# Patient Record
Sex: FEMALE | Race: WHITE | NOT HISPANIC OR LATINO | URBAN - METROPOLITAN AREA
[De-identification: names, ages, dates, MRNs, and addresses within clinical notes are randomized per-mention and may not be internally consistent; named-entity substitution may affect disease eponyms.]

---

## 2017-07-18 ENCOUNTER — OUTPATIENT (OUTPATIENT)
Dept: OUTPATIENT SERVICES | Facility: HOSPITAL | Age: 82
LOS: 1 days | End: 2017-07-18

## 2017-10-05 ENCOUNTER — OUTPATIENT (OUTPATIENT)
Dept: OUTPATIENT SERVICES | Facility: HOSPITAL | Age: 82
LOS: 1 days | End: 2017-10-05

## 2019-09-06 ENCOUNTER — APPOINTMENT (OUTPATIENT)
Dept: DERMATOLOGY | Facility: CLINIC | Age: 84
End: 2019-09-06
Payer: MEDICARE

## 2019-09-06 VITALS — WEIGHT: 122 LBS | HEIGHT: 64 IN | BODY MASS INDEX: 20.83 KG/M2

## 2019-09-06 DIAGNOSIS — D48.9 NEOPLASM OF UNCERTAIN BEHAVIOR, UNSPECIFIED: ICD-10-CM

## 2019-09-06 DIAGNOSIS — L82.0 INFLAMED SEBORRHEIC KERATOSIS: ICD-10-CM

## 2019-09-06 PROCEDURE — 99203 OFFICE O/P NEW LOW 30 MIN: CPT | Mod: 25

## 2019-09-06 PROCEDURE — 11312 SHAVE SKIN LESION 1.1-2.0 CM: CPT

## 2019-09-06 NOTE — HISTORY OF PRESENT ILLNESS
[de-identified] : Pt. for check of face;  c/o lesion on L cheek, has been treated with cryo multiple times in past, irritated, now recurring

## 2019-09-06 NOTE — PHYSICAL EXAM
[FreeTextEntry3] : Skin examination performed of the face, neck, chest, hands, lower legs;\par The patient is well, alert and oriented, pleasant and cooperative.\par Eyelids, conjunctivae, oral mucosa, digits and nails all normal.  \par No cervical adenopathy.\par \par \par + lentigines and solar damage are present in sun exposed areas; \par \par + inflamed, waxy, keratotic papules; multiple small lesions in cluster, 1.8 cm in diameter;  L lateral cheek

## 2019-09-25 ENCOUNTER — APPOINTMENT (OUTPATIENT)
Dept: ORTHOPEDIC SURGERY | Facility: CLINIC | Age: 84
End: 2019-09-25
Payer: MEDICARE

## 2019-09-25 VITALS
DIASTOLIC BLOOD PRESSURE: 64 MMHG | SYSTOLIC BLOOD PRESSURE: 101 MMHG | BODY MASS INDEX: 20.83 KG/M2 | WEIGHT: 122 LBS | HEIGHT: 64 IN | HEART RATE: 67 BPM

## 2019-09-25 DIAGNOSIS — M70.50 OTHER BURSITIS OF KNEE, UNSPECIFIED KNEE: ICD-10-CM

## 2019-09-25 PROCEDURE — 20610 DRAIN/INJ JOINT/BURSA W/O US: CPT | Mod: RT

## 2019-09-25 PROCEDURE — 99214 OFFICE O/P EST MOD 30 MIN: CPT | Mod: 25

## 2019-09-25 PROCEDURE — 73560 X-RAY EXAM OF KNEE 1 OR 2: CPT | Mod: RT

## 2019-10-03 ENCOUNTER — APPOINTMENT (OUTPATIENT)
Dept: ORTHOPEDIC SURGERY | Facility: CLINIC | Age: 84
End: 2019-10-03
Payer: MEDICARE

## 2019-10-03 VITALS
SYSTOLIC BLOOD PRESSURE: 116 MMHG | WEIGHT: 124 LBS | TEMPERATURE: 97.7 F | DIASTOLIC BLOOD PRESSURE: 73 MMHG | HEART RATE: 61 BPM | HEIGHT: 64 IN | BODY MASS INDEX: 21.17 KG/M2

## 2019-10-03 DIAGNOSIS — Z87.39 PERSONAL HISTORY OF OTHER DISEASES OF THE MUSCULOSKELETAL SYSTEM AND CONNECTIVE TISSUE: ICD-10-CM

## 2019-10-03 DIAGNOSIS — Z78.9 OTHER SPECIFIED HEALTH STATUS: ICD-10-CM

## 2019-10-03 DIAGNOSIS — M70.50 OTHER BURSITIS OF KNEE, UNSPECIFIED KNEE: ICD-10-CM

## 2019-10-03 PROCEDURE — 99213 OFFICE O/P EST LOW 20 MIN: CPT

## 2019-10-04 NOTE — ADDENDUM
[FreeTextEntry1] : This note was written by Albania Lindquist on 10/04/2019 acting as scribe for Lavon Villasenor III, MD

## 2019-10-04 NOTE — HISTORY OF PRESENT ILLNESS
[de-identified] : The patient comes in today for her right knee.  She has not been seen for many years for her right knee.  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  She had some pain for the past week.  She denies any fevers or chills.

## 2019-10-04 NOTE — PROCEDURE
[de-identified] : Consent: \par At this time, I have recommended an injection to the right knee.  The risks and benefits of the procedure were discussed with the patient in detail.  Upon verbal consent of the patient, we proceeded with the injection as noted below.  \par \par Procedure:  \par After a sterile prep, the patient underwent an injection of 9 cc of 1% Lidocaine without epinephrine and 1 cc of Kenalog into the right knee.  The patient tolerated the procedure well.  There were no complications.

## 2019-10-04 NOTE — PHYSICAL EXAM
[de-identified] : Left Knee: \par Knee: Range of Motion in Degrees	\par 	                  Claimant:	Normal:	\par Flexion Active	  135 	                135-degrees	\par Flexion Passive	  135	                135-degrees	\par Extension Active	  0-5	                0-5-degrees	\par Extension Passive	  0-5	                0-5-degrees	\par \par No weakness to flexion/extension. No evidence of instability in the AP plane or varus or valgus stress.  Negative  Lachman.  Negative pivot shift.  Negative anterior drawer test.  Negative posterior drawer test.  Negative Mariann.  Negative Apley grind.  No medial or lateral joint line tenderness.  Positive tenderness over the medial and lateral facet of the patella.  Positive patellofemoral crepitations.  No lateral tilting patella.  No patella apprehension.  Positive crepitation in the medial and lateral femoral condyle.  No proximal or distal swelling, edema or tenderness.  No gross motor or sensory deficits. Mild intra-articular swelling.  2+ DP and PT pulses. No varus or valgus malalignment.  Skin is intact.  No rashes, scars or lesions.\par \par Right Knee: \par Knee: Range of Motion in Degrees	\par 	                  Claimant:	Normal:	\par Flexion Active	  135 	                135-degrees	\par Flexion Passive	  135	                135-degrees	\par Extension Active	  0-5	                0-5-degrees	\par Extension Passive	  0-5	                0-5-degrees	\par \par No weakness to flexion/extension.  Tenderness over the pes bursa.  No evidence of instability in the AP plane or varus or valgus stress.  Negative  Lachman.  Negative pivot shift.  Negative anterior drawer test.  Negative posterior drawer test.  Negative Mariann.  Negative Apley grind.  No medial or lateral joint line tenderness.  No tenderness over the medial and lateral facet of the patella.  No patellofemoral crepitations.  No lateral tilting patella.  No patella apprehension.  No crepitation in the medial and lateral femoral condyle.  No proximal or distal swelling, edema or tenderness.  No gross motor or sensory deficits.  Mild effusion.  Extra-articular swelling over the proximal medial aspect of the tibia.  2+ DP and PT pulses.  No varus or valgus malalignment.  Well healed scar.    \par   [de-identified] : Ambulating with a slightly antalgic to antalgic gait.  Station:  Normal.  [de-identified] : General Appearance:  Well-developed, well-nourished female in no acute distress. \par  [de-identified] : Radiographs, two views of the right knee, show excellent position of the implant.  No evidence of loosening.

## 2019-10-04 NOTE — DISCUSSION/SUMMARY
[de-identified] : At this time, I have recommended ice and elevation for the right knee pes bursitis status post right total knee.  She will be reassessed in one week.

## 2019-10-10 NOTE — ADDENDUM
[FreeTextEntry1] : This note was written by Nidia Carter on 10/08/2019 acting as a scribe for Lavon Villasenor III, MD\par \par

## 2019-10-10 NOTE — DISCUSSION/SUMMARY
[de-identified] : At this time, since the patient is doing well status post the pes bursal injection for the pes bursitis of the right knee, she is instructed on home therapeutic modalities.  She will follow up as needed.

## 2019-10-10 NOTE — PHYSICAL EXAM
[de-identified] : Right Knee: \par Range of Motion in Degrees	\par 	                  Claimant:	Normal:	\par Flexion Active	  135 	                135-degrees	\par Flexion Passive	  135	                135-degrees	\par Extension Active	  0-5	                0-5-degrees	\par Extension Passive	  0-5	                0-5-degrees	\par \par No weakness to flexion/extension.  No evidence of instability in the AP plane or varus or valgus stress.  Negative  Lachman.  Negative pivot shift.  Negative anterior drawer test.  Negative posterior drawer test.  Negative Mariann.  Negative Apley grind.  No medial or lateral joint line tenderness.  No tenderness over the medial and lateral facet of the patella.  No patellofemoral crepitations.  No lateral tilting patella.  No patellar apprehension.  No crepitation in the medial and lateral femoral condyle.  No proximal or distal swelling, edema or tenderness.  No gross motor or sensory deficits.  No intra-articular swelling.  2+ DP and PT pulses. No varus or valgus malalignment.  Well-healed scar.  \par   [de-identified] : Ambulating with a slightly antalgic to antalgic gait.  Station:  Normal.  [de-identified] : Appearance:  Well-developed, well-nourished female in no acute distress.\par

## 2019-10-10 NOTE — HISTORY OF PRESENT ILLNESS
[5] : the ailment interference is 5/10 [6] : the ailment interference is 6/10 [(Does not interfere) 0] : the ailment interference is 0/10 (does not interfere) [de-identified] : The patient comes in today for her right knee.  She states she is feeling great from her injection.  The patient states the pain is localized.  The patient describes the pain as achy.  The patient notes rest makes her symptoms better, while bending makes her symptoms worse. The patient indicates pain is at a level of 6 on a pain scale of 0-10. [] : No

## 2020-09-17 ENCOUNTER — APPOINTMENT (OUTPATIENT)
Dept: ORTHOPEDIC SURGERY | Facility: CLINIC | Age: 85
End: 2020-09-17
Payer: MEDICARE

## 2020-09-17 VITALS
HEIGHT: 64 IN | BODY MASS INDEX: 20.49 KG/M2 | SYSTOLIC BLOOD PRESSURE: 174 MMHG | DIASTOLIC BLOOD PRESSURE: 78 MMHG | WEIGHT: 120 LBS | HEART RATE: 61 BPM

## 2020-09-17 DIAGNOSIS — S83.207A UNSPECIFIED TEAR OF UNSPECIFIED MENISCUS, CURRENT INJURY, LEFT KNEE, INITIAL ENCOUNTER: ICD-10-CM

## 2020-09-17 PROCEDURE — 20610 DRAIN/INJ JOINT/BURSA W/O US: CPT | Mod: LT

## 2020-09-17 PROCEDURE — 99214 OFFICE O/P EST MOD 30 MIN: CPT | Mod: 25

## 2020-09-17 PROCEDURE — 73560 X-RAY EXAM OF KNEE 1 OR 2: CPT | Mod: LT

## 2020-09-23 NOTE — PROCEDURE
[de-identified] : Consent: \par At this time, I have recommended an injection to the left knee.  The risks and benefits of the procedure were discussed with the patient in detail.  Upon verbal consent of the patient, we proceeded with the injection as noted below.  \par \par Procedure:  \par After a sterile prep, the patient underwent an injection of 9 cc of 1% Lidocaine without epinephrine and 1 cc of Kenalog into the left knee.  The patient tolerated the procedure well.  There were no complications.

## 2020-09-23 NOTE — PHYSICAL EXAM
[de-identified] : Left Knee: \par Range of Motion in Degrees	\par 	                  Claimant:	Normal:	\par Flexion Active	  135 	               135-degrees	\par Flexion Passive	  135	               135-degrees	\par Extension Active	  0-5	               0-5-degrees	\par Extension Passive     0-5	               0-5-degrees	\par \par No weakness to flexion/extension.  No evidence of instability in the AP plane or varus or valgus stress.  Negative  Lachman.  Negative pivot shift.  Negative anterior drawer test.  Negative posterior drawer test.  Positive Mariann.  Positive Apley grind.  Positive medial joint line tenderness.  Negative lateral joint line tenderness.  Positive tenderness over the medial and lateral facet of the patella.  Positive patellofemoral crepitations.  No lateral tilting patella.  No patellar apprehension.  Positive crepitation in the medial and lateral femoral condyle.  No proximal or distal swelling, edema or tenderness.  No gross motor or sensory deficits.  Mild intra-articular swelling.  2+ DP and PT pulses.  No varus or valgus malalignment.  Skin is intact.  No rashes, scars or lesions. \par  [de-identified] : Ambulating with a slightly antalgic to antalgic gait.  Station:  Normal.  [de-identified] : General Appearance:  Well-developed, well-nourished female in no acute distress. \par  [de-identified] : Radiographs, two views of the left knee, show moderate degenerative change.

## 2020-09-23 NOTE — ADDENDUM
[FreeTextEntry1] : This note was written by Albania Lindquist on 09/23/2020 acting as scribe for Lavon Villasenor III, MD

## 2020-09-23 NOTE — DISCUSSION/SUMMARY
[de-identified] : At this time, I have recommended ice and elevation for the left knee osteoarthritis and meniscal tear.  She will be reassessed in two to three weeks.

## 2020-09-23 NOTE — HISTORY OF PRESENT ILLNESS
[de-identified] : The patient comes in today with persistent complaints of pain to her left knee.

## 2020-09-30 ENCOUNTER — APPOINTMENT (OUTPATIENT)
Dept: ORTHOPEDIC SURGERY | Facility: CLINIC | Age: 85
End: 2020-09-30
Payer: MEDICARE

## 2020-09-30 DIAGNOSIS — S83.207D UNSPECIFIED TEAR OF UNSPECIFIED MENISCUS, CURRENT INJURY, LEFT KNEE, SUBSEQUENT ENCOUNTER: ICD-10-CM

## 2020-09-30 PROCEDURE — 99441: CPT | Mod: 95

## 2021-05-27 ENCOUNTER — NON-APPOINTMENT (OUTPATIENT)
Age: 86
End: 2021-05-27

## 2021-05-27 ENCOUNTER — APPOINTMENT (OUTPATIENT)
Dept: ORTHOPEDIC SURGERY | Facility: CLINIC | Age: 86
End: 2021-05-27
Payer: MEDICARE

## 2021-05-27 VITALS
DIASTOLIC BLOOD PRESSURE: 62 MMHG | HEART RATE: 56 BPM | SYSTOLIC BLOOD PRESSURE: 116 MMHG | WEIGHT: 122 LBS | HEIGHT: 64 IN | TEMPERATURE: 97.7 F | BODY MASS INDEX: 20.83 KG/M2

## 2021-05-27 PROCEDURE — 99214 OFFICE O/P EST MOD 30 MIN: CPT | Mod: 25

## 2021-05-27 PROCEDURE — 20610 DRAIN/INJ JOINT/BURSA W/O US: CPT | Mod: LT

## 2021-05-27 PROCEDURE — 73560 X-RAY EXAM OF KNEE 1 OR 2: CPT | Mod: LT

## 2021-06-03 NOTE — DISCUSSION/SUMMARY
[de-identified] : At this time, due to osteoarthritis of the left knee, I recommend ice and elevation.  She will be reassessed in three to four weeks.

## 2021-06-03 NOTE — ADDENDUM
[FreeTextEntry1] : This note was written by Nidia Carter on 06/03/2021 acting as a scribe for OSCAR ALLEN III, MD

## 2021-06-03 NOTE — PROCEDURE
[de-identified] : Consent: \par At this time, I have recommended an injection to the left knee.  The risks and benefits of the procedure were discussed with the patient in detail.  Upon verbal consent of the patient, we proceeded with the injection as noted below.  \par \par Procedure:  \par After a sterile prep, the patient underwent an injection of 9 cc of 1% Lidocaine without epinephrine and 1 cc of Kenalog into the left knee.  The patient tolerated the procedure well.  There were no complications.  \par

## 2021-06-03 NOTE — HISTORY OF PRESENT ILLNESS
[de-identified] : The patient comes in today, she hasn't been seen in awhile.  She is having some increasing complaints of pain in her left knee.

## 2021-06-03 NOTE — PHYSICAL EXAM
[de-identified] : Right Knee: \par Range of Motion in Degrees	\par 	                  Claimant:	Normal:	\par Flexion Active	  135 	                135-degrees	\par Flexion Passive	  135	                135-degrees	\par Extension Active	  0-5	                0-5-degrees	\par Extension Passive	  0-5	                0-5-degrees	\par \par Well-healed scar.  No instability.\par  \par Left Knee: \par Range of Motion in Degrees	\par 	                  Claimant:	Normal:	\par Flexion Active	  135 	                135-degrees	\par Flexion Passive	  135	                135-degrees	\par Extension Active	  0-5	                0-5-degrees	\par Extension Passive	  0-5	                0-5-degrees	\par \par No weakness to flexion/extension.  No evidence of instability in the AP plane or varus or valgus stress.  Negative  Lachman.  Negative pivot shift.  Negative anterior drawer test.  Negative posterior drawer test.  Negative Mariann.  Negative Apley grind.  No medial or lateral joint line tenderness.  Positive tenderness over the medial and lateral facet of the patella.  Positive patellofemoral crepitations.  No lateral tilting patella.  No patella apprehension.  Positive crepitation in the medial and lateral femoral condyle.  No proximal or distal swelling, edema or tenderness.  No gross motor or sensory deficits.  Mild intra-articular swelling.  2+ DP and PT pulses.  No varus or valgus malalignment.  Skin is intact.  No rashes, scars or lesions.  \par   [de-identified] : Ambulating with a slightly antalgic to antalgic gait.  Station:  Normal.  [de-identified] : Appearance:  Well-developed, well-nourished female in no acute distress.\par   [de-identified] : Radiographs, two views of the left knee, show moderate degenerative changes.

## 2021-06-24 ENCOUNTER — APPOINTMENT (OUTPATIENT)
Dept: ORTHOPEDIC SURGERY | Facility: CLINIC | Age: 86
End: 2021-06-24
Payer: MEDICARE

## 2021-06-24 DIAGNOSIS — M17.12 UNILATERAL PRIMARY OSTEOARTHRITIS, LEFT KNEE: ICD-10-CM

## 2021-06-24 PROCEDURE — 99441: CPT | Mod: 95

## 2021-07-07 PROBLEM — M17.12 PRIMARY OSTEOARTHRITIS OF LEFT KNEE: Status: ACTIVE | Noted: 2020-09-17

## 2021-07-14 ENCOUNTER — APPOINTMENT (OUTPATIENT)
Dept: OPHTHALMOLOGY | Facility: CLINIC | Age: 86
End: 2021-07-14
Payer: MEDICARE

## 2021-07-14 ENCOUNTER — NON-APPOINTMENT (OUTPATIENT)
Age: 86
End: 2021-07-14

## 2021-07-14 PROCEDURE — 92014 COMPRE OPH EXAM EST PT 1/>: CPT

## 2021-08-05 ENCOUNTER — APPOINTMENT (OUTPATIENT)
Dept: ORTHOPEDIC SURGERY | Facility: CLINIC | Age: 86
End: 2021-08-05
Payer: MEDICARE

## 2021-08-05 VITALS
DIASTOLIC BLOOD PRESSURE: 73 MMHG | SYSTOLIC BLOOD PRESSURE: 153 MMHG | WEIGHT: 122 LBS | BODY MASS INDEX: 20.83 KG/M2 | HEIGHT: 64 IN | HEART RATE: 52 BPM

## 2021-08-05 DIAGNOSIS — S42.002A FRACTURE OF UNSPECIFIED PART OF LEFT CLAVICLE, INITIAL ENCOUNTER FOR CLOSED FRACTURE: ICD-10-CM

## 2021-08-05 DIAGNOSIS — S80.11XA CONTUSION OF RIGHT LOWER LEG, INITIAL ENCOUNTER: ICD-10-CM

## 2021-08-05 PROCEDURE — 73030 X-RAY EXAM OF SHOULDER: CPT | Mod: LT

## 2021-08-05 PROCEDURE — 73560 X-RAY EXAM OF KNEE 1 OR 2: CPT | Mod: RT

## 2021-08-05 PROCEDURE — 99214 OFFICE O/P EST MOD 30 MIN: CPT | Mod: 57

## 2021-08-05 PROCEDURE — 23500 CLTX CLAVICULAR FX W/O MNPJ: CPT | Mod: LT

## 2021-08-09 NOTE — DISCUSSION/SUMMARY
[de-identified] : At this time, due to contusion of the right knee, I recommend a patellar sleeve.  As far as the fracture of the distal clavicle of the left shoulder, I recommend a sling, ice and elevation.  She will be reassessed in three weeks.\par \par I declare responsibility and liability for caring for this fracture for the next 90 days.

## 2021-08-09 NOTE — PHYSICAL EXAM
[de-identified] : Left Knee: \par Range of Motion in Degrees	\par 	                  Claimant:	Normal:	\par Flexion Active	  135 	                135-degrees	\par Flexion Passive	  135	                135-degrees	\par Extension Active	  0-5	                0-5-degrees	\par Extension Passive	  0-5	                0-5-degrees	\par \par No weakness to flexion/extension.  No evidence of instability in the AP plane or varus or valgus stress.  Negative  Lachman.  Negative pivot shift.  Negative anterior drawer test.  Negative posterior drawer test.  Negative Mariann.  Negative Apley grind.  No medial or lateral joint line tenderness.  No tenderness over the medial and lateral facet of the patella.  No patellofemoral crepitations.  No lateral tilting patella.  No patellar apprehension.  No crepitation in the medial and lateral femoral condyle.  No proximal or distal swelling, edema or tenderness.  No gross motor or sensory deficits.  No intra-articular swelling.  2+ DP and PT pulses. No varus or valgus malalignment.  Skin is intact.  No rashes, scars or lesions.  \par \par Right Knee:\par Range of Motion in Degrees	\par 	                  Claimant:	Normal:	\par Flexion Active	  135 	                135-degrees	\par Flexion Passive	  135	                135-degrees	\par Extension Active	  0-5	                0-5-degrees	\par Extension Passive	  0-5	                0-5-degrees	\par \par Well-healed scar.  Diffuse tenderness in the prepatellar region.\par \par Left Shoulder:\par Significantly tender over the distal clavicle.  Limited range of motion secondary to pain.  \par \par Right Shoulder: \par Range of Motion in Degrees:   	\par    	                        Claimant:          Normal:  	\par Abduction (Active)  	180  	180 degrees  	\par Abduction (Passive)  	180  	180 degrees  	\par Forward elevation (Active):  	180  	180 degrees  	\par Forward elevation (Passive):  180  	180 degrees  	\par External rotation (Active):  	45  	45 degrees  	\par External rotation (Passive):  	45  	45 degrees  	\par Internal rotation (Active):  	L-1  	L-1  	\par Internal rotation (Passive):  	L-1  	L-1  	\par \par No motor weakness to internal rotation, external rotation or abduction in the scapular plane.  Negative crank test.  Negative O’Elias’s test.  Negative Speed’s test. Negative Yergason’s test.  Negative cross arm test.  No tenderness to palpation at the AC joint. Negative Hawkin’s sign.  Negative Neer’s sign.  Negative apprehension. Negative sulcus sign.  No gross neurological or vascular deficits distally.  Skin is intact.  No rashes, scars or lesions. 2+ radial and ulnar pulses. No extra-articular swelling or tenderness.   \par    [de-identified] : Ambulating with a slightly antalgic to antalgic gait.  Station:  Normal.  [de-identified] : Appearance:  Well-developed, well-nourished female in no acute distress.\par   [de-identified] : Radiographs, two views of the left shoulder, show fracture of the distal clavicle.\par \par Radiographs, two views of the right knee, show a total knee arthroplasty in good position.

## 2021-08-09 NOTE — ADDENDUM
[FreeTextEntry1] : This note was written by Nidia Carter on 08/09/2021 acting as a scribe for OSCAR ALLEN III, MD

## 2021-08-09 NOTE — HISTORY OF PRESENT ILLNESS
[de-identified] : The patient comes in today with complaints of pain to her left shoulder and her right knee.  She is status post a fall several hours ago, banging her shoulder and hitting the front aspect of her right knee (the one that she had replaced).

## 2021-08-26 ENCOUNTER — APPOINTMENT (OUTPATIENT)
Dept: ORTHOPEDIC SURGERY | Facility: CLINIC | Age: 86
End: 2021-08-26
Payer: MEDICARE

## 2021-08-26 VITALS
SYSTOLIC BLOOD PRESSURE: 100 MMHG | HEART RATE: 65 BPM | BODY MASS INDEX: 20.83 KG/M2 | WEIGHT: 122 LBS | HEIGHT: 64 IN | DIASTOLIC BLOOD PRESSURE: 59 MMHG

## 2021-08-26 PROCEDURE — 73000 X-RAY EXAM OF COLLAR BONE: CPT | Mod: LT

## 2021-08-26 PROCEDURE — 99024 POSTOP FOLLOW-UP VISIT: CPT

## 2021-09-01 NOTE — ADDENDUM
[FreeTextEntry1] : This note was written by Nidia Carter on 08/31/2021 acting as a scribe for OSCAR ALLEN III, MD

## 2021-09-01 NOTE — PHYSICAL EXAM
[de-identified] : Left Shoulder:\par Significantly tender over the distal clavicle. Limited range of motion secondary to pain.\par \par  [de-identified] : Ambulating with a slightly antalgic to antalgic gait.  Station:  Normal.  [de-identified] : Appearance:  Well-developed, well-nourished female in no acute distress.\par   [de-identified] : Radiographs, two views of the left clavicle, reveal some displacement and a fracture of the distal clavicle.

## 2021-09-01 NOTE — DISCUSSION/SUMMARY
[de-identified] : At this time, due to the fracture of the left clavicle, I recommend she continue her current modalities.  She will be reassessed in three weeks.

## 2021-09-20 ENCOUNTER — APPOINTMENT (OUTPATIENT)
Dept: ORTHOPEDIC SURGERY | Facility: CLINIC | Age: 86
End: 2021-09-20
Payer: MEDICARE

## 2021-09-20 VITALS
BODY MASS INDEX: 20.83 KG/M2 | HEART RATE: 72 BPM | HEIGHT: 64 IN | SYSTOLIC BLOOD PRESSURE: 123 MMHG | WEIGHT: 122 LBS | DIASTOLIC BLOOD PRESSURE: 64 MMHG

## 2021-09-20 PROCEDURE — 99024 POSTOP FOLLOW-UP VISIT: CPT

## 2021-09-20 PROCEDURE — 73000 X-RAY EXAM OF COLLAR BONE: CPT | Mod: LT

## 2021-09-22 NOTE — DISCUSSION/SUMMARY
[de-identified] : At this time, due to fracture of left distal clavicle with mild scapular winging, she was instructed on home therapeutic modalities and will be reassessed in 1 month.\par

## 2021-09-22 NOTE — ADDENDUM
[FreeTextEntry1] : This note was written by Caleb Monroe on 09/22/2021, acting as a scribe for Lavon Villasenor III, MD

## 2021-09-22 NOTE — HISTORY OF PRESENT ILLNESS
[de-identified] : The patient comes in today for her left shoulder.  She states she is feeling much better, but she notes her scapula sticking out.\par

## 2021-09-22 NOTE — PHYSICAL EXAM
[Normal] : Gait: normal [de-identified] : Left Shoulder:  Positive winging of the scapula.  Minimally tender over the distal clavicle. [de-identified] : \par   [de-identified] : Radiographs, two views of the left clavicle, show acceptable position and healing.\par

## 2021-09-29 ENCOUNTER — APPOINTMENT (OUTPATIENT)
Dept: OPHTHALMOLOGY | Facility: CLINIC | Age: 86
End: 2021-09-29

## 2021-10-18 ENCOUNTER — APPOINTMENT (OUTPATIENT)
Dept: ORTHOPEDIC SURGERY | Facility: CLINIC | Age: 86
End: 2021-10-18
Payer: MEDICARE

## 2021-10-18 VITALS
DIASTOLIC BLOOD PRESSURE: 69 MMHG | BODY MASS INDEX: 20.49 KG/M2 | WEIGHT: 120 LBS | SYSTOLIC BLOOD PRESSURE: 145 MMHG | HEIGHT: 64 IN | HEART RATE: 51 BPM

## 2021-10-18 DIAGNOSIS — G62.9 POLYNEUROPATHY, UNSPECIFIED: ICD-10-CM

## 2021-10-18 DIAGNOSIS — S42.002D FRACTURE OF UNSPECIFIED PART OF LEFT CLAVICLE, SUBSEQUENT ENCOUNTER FOR FRACTURE WITH ROUTINE HEALING: ICD-10-CM

## 2021-10-18 DIAGNOSIS — M17.0 BILATERAL PRIMARY OSTEOARTHRITIS OF KNEE: ICD-10-CM

## 2021-10-18 PROCEDURE — 73000 X-RAY EXAM OF COLLAR BONE: CPT | Mod: LT

## 2021-10-18 PROCEDURE — 99024 POSTOP FOLLOW-UP VISIT: CPT

## 2021-10-19 PROBLEM — S42.002D CLOSED NONDISPLACED FRACTURE OF LEFT CLAVICLE WITH ROUTINE HEALING, UNSPECIFIED PART OF CLAVICLE, SUBSEQUENT ENCOUNTER: Status: ACTIVE | Noted: 2021-08-26

## 2021-10-19 PROBLEM — G62.9 NEUROPATHY: Status: ACTIVE | Noted: 2021-10-18

## 2021-10-19 NOTE — HISTORY OF PRESENT ILLNESS
[de-identified] : The patient comes in today for her left shoulder.  She states the shoulder is feeling much better.\par

## 2021-10-19 NOTE — ADDENDUM
[FreeTextEntry1] : This note was written by Caleb Monroe on 10/19/2021, acting as a scribe for Lavon Villasenor III, MD

## 2021-10-19 NOTE — PHYSICAL EXAM
[de-identified] : Left Shoulder:  Mildly tender over the distal clavicle.   [de-identified] : Radiographs, two views of the left clavicle, show acceptable position and healing.\par

## 2021-10-19 NOTE — DISCUSSION/SUMMARY
[de-identified] : At this time, since the patient is status post fracture of left distal clavicle, the patient was instructed on home therapeutic modalities.  \par \par Of note, because of the weakness in the legs from neuropathy, I recommended a rolling walker and she was given a script for such.\par

## 2022-09-08 ENCOUNTER — APPOINTMENT (OUTPATIENT)
Dept: OPHTHALMOLOGY | Facility: CLINIC | Age: 87
End: 2022-09-08

## 2022-09-08 ENCOUNTER — NON-APPOINTMENT (OUTPATIENT)
Age: 87
End: 2022-09-08

## 2022-09-08 PROCEDURE — 92133 CPTRZD OPH DX IMG PST SGM ON: CPT

## 2022-09-08 PROCEDURE — 92014 COMPRE OPH EXAM EST PT 1/>: CPT

## 2022-12-20 ENCOUNTER — APPOINTMENT (OUTPATIENT)
Dept: OPHTHALMOLOGY | Facility: CLINIC | Age: 87
End: 2022-12-20

## 2022-12-20 ENCOUNTER — NON-APPOINTMENT (OUTPATIENT)
Age: 87
End: 2022-12-20

## 2022-12-20 PROCEDURE — 92012 INTRM OPH EXAM EST PATIENT: CPT

## 2022-12-20 PROCEDURE — 92083 EXTENDED VISUAL FIELD XM: CPT

## 2022-12-20 PROCEDURE — 92250 FUNDUS PHOTOGRAPHY W/I&R: CPT

## 2022-12-29 ENCOUNTER — EMERGENCY (EMERGENCY)
Facility: HOSPITAL | Age: 87
LOS: 0 days | Discharge: HOME | End: 2022-12-29
Attending: EMERGENCY MEDICINE
Payer: MEDICARE

## 2022-12-29 VITALS
DIASTOLIC BLOOD PRESSURE: 71 MMHG | OXYGEN SATURATION: 97 % | RESPIRATION RATE: 16 BRPM | TEMPERATURE: 98 F | SYSTOLIC BLOOD PRESSURE: 135 MMHG | HEART RATE: 70 BPM

## 2022-12-29 VITALS — HEIGHT: 64 IN | WEIGHT: 119.93 LBS

## 2022-12-29 DIAGNOSIS — G47.33 OBSTRUCTIVE SLEEP APNEA (ADULT) (PEDIATRIC): ICD-10-CM

## 2022-12-29 DIAGNOSIS — Z91.040 LATEX ALLERGY STATUS: ICD-10-CM

## 2022-12-29 DIAGNOSIS — Z88.7 ALLERGY STATUS TO SERUM AND VACCINE: ICD-10-CM

## 2022-12-29 DIAGNOSIS — Z91.018 ALLERGY TO OTHER FOODS: ICD-10-CM

## 2022-12-29 DIAGNOSIS — Z90.49 ACQUIRED ABSENCE OF OTHER SPECIFIED PARTS OF DIGESTIVE TRACT: ICD-10-CM

## 2022-12-29 DIAGNOSIS — M85.80 OTHER SPECIFIED DISORDERS OF BONE DENSITY AND STRUCTURE, UNSPECIFIED SITE: ICD-10-CM

## 2022-12-29 DIAGNOSIS — M79.662 PAIN IN LEFT LOWER LEG: ICD-10-CM

## 2022-12-29 DIAGNOSIS — Z91.012 ALLERGY TO EGGS: ICD-10-CM

## 2022-12-29 DIAGNOSIS — G62.9 POLYNEUROPATHY, UNSPECIFIED: ICD-10-CM

## 2022-12-29 DIAGNOSIS — Z90.710 ACQUIRED ABSENCE OF BOTH CERVIX AND UTERUS: ICD-10-CM

## 2022-12-29 DIAGNOSIS — Z90.89 ACQUIRED ABSENCE OF OTHER ORGANS: ICD-10-CM

## 2022-12-29 DIAGNOSIS — M19.90 UNSPECIFIED OSTEOARTHRITIS, UNSPECIFIED SITE: ICD-10-CM

## 2022-12-29 DIAGNOSIS — I82.452 ACUTE EMBOLISM AND THROMBOSIS OF LEFT PERONEAL VEIN: ICD-10-CM

## 2022-12-29 DIAGNOSIS — M41.84 OTHER FORMS OF SCOLIOSIS, THORACIC REGION: ICD-10-CM

## 2022-12-29 DIAGNOSIS — M81.0 AGE-RELATED OSTEOPOROSIS WITHOUT CURRENT PATHOLOGICAL FRACTURE: ICD-10-CM

## 2022-12-29 LAB
ALBUMIN SERPL ELPH-MCNC: 3.2 G/DL — LOW (ref 3.3–5)
ALP SERPL-CCNC: 100 U/L — SIGNIFICANT CHANGE UP (ref 40–120)
ALT FLD-CCNC: 50 U/L — SIGNIFICANT CHANGE UP (ref 12–78)
ANION GAP SERPL CALC-SCNC: 3 MMOL/L — LOW (ref 5–17)
APTT BLD: 49.2 SEC — HIGH (ref 27.5–35.5)
AST SERPL-CCNC: 39 U/L — HIGH (ref 15–37)
BASOPHILS # BLD AUTO: 0.02 K/UL — SIGNIFICANT CHANGE UP (ref 0–0.2)
BASOPHILS NFR BLD AUTO: 0.4 % — SIGNIFICANT CHANGE UP (ref 0–2)
BILIRUB SERPL-MCNC: 0.3 MG/DL — SIGNIFICANT CHANGE UP (ref 0.2–1.2)
BUN SERPL-MCNC: 31 MG/DL — HIGH (ref 7–23)
CALCIUM SERPL-MCNC: 9.6 MG/DL — SIGNIFICANT CHANGE UP (ref 8.5–10.1)
CHLORIDE SERPL-SCNC: 109 MMOL/L — HIGH (ref 96–108)
CO2 SERPL-SCNC: 29 MMOL/L — SIGNIFICANT CHANGE UP (ref 22–31)
CREAT SERPL-MCNC: 0.86 MG/DL — SIGNIFICANT CHANGE UP (ref 0.5–1.3)
EGFR: 65 ML/MIN/1.73M2 — SIGNIFICANT CHANGE UP
EOSINOPHIL # BLD AUTO: 0.07 K/UL — SIGNIFICANT CHANGE UP (ref 0–0.5)
EOSINOPHIL NFR BLD AUTO: 1.5 % — SIGNIFICANT CHANGE UP (ref 0–6)
GLUCOSE SERPL-MCNC: 100 MG/DL — HIGH (ref 70–99)
HCT VFR BLD CALC: 35.6 % — SIGNIFICANT CHANGE UP (ref 34.5–45)
HGB BLD-MCNC: 11.5 G/DL — SIGNIFICANT CHANGE UP (ref 11.5–15.5)
IMM GRANULOCYTES NFR BLD AUTO: 0 % — SIGNIFICANT CHANGE UP (ref 0–0.9)
INR BLD: 1.07 RATIO — SIGNIFICANT CHANGE UP (ref 0.88–1.16)
LYMPHOCYTES # BLD AUTO: 1.19 K/UL — SIGNIFICANT CHANGE UP (ref 1–3.3)
LYMPHOCYTES # BLD AUTO: 25.4 % — SIGNIFICANT CHANGE UP (ref 13–44)
MCHC RBC-ENTMCNC: 30.2 PG — SIGNIFICANT CHANGE UP (ref 27–34)
MCHC RBC-ENTMCNC: 32.3 GM/DL — SIGNIFICANT CHANGE UP (ref 32–36)
MCV RBC AUTO: 93.4 FL — SIGNIFICANT CHANGE UP (ref 80–100)
MONOCYTES # BLD AUTO: 0.34 K/UL — SIGNIFICANT CHANGE UP (ref 0–0.9)
MONOCYTES NFR BLD AUTO: 7.2 % — SIGNIFICANT CHANGE UP (ref 2–14)
NEUTROPHILS # BLD AUTO: 3.07 K/UL — SIGNIFICANT CHANGE UP (ref 1.8–7.4)
NEUTROPHILS NFR BLD AUTO: 65.5 % — SIGNIFICANT CHANGE UP (ref 43–77)
PLATELET # BLD AUTO: 147 K/UL — LOW (ref 150–400)
POTASSIUM SERPL-MCNC: 4.5 MMOL/L — SIGNIFICANT CHANGE UP (ref 3.5–5.3)
POTASSIUM SERPL-SCNC: 4.5 MMOL/L — SIGNIFICANT CHANGE UP (ref 3.5–5.3)
PROT SERPL-MCNC: 6.8 GM/DL — SIGNIFICANT CHANGE UP (ref 6–8.3)
PROTHROM AB SERPL-ACNC: 12.4 SEC — SIGNIFICANT CHANGE UP (ref 10.5–13.4)
RBC # BLD: 3.81 M/UL — SIGNIFICANT CHANGE UP (ref 3.8–5.2)
RBC # FLD: 15.1 % — HIGH (ref 10.3–14.5)
SODIUM SERPL-SCNC: 141 MMOL/L — SIGNIFICANT CHANGE UP (ref 135–145)
WBC # BLD: 4.69 K/UL — SIGNIFICANT CHANGE UP (ref 3.8–10.5)
WBC # FLD AUTO: 4.69 K/UL — SIGNIFICANT CHANGE UP (ref 3.8–10.5)

## 2022-12-29 PROCEDURE — 99283 EMERGENCY DEPT VISIT LOW MDM: CPT | Mod: 25

## 2022-12-29 PROCEDURE — 80053 COMPREHEN METABOLIC PANEL: CPT

## 2022-12-29 PROCEDURE — 85730 THROMBOPLASTIN TIME PARTIAL: CPT

## 2022-12-29 PROCEDURE — 71045 X-RAY EXAM CHEST 1 VIEW: CPT

## 2022-12-29 PROCEDURE — 85025 COMPLETE CBC W/AUTO DIFF WBC: CPT

## 2022-12-29 PROCEDURE — 85610 PROTHROMBIN TIME: CPT

## 2022-12-29 PROCEDURE — 71045 X-RAY EXAM CHEST 1 VIEW: CPT | Mod: 26

## 2022-12-29 PROCEDURE — 99284 EMERGENCY DEPT VISIT MOD MDM: CPT | Mod: FS

## 2022-12-29 PROCEDURE — 36415 COLL VENOUS BLD VENIPUNCTURE: CPT

## 2022-12-29 RX ORDER — SODIUM CHLORIDE 9 MG/ML
3 INJECTION INTRAMUSCULAR; INTRAVENOUS; SUBCUTANEOUS ONCE
Refills: 0 | Status: COMPLETED | OUTPATIENT
Start: 2022-12-29 | End: 2022-12-29

## 2022-12-29 RX ORDER — RIVAROXABAN 15 MG-20MG
1 KIT ORAL
Qty: 42 | Refills: 0
Start: 2022-12-29

## 2022-12-29 RX ORDER — RIVAROXABAN 15 MG-20MG
15 KIT ORAL ONCE
Refills: 0 | Status: COMPLETED | OUTPATIENT
Start: 2022-12-29 | End: 2022-12-29

## 2022-12-29 RX ADMIN — RIVAROXABAN 15 MILLIGRAM(S): KIT at 18:41

## 2022-12-29 RX ADMIN — SODIUM CHLORIDE 3 MILLILITER(S): 9 INJECTION INTRAMUSCULAR; INTRAVENOUS; SUBCUTANEOUS at 16:55

## 2022-12-29 NOTE — ED STATDOCS - PATIENT PORTAL LINK FT
You can access the FollowMyHealth Patient Portal offered by [facility] by registering at the following website: [email or web address]. By joining [facility]’s FollowMyHealth portal, you will also be able to view your health information using other applications (apps) compatible with our system.

## 2022-12-29 NOTE — ED ADULT TRIAGE NOTE - CHIEF COMPLAINT QUOTE
pt ambulatory to triage from DR. Pichardo (cardiologist) office. as per radiologist report patient has a DVT in L lower extremity. -sob -blood thinners

## 2022-12-29 NOTE — ED STATDOCS - NSICDXPASTSURGICALHX_GEN_ALL_CORE_FT
PAST SURGICAL HISTORY:  Lumbar Laminectomy/ Spinal Fusion 1999    S/P Appendectomy @ 12 years old    S/P Arthroscopy of Right Knee 1998    S/P Hysterectomy 2007    S/P Tonsillectomy @ 2 years old

## 2022-12-29 NOTE — ED STATDOCS - MUSCULOSKELETAL, MLM
maex4, R knee surgical scar well healed, no obvious tenderness or swelling maex4, R knee surgical scar well healed, no obvious tenderness or swelling.  LLE: + mild L calf tender, no swelling/erythema noted

## 2022-12-29 NOTE — ED STATDOCS - PROGRESS NOTE DETAILS
PA: Patient is an 86 y/o female with PMHx of neuropathy, osteoporosis, and MILTON who presents to [facility] c/o LEFT calf pain. Patient had an outpatient venous doppler done today that showed +DVT in left calf. ADAN JAVIER CP. ~Jigar Bingham PA-C Will get basic labs, CXR. Start patient on Xarelto and continue on DC. Vascular surgery referral. ~Jigar Bingham PA-C PA note: All labwork/radiology results discussed in detail with patient. Patient re-examined and re-evaluated. Patient feels much better at this time. ED evaluation, Diagnosis and management discussed with the patient in detail. Workup results discussed with ED attending, OK to dc home. Close VASCULAR PMD follow up encouraged, aftercare to assist with scheduling appointment ASAP. Strict ED return instructions discussed in detail and patient given the opportunity to ask any questions about their discharge diagnosis and instructions. Patient verbalized understanding. ~ Jigar Bingham PA-C

## 2022-12-29 NOTE — ED STATDOCS - CARE PROVIDER_API CALL
Marshall Woodard)  Vascular Surgery  [address] [place] [address]  Phone: [phone]  Fax: [phone]  Follow Up Time: Urgent

## 2022-12-29 NOTE — ED STATDOCS - NS ED ATTENDING STATEMENT MOD
25' RW contact guard until discharge. This was a shared visit with the GRISELDA. I reviewed and verified the documentation and independently performed the documented:

## 2022-12-29 NOTE — ED STATDOCS - OBJECTIVE STATEMENT
86 y/o F with a PMHx of neuropathy, osteoporosis, and MILTON presents to the ED c/o L calf pain. pt has difficulty ambulating at baseline and is assisted by a walker.  states they were at the Dr. Pichardo who prompted the pt to have a doppler of b/l LE. on outpatient venous doppler b/l, R nunes's cyst, no DVT, L LE with positive +near occlusive thrombosis in peroneal vein with lack of proper flow or compressibility. Denies fever, CP, or SOB. Not on ACs. PCP: Dr. Washington. KILEY. 86 y/o F with a PMHx of neuropathy, osteoporosis, and MILTON BIB pvt car to the ED c/o L calf pain. pt has difficulty ambulating at baseline and is assisted by a walker.  states they were at Dr. Pichardo's office who prompted the pt to have a doppler of b/l LE.  Outpatient venous doppler b/l: R nunes's cyst, no DVT, L LE with positive +near occlusive thrombosis in peroneal vein with lack of proper flow or compressibility. Denies fever, CP, or SOB. Not on ACs.   PCP: Dr. Washington. KILEY.

## 2022-12-29 NOTE — ED STATDOCS - PRIOR OUTPATIENT RADIOLOGY SUMMARY
Outpatient venous doppler b/l: R nunes's cyst, no DVT, L LE with positive +near occlusive thrombosis in peroneal vein with lack of proper flow or compressibility.

## 2022-12-29 NOTE — ED STATDOCS - CLINICAL SUMMARY MEDICAL DECISION MAKING FREE TEXT BOX
elderly woman with neuropathy with recent LLKE calf pain, outpatient venous doppler positive L dvt, pt advised by a cardiologist for further eval and management. no CP, SOB, VSS. plan; labs, CXR, start oral AC, expect d/c. elderly woman with neuropathy with recent LLKE calf pain, outpatient venous doppler positive L dvt, pt advised by a cardiologist for further eval and management. no CP, SOB, VSS. plan; labs, CXR, start oral AC, expect d/c.      PA note: All labwork/radiology results discussed in detail with patient. Patient re-examined and re-evaluated. Patient feels much better at this time. ED evaluation, Diagnosis and management discussed with the patient in detail. Workup results discussed with ED attending, OK to dc home. Close VASCULAR PMD follow up encouraged, aftercare to assist with scheduling appointment ASAP. Strict ED return instructions discussed in detail and patient given the opportunity to ask any questions about their discharge diagnosis and instructions. Patient verbalized understanding. ~ Jigar Bingham PA-C elderly woman with neuropathy with recent L LE/calf pain, outpatient venous doppler positive L dvt, pt advised by a cardiologist for further eval and management. no CP, SOB, VSS.   Plan: labs, CXR, start oral AC, expect d/c.      PA note: All labwork/radiology results discussed in detail with patient. Patient re-examined and re-evaluated. Patient feels much better at this time. ED evaluation, Diagnosis and management discussed with the patient in detail. Workup results discussed with ED attending, OK to dc home. Close VASCULAR PMD follow up encouraged, aftercare to assist with scheduling appointment ASAP. Strict ED return instructions discussed in detail and patient given the opportunity to ask any questions about their discharge diagnosis and instructions. Patient verbalized understanding. ~ Jigar Bingham PA-C

## 2022-12-29 NOTE — ED STATDOCS - ENMT, MLM
Nasal mucosa clear.  oropharynx clear, MMM Throat has no vesicles, no oropharyngeal exudates and uvula is midline.

## 2022-12-29 NOTE — ED STATDOCS - NSFOLLOWUPINSTRUCTIONS_ED_ALL_ED_FT
Deep Vein Thrombosis    A person's legs with close-ups showing a normal vein, a vein with a blood clot, and a blood clot that breaks loose.   Deep vein thrombosis (DVT) is a condition in which a blood clot forms in a vein of the deep venous system. This can occur in the lower leg, thigh, pelvis, arm, or neck. A clot is blood that has thickened into a gel or solid. This condition is serious and can be life-threatening if the clot travels to the arteries of the lungs and causes a blockage (pulmonary embolism). A DVT can also damage veins in the leg, which can lead to long-term venous disease, leg pain, swelling, discoloration, and ulcers or sores (post-thrombotic syndrome).      What are the causes?    This condition may be caused by:  •A slowdown of blood flow.      •Damage to a vein.      •A condition that causes blood to clot more easily, such as certain bleeding disorders.        What increases the risk?    The following factors may make you more likely to develop this condition:  •Obesity.      •Being older, especially older than age 60.    •Being inactive or not moving around (sedentary lifestyle). This may include:  •Sitting or lying down for longer than 4–6 hours other than to sleep at night.      •Being in the hospital, or having major or lengthy surgery.      •Having any recent bone injuries, such as breaks (fractures), that reduce movement, especially in the lower extremities.      •Having recent orthopedic surgery on the lower extremities.        •Being pregnant, giving birth, or having recently given birth.      •Taking medicines that contain estrogen, such as birth control or hormone replacement therapy.      •Using products that contain nicotine or tobacco, especially if you use hormonal birth control.      •Having a history of a blood vessel disease (peripheral vascular disease) or congestive heart disease.      •Having a history of cancer, especially if being treated with chemotherapy.        What are the signs or symptoms?    Symptoms of this condition include:  •Swelling, pain, pressure, or tenderness in an arm or a leg.      •An arm or a leg becoming warm, red, or discolored.      •A leg turning very pale or blue. You may have a large DVT. This is rare.      If the clot is in your leg, you may notice that symptoms get worse when you stand or walk.    In some cases, there are no symptoms.      How is this diagnosed?    This condition is diagnosed with:  •Your medical history and a physical exam.    •Tests, such as:  •Blood tests to check how well your blood clots.      •Doppler ultrasound. This is the best way to find a DVT.      •CT venogram. Contrast dye is injected into a vein, and X-rays are taken to check for clots. This is helpful for veins in the chest or pelvis.          How is this treated?    Treatment for this condition depends on:  •The cause of your DVT.      •The size and location of your DVT, or having more than one DVT.      •Your risk for bleeding or developing more clots.      •Other medical conditions you may have.      Treatment may include:  •Taking a blood thinner medicine (anticoagulant) to prevent more clots from forming or current clots from growing.      •Wearing compression stockings.      •Injecting medicines into the affected vein to break up the clot (catheter-directed thrombolysis).    •Surgical procedures, when DVT is severe or hard to treat. These may be done to:  •Isolate and remove your clot.      •Place an inferior vena cava (IVC) filter. This filter is placed into a large vein called the inferior vena cava to catch blood clots before they reach your lungs.        You may get some medical treatments for 6 months or longer.      Follow these instructions at home:    If you are taking blood thinners:     •Talk with your health care provider before you take any medicines that contain aspirin or NSAIDs, such as ibuprofen. These medicines increase your risk for dangerous bleeding.      •Take your medicine exactly as told, at the same time every day. Do not skip a dose. Do not take more than the prescribed dose. This is important.      •Ask your health care provider about foods and medicines that could change or interact with the way your blood thinner works. Avoid these foods and medicines if you are told to do so.    •Avoid anything that may cause bleeding or bruising. You may bleed more easily while taking blood thinners.  •Be very careful when using knives, scissors, or other sharp objects.      •Use an electric razor instead of a blade.      •Avoid activities that could cause injury or bruising, and follow instructions for preventing falls.      •Tell your health care provider if you have had any internal bleeding, bleeding ulcers, or neurologic diseases, such as strokes or cerebral aneurysms.        •Wear a medical alert bracelet or carry a card that lists what medicines you take.      General instructions     •Take over-the-counter and prescription medicines only as told by your health care provider.      •Return to your normal activities as told by your health care provider. Ask your health care provider what activities are safe for you.      •If recommended, wear compression stockings as told by your health care provider. These stockings help to prevent blood clots and reduce swelling in your legs. Never wear your compression stockings while sleeping at night.      •Keep all follow-up visits. This is important.        Where to find more information    •American Heart Association: www.heart.org      •Centers for Disease Control and Prevention: www.cdc.gov      •National Heart, Lung, and [facility] [place]: www.nhlbi.nih.gov        Contact a health care provider if:    •You miss a dose of your blood thinner.      •You have unusual bruising or other color changes.      •You have new or worse pain, swelling, or redness in an arm or a leg.      •You have worsening numbness or tingling in an arm or a leg.      •You have a significant color change (pale or blue) in the extremity that has the DVT.        Get help right away if:  •You have signs or symptoms that a blood clot has moved to the lungs. These may include:  •Shortness of breath.      •Chest pain.      •Fast or irregular heartbeats (palpitations).      •Light-headedness, dizziness, or fainting.      •Coughing up blood.      •You have signs or symptoms that your blood is too thin. These may include:  •Blood in your vomit, stool, or urine.      •A cut that will not stop bleeding.      •A menstrual period that is heavier than usual.      •A severe headache or confusion.        These symptoms may be an emergency. Get help right away. Call 911.   • Do not wait to see if the symptoms will go away.        •  Do not drive yourself to the hospital.         Summary    •Deep vein thrombosis (DVT) happens when a blood clot forms in a deep vein. This may occur in the lower leg, thigh, pelvis, arm, or neck.      •Symptoms affect the arm or leg and can include swelling, pain, tenderness, warmth, redness, or discoloration.      •This condition may be treated with medicines. In severe cases, a procedure or surgery may be done to remove or dissolve the clots.      •If you are taking blood thinners, take them exactly as told. Do not skip a dose. Do not take more than is prescribed.      •Get help right away if you have a severe headache, shortness of breath, chest pain, fast or irregular heartbeats, or blood in your vomit, urine, or stool.      This information is not intended to replace advice given to you by your health care provider. Make sure you discuss any questions you have with your health care provider.

## 2022-12-29 NOTE — ED STATDOCS - PHYSICAL EXAMINATION
mild tenderness L calf, no obvious swelling or erythema PA NOTE: GEN: AOX3, NAD. HEENT: Throat clear. Airway is patent. EYES: PERRLA. EOMI. Head: NC/AT. NECK: Supple, No JVD. FROM. C-spine non-tender. CV:S1S2, RRR, LUNGS: Non-labored breathing, no tachypnea. O2sat 100% RA. CTA b/l. No w/r/r. CHEST: Equal chest expansion and rise. No deformity. ABD: Soft, NT/ND, no rebound, no guarding. No CVAT. EXT: No e/c/c. 2+ distal pulses. LEFT LE: +Mild tenderness left calf area. NEG Brandy, NEG Dick. 2+ distal pulses. No erythema. SKIN: No rashes. NEURO: No focal deficits. CN II-XII intact. FROM. 5/5 motor and sensory. ~Jigar ANDI Bingham-GRZEGORZ         mild tenderness L calf, no obvious swelling or erythema

## 2023-02-22 ENCOUNTER — OUTPATIENT (OUTPATIENT)
Dept: OUTPATIENT SERVICES | Facility: HOSPITAL | Age: 88
LOS: 1 days | End: 2023-02-22
Payer: MEDICARE

## 2023-02-22 DIAGNOSIS — R13.10 DYSPHAGIA, UNSPECIFIED: ICD-10-CM

## 2023-02-22 PROCEDURE — 74230 X-RAY XM SWLNG FUNCJ C+: CPT

## 2023-02-22 PROCEDURE — 74220 X-RAY XM ESOPHAGUS 1CNTRST: CPT

## 2023-02-22 PROCEDURE — 74230 X-RAY XM SWLNG FUNCJ C+: CPT | Mod: 26

## 2023-02-22 PROCEDURE — 92611 MOTION FLUOROSCOPY/SWALLOW: CPT | Mod: GN

## 2023-02-22 NOTE — SWALLOW VFSS/MBS ASSESSMENT ADULT - ORAL PHASE COMMENTS
Bolus formation/transfer were achieved via functional AP lingual actions and functional rotary masticatory motions that were within age acceptable parameters. The patient cleared oral debris after age acceptable piecemeal deglutition.

## 2023-02-22 NOTE — SWALLOW VFSS/MBS ASSESSMENT ADULT - PHARYNGEAL PHASE COMMENTS
Swallow triggered in an acceptable time frame for age. Hyolaryngeal excursion and epiglottic retroflexion during the swallow were grossly within functional parameters for age with sufficient prandial airway protection. Pharyngeal motility was felt to be functional for age as well, and cricopharyngeal sphincter opening was functional as well. NO LARYNGEAL PENETRATION, ASPIRATION, PHARYNGEAL RETENTION OR LARYNGOPHARYNGEAL REFLUX WERE DEMONSTRATED UNDER FLUOROSCOPIC CONTROL. MOREOVER, NO COUGHING OR SNEEZING EPISODES OCCURRED DURING THE MBS PROCEDURE.

## 2023-02-22 NOTE — SWALLOW VFSS/MBS ASSESSMENT ADULT - CONSISTENCIES ADMINISTERED
All PO was either coated or injected with barium for contrast purposes./thin liquid/mildly thick/pureed/regular solid

## 2023-02-22 NOTE — SWALLOW VFSS/MBS ASSESSMENT ADULT - ADDITIONAL INFORMATION
The patient was postured upright in a MIKEY chair for testing and was studied in the lateral plane. Preliminary fluoroscopy was notable for a cervical lordosis and presence of multiple cervical osteophytes that are not obstructive.
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
## 2023-02-22 NOTE — SWALLOW VFSS/MBS ASSESSMENT ADULT - COMMENTS
The patient was referred for an outpatient Modified Barium Swallowing Study(MBS) by Dr Seth. Medical history is remarkable for osteopenia, OA  post nasal drip, prior lumbar laminectomy, previous right knee arthroscopy, past LISSY, prior hysterectomy, and past tonsillectomy. The patient is reportedly on a regular consistency diet. The pt and  report that the she sometimes sneezes when eating and she sometimes coughs when eating as well. With that being stated, the patient denied coughing being food consistency specific and she indicated that she variably coughs non nutritively as well. The patient denied aspiration signs with meals, denied odynophagia and denied unintentional weight loss.

## 2023-02-22 NOTE — SWALLOW VFSS/MBS ASSESSMENT ADULT - DIAGNOSTIC IMPRESSIONS
THE PATIENT DEMONSTRATES OROPHARYNGEAL SWALLOWING ABILITIES WHICH ARE FELT TO BE WITHIN FUNCTIONAL PARAMETERS FOR AGE. NO LARYNGEAL PENETRATION, ASPIRATION, PHARYNGEAL RETENTION OR LARYNGOPHARYNGEAL REFLUX WERE DEMONSTRATED UNDER FLUOROSCOPIC CONTROL. MOREOVER, NO COUGHING OR SNEEZING EPISODES OCCURRED DURING THE MBS PROCEDURE.

## 2023-02-22 NOTE — SWALLOW VFSS/MBS ASSESSMENT ADULT - ADDITIONAL RECOMMENDATIONS
1) SUGGEST A REGULAR TEXTURE DIET WITH THIN LIQUID CONSISTENCIES AS THE PATIENT APPEARED CLINICALLY TOLERANT OF THESE FOOD CONSISTENCIES FROM AN OROPHARYNGEAL SWALLOWING PERSPECTIVE ON CLINICAL EXAM.     2) NO NEED FOR SWALLOWING THERAPY BY A SPEECH PATHOLOGIST.     3) MEDICAL F/U AT DISCRETION OF DR RIVERA. NOTE THAT AN ESOPHAGRAM WAS CONDUCTED BY RADIOLOGIST(DR CASTRO) AFTER MBS PROCEDURE. SEE HIS REPORT FOR DETAILS.     4) RECONSULT MAGED WEISS MA, [facility]  , SPEECH PATHOLOGY   [phone]

## 2023-02-23 ENCOUNTER — APPOINTMENT (OUTPATIENT)
Dept: ORTHOPEDIC SURGERY | Facility: CLINIC | Age: 88
End: 2023-02-23
Payer: MEDICARE

## 2023-02-23 VITALS
SYSTOLIC BLOOD PRESSURE: 107 MMHG | WEIGHT: 120 LBS | HEART RATE: 77 BPM | HEIGHT: 64 IN | BODY MASS INDEX: 20.49 KG/M2 | DIASTOLIC BLOOD PRESSURE: 54 MMHG

## 2023-02-23 DIAGNOSIS — R07.81 PLEURODYNIA: ICD-10-CM

## 2023-02-23 DIAGNOSIS — M25.512 PAIN IN LEFT SHOULDER: ICD-10-CM

## 2023-02-23 DIAGNOSIS — R13.10 DYSPHAGIA, UNSPECIFIED: ICD-10-CM

## 2023-02-23 PROCEDURE — 73030 X-RAY EXAM OF SHOULDER: CPT | Mod: LT

## 2023-02-23 PROCEDURE — 99213 OFFICE O/P EST LOW 20 MIN: CPT

## 2023-02-27 PROBLEM — M25.512 ACUTE PAIN OF LEFT SHOULDER: Status: ACTIVE | Noted: 2023-02-27

## 2023-02-27 PROBLEM — R07.81 PAIN IN RIB: Status: ACTIVE | Noted: 2023-02-27

## 2023-02-27 NOTE — DISCUSSION/SUMMARY
[de-identified] : At this time, due to left shoulder pain and left-sided rib pain, she is being referred for a spine evaluation because I think her primary issue is the pain at that level.\par \par

## 2023-02-27 NOTE — PHYSICAL EXAM
[Normal] : Gait: normal [de-identified] : Left Shoulder: \par Shoulder: Range of Motion in Degrees:   	\par    	                        Claimant:  	Normal:  	\par Abduction (Active)  	180  	180 degrees  	\par Abduction (Passive)  	180  	180 degrees  	\par Forward elevation (Active):  	180  	180 degrees  	\par Forward elevation (Passive):  180  	180 degrees  	\par External rotation (Active):  	45  	45 degrees  	\par External rotation (Passive):  	45  	45 degrees  	\par Internal rotation (Active):  	L-1  	L-1  	\par Internal rotation (Passive):  	L-1  	L-1  	\par \par Mild tenderness at the extremes of motion to the left shoulder and some pain to the extremes of motion, more significantly tender along the left side of her rib cage with mild scoliotic deformity.  No motor weakness to internal rotation, external rotation or abduction in the scapular plane.  Negative crank test.  Negative O’Elias’s test.  Negative Speed’s test. Negative Yergason’s test.  Negative cross arm test.  No tenderness to palpation at the AC joint. Negative Hawkin’s sign.  Negative Neer’s sign.  Negative apprehension. Negative sulcus sign.  No gross neurological or vascular deficits distally.  Skin is intact.  No rashes, scars or lesions.  2+ radial and ulnar pulses. No extra-articular swelling or tenderness.\par   [de-identified] : Appearance:  Well developed, well-nourished female in no acute distress.\par   [de-identified] : Radiographs, two views of the left shoulder taken in the office today, show mild degenerative changes.\par

## 2023-02-27 NOTE — HISTORY OF PRESENT ILLNESS
[de-identified] : The patient comes in today with complaints of pain to her left shoulder.  She notes she had a fall, but also complains of a lot of pain down the left side of her ribs.\par

## 2023-02-27 NOTE — ADDENDUM
[FreeTextEntry1] : This note was written by Caleb Monroe on 02/27/2023, acting as a scribe for Lavon Villasenor III, MD

## 2023-03-02 ENCOUNTER — INPATIENT (INPATIENT)
Facility: HOSPITAL | Age: 88
LOS: 8 days | Discharge: HOME | DRG: 177 | End: 2023-03-11
Attending: HOSPITALIST | Admitting: HOSPITALIST
Payer: MEDICARE

## 2023-03-02 VITALS — WEIGHT: 119.93 LBS | OXYGEN SATURATION: 91 %

## 2023-03-02 DIAGNOSIS — J18.9 PNEUMONIA, UNSPECIFIED ORGANISM: ICD-10-CM

## 2023-03-02 LAB
ADD ON TEST-SPECIMEN IN LAB: SIGNIFICANT CHANGE UP
ALBUMIN SERPL ELPH-MCNC: 1.7 G/DL — LOW (ref 3.3–5)
ALP SERPL-CCNC: 451 U/L — HIGH (ref 40–120)
ALT FLD-CCNC: 66 U/L — SIGNIFICANT CHANGE UP (ref 12–78)
ANION GAP SERPL CALC-SCNC: 4 MMOL/L — LOW (ref 5–17)
APPEARANCE UR: CLEAR — SIGNIFICANT CHANGE UP
APTT BLD: 38.9 SEC — HIGH (ref 27.5–35.5)
AST SERPL-CCNC: 39 U/L — HIGH (ref 15–37)
BASOPHILS # BLD AUTO: 0.09 K/UL — SIGNIFICANT CHANGE UP (ref 0–0.2)
BASOPHILS NFR BLD AUTO: 0.5 % — SIGNIFICANT CHANGE UP (ref 0–2)
BILIRUB SERPL-MCNC: 0.3 MG/DL — SIGNIFICANT CHANGE UP (ref 0.2–1.2)
BILIRUB UR-MCNC: NEGATIVE — SIGNIFICANT CHANGE UP
BUN SERPL-MCNC: 26 MG/DL — HIGH (ref 7–23)
CALCIUM SERPL-MCNC: 9.1 MG/DL — SIGNIFICANT CHANGE UP (ref 8.5–10.1)
CHLORIDE SERPL-SCNC: 102 MMOL/L — SIGNIFICANT CHANGE UP (ref 96–108)
CO2 SERPL-SCNC: 30 MMOL/L — SIGNIFICANT CHANGE UP (ref 22–31)
COLOR SPEC: YELLOW — SIGNIFICANT CHANGE UP
CREAT SERPL-MCNC: 0.78 MG/DL — SIGNIFICANT CHANGE UP (ref 0.5–1.3)
DIFF PNL FLD: NEGATIVE — SIGNIFICANT CHANGE UP
EGFR: 73 ML/MIN/1.73M2 — SIGNIFICANT CHANGE UP
EOSINOPHIL # BLD AUTO: 0.02 K/UL — SIGNIFICANT CHANGE UP (ref 0–0.5)
EOSINOPHIL NFR BLD AUTO: 0.1 % — SIGNIFICANT CHANGE UP (ref 0–6)
GLUCOSE SERPL-MCNC: 107 MG/DL — HIGH (ref 70–99)
GLUCOSE UR QL: NEGATIVE — SIGNIFICANT CHANGE UP
HCT VFR BLD CALC: 29.7 % — LOW (ref 34.5–45)
HGB BLD-MCNC: 9.9 G/DL — LOW (ref 11.5–15.5)
IMM GRANULOCYTES NFR BLD AUTO: 2 % — HIGH (ref 0–0.9)
INR BLD: 1.97 RATIO — HIGH (ref 0.88–1.16)
KETONES UR-MCNC: NEGATIVE — SIGNIFICANT CHANGE UP
LACTATE SERPL-SCNC: 1.3 MMOL/L — SIGNIFICANT CHANGE UP (ref 0.7–2)
LEUKOCYTE ESTERASE UR-ACNC: NEGATIVE — SIGNIFICANT CHANGE UP
LYMPHOCYTES # BLD AUTO: 0.68 K/UL — LOW (ref 1–3.3)
LYMPHOCYTES # BLD AUTO: 3.7 % — LOW (ref 13–44)
MCHC RBC-ENTMCNC: 30.3 PG — SIGNIFICANT CHANGE UP (ref 27–34)
MCHC RBC-ENTMCNC: 33.3 GM/DL — SIGNIFICANT CHANGE UP (ref 32–36)
MCV RBC AUTO: 90.8 FL — SIGNIFICANT CHANGE UP (ref 80–100)
MONOCYTES # BLD AUTO: 1.73 K/UL — HIGH (ref 0–0.9)
MONOCYTES NFR BLD AUTO: 9.4 % — SIGNIFICANT CHANGE UP (ref 2–14)
NEUTROPHILS # BLD AUTO: 15.58 K/UL — HIGH (ref 1.8–7.4)
NEUTROPHILS NFR BLD AUTO: 84.3 % — HIGH (ref 43–77)
NITRITE UR-MCNC: NEGATIVE — SIGNIFICANT CHANGE UP
NT-PROBNP SERPL-SCNC: 1428 PG/ML — HIGH (ref 0–450)
PH UR: 7 — SIGNIFICANT CHANGE UP (ref 5–8)
PLATELET # BLD AUTO: 400 K/UL — SIGNIFICANT CHANGE UP (ref 150–400)
POTASSIUM SERPL-MCNC: 3.9 MMOL/L — SIGNIFICANT CHANGE UP (ref 3.5–5.3)
POTASSIUM SERPL-SCNC: 3.9 MMOL/L — SIGNIFICANT CHANGE UP (ref 3.5–5.3)
PROT SERPL-MCNC: 5.6 GM/DL — LOW (ref 6–8.3)
PROT UR-MCNC: NEGATIVE — SIGNIFICANT CHANGE UP
PROTHROM AB SERPL-ACNC: 23 SEC — HIGH (ref 10.5–13.4)
RAPID RVP RESULT: SIGNIFICANT CHANGE UP
RBC # BLD: 3.27 M/UL — LOW (ref 3.8–5.2)
RBC # FLD: 14.4 % — SIGNIFICANT CHANGE UP (ref 10.3–14.5)
SARS-COV-2 RNA SPEC QL NAA+PROBE: SIGNIFICANT CHANGE UP
SODIUM SERPL-SCNC: 136 MMOL/L — SIGNIFICANT CHANGE UP (ref 135–145)
SP GR SPEC: 1 — LOW (ref 1.01–1.02)
TROPONIN I, HIGH SENSITIVITY RESULT: 8.23 NG/L — SIGNIFICANT CHANGE UP
UROBILINOGEN FLD QL: NEGATIVE — SIGNIFICANT CHANGE UP
WBC # BLD: 18.46 K/UL — HIGH (ref 3.8–10.5)
WBC # FLD AUTO: 18.46 K/UL — HIGH (ref 3.8–10.5)

## 2023-03-02 PROCEDURE — 71275 CT ANGIOGRAPHY CHEST: CPT | Mod: ME

## 2023-03-02 PROCEDURE — 85610 PROTHROMBIN TIME: CPT

## 2023-03-02 PROCEDURE — 83735 ASSAY OF MAGNESIUM: CPT

## 2023-03-02 PROCEDURE — 71046 X-RAY EXAM CHEST 2 VIEWS: CPT

## 2023-03-02 PROCEDURE — G1004: CPT

## 2023-03-02 PROCEDURE — 36415 COLL VENOUS BLD VENIPUNCTURE: CPT

## 2023-03-02 PROCEDURE — 82945 GLUCOSE OTHER FLUID: CPT

## 2023-03-02 PROCEDURE — C1729: CPT

## 2023-03-02 PROCEDURE — 32555 ASPIRATE PLEURA W/ IMAGING: CPT | Mod: LT

## 2023-03-02 PROCEDURE — 71045 X-RAY EXAM CHEST 1 VIEW: CPT

## 2023-03-02 PROCEDURE — 92610 EVALUATE SWALLOWING FUNCTION: CPT | Mod: GN

## 2023-03-02 PROCEDURE — 92523 SPEECH SOUND LANG COMPREHEN: CPT | Mod: GN

## 2023-03-02 PROCEDURE — 80053 COMPREHEN METABOLIC PANEL: CPT

## 2023-03-02 PROCEDURE — 70486 CT MAXILLOFACIAL W/O DYE: CPT

## 2023-03-02 PROCEDURE — 99285 EMERGENCY DEPT VISIT HI MDM: CPT | Mod: CS

## 2023-03-02 PROCEDURE — 93010 ELECTROCARDIOGRAM REPORT: CPT

## 2023-03-02 PROCEDURE — 84157 ASSAY OF PROTEIN OTHER: CPT

## 2023-03-02 PROCEDURE — 85027 COMPLETE CBC AUTOMATED: CPT

## 2023-03-02 PROCEDURE — 85730 THROMBOPLASTIN TIME PARTIAL: CPT

## 2023-03-02 PROCEDURE — 84100 ASSAY OF PHOSPHORUS: CPT

## 2023-03-02 PROCEDURE — 85025 COMPLETE CBC W/AUTO DIFF WBC: CPT

## 2023-03-02 PROCEDURE — 94760 N-INVAS EAR/PLS OXIMETRY 1: CPT

## 2023-03-02 PROCEDURE — 99497 ADVNCD CARE PLAN 30 MIN: CPT | Mod: 25

## 2023-03-02 PROCEDURE — 88313 SPECIAL STAINS GROUP 2: CPT

## 2023-03-02 PROCEDURE — 87070 CULTURE OTHR SPECIMN AEROBIC: CPT

## 2023-03-02 PROCEDURE — 80048 BASIC METABOLIC PNL TOTAL CA: CPT

## 2023-03-02 PROCEDURE — 32557 INSERT CATH PLEURA W/ IMAGE: CPT | Mod: LT

## 2023-03-02 PROCEDURE — 99223 1ST HOSP IP/OBS HIGH 75: CPT

## 2023-03-02 PROCEDURE — 71275 CT ANGIOGRAPHY CHEST: CPT | Mod: 26

## 2023-03-02 PROCEDURE — 88305 TISSUE EXAM BY PATHOLOGIST: CPT

## 2023-03-02 PROCEDURE — 82042 OTHER SOURCE ALBUMIN QUAN EA: CPT

## 2023-03-02 PROCEDURE — 71045 X-RAY EXAM CHEST 1 VIEW: CPT | Mod: 26

## 2023-03-02 PROCEDURE — 89051 BODY FLUID CELL COUNT: CPT

## 2023-03-02 PROCEDURE — C1769: CPT

## 2023-03-02 PROCEDURE — 83615 LACTATE (LD) (LDH) ENZYME: CPT

## 2023-03-02 PROCEDURE — 88312 SPECIAL STAINS GROUP 1: CPT

## 2023-03-02 PROCEDURE — 87102 FUNGUS ISOLATION CULTURE: CPT

## 2023-03-02 PROCEDURE — 0241U: CPT

## 2023-03-02 PROCEDURE — 87075 CULTR BACTERIA EXCEPT BLOOD: CPT

## 2023-03-02 RX ORDER — ASCORBIC ACID 60 MG
500 TABLET,CHEWABLE ORAL DAILY
Refills: 0 | Status: DISCONTINUED | OUTPATIENT
Start: 2023-03-02 | End: 2023-03-11

## 2023-03-02 RX ORDER — ALBUTEROL 90 UG/1
2 AEROSOL, METERED ORAL EVERY 6 HOURS
Refills: 0 | Status: DISCONTINUED | OUTPATIENT
Start: 2023-03-02 | End: 2023-03-11

## 2023-03-02 RX ORDER — PIPERACILLIN AND TAZOBACTAM 4; .5 G/20ML; G/20ML
3.38 INJECTION, POWDER, LYOPHILIZED, FOR SOLUTION INTRAVENOUS ONCE
Refills: 0 | Status: COMPLETED | OUTPATIENT
Start: 2023-03-02 | End: 2023-03-02

## 2023-03-02 RX ORDER — LATANOPROST 0.05 MG/ML
1 SOLUTION/ DROPS OPHTHALMIC; TOPICAL AT BEDTIME
Refills: 0 | Status: DISCONTINUED | OUTPATIENT
Start: 2023-03-02 | End: 2023-03-11

## 2023-03-02 RX ORDER — CHOLECALCIFEROL (VITAMIN D3) 125 MCG
400 CAPSULE ORAL DAILY
Refills: 0 | Status: DISCONTINUED | OUTPATIENT
Start: 2023-03-02 | End: 2023-03-11

## 2023-03-02 RX ORDER — ACETAMINOPHEN 500 MG
650 TABLET ORAL EVERY 6 HOURS
Refills: 0 | Status: DISCONTINUED | OUTPATIENT
Start: 2023-03-02 | End: 2023-03-11

## 2023-03-02 RX ORDER — AZITHROMYCIN 500 MG/1
500 TABLET, FILM COATED ORAL ONCE
Refills: 0 | Status: COMPLETED | OUTPATIENT
Start: 2023-03-02 | End: 2023-03-02

## 2023-03-02 RX ORDER — DONEPEZIL HYDROCHLORIDE 10 MG/1
5 TABLET, FILM COATED ORAL AT BEDTIME
Refills: 0 | Status: DISCONTINUED | OUTPATIENT
Start: 2023-03-02 | End: 2023-03-11

## 2023-03-02 RX ORDER — PIPERACILLIN AND TAZOBACTAM 4; .5 G/20ML; G/20ML
3.38 INJECTION, POWDER, LYOPHILIZED, FOR SOLUTION INTRAVENOUS ONCE
Refills: 0 | Status: COMPLETED | OUTPATIENT
Start: 2023-03-03 | End: 2023-03-03

## 2023-03-02 RX ORDER — AZITHROMYCIN 500 MG/1
500 TABLET, FILM COATED ORAL DAILY
Refills: 0 | Status: DISCONTINUED | OUTPATIENT
Start: 2023-03-03 | End: 2023-03-06

## 2023-03-02 RX ORDER — MAGNESIUM OXIDE 400 MG ORAL TABLET 241.3 MG
400 TABLET ORAL DAILY
Refills: 0 | Status: DISCONTINUED | OUTPATIENT
Start: 2023-03-02 | End: 2023-03-04

## 2023-03-02 RX ORDER — CEFTRIAXONE 500 MG/1
1000 INJECTION, POWDER, FOR SOLUTION INTRAMUSCULAR; INTRAVENOUS ONCE
Refills: 0 | Status: COMPLETED | OUTPATIENT
Start: 2023-03-02 | End: 2023-03-02

## 2023-03-02 RX ORDER — LANOLIN ALCOHOL/MO/W.PET/CERES
3 CREAM (GRAM) TOPICAL AT BEDTIME
Refills: 0 | Status: DISCONTINUED | OUTPATIENT
Start: 2023-03-02 | End: 2023-03-11

## 2023-03-02 RX ORDER — GUAIFENESIN/DEXTROMETHORPHAN 600MG-30MG
10 TABLET, EXTENDED RELEASE 12 HR ORAL EVERY 4 HOURS
Refills: 0 | Status: DISCONTINUED | OUTPATIENT
Start: 2023-03-02 | End: 2023-03-11

## 2023-03-02 RX ORDER — ONDANSETRON 8 MG/1
4 TABLET, FILM COATED ORAL EVERY 8 HOURS
Refills: 0 | Status: DISCONTINUED | OUTPATIENT
Start: 2023-03-02 | End: 2023-03-11

## 2023-03-02 RX ORDER — DORZOLAMIDE HYDROCHLORIDE TIMOLOL MALEATE 20; 5 MG/ML; MG/ML
1 SOLUTION/ DROPS OPHTHALMIC
Refills: 0 | Status: DISCONTINUED | OUTPATIENT
Start: 2023-03-02 | End: 2023-03-11

## 2023-03-02 RX ORDER — ENOXAPARIN SODIUM 100 MG/ML
50 INJECTION SUBCUTANEOUS
Refills: 0 | Status: COMPLETED | OUTPATIENT
Start: 2023-03-02 | End: 2023-03-02

## 2023-03-02 RX ORDER — PIPERACILLIN AND TAZOBACTAM 4; .5 G/20ML; G/20ML
3.38 INJECTION, POWDER, LYOPHILIZED, FOR SOLUTION INTRAVENOUS EVERY 8 HOURS
Refills: 0 | Status: COMPLETED | OUTPATIENT
Start: 2023-03-03 | End: 2023-03-10

## 2023-03-02 RX ORDER — CEFTRIAXONE 500 MG/1
1000 INJECTION, POWDER, FOR SOLUTION INTRAMUSCULAR; INTRAVENOUS ONCE
Refills: 0 | Status: DISCONTINUED | OUTPATIENT
Start: 2023-03-02 | End: 2023-03-02

## 2023-03-02 RX ADMIN — DONEPEZIL HYDROCHLORIDE 5 MILLIGRAM(S): 10 TABLET, FILM COATED ORAL at 22:00

## 2023-03-02 RX ADMIN — PIPERACILLIN AND TAZOBACTAM 200 GRAM(S): 4; .5 INJECTION, POWDER, LYOPHILIZED, FOR SOLUTION INTRAVENOUS at 18:23

## 2023-03-02 RX ADMIN — AZITHROMYCIN 255 MILLIGRAM(S): 500 TABLET, FILM COATED ORAL at 10:54

## 2023-03-02 RX ADMIN — CEFTRIAXONE 1000 MILLIGRAM(S): 500 INJECTION, POWDER, FOR SOLUTION INTRAMUSCULAR; INTRAVENOUS at 09:44

## 2023-03-02 RX ADMIN — DORZOLAMIDE HYDROCHLORIDE TIMOLOL MALEATE 1 DROP(S): 20; 5 SOLUTION/ DROPS OPHTHALMIC at 22:02

## 2023-03-02 RX ADMIN — Medication 3 MILLIGRAM(S): at 22:01

## 2023-03-02 RX ADMIN — ENOXAPARIN SODIUM 50 MILLIGRAM(S): 100 INJECTION SUBCUTANEOUS at 22:18

## 2023-03-02 RX ADMIN — LATANOPROST 1 DROP(S): 0.05 SOLUTION/ DROPS OPHTHALMIC; TOPICAL at 22:01

## 2023-03-02 NOTE — H&P ADULT - ASSESSMENT
MEDICATIONS  (STANDING):  ascorbic acid 500 milliGRAM(s) Oral daily  cholecalciferol 400 Unit(s) Oral daily  donepezil 5 milliGRAM(s) Oral at bedtime  dorzolamide 2%/timolol 0.5% Ophthalmic Solution 1 Drop(s) Right EYE two times a day  enoxaparin Injectable 50 milliGRAM(s) SubCutaneous <User Schedule>  latanoprost 0.005% Ophthalmic Solution 1 Drop(s) Right EYE at bedtime  magnesium oxide 400 milliGRAM(s) Oral daily  multivitamin 1 Tablet(s) Oral daily  piperacillin/tazobactam IVPB. 3.375 Gram(s) IV Intermittent once  piperacillin/tazobactam IVPB.- 3.375 Gram(s) IV Intermittent once    MEDICATIONS  (PRN):  acetaminophen     Tablet .. 650 milliGRAM(s) Oral every 6 hours PRN Temp greater or equal to 38C (100.4F), Mild Pain (1 - 3)  albuterol    90 MICROgram(s) HFA Inhaler 2 Puff(s) Inhalation every 6 hours PRN Shortness of Breath and/or Wheezing  aluminum hydroxide/magnesium hydroxide/simethicone Suspension 30 milliLiter(s) Oral every 4 hours PRN Dyspepsia  benzonatate 100 milliGRAM(s) Oral every 8 hours PRN Cough  guaifenesin/dextromethorphan Oral Liquid 10 milliLiter(s) Oral every 4 hours PRN Cough  melatonin 3 milliGRAM(s) Oral at bedtime PRN Insomnia  ondansetron Injectable 4 milliGRAM(s) IV Push every 8 hours PRN Nausea and/or Vomiting      A/P    Pneumonia. Possibly necrotizing but unable to clinically determine  Loculated Left Pleural Effusion  -No Sepsis POA  -Troponin negative  -CTA findings noted  -Empiric ABX  -ID consulted  -CTS consulted. Tentative chest tube tomorrow (last dose of eliquis morning of 3/2)  -O2 supplementation titration  -Pulmonology consulted  -Albuterol PRN  -Symptomatic Rx      Recent Dx of LLE DVT   -Eliquis on hold in anticipation for chest tube tomorrow  -Therapeutic lovenox tonight. Resume AC post chest tube when cleared by CT surgery    DVT Prophylaxis: Lovenox subq    Goals of Care (GOC)/Advance Care Planning (ACP)  Date of Discussion/evaluation: 3/2/2023  Purpose of Discussion: In the setting of advanced age and multiple comorbidities, discussion of patient's wishes should there be any deterioration in health status.   Parties Present/Discussed with: Patient,  (josse) and myself at bedside  Patient's Decision making capacity at the time of discussion: AAOx4 and has full medical decision making capacity  Presentation: As above  GOC: Code Status, HCP, Alternative Feeding Methods, Blood product Transfusions    PLAN:  Code Status: Full Code  HCP:   (Josse  Alternative Feeding methods: Would like to discuss or assess should the situation arise that it requires alternative feeding methods  Blood Product Transfusions: YES agreeable  Pressors: YES agreeable    ACP/GOC Time Spent:  16 minutes

## 2023-03-02 NOTE — CONSULT NOTE ADULT - SUBJECTIVE AND OBJECTIVE BOX
Patient is a 87y old  Female who presents with a chief complaint of SOB    HPI:  86 y/o female with h/o neuropathy, osteoporosis, MILTON, and DVT on Eliquis was admitted on 3/2 for worsening SOB x 2 weeks. Pt describes feeling like it is hard to take a breath in. Pt notes associated dry cough and left sided chest pain radiating to armpit. Pt was sent in by PCP Dr. Bg Villasenor secondary to CT chest non-contrast results revealing left sided pleural effusion/consolidation. Denies fevers, chills, sore throat at home. In ER she received zosyn.     PAST MEDICAL HISTORY:  Osteoarthritis   Osteopenia   Deep vein thrombosis (DVT).     PAST SURGICAL HISTORY:  Lumbar Laminectomy/ Spinal Fusion   S/P Appendectomy @ 12 years old  S/P Arthroscopy of Right Knee   S/P Hysterectomy   S/P Tonsillectomy @ 2 years old.    Meds: per reconciliation sheet, noted below  MEDICATIONS  (STANDING):  ascorbic acid 500 milliGRAM(s) Oral daily  cholecalciferol 400 Unit(s) Oral daily  donepezil 5 milliGRAM(s) Oral at bedtime  dorzolamide 2%/timolol 0.5% Ophthalmic Solution 1 Drop(s) Right EYE two times a day  enoxaparin Injectable 50 milliGRAM(s) SubCutaneous <User Schedule>  latanoprost 0.005% Ophthalmic Solution 1 Drop(s) Right EYE at bedtime  magnesium oxide 400 milliGRAM(s) Oral daily  multivitamin 1 Tablet(s) Oral daily  piperacillin/tazobactam IVPB. 3.375 Gram(s) IV Intermittent once  piperacillin/tazobactam IVPB.- 3.375 Gram(s) IV Intermittent once    MEDICATIONS  (PRN):  acetaminophen     Tablet .. 650 milliGRAM(s) Oral every 6 hours PRN Temp greater or equal to 38C (100.4F), Mild Pain (1 - 3)  albuterol    90 MICROgram(s) HFA Inhaler 2 Puff(s) Inhalation every 6 hours PRN Shortness of Breath and/or Wheezing  aluminum hydroxide/magnesium hydroxide/simethicone Suspension 30 milliLiter(s) Oral every 4 hours PRN Dyspepsia  benzonatate 100 milliGRAM(s) Oral every 8 hours PRN Cough  guaifenesin/dextromethorphan Oral Liquid 10 milliLiter(s) Oral every 4 hours PRN Cough  melatonin 3 milliGRAM(s) Oral at bedtime PRN Insomnia  ondansetron Injectable 4 milliGRAM(s) IV Push every 8 hours PRN Nausea and/or Vomiting    Allergies    [This allergen will not trigger allergy alert] hazelnuts (Swelling)  [This allergen will not trigger allergy alert] horse serum (Unknown)  [This allergen will not trigger allergy alert] Originally Entered as [SEE COMMENT] reaction to [HORSE SERUM] (Unknown)  eggs (Rhinitis)  latex (Rash)  No Known Drug Allergies    Intolerances    Social: no smoking, no alcohol, no illegal drugs; no recent travel, no exposure to TB  FAMILY HISTORY:    no history of premature cardiovascular disease in first degree relatives    ROS: the patient denies fever, no chills, no HA, no seizures, no dizziness, no sore throat, no nasal congestion, no blurry vision, no CP, no palpitations, has SOB, no cough, no abdominal pain, no diarrhea, no N/V, no dysuria, no leg pain, no claudication, no rash, no joint aches, no rectal pain or bleeding, no night sweats, has increased weakness  All other systems reviewed and are negative    Vital Signs Last 24 Hrs  T(C): 36.1 (02 Mar 2023 08:44), Max: 36.1 (02 Mar 2023 08:44)  T(F): 96.9 (02 Mar 2023 08:44), Max: 96.9 (02 Mar 2023 08:44)  HR: 76 (02 Mar 2023 08:44) (76 - 76)  BP: 130/59 (02 Mar 2023 08:44) (130/59 - 130/59)  BP(mean): 79 (02 Mar 2023 08:44) (79 - 79)  RR: 18 (02 Mar 2023 08:44) (18 - 18)  SpO2: 92% (02 Mar 2023 08:44) (91% - 92%)    Parameters below as of 02 Mar 2023 08:44  Patient On (Oxygen Delivery Method): room air    PE:    Constitutional:  No acute distress  HEENT: NC/AT, EOMI, PERRLA, conjunctivae clear; ears and nose atraumatic; pharynx benign  Neck: supple; thyroid not palpable  Back: no tenderness  Respiratory: respiratory effort normal; crackles at bases  Cardiovascular: S1S2 regular, no murmurs  Abdomen: soft, not tender, not distended, positive BS; no liver or spleen organomegaly  Genitourinary: no suprapubic tenderness  Lymphatic: no LN palpable  Musculoskeletal: no muscle tenderness, no joint swelling or tenderness  Extremities: no pedal edema  Neurological/ Psychiatric: AxOx3, judgement and insight normal; moving all extremities  Skin: no rashes; no palpable lesions    Labs: all available labs reviewed                        9.9    18.46 )-----------( 400      ( 02 Mar 2023 08:58 )             29.7         136  |  102  |  26<H>  ----------------------------<  107<H>  3.9   |  30  |  0.78    Ca    9.1      02 Mar 2023 08:58    TPro  5.6<L>  /  Alb  1.7<L>  /  TBili  0.3  /  DBili  x   /  AST  39<H>  /  ALT  66  /  AlkPhos  451<H>       LIVER FUNCTIONS - ( 02 Mar 2023 08:58 )  Alb: 1.7 g/dL / Pro: 5.6 gm/dL / ALK PHOS: 451 U/L / ALT: 66 U/L / AST: 39 U/L / GGT: x           Urinalysis Basic - ( 02 Mar 2023 10:21 )    Color: Yellow / Appearance: Clear / S.005 / pH: x  Gluc: x / Ketone: Negative  / Bili: Negative / Urobili: Negative   Blood: x / Protein: Negative / Nitrite: Negative   Leuk Esterase: Negative / RBC: x / WBC x   Sq Epi: x / Non Sq Epi: x / Bacteria: x    ( @ 08:58)  [facility]      Radiology: all available radiological tests reviewed    < from: CT Angio Chest PE Protocol w/ IV Cont (23 @ 13:49) >  No pulmonary embolism.    Complete left lower lobe consolidation, which appears to be new compared   to 2022. This likely represents pneumonia (possibly necrotizing   pneumonia). Recommend CT chest follow-up in one month to ensure clearing   and to exclude alternative etiologies.    Moderate size loculated left pleural effusion and small right pleural effusion.  < end of copied text >      Advanced directives addressed: full resuscitation

## 2023-03-02 NOTE — PATIENT PROFILE ADULT - FALL HARM RISK - ATTEMPT OOB
adenopathy. Does not bruise/bleed easily. Psychiatric/Behavioral: Negative for agitation, behavioral problems, confusion, decreased concentration, dysphoric mood, hallucinations, self-injury, sleep disturbance and suicidal ideas. The patient is not nervous/anxious and is not hyperactive.       /62 (Site: Right Arm, Position: Sitting, Cuff Size: Medium Adult)   Pulse 118   Temp 98.2 °F (36.8 °C) (Tympanic)   Resp 18   Ht 5' 5\" (1.651 m)   Wt 194 lb 8 oz (88.2 kg)   SpO2 95%   BMI 32.37 kg/m²     Objective:   Physical Exam    Assessment:            Plan:              Author MACRINA [place] No

## 2023-03-02 NOTE — H&P ADULT - NSHPPHYSICALEXAM_GEN_ALL_CORE
PHYSICAL EXAM:    T(C): 36.1 (03-02-23 @ 08:44), Max: 36.1 (03-02-23 @ 08:44)  HR: 63 (03-02-23 @ 15:03) (63 - 76)  BP: 145/64 (03-02-23 @ 15:03) (130/59 - 145/64)  RR: 19 (03-02-23 @ 15:03) (18 - 19)  SpO2: 93% (03-02-23 @ 15:03) (91% - 93%)    General: AAOx3; NAD; Lethargic; Fraile  Head: AT/NC  ENT: Moist Mucous Membranes; No Injury  Eyes: EOMI; PERRL  Neck: Non-tender; No JVD  CVS: RRR, S1&S2, No murmur, Trace LE edema  Respiratory: Decreased breath sounds bilaterally (L>>R) and mild bibasilar ronchi; Normal Respiratory Effort; respiratory support with NC  Abdomen/GI: Soft, non-tender, non-distended, no guarding, no rebound, normal bowel sounds  : No bladder distention, No Lenz  Extremities: No cyanosis, No clubbing, Trace LE edema  MSK: No CVA tenderness, Normal ROM, No injury  Neuro: AAOx3, CNII-XII grossly intact, non-focal  Psych: Appropriate, Cooperative,  Skin: Clean, Dry and Intact

## 2023-03-02 NOTE — H&P ADULT - NSHPLABSRESULTS_GEN_ALL_CORE
9.9    18.46 )-----------( 400      ( 02 Mar 2023 08:58 )             29.7     03-02    136  |  102  |  26<H>  ----------------------------<  107<H>  3.9   |  30  |  0.78    Ca    9.1      02 Mar 2023 08:58    TPro  5.6<L>  /  Alb  1.7<L>  /  TBili  0.3  /  DBili  x   /  AST  39<H>  /  ALT  66  /  AlkPhos  451<H>  03-02    [facility] (02 Mar 2023 08:58)    CAPILLARY BLOOD GLUCOSE        Urinalysis (03.02.23 @ 10:21)   Glucose Qualitative, Urine: Negative   Blood, Urine: Negative   pH Urine: 7.0   Color: Yellow   Urine Appearance: Clear   Bilirubin: Negative   Ketone - Urine: Negative   Specific Gravity: 1.005   Protein, Urine: Negative   Urobilinogen: Negative   Nitrite: Negative   Leukocyte Esterase Concentration: Negative Serum Pro-Brain Natriuretic Peptide (03.02.23 @ 08:58)   Serum Pro-Brain Natriuretic Peptide: 1428 pg/mL Troponin I, High Sensitivity Result: 8.23: Lactate, Blood: 1.3 mmol/L (03.02.23 @ 08:58) Respiratory Viral Panel with COVID-19 by RICARDO (03.02.23 @ 08:58)   Rapid RVP Result: [facility]    RADIOLOGY:  `< from: CT Angio Chest PE Protocol w/ IV Cont (03.02.23 @ 13:49) >    IMPRESSION:.    No pulmonary embolism.    Complete left lower lobe consolidation, which appears to be new compared   to 12/29/2022. This likely represents pneumonia (possibly necrotizing   pneumonia). Recommend CT chest follow-up in one month to ensure clearing   and to exclude alternative etiologies.    Moderate size loculated left pleural effusion and small right pleural   effusion.    < end of copied text >    < from: Xray Chest 1 View-PORTABLE IMMEDIATE (03.02.23 @ 09:00) >      IMPRESSION: Large left effusion and minimal right base effusion presently   seen.    < end of copied text >    I personally reviewed labs, imaging, orders and vitals. `

## 2023-03-02 NOTE — PHARMACOTHERAPY INTERVENTION NOTE - COMMENTS
Medication history complete, reviewed medication with patients  at bedside and confirmed with DrFirst.

## 2023-03-02 NOTE — ED PROVIDER NOTE - PROGRESS NOTE DETAILS
Alana Mensah for Dr. Sales: Thoracic will consult, can not drain effusion now because pt took her Eliquis this morning.

## 2023-03-02 NOTE — ED ADULT NURSE NOTE - OBJECTIVE STATEMENT
Pt. is a 87 YOF complaining of shortness of breath. Pt. states "Its been hard to breathe for two weeks". Pt endorses chest pain upon inspiration. Pt is GCS 15, A & O x4, PERRL, PMS x4, HEENT clear. 20 G iv inserted R FA labs drawn. Pt has a hx of neuropathy osteopetrosis, MILTON, and DVT. Pt takes Eliquis. Pt safety is maintained bed is in lowest position bed rails up semi fowlers call bell within reach.

## 2023-03-02 NOTE — ED ADULT NURSE NOTE - CHIEF COMPLAINT QUOTE
p/w SOB w/ hypoxia and L sided chest pain x 2 weeks. pt. seen by MD [place] at [facility]. outp. CT scan chest that showed L lobe PNA vs. atelectasis, pleural effusion and nodule. (official report and CD in chart).  PMH: DVT on Eliquis.

## 2023-03-02 NOTE — ED PROVIDER NOTE - PHYSICAL EXAMINATION
Constitutional: NAD AAOx3  Eyes: PERRLA EOMI  Head: Normocephalic atraumatic  Mouth: MMM  Cardiac: regular rate   Resp: Lungs CTAB  GI: Abd s/nt/nd, no rebound or guarding.  Neuro: awake, alert, moving all extremities, cranial nerves 2-12 intact, sensation intact, no dysmetria.  Skin: No rashes Constitutional: Elderly female, mild distress, AAOx3  Eyes: PERRLA EOMI  Head: Normocephalic atraumatic  Mouth: MMM  Cardiac: regular rate   Resp: left lung with diminished breath sounds, tachypneic  GI: Abd s/nt/nd, no rebound or guarding.  Neuro: awake, alert, moving all extremities, cranial nerves 2-12 intact, sensation intact, no dysmetria.  Skin: No rashes

## 2023-03-02 NOTE — ED PROVIDER NOTE - OBJECTIVE STATEMENT
87 year old female with PMHx of neuropathy, osteoporosis, MILTON, and DVT on Eliquis presents to the ED complaining of SOB x 2 weeks. Pt describes feeling like it is hard to take a breath in. Pt notes associated dry cough and chest pain. Pt was sent in by PCP Dr. Bg Villasenor secondary to CT chest non-contrast results revealing left sided pleural effusion/consolidation. Denies any movements that exacerbate/relieve symptoms. Denies fevers, chills, sore throat, nausea, vomiting, constipation, diarrhea, or any other complaints. No recent travel. 87 year old female with PMHx of neuropathy, osteoporosis, MILTON, and DVT on Eliquis presents to the ED complaining of SOB x 2 weeks. Pt describes feeling like it is hard to take a breath in. Pt notes associated dry cough and left sided chest pain radiating to armpit. Pt was sent in by PCP Dr. Bg Villasenor secondary to CT chest non-contrast results revealing left sided pleural effusion/consolidation. Denies any movements that exacerbate/relieve symptoms. Denies fevers, chills, sore throat, nausea, vomiting, constipation, diarrhea, or any other complaints. No recent travel.

## 2023-03-02 NOTE — ED ADULT NURSE NOTE - NSIMPLEMENTINTERV_GEN_ALL_ED
Implemented All Fall with Harm Risk Interventions:  [place] to call system. Call bell, personal items and telephone within reach. Instruct patient to call for assistance. Room bathroom lighting operational. Non-slip footwear when patient is off stretcher. Physically safe environment: no spills, clutter or unnecessary equipment. Stretcher in lowest position, wheels locked, appropriate side rails in place. Provide visual cue, wrist band, yellow gown, etc. Monitor gait and stability. Monitor for mental status changes and reorient to person, place, and time. Review medications for side effects contributing to fall risk. Reinforce activity limits and safety measures with patient and family. Provide visual clues: red socks.

## 2023-03-02 NOTE — ED PROVIDER NOTE - NS ED ROS FT
Constitutional: No fever or chills  Eyes: No visual changes  HEENT: No throat pain  CV: + chest pain  Resp: + SOB + cough  GI: No abd pain, nausea or vomiting  : No dysuria  MSK: No musculoskeletal pain  Skin: No rash  Neuro: No headache

## 2023-03-02 NOTE — PATIENT PROFILE ADULT - FALL HARM RISK - HARM RISK INTERVENTIONS

## 2023-03-02 NOTE — ED PROVIDER NOTE - CLINICAL SUMMARY MEDICAL DECISION MAKING FREE TEXT BOX
Elderly female with SOB and left lung pain found to have plural effusion and pneumonia. Will give oxygen, antibiotics, check labs, EKG, chest x-ray, and admit.

## 2023-03-02 NOTE — ED ADULT TRIAGE NOTE - CHIEF COMPLAINT QUOTE
p/w SOB w/ hypoxia and L sided chest pain x 2 weeks. pt. seen by MD [place] at [facility]. outp. CT scan chest that showed L lobe PNA vs. atelectasis, pleural effusion and nodule. (official report and CD in chart).  PMH: DVT on Eliquis.

## 2023-03-02 NOTE — H&P ADULT - HISTORY OF PRESENT ILLNESS
87F with PMH of DVT (Dx 12/2022 on eliquis), Neuropathy, Osteoenia, MILTON presents with progressive SOB, left pleurisy and non-productive cough x 2 weeks. Associated weakness and fatigue. Denies dizziness, fever, syncope, nausea, vomiting. Outpatient evaluation with CT revealed loculated left pleural effusion and advised to report to ER for further evaluation by PMD.     In the ER vitals stable with SpO2 low of 90% on RA. WBC 18.46, Hgb 9.9 from 11.5 (12/2022). Troponin negative. CXRAY with large left pleural efffusion. Also noted to have have been recently diagnosed with DVT 2 months prior to admission. CTA chest ordered to rule out PE as a concurrent problem. CTA chest reported no PE, complete left lower lobe consolidation and likely pneumonia in nature. moderate left loculated pleural effusion and small right pleural effusion.    Reported recent cardiac work up outpatient with stress and echocardiogram reported as unremarkable by patient's . In addition recent swallow evaluation with barium swallow outpatient a little more than a week prior to admission also reported as unremarkable.

## 2023-03-02 NOTE — ED PROVIDER NOTE - CARE PLAN
Principal Discharge DX:	CAP (community acquired pneumonia)  Secondary Diagnosis:	Pleural effusion   1

## 2023-03-03 LAB
ALBUMIN SERPL ELPH-MCNC: 1.6 G/DL — LOW (ref 3.3–5)
ALP SERPL-CCNC: 397 U/L — HIGH (ref 40–120)
ALT FLD-CCNC: 61 U/L — SIGNIFICANT CHANGE UP (ref 12–78)
ANION GAP SERPL CALC-SCNC: 3 MMOL/L — LOW (ref 5–17)
AST SERPL-CCNC: 36 U/L — SIGNIFICANT CHANGE UP (ref 15–37)
BASOPHILS # BLD AUTO: 0.09 K/UL — SIGNIFICANT CHANGE UP (ref 0–0.2)
BASOPHILS NFR BLD AUTO: 0.6 % — SIGNIFICANT CHANGE UP (ref 0–2)
BILIRUB SERPL-MCNC: 0.3 MG/DL — SIGNIFICANT CHANGE UP (ref 0.2–1.2)
BUN SERPL-MCNC: 22 MG/DL — SIGNIFICANT CHANGE UP (ref 7–23)
CALCIUM SERPL-MCNC: 8.9 MG/DL — SIGNIFICANT CHANGE UP (ref 8.5–10.1)
CHLORIDE SERPL-SCNC: 104 MMOL/L — SIGNIFICANT CHANGE UP (ref 96–108)
CO2 SERPL-SCNC: 30 MMOL/L — SIGNIFICANT CHANGE UP (ref 22–31)
CREAT SERPL-MCNC: 0.64 MG/DL — SIGNIFICANT CHANGE UP (ref 0.5–1.3)
CULTURE RESULTS: SIGNIFICANT CHANGE UP
EGFR: 85 ML/MIN/1.73M2 — SIGNIFICANT CHANGE UP
EOSINOPHIL # BLD AUTO: 0.04 K/UL — SIGNIFICANT CHANGE UP (ref 0–0.5)
EOSINOPHIL NFR BLD AUTO: 0.3 % — SIGNIFICANT CHANGE UP (ref 0–6)
GLUCOSE SERPL-MCNC: 113 MG/DL — HIGH (ref 70–99)
HCT VFR BLD CALC: 27.7 % — LOW (ref 34.5–45)
HGB BLD-MCNC: 9.2 G/DL — LOW (ref 11.5–15.5)
IMM GRANULOCYTES NFR BLD AUTO: 2 % — HIGH (ref 0–0.9)
LDH SERPL L TO P-CCNC: 151 U/L — SIGNIFICANT CHANGE UP (ref 84–241)
LYMPHOCYTES # BLD AUTO: 0.79 K/UL — LOW (ref 1–3.3)
LYMPHOCYTES # BLD AUTO: 5.4 % — LOW (ref 13–44)
MAGNESIUM SERPL-MCNC: 2.1 MG/DL — SIGNIFICANT CHANGE UP (ref 1.6–2.6)
MCHC RBC-ENTMCNC: 30.2 PG — SIGNIFICANT CHANGE UP (ref 27–34)
MCHC RBC-ENTMCNC: 33.2 GM/DL — SIGNIFICANT CHANGE UP (ref 32–36)
MCV RBC AUTO: 90.8 FL — SIGNIFICANT CHANGE UP (ref 80–100)
MONOCYTES # BLD AUTO: 1.19 K/UL — HIGH (ref 0–0.9)
MONOCYTES NFR BLD AUTO: 8.1 % — SIGNIFICANT CHANGE UP (ref 2–14)
NEUTROPHILS # BLD AUTO: 12.22 K/UL — HIGH (ref 1.8–7.4)
NEUTROPHILS NFR BLD AUTO: 83.6 % — HIGH (ref 43–77)
PHOSPHATE SERPL-MCNC: 3.8 MG/DL — SIGNIFICANT CHANGE UP (ref 2.5–4.5)
PLATELET # BLD AUTO: 377 K/UL — SIGNIFICANT CHANGE UP (ref 150–400)
POTASSIUM SERPL-MCNC: 3.8 MMOL/L — SIGNIFICANT CHANGE UP (ref 3.5–5.3)
POTASSIUM SERPL-SCNC: 3.8 MMOL/L — SIGNIFICANT CHANGE UP (ref 3.5–5.3)
PROT SERPL-MCNC: 5.2 GM/DL — LOW (ref 6–8.3)
RBC # BLD: 3.05 M/UL — LOW (ref 3.8–5.2)
RBC # FLD: 14.6 % — HIGH (ref 10.3–14.5)
SODIUM SERPL-SCNC: 137 MMOL/L — SIGNIFICANT CHANGE UP (ref 135–145)
SPECIMEN SOURCE: SIGNIFICANT CHANGE UP
WBC # BLD: 14.62 K/UL — HIGH (ref 3.8–10.5)
WBC # FLD AUTO: 14.62 K/UL — HIGH (ref 3.8–10.5)

## 2023-03-03 PROCEDURE — 99232 SBSQ HOSP IP/OBS MODERATE 35: CPT

## 2023-03-03 RX ORDER — ENOXAPARIN SODIUM 100 MG/ML
50 INJECTION SUBCUTANEOUS EVERY 12 HOURS
Refills: 0 | Status: DISCONTINUED | OUTPATIENT
Start: 2023-03-03 | End: 2023-03-06

## 2023-03-03 RX ADMIN — MAGNESIUM OXIDE 400 MG ORAL TABLET 400 MILLIGRAM(S): 241.3 TABLET ORAL at 09:55

## 2023-03-03 RX ADMIN — Medication 400 UNIT(S): at 09:59

## 2023-03-03 RX ADMIN — Medication 1 TABLET(S): at 09:58

## 2023-03-03 RX ADMIN — LATANOPROST 1 DROP(S): 0.05 SOLUTION/ DROPS OPHTHALMIC; TOPICAL at 22:07

## 2023-03-03 RX ADMIN — Medication 100 MILLIGRAM(S): at 09:58

## 2023-03-03 RX ADMIN — PIPERACILLIN AND TAZOBACTAM 25 GRAM(S): 4; .5 INJECTION, POWDER, LYOPHILIZED, FOR SOLUTION INTRAVENOUS at 17:04

## 2023-03-03 RX ADMIN — AZITHROMYCIN 500 MILLIGRAM(S): 500 TABLET, FILM COATED ORAL at 09:59

## 2023-03-03 RX ADMIN — PIPERACILLIN AND TAZOBACTAM 25 GRAM(S): 4; .5 INJECTION, POWDER, LYOPHILIZED, FOR SOLUTION INTRAVENOUS at 02:09

## 2023-03-03 RX ADMIN — DONEPEZIL HYDROCHLORIDE 5 MILLIGRAM(S): 10 TABLET, FILM COATED ORAL at 22:06

## 2023-03-03 RX ADMIN — ENOXAPARIN SODIUM 50 MILLIGRAM(S): 100 INJECTION SUBCUTANEOUS at 22:06

## 2023-03-03 RX ADMIN — DORZOLAMIDE HYDROCHLORIDE TIMOLOL MALEATE 1 DROP(S): 20; 5 SOLUTION/ DROPS OPHTHALMIC at 09:59

## 2023-03-03 RX ADMIN — PIPERACILLIN AND TAZOBACTAM 25 GRAM(S): 4; .5 INJECTION, POWDER, LYOPHILIZED, FOR SOLUTION INTRAVENOUS at 08:35

## 2023-03-03 RX ADMIN — DORZOLAMIDE HYDROCHLORIDE TIMOLOL MALEATE 1 DROP(S): 20; 5 SOLUTION/ DROPS OPHTHALMIC at 22:07

## 2023-03-03 RX ADMIN — Medication 500 MILLIGRAM(S): at 09:58

## 2023-03-03 RX ADMIN — Medication 3 MILLIGRAM(S): at 22:06

## 2023-03-03 NOTE — CONSULT NOTE ADULT - SUBJECTIVE AND OBJECTIVE BOX
Surgeon: Jovani     Consult requesting by: Grace     HISTORY OF PRESENT ILLNESS:  87F with PMH of DVT (Dx 2022 on eliquis), Neuropathy, Osteoenia, MILTON presents with progressive SOB, left pleurisy and non-productive cough x 2 weeks. Associated weakness and fatigue. Denies dizziness, fever, syncope, nausea, vomiting. Outpatient evaluation with CT revealed loculated left pleural effusion and advised to report to ER for further evaluation by PMD.     Pt noted to have b/l pleural effusions, loculated in nature .  Will plan for POCUS and offer thoracentesis of there is a good pocket     PAST MEDICAL & SURGICAL HISTORY:  Osteoarthritis      Osteopenia      Deep vein thrombosis (DVT)      S/P Hysterectomy        Lumbar Laminectomy/ Spinal Fusion        S/P Arthroscopy of Right Knee        S/P Appendectomy  @ 12 years old      S/P Tonsillectomy  @ 2 years old          MEDICATIONS  (STANDING):  ascorbic acid 500 milliGRAM(s) Oral daily  azithromycin   Tablet 500 milliGRAM(s) Oral daily  cholecalciferol 400 Unit(s) Oral daily  donepezil 5 milliGRAM(s) Oral at bedtime  dorzolamide 2%/timolol 0.5% Ophthalmic Solution 1 Drop(s) Right EYE two times a day  latanoprost 0.005% Ophthalmic Solution 1 Drop(s) Right EYE at bedtime  magnesium oxide 400 milliGRAM(s) Oral daily  multivitamin 1 Tablet(s) Oral daily  piperacillin/tazobactam IVPB.. 3.375 Gram(s) IV Intermittent every 8 hours    MEDICATIONS  (PRN):  acetaminophen     Tablet .. 650 milliGRAM(s) Oral every 6 hours PRN Temp greater or equal to 38C (100.4F), Mild Pain (1 - 3)  albuterol    90 MICROgram(s) HFA Inhaler 2 Puff(s) Inhalation every 6 hours PRN Shortness of Breath and/or Wheezing  aluminum hydroxide/magnesium hydroxide/simethicone Suspension 30 milliLiter(s) Oral every 4 hours PRN Dyspepsia  benzonatate 100 milliGRAM(s) Oral every 8 hours PRN Cough  guaifenesin/dextromethorphan Oral Liquid 10 milliLiter(s) Oral every 4 hours PRN Cough  melatonin 3 milliGRAM(s) Oral at bedtime PRN Insomnia  ondansetron Injectable 4 milliGRAM(s) IV Push every 8 hours PRN Nausea and/or Vomiting    Antiplatelet therapy:   Eliquis                         Last dose/amt: hold     Allergies    [This allergen will not trigger allergy alert] hazelnuts (Swelling)  [This allergen will not trigger allergy alert] horse serum (Unknown)  [This allergen will not trigger allergy alert] Originally Entered as [SEE COMMENT] reaction to [HORSE SERUM] (Unknown)  eggs (Rhinitis)  latex (Rash)  No Known Drug Allergies    Intolerances        SOCIAL HISTORY:  Smoker: [ ] Yes  [ x] No        PACK YEARS:                         WHEN QUIT?  ETOH use: [ ] Yes  [x ] No              FREQUENCY / QUANTITY:  Ilicit Drug use:  [ ] Yes  [x ] No  Occupation:  Live with:    Assisted device use: cane     FAMILY HISTORY:  FH: heart disease        Review of Systems  CONSTITUTIONAL:  Fevers[ ] chills[ ] sweats[ ] fatigue[x ] weight loss[ ] weight gain [ ]                                     NEGATIVE [ ]   NEURO:  parathesias[ ] seizures [ ]  syncope [ ]  confusion [ ]                                                                                NEGATIVE[x ]   EYES: glasses[ ]  blurry vision[ ]  discharge[ ] pain[ ] glaucoma [ ]                                                                          NEGATIVE[x ]   ENMT:  difficulty hearing [ ]  vertigo[ ]  dysphagia[ ] epistaxis[ ] recent dental work [ ]                                    NEGATIVE[x ]   CV:  chest pain[ ] palpitations[ ] ARELLANO [x ] diaphoresis [ ]                                                                                           NEGATIVE[ ]   RESPIRATORY:  wheezing[ ] SOB[ x] cough [x ] sputum[ ] hemoptysis[ ]                                                                  NEGATIVE[ ]   GI:  nausea[ ]  vomiting [ ]  diarrhea[ ] constipation [ ] melena [ ]                                                                      NEGATIVE[ ]   : hematuria[ ]  dysuria[ ] urgency[ ] incontinence[ ]                                                                                            NEGATIVE[ ]   MUSCULOSKELETAL  arthritis[ ]  joint swelling [ ] muscle weakness [x ]                                                                NEGATIVE[x ]   SKIN/BREAST:  rash[ ] itching [ ]  hair loss[ ] masses[ ]                                                                                              NEGATIVE[x ]   PSYCH:  dementia [ ] depression [ ] anxiety[ ]                                                                                                               NEGATIVE[x ]   HEME/LYMPH:  bruises easily[ ] enlarged lymph nodes[ ] tender lymph nodes[ ]                                               NEGATIVE[x ]   ENDOCRINE:  cold intolerance[ ] heat intolerance[ ] polydipsia[ ]                                                                          NEGATIVE[x ]     PHYSICAL EXAM  Vital Signs Last 24 Hrs  T(C): 36.3 (03 Mar 2023 09:16), Max: 36.8 (02 Mar 2023 17:20)  T(F): 97.3 (03 Mar 2023 09:16), Max: 98.3 (02 Mar 2023 17:20)  HR: 75 (03 Mar 2023 09:16) (63 - 78)  BP: 115/53 (03 Mar 2023 09:16) (115/53 - 149/69)  BP(mean): 81 (02 Mar 2023 15:03) (81 - 81)  RR: 18 (03 Mar 2023 09:16) (18 - 19)  SpO2: 93% (03 Mar 2023 09:16) (93% - 93%)    Parameters below as of 03 Mar 2023 09:16  Patient On (Oxygen Delivery Method): nasal cannula  O2 Flow (L/min): 2      CONSTITUTIONAL:                                                                          WNL[ ]  frail , cachetic   Neuro: WNL[ ] Normal exam oriented to person/place & time with no focal motor or sensory  deficits. Other                     Eyes: WNL[x ]   Normal exam of conjunctiva & lids, pupils equally reactive. Other     ENT: WNL[ x]    Normal exam of nasal/oral mucosa with absence of cyanosis. Other  Neck: WNL[x ]  Normal exam of jugular veins, trachea & thyroid. Other  Chest: WNL[ ] Normal lung exam with good air movement absence of wheezes, rales, or rhonchi: Other    decreased at bases b/l                                                                             CV:  Auscultation: normal [ ] S3[ ] S4[ ] Irregular [ ] Rub[ ] Clicks[ ]    Murmurs none:[ ]systolic [ ]  diastolic [ ] holosystolic [ ]  Carotids: No Bruits[ ] Other                 Abdominal Aorta: normal [ ] nonpalpable[ ]Other                                                                                      GI:           WNL[ ] Normal exam of abdomen, liver & spleen with no noted masses or tenderness. Other                                                                                                        Extremities: WNL[ ] Normal no evidence of cyanosis or deformity Edema: none[ ]trace[ ]1+[ ]2+[ ]3+[ ]4+[ ]  Lower Extremity Pulses: Right[ ] Left[ ]Varicosities[ ]  SKIN :WNL[ ] Normal exam to inspection & palation. Other:                                                          LABS:                        9.2    14.62 )-----------( 377      ( 03 Mar 2023 06:05 )             27.7     03-03    137  |  104  |  22  ----------------------------<  113<H>  3.8   |  30  |  0.64    Ca    8.9      03 Mar 2023 06:05  Phos  3.8     -  Mg     2.1     -    TPro  5.2<L>  /  Alb  1.6<L>  /  TBili  0.3  /  DBili  x   /  AST  36  /  ALT  61  /  AlkPhos  397<H>  -    PT/INR - ( 02 Mar 2023 08:58 )   PT: 23.0 sec;   INR: 1.97 ratio         PTT - ( 02 Mar 2023 08:58 )  PTT:38.9 sec  Urinalysis Basic - ( 02 Mar 2023 10:21 )    Color: Yellow / Appearance: Clear / S.005 / pH: x  Gluc: x / Ketone: Negative  / Bili: Negative / Urobili: Negative   Blood: x / Protein: Negative / Nitrite: Negative   Leuk Esterase: Negative / RBC: x / WBC x   Sq Epi: x / Non Sq Epi: x / Bacteria: x              Cardiac Cath:    TTE / ALICE:

## 2023-03-03 NOTE — CONSULT NOTE ADULT - SUBJECTIVE AND OBJECTIVE BOX
Surgeon: Jovani    Consult requesting by: Grace    HISTORY OF PRESENT ILLNESS:  87F with PMH of DVT (Dx 2022 on eliquis), Neuropathy, Osteoenia, MILTON presents with progressive SOB, left pleurisy and non-productive cough x 2 weeks. Associated weakness and fatigue. Denies dizziness, fever, syncope, nausea, vomiting. Outpatient evaluation with CT revealed loculated left pleural effusion and advised to report to ER for further evaluation by PMD.     pt seen at bedside. Comfortable POCUS preformed b/l  Small loculated pleural effusions appreciated     PAST MEDICAL & SURGICAL HISTORY:  Osteoarthritis      Osteopenia      Deep vein thrombosis (DVT)      S/P Hysterectomy  2007      Lumbar Laminectomy/ Spinal Fusion        S/P Arthroscopy of Right Knee        S/P Appendectomy  @ 12 years old      S/P Tonsillectomy  @ 2 years old          MEDICATIONS  (STANDING):  ascorbic acid 500 milliGRAM(s) Oral daily  azithromycin   Tablet 500 milliGRAM(s) Oral daily  cholecalciferol 400 Unit(s) Oral daily  donepezil 5 milliGRAM(s) Oral at bedtime  dorzolamide 2%/timolol 0.5% Ophthalmic Solution 1 Drop(s) Right EYE two times a day  latanoprost 0.005% Ophthalmic Solution 1 Drop(s) Right EYE at bedtime  magnesium oxide 400 milliGRAM(s) Oral daily  multivitamin 1 Tablet(s) Oral daily  piperacillin/tazobactam IVPB.. 3.375 Gram(s) IV Intermittent every 8 hours    MEDICATIONS  (PRN):  acetaminophen     Tablet .. 650 milliGRAM(s) Oral every 6 hours PRN Temp greater or equal to 38C (100.4F), Mild Pain (1 - 3)  albuterol    90 MICROgram(s) HFA Inhaler 2 Puff(s) Inhalation every 6 hours PRN Shortness of Breath and/or Wheezing  aluminum hydroxide/magnesium hydroxide/simethicone Suspension 30 milliLiter(s) Oral every 4 hours PRN Dyspepsia  benzonatate 100 milliGRAM(s) Oral every 8 hours PRN Cough  guaifenesin/dextromethorphan Oral Liquid 10 milliLiter(s) Oral every 4 hours PRN Cough  melatonin 3 milliGRAM(s) Oral at bedtime PRN Insomnia  ondansetron Injectable 4 milliGRAM(s) IV Push every 8 hours PRN Nausea and/or Vomiting    Antiplatelet therapy:      Eliquis                     Last dose/amt: Hold     Allergies    [This allergen will not trigger allergy alert] hazelnuts (Swelling)  [This allergen will not trigger allergy alert] horse serum (Unknown)  [This allergen will not trigger allergy alert] Originally Entered as [SEE COMMENT] reaction to [HORSE SERUM] (Unknown)  eggs (Rhinitis)  latex (Rash)  No Known Drug Allergies    Intolerances        SOCIAL HISTORY:  Smoker: [ ] Yes  [x ] No        PACK YEARS:                         WHEN QUIT?  ETOH use: [ ] Yes  [ x] No              FREQUENCY / QUANTITY:  Ilicit Drug use:  [ ] Yes  [ x] No  Occupation:  Live with:    Assisted device use:    FAMILY HISTORY:  FH: heart disease        Review of Systems  CONSTITUTIONAL:  Fevers[ ] chills[X ] sweats[ ] fatigue[ ] weight loss[ X] weight gain [ ]                                     NEGATIVE [ ]   NEURO:  parathesias[ ] seizures [ ]  syncope [ ]  confusion [ ]                                                                                NEGATIVE[x ]   EYES: glasses[ ]  blurry vision[ ]  discharge[ ] pain[ ] glaucoma [ ]                                                                          NEGATIVE[x ]   ENMT:  difficulty hearing [ ]  vertigo[ ]  dysphagia[ ] epistaxis[ ] recent dental work [ ]                                    NEGATIVE[x ]   CV:  chest pain[ ] palpitations[ ] ARELLANO [x ] diaphoresis [ ]                                                                                           NEGATIVE[ ]   RESPIRATORY:  wheezing[ ] SOB[ ] cough [x ] sputum[ ] hemoptysis[ ]                                                                  NEGATIVE[ ]   GI:  nausea[ ]  vomiting [ ]  diarrhea[ ] constipation [ ] melena [ ]                                                                      NEGATIVE[ x]   : hematuria[ ]  dysuria[ ] urgency[ ] incontinence[ ]                                                                                            NEGATIVE[x ]   MUSCULOSKELETAL  arthritis[ ]  joint swelling [ ] muscle weakness [x ]                                                                NEGATIVE[ ]   SKIN/BREAST:  rash[ ] itching [ ]  hair loss[ ] masses[ ]                                                                                              NEGATIVE[x ]   PSYCH:  dementia [ ] depression [ ] anxiety[ ]                                                                                                               NEGATIVE[x ]   HEME/LYMPH:  bruises easily[ ] enlarged lymph nodes[ ] tender lymph nodes[ ]                                               NEGATIVE[ x]   ENDOCRINE:  cold intolerance[ ] heat intolerance[ ] polydipsia[ ]                                                                          NEGATIVE[ x]     PHYSICAL EXAM  Vital Signs Last 24 Hrs  T(C): 36.3 (03 Mar 2023 09:16), Max: 36.3 (03 Mar 2023 09:16)  T(F): 97.3 (03 Mar 2023 09:16), Max: 97.3 (03 Mar 2023 09:16)  HR: 75 (03 Mar 2023 09:16) (69 - 75)  BP: 115/53 (03 Mar 2023 09:16) (115/53 - 149/69)  BP(mean): --  RR: 18 (03 Mar 2023 09:16) (18 - 18)  SpO2: 93% (03 Mar 2023 09:16) (93% - 93%)    Parameters below as of 03 Mar 2023 09:16  Patient On (Oxygen Delivery Method): nasal cannula  O2 Flow (L/min): 2      CONSTITUTIONAL:                                                                          WNL[x ]   Neuro: WNLx[ ] Normal exam oriented to person/place & time with no focal motor or sensory  deficits. Other                     Eyes: WNL[ x]   Normal exam of conjunctiva & lids, pupils equally reactive. Other     ENT: WNL[x ]    Normal exam of nasal/oral mucosa with absence of cyanosis. Other  Neck: WNL[x ]  Normal exam of jugular veins, trachea & thyroid. Other  Chest: WNL[ ] Normal lung exam with good air movement absence of wheezes, rales, or rhonchi: Other    decreased b/l                                                                             CV:  Auscultation: normal [ x] S3[ ] S4[ ] Irregular [ ] Rub[ ] Clicks[ ]    Murmurs none:[ ]systolic [x ]  diastolic [ ] holosystolic [ ]  Carotids: No Bruits[ x] Other                 Abdominal Aorta: normal [ ] nonpalpable[ ]Other                                                                                      GI:           WNL[x ] Normal exam of abdomen, liver & spleen with no noted masses or tenderness. Other                                                                                                        Extremities: WNL[ ] Normal no evidence of cyanosis or deformity Edema: none[ ]trace[x ]1+[ ]2+[ ]3+[ ]4+[ ]                                                            LABS:                        9.2    14.62 )-----------( 377      ( 03 Mar 2023 06:05 )             27.7     03-03    137  |  104  |  22  ----------------------------<  113<H>  3.8   |  30  |  0.64    Ca    8.9      03 Mar 2023 06:05  Phos  3.8     03-03  Mg     2.1     03-03    TPro  5.2<L>  /  Alb  1.6<L>  /  TBili  0.3  /  DBili  x   /  AST  36  /  ALT  61  /  AlkPhos  397<H>  03-03    PT/INR - ( 02 Mar 2023 08:58 )   PT: 23.0 sec;   INR: 1.97 ratio         PTT - ( 02 Mar 2023 08:58 )  PTT:38.9 sec  Urinalysis Basic - ( 02 Mar 2023 10:21 )    Color: Yellow / Appearance: Clear / S.005 / pH: x  Gluc: x / Ketone: Negative  / Bili: Negative / Urobili: Negative   Blood: x / Protein: Negative / Nitrite: Negative   Leuk Esterase: Negative / RBC: x / WBC x   Sq Epi: x / Non Sq Epi: x / Bacteria: x          < from: CT Angio Chest PE Protocol w/ IV Cont (23 @ 13:49) >    FINDINGS:    PULMONARY ARTERIES: No pulmonary embolism.    MEDIASTINUM: Heart size normal. No pericardial effusion. Normal caliber   thoracic aorta. Fluid present within the lumen of the mid esophagus.    AIRWAYS, LUNGS, PLEURA: Trachea and mainstem bronchi patent. Complete   left lower lobe consolidation with areas of hypoenhancement. When   comparing the current  radiograph with prior chest radiograph dated   2022, findings appear to be new. Moderate size loculated left   pleural effusion and small right pleural effusion.    IMAGED ABDOMEN: Unremarkable.    SOFT TISSUES: Unremarkable.    BONES: Unremarkable.      IMPRESSION:.    No pulmonary embolism.    Complete left lower lobe consolidation, which appears to be new compared   to 2022. This likely represents pneumonia (possibly necrotizing   pneumonia). Recommend CT chest follow-up in one month to ensure clearing   and to exclude alternative etiologies.    Moderate size loculated left pleural effusion and small right pleural   effusion.    < end of copied text >

## 2023-03-03 NOTE — CONSULT NOTE ADULT - CONSULT REASON
b/l pleural effusions
pleural effusions.
Pneumonia; pleural effusion.
dysphagia
pleural effusions
SOB

## 2023-03-03 NOTE — SWALLOW BEDSIDE ASSESSMENT ADULT - COMMENTS
The patient was admitted to  with SOB and cough. Imaging notable for left sided pneumonia/loculated pleural effusion. NOTE THAT PATIENT UNDERWENT AN OUTPATIENT MODIFIED BARIUM SWALLOWING STUDY(MBS) AT THIS FACILITY ON 2/22/23 AT WHICH TIME SHE EXHIBITED FUNCTIONAL OROPHARYNGEAL SWALLOWING ABILITIES FOR AGE. STUDY NEGATIVE FOR PRANDIAL ASPIRATION. HOWEVER, AN ESOPHAGRAM WAS CONDUCTED BY RADIOLOGIST(DR CASTRO) AFTER MBS PROCEDURE AT WHICH TIME PATIENT WAS DIAGNOSED WITH ACHALASIA. PATIENTS GI PHYSICIAN IS DR CLARK. This profile is superimposed upon a history of osteopenia, OA, post nasal drip, lumbar laminectomy, previous right knee arthroscopy, past LISSY, previous appy and prior tonsillectomy.

## 2023-03-03 NOTE — PROGRESS NOTE ADULT - ASSESSMENT
Pneumonia. Possibly necrotizing but unable to clinically determine  Loculated Left Pleural Effusion worrisome picture with hx of achalasia for Gram negative aspiration PNA  -No Sepsis POA  -Troponin negative  -CTA findings noted  -on Empiric ABX  -ID  and Pulm consulted  -CTS consulted. for chest tube tomorrow (last dose of eliquis morning of 3/2)  plan  -O2 supplementation titration  Chest tube today  -IV abx  -speech consulted. (patient had recent modifed barium    Hx of achalasia  patient was followed by  GI;   will consult in house      Recent Dx of LLE DVT   -Eliquis on hold in anticipation for chest tube today  -Resume AC post chest tube when cleared by CT surgery    DVT Prophylaxis: Lovenox subq

## 2023-03-03 NOTE — CONSULT NOTE ADULT - ASSESSMENT
87F with PMH of DVT (Dx 12/2022 on eliquis), Neuropathy, Osteoenia, MILTON presents with progressive SOB, left pleurisy and non-productive cough x 2 weeks found to b/l PNA and small pleural effusions     Plan   POCUS at bedside with small loculated pleural effusions   no thoracic intervention   Spoke with Dr Henao  Recommend IR thora if a specimen is warranted  would restart AC until IR consult   will sign off   DW Dr Hahn 
1. Dysphagia with prior findings of dilated esophagram with narrowing at GEJ concerning for achalasia. Subsequent CT chest with large nearly 7 x 7 cm consolidation in left lower lobe. Unsure if this is the cause of the extrinsic compression leading to intermittent dysphagia or is partial cause of lung findings.     Recommendation  1. Full liquid diet  2. Aspiration precautions  3. Appreciate ID, pulmonary and cardiothoracic surgery recommendations  4. Will follow. Would be at increased risk for procedures with lung findings at this time. 
88 y/o female with h/o neuropathy, osteoporosis, MILTON, and DVT on Eliquis was admitted on 3/2 for worsening SOB x 2 weeks. Pt describes feeling like it is hard to take a breath in. Pt notes associated dry cough and left sided chest pain radiating to armpit. Pt was sent in by PCP Dr. Bg Villasenor secondary to CT chest non-contrast results revealing left sided pleural effusion/consolidation. Denies fevers, chills, sore throat at home. In ER she received zosyn.     1. LLL pneumonia. ?necrotizing pneumonia. Loculated left side pleural effusion. ?empyema.   -leukocytosis  -obtain BC x 2, sputum c/s  -start zosyn 3.375 gm IV q8h  -reason for abx use and side effects reviewed with patient; monitor BMP   -consider cardiothoracic evaluation ?thoracocentesis  -respiratory care  -old chart reviewed to assess prior cultures  -monitor temps  -f/u CBC  -supportive care  2. Other issues:   -care per medicine    
 chest pain, atypical in nature  Likely secondary to the left pleural effusion.    Cardiac enzymes were negative suffered the EKG was uninterpretable secondary to the left bundle-branch block.    Recommend continue monitoring.      Left pleural effusion  Her cardiac BNP is mildly elevated.    I doubt it is transudative effusion secondary to congestive heart failure.    She otherwise looks euvolemic on physical exam.    Other medical issues- Management per primary team.e is about to have thoracocentesis today.    Atrial fibrillation  She is on anticoagulation.    It should be started we ASAP if it was held for the PleurX catheter.    Hypertension   Continue current medical regimen.      I am seeing this patient for Dr. Elmore.   Will sign off for now.  Please call us with any questions.  Other medical issues- Management per primary team.    Thank you for allowing me to participate in the care of this patient. Please feel free to contact me with any questions.

## 2023-03-03 NOTE — SWALLOW BEDSIDE ASSESSMENT ADULT - SWALLOW EVAL: RECOMMENDED DIET
NO OVERT OROPHARYNGEAL SWALLOWING CONTRAINDICATIONS EVIDENT FOR PATIENT TO BE ON A REGULAR CONSISTENCY DIET WITH THIN LIQUIDS AS SHE TOLERATES THE SAME FROM AN OROPHARYNGEAL SWALLOWING PERSPECTIVE ON EXAM. NEED TO MODIFY DIET DUE TO ACHALASIA A

## 2023-03-03 NOTE — CONSULT NOTE ADULT - CONSULT REQUESTED DATE/TIME
03-Mar-2023 07:30
03-Mar-2023 07:54
03-Mar-2023 13:48
03-Mar-2023 17:44
02-Mar-2023 14:59
03-Mar-2023 17:42

## 2023-03-03 NOTE — SWALLOW BEDSIDE ASSESSMENT ADULT - ASR SWALLOW LINGUAL MOBILITY
Telephone Encounter by Patricia Braden at 07/19/17 08:23 AM     Author:  Patricia Braden Service:  (none) Author Type:  Patient      Filed:  07/19/17 08:27 AM Encounter Date:  7/19/2017 Status:  Signed     :  Patricia Braden (Patient )              JORGITO HURLEY    Patient Age: 55 year old    ACCT STATUS:   MESSAGE:[MM1.1T]   Kirti from Dr. Lopez's office calling to request doctor's notes and referral for patient. Per Kirti, patient needs prior authorization for physical therapy. Kirti can be reached at [phone] to discuss and doctor's notes and referral can be fax to [phone]. Message routed to team for assistance.[MM1.1M]     Next and Last Visit with Provider and Department  Next visit with MARGARET THURMAN is on No match found  Next visit with [facility] is on No match found  Last visit with MARGARET THURMAN was on 07/12/2017 at  1:45 PM in [facility]  Last visit with [facility] was on 07/12/2017 at  1:45 PM in [facility]     WEIGHT AND HEIGHT: As of 07/12/2017 weight is 272 lbs.(123.378 kg). Height is 5' 6\"(1.676 m).   BMI is 43.92 kg/(m^2) calculated from:     Height 5' 6\" (1.676 m) as of 7/12/17     Weight 272 lb (123.378 kg) as of 7/12/17[MM1.1T]      No Known Allergies[MM1.2T]  Current Outpatient Prescriptions     Medication  Sig   • Diclofenac Sodium (VOLTAREN) 1 % GEL Place 4 g onto the skin 2 (two) times daily.   • divalproex (DEPAKOTE ENTERIC COATED) 500 MG tablet Take 2 Tabs by mouth 2 (two) times daily.   • RisperiDONE (RISPERDAL) 1 MG tablet Take 1 Tab by mouth nightly.   • sertraline (ZOLOFT) 100 MG tablet Take 2 Tabs by mouth daily.   • benztropine (COGENTIN) 0.5 MG tablet Take 1 Tab by mouth 2 (two) times daily as needed.   • metoprolol (LOPRESSOR) 25 MG tablet Take 25 mg by mouth 2 (two) times daily.   • lisinopril (PRINIVIL,ZESTRIL) 20 MG tablet Take 20 mg by mouth daily.   • omeprazole (PRILOSEC) 40 MG  Cap    • LANTUS SOLOSTAR 100 UNIT/ML SOPN    • NOVOLOG 100 UNIT/ML injection    • atorvastatin (LIPITOR) 20 MG tablet    • gabapentin (NEURONTIN) 400 MG Cap Take 400 mg by mouth 3 (three) times daily.      PHARMACY to use:[MM1.1T] n/a[MM1.1M]          Pharmacy preference(s) on file: Data Unavailable    CALL BACK INFO:[MM1.1T] Ok to leave response (including medical information) with family member or on answering machine[MM1.1M]  ROUTING:[MM1.1T] Patient's physician/staff[MM1.1M]        PCP: Carlos Manuel Whiting         INS: Payor: MEDICARE HMO NON DREYER / Plan: N/A / Product Type: *No Product type* / Note: This is the primary coverage, but no account was found for this location or the patient's primary location.   ADDRESS:  [address] [place] [address] [id number]  [facility] [id number][MM1.1T]       Revision History        User Key Date/Time User Provider Type Action    > MM1.2 07/19/17 08:27 AM Patricia Braden Patient  Sign     MM1.1 07/19/17 08:23 AM Patricia Braden Patient      M - Manual, T - Template             No tongue focality evident./within functional limits

## 2023-03-03 NOTE — SWALLOW BEDSIDE ASSESSMENT ADULT - ADDITIONAL RECOMMENDATIONS
Hospitalist f/u. Pt exhibited functional Oropharyngeal Swallowing abilities for age. Bolus formation/transfer were mechanically functional for age & laryngeal lift on palpation during swallowing trials was felt to be functional for age as well. No behavioral aspiration signs exhibited. No change in O2 sats noted. Odynophagia denied. No emesis demonstrated. Note that pt underwent an outpatient Modified Barium Swallow Study(MBS) at this facility on 2/22/23 which was negative for Oropharyngeal Dysphagia as well as negative for Aspiration. With that being stated, pt has a h/o radiographically confirmed Achalasia which places her at a relatively increased risk for episodic post prandial aspiration of Reflux, particularly nocturnally. Need to consult GI/Dr Seth at discretion of hospitalist.

## 2023-03-03 NOTE — PROGRESS NOTE ADULT - ASSESSMENT
88 y/o female with h/o neuropathy, osteoporosis, MILTON, and DVT on Eliquis was admitted on 3/2 for worsening SOB x 2 weeks. Pt describes feeling like it is hard to take a breath in. Pt notes associated dry cough and left sided chest pain radiating to armpit. Pt was sent in by PCP Dr. Bg Villasenor secondary to CT chest non-contrast results revealing left sided pleural effusion/consolidation. Denies fevers, chills, sore throat at home. In ER she received zosyn.     1. LLL pneumonia. ?necrotizing pneumonia. Loculated left side pleural effusion. ?empyema.   -respiratory frail  -leukocytosis  -BC x 2, sputum c/s noted  -on zosyn 3.375 gm IV q8h # 2  -tolerating abx well so far; no side effects noted  -cardiothoracic evaluation appreciated  -respiratory care  -continue abx coverage   -monitor temps  -f/u CBC  -supportive care  2. Other issues:   -care per medicine    d/w  at bedside

## 2023-03-03 NOTE — CONSULT NOTE ADULT - SUBJECTIVE AND OBJECTIVE BOX
HPI:  87 F with PMH of DVT (Dx 12/2022 on eliquis), Neuropathy, Osteoenia, MILTON who presented with 2 weeks of progressive dysphagia, non-productive cough, weakness an fatigue.    Patient was found on outpatient CT chest without contrast with a 7.6 cm x 6.5 cm large dense consolidation occupying most of the left lower lobe. Patient hadbeen seen by GI as outpatient for dysphagia in 2/10/23 for dysphagia. She was sent for MBS and timed barium esophagram performed at  which showed no evidence of oropharyngeal dysphagia or aspiration, however, esophagram revealed a dilated esophagus with narrowing at the GEJ concerning for achalasia. Given age, CT Chest was ordered with findings as stated above.  Denied nausea, vomiting, odynophagia, fever, chills, abdominal pain.    In the ED, hemodynamically stable with SpO2 90% on RA. Labs notable for WBC 18.46, Hgb 9.9. CTA chest reported no PE, complete left lower lobe consolidation and likely pneumonia in nature. moderate left loculated pleural effusion and small right pleural effusion.    PAST MEDICAL & SURGICAL HISTORY:  Osteoarthritis      Osteopenia      Deep vein thrombosis (DVT)      S/P Hysterectomy  2007      Lumbar Laminectomy/ Spinal Fusion  1999      S/P Arthroscopy of Right Knee  1998      S/P Appendectomy  @ 12 years old      S/P Tonsillectomy  @ 2 years old          Home Medications:  apixaban 2.5 mg oral tablet: 1 tab(s) orally 2 times a day (02 Mar 2023 13:08)  biotin 300 mcg oral tablet: 1 tab(s) orally once a day (02 Mar 2023 13:08)  Citracal 250 mg + D 200 intl units oral tablet: 1 tab(s) orally 2 times a day (02 Mar 2023 13:08)  donepezil 5 mg oral tablet: 1 tab(s) orally once a day (at bedtime) (02 Mar 2023 13:08)  dorzolamide-timolol 22.3-6.8mg/mL: 1 drop(s) in the right eye 2 times a day (02 Mar 2023 13:08)  Negrita-C 500 mg oral tablet: 1 tab(s) orally once a day (02 Mar 2023 13:08)  ipratropium 42 mcg/inh (0.06%) nasal spray: 2 spray(s) in each nostril 3 times a day, As Needed (02 Mar 2023 13:08)  latanoprost 0.005% ophthalmic solution: 1 drop(s) in the right eye once a day (in the evening) (02 Mar 2023 13:08)  magnesium glycinate 200 mg oral tablet: 2 tab(s) orally once a day (after a meal) (02 Mar 2023 13:08)  Multiple Vitamins oral tablet: 1 tab(s) orally once a day (02 Mar 2023 13:08)  Vitamin D3 400 intl units (10 mcg) oral tablet: 1 tab(s) orally once a day (02 Mar 2023 13:08)      MEDICATIONS  (STANDING):  ascorbic acid 500 milliGRAM(s) Oral daily  azithromycin   Tablet 500 milliGRAM(s) Oral daily  cholecalciferol 400 Unit(s) Oral daily  donepezil 5 milliGRAM(s) Oral at bedtime  dorzolamide 2%/timolol 0.5% Ophthalmic Solution 1 Drop(s) Right EYE two times a day  latanoprost 0.005% Ophthalmic Solution 1 Drop(s) Right EYE at bedtime  magnesium oxide 400 milliGRAM(s) Oral daily  multivitamin 1 Tablet(s) Oral daily  piperacillin/tazobactam IVPB.. 3.375 Gram(s) IV Intermittent every 8 hours    MEDICATIONS  (PRN):  acetaminophen     Tablet .. 650 milliGRAM(s) Oral every 6 hours PRN Temp greater or equal to 38C (100.4F), Mild Pain (1 - 3)  albuterol    90 MICROgram(s) HFA Inhaler 2 Puff(s) Inhalation every 6 hours PRN Shortness of Breath and/or Wheezing  aluminum hydroxide/magnesium hydroxide/simethicone Suspension 30 milliLiter(s) Oral every 4 hours PRN Dyspepsia  benzonatate 100 milliGRAM(s) Oral every 8 hours PRN Cough  guaifenesin/dextromethorphan Oral Liquid 10 milliLiter(s) Oral every 4 hours PRN Cough  melatonin 3 milliGRAM(s) Oral at bedtime PRN Insomnia  ondansetron Injectable 4 milliGRAM(s) IV Push every 8 hours PRN Nausea and/or Vomiting      Allergies    [This allergen will not trigger allergy alert] hazelnuts (Swelling)  [This allergen will not trigger allergy alert] horse serum (Unknown)  [This allergen will not trigger allergy alert] Originally Entered as [SEE COMMENT] reaction to [HORSE SERUM] (Unknown)  eggs (Rhinitis)  latex (Rash)  No Known Drug Allergies    Intolerances        SOCIAL HISTORY:    FAMILY HISTORY:  FH: heart disease        ROS  As above  Otherwise unremarkable    Vital Signs Last 24 Hrs  T(C): 36.3 (03 Mar 2023 09:16), Max: 36.3 (03 Mar 2023 09:16)  T(F): 97.3 (03 Mar 2023 09:16), Max: 97.3 (03 Mar 2023 09:16)  HR: 75 (03 Mar 2023 09:16) (69 - 75)  BP: 115/53 (03 Mar 2023 09:16) (115/53 - 149/69)  BP(mean): --  RR: 18 (03 Mar 2023 09:16) (18 - 18)  SpO2: 93% (03 Mar 2023 09:16) (93% - 93%)    Parameters below as of 03 Mar 2023 09:16  Patient On (Oxygen Delivery Method): nasal cannula  O2 Flow (L/min): 2      Constitutional: NAD  Respiratory: CTAB  Cardiovascular: S1 and S2, RRR  Gastrointestinal: BS+, soft, NT/ND  Extremities: No peripheral edema  Psychiatric: Normal mood, normal affect  Skin: No rashes    LABS:                        9.2    14.62 )-----------( 377      ( 03 Mar 2023 06:05 )             27.7     03-03    137  |  104  |  22  ----------------------------<  113<H>  3.8   |  30  |  0.64    Ca    8.9      03 Mar 2023 06:05  Phos  3.8     03-03  Mg     2.1     03-03    TPro  5.2<L>  /  Alb  1.6<L>  /  TBili  0.3  /  DBili  x   /  AST  36  /  ALT  61  /  AlkPhos  397<H>  03-03    PT/INR - ( 02 Mar 2023 08:58 )   PT: 23.0 sec;   INR: 1.97 ratio         PTT - ( 02 Mar 2023 08:58 )  PTT:38.9 sec  LIVER FUNCTIONS - ( 03 Mar 2023 06:05 )  Alb: 1.6 g/dL / Pro: 5.2 gm/dL / ALK PHOS: 397 U/L / ALT: 61 U/L / AST: 36 U/L / GGT: x             RADIOLOGY & ADDITIONAL STUDIES:    ACC: [id number] EXAM:  CT ANGIO CHEST PULM ART WAWIC   ORDERED BY: GERALDO PETTIT     PROCEDURE DATE:  03/02/2023          INTERPRETATION:  HISTORY: Shortness of breath.    EXAMINATION: CTA CHEST was performed for evaluation of the pulmonary   arteries. Multiplanar reformatted images and MIPS were acquired.  CONTRAST/COMPLICATIONS:  IV Contrast: Omnipaque 350  68 cc administered   32 cc discarded  Oral Contrast: NONE  Complications: None reported at time of study completion    COMPARISON: No priorCT chest comparison. Correlation with chest   radiograph dated 2/22/2023 and 12/29/2022.    FINDINGS:    PULMONARY ARTERIES: No pulmonary embolism.    MEDIASTINUM: Heart size normal. No pericardial effusion. Normal caliber   thoracic aorta. Fluid present within the lumen of the mid esophagus.    AIRWAYS, LUNGS, PLEURA: Trachea and mainstem bronchi patent. Complete   left lower lobe consolidation with areas of hypoenhancement. When   comparing the current  radiograph with prior chest radiograph dated   12/29/2022, findings appear to be new. Moderate size loculated left   pleural effusion and small right pleural effusion.    IMAGED ABDOMEN: Unremarkable.    SOFT TISSUES: Unremarkable.    BONES: Unremarkable.      IMPRESSION:.    No pulmonary embolism.    Complete left lower lobe consolidation, which appears to be new compared   to 12/29/2022. This likely represents pneumonia (possibly necrotizing   pneumonia). Recommend CT chest follow-up in one month to ensure clearing   and to exclude alternative etiologies.    Moderate size loculated left pleural effusion and small right pleural   effusion.    --- End of Report ---        LATISHA GONZALES MD; Attending Radiologist  This document has been electronically signed. Mar  2 2023  1:54PM

## 2023-03-03 NOTE — SWALLOW BEDSIDE ASSESSMENT ADULT - SWALLOW EVAL: DIAGNOSIS
1) Pt exhibits functional Oropharyngeal Swallowing abilities for age. Bolus formation/transfer were mechanically functional for age & laryngeal lift on palpation during swallowing trials was felt to be functional for age as well. No behavioral aspiration signs exhibited. No change in O2 sats noted. Odynophagia denied. No emesis demonstrated. Note that pt underwent an outpatient Modified Barium Swallow Study(MBS) at this facility on 2/22/23 which was negative for Oropharyngeal Dysphagia as well as negative for Aspiration. With that being stated, pt has a h/o radiographically confirmed Achalasia which places her at a relatively increased risk for episodic post prandial aspiration of Reflux, particularly nocturnally.

## 2023-03-03 NOTE — CONSULT NOTE ADULT - SUBJECTIVE AND OBJECTIVE BOX
Chief Complaint: Shortness of breath; cough    HPI: 88 yo F presented to ER c/o 2 week hx of cough, left sided chest discomfort, and shortness of breath. Chest CT shows left lower lobe consolidation and a loculated left pleural effusion. She denies history of chronic lung disease. She admits to having a cough when eating. She c/o weakness and fatigue. Denies dizziness, fever, syncope, nausea, vomiting.     An esophagram done 2/22/2023 demonstrates achalasia with markedly decreased clearance of barium.       In the ER vitals stable with SpO2 low of 90% on RA. WBC 18.46.    3/3/2023: No new complaints this morning. C/O cough; left chest discomfort.       PAST MEDICAL & SURGICAL HISTORY:  Osteoarthritis      Osteopenia      Deep vein thrombosis (DVT)      S/P Hysterectomy  2007      Lumbar Laminectomy/ Spinal Fusion  1999      S/P Arthroscopy of Right Knee  1998      S/P Appendectomy  @ 12 years old      S/P Tonsillectomy  @ 2 years old          REVIEW OF SYSTEMS:    As in HPI  All other review of systems is negative unless indicated above    Vital Signs Last 24 Hrs  T(C): 36.2 (02 Mar 2023 20:51), Max: 36.8 (02 Mar 2023 17:20)  T(F): 97.2 (02 Mar 2023 20:51), Max: 98.3 (02 Mar 2023 17:20)  HR: 69 (02 Mar 2023 20:51) (63 - 78)  BP: 149/69 (02 Mar 2023 20:51) (115/56 - 149/69)  BP(mean): 81 (02 Mar 2023 15:03) (79 - 81)  RR: 18 (02 Mar 2023 20:51) (18 - 19)  SpO2: 93% (02 Mar 2023 20:51) (91% - 93%)      I&O's Summary    02 Mar 2023 07:01  -  03 Mar 2023 07:00  --------------------------------------------------------  IN: 200 mL / OUT: 0 mL / NET: 200 mL        PHYSICAL EXAM:    Constitutional: NAD, awake and alert, frail. No distress  Neck: Soft and supple, No LAD, No JVD  Respiratory: Breath sounds are decreased in the left lower lung field.   Cardiovascular: S1 and S2, regular rate and rhythm, no Murmurs, gallops or rubs  Gastrointestinal: Bowel Sounds present, soft, nontender, nondistended, no guarding, no rebound  Extremities: No peripheral edema  Neurological: A/O x 3, no focal deficits  Skin: No rashes    Medications:  MEDICATIONS  (STANDING):  ascorbic acid 500 milliGRAM(s) Oral daily  azithromycin   Tablet 500 milliGRAM(s) Oral daily  cholecalciferol 400 Unit(s) Oral daily  donepezil 5 milliGRAM(s) Oral at bedtime  dorzolamide 2%/timolol 0.5% Ophthalmic Solution 1 Drop(s) Right EYE two times a day  latanoprost 0.005% Ophthalmic Solution 1 Drop(s) Right EYE at bedtime  magnesium oxide 400 milliGRAM(s) Oral daily  multivitamin 1 Tablet(s) Oral daily  piperacillin/tazobactam IVPB.- 3.375 Gram(s) IV Intermittent once  piperacillin/tazobactam IVPB.. 3.375 Gram(s) IV Intermittent every 8 hours      Labs: All Labs Reviewed:                        9.2    14.62 )-----------( 377      ( 03 Mar 2023 06:05 )             27.7     03-03    137  |  104  |  22  ----------------------------<  113<H>  3.8   |  30  |  0.64    Ca    8.9      03 Mar 2023 06:05  Phos  3.8     03-03  Mg     2.1     03-03    TPro  5.2<L>  /  Alb  1.6<L>  /  TBili  0.3  /  DBili  x   /  AST  36  /  ALT  61  /  AlkPhos  397<H>  03-03    PT/INR - ( 02 Mar 2023 08:58 )   PT: 23.0 sec;   INR: 1.97 ratio         PTT - ( 02 Mar 2023 08:58 )  PTT:38.9 sec      Blood Culture:     RADIOLOGY:     CT ANGIO CHEST PULM ART WAWIC   ORDERED BY: GERALDO PETTIT     PROCEDURE DATE:  03/02/2023          INTERPRETATION:  HISTORY: Shortness of breath.    EXAMINATION: CTA CHEST was performed for evaluation of the pulmonary   arteries. Multiplanar reformatted images and MIPS were acquired.  CONTRAST/COMPLICATIONS:  IV Contrast: Omnipaque 350  68 cc administered   32 cc discarded  Oral Contrast: NONE  Complications: None reported at time of study completion    COMPARISON: No priorCT chest comparison. Correlation with chest   radiograph dated 2/22/2023 and 12/29/2022.    FINDINGS:    PULMONARY ARTERIES: No pulmonary embolism.    MEDIASTINUM: Heart size normal. No pericardial effusion. Normal caliber   thoracic aorta. Fluid present within the lumen of the mid esophagus.    AIRWAYS, LUNGS, PLEURA: Trachea and mainstem bronchi patent. Complete   left lower lobe consolidation with areas of hypoenhancement. When   comparing the current  radiograph with prior chest radiograph dated   12/29/2022, findings appear to be new. Moderate size loculated left   pleural effusion and small right pleural effusion.    IMAGED ABDOMEN: Unremarkable.    SOFT TISSUES: Unremarkable.    BONES: Unremarkable.      IMPRESSION:.    No pulmonary embolism.    Complete left lower lobe consolidation, which appears to be new compared   to 12/29/2022. This likely represents pneumonia (possibly necrotizing   pneumonia). Recommend CT chest follow-up in one month to ensure clearing   and to exclude alternative etiologies.    Moderate size loculated left pleural effusion and small right pleural   effusion.           EXAM:  XR ESOPH SNGL CON STUDY   ORDERED BY: JULIANA CLARK   PROCEDURE DATE:  02/22/2023      INTERPRETATION:  CLINICAL INFORMATION: Dysphagia    TECHNIQUE: An esophagram was performed under fluoroscopic guidance   utilizing single contrast barium. The study is limited due to the   patient's inability to stand.  Fluoroscopy time:  1.1 minutes    COMPARISON: None    FINDINGS:  radiograph of the chest demonstrates the visualized lungs   to be clear and fusion hardware in the lumbar spine..    Barium mixes with retained intraluminal esophageal fluid at the beginning   of the examination. The esophagus is moderately dilated. There is   significant delay in contrast passing through the gastroesophageal   junction and into the stomach. There is marked narrowing of the   gastroesophageal junction measuring 5 mm in diameter when maximally   distended. Contrast does eventually pass into the stomach and duodenum.    Postprocedure radiograph obtained at least 7 minutes after the patient   completed drinking demonstrates residual contrast column to the level of   the proximal esophagus. Note that a timed esophagram could not be   performed due to the patient's inability to stand.    IMPRESSION:    Dilated esophagus withmarkedly decreased clearance of barium and marked   narrowing of the gastroesophageal junction, suspicious for achalasia.      MERCEDES CASTRO MD; Attending Radiologist            Assessment/Plan: 1. Left lower lobe pneumonia - consider aspiration pneumonia in patient with achalasia    2. Left loculated pleural effusion -  history concerning for possible empyema.      3. Achalasia - recently noted on esophagram.     4. Hx of DVT - on Eliquis; no evidence of PE on CTA.     Suggest: Continue antibiotics per ID (currently on Zosyn and zithro). Follow up cultures; follow up WBC. Thoracentesis / drainage of left pleural space per thoracic surgery (Eliquis held). GI follow up with Dr. Stevens who has been following her as an outpatient. Swallow therapy consult (patient has had prior evaluation with Shahid Rodriguez. Supplement O2 prn. Lovenox for DVT prophylaxis while off Eliquis; hold for procedures. Resume anticoagulation after procedures.         Time Span: 60 minutes.

## 2023-03-03 NOTE — SWALLOW BEDSIDE ASSESSMENT ADULT - SLP GENERAL OBSERVATIONS
On encounter, a cervical kyphosis was evident and an age acceptable loss of bulk was apparent in pt's strap muscle regions. The pt was alert and interactive. She was able to verbalize during communicative probes without evidence of a primary motor speech or primary linguistic pathology. Pt is able to verbalize needs and feels that she is at communicative baseline.

## 2023-03-03 NOTE — SWALLOW BEDSIDE ASSESSMENT ADULT - SWALLOW EVAL: STRUCTURAL ABNORMALITIES
A cervical kyphosis was evident and an age acceptable loss of bulk was apparent in pt's strap muscle regions.

## 2023-03-03 NOTE — SWALLOW BEDSIDE ASSESSMENT ADULT - NS SPL SWALLOW CLINIC TRIAL FT
Pt exhibited functional Oropharyngeal Swallowing abilities for age. Bolus formation/transfer were mechanically functional for age & laryngeal lift on palpation during swallowing trials was felt to be functional for age as well. No behavioral aspiration signs exhibited. No change in O2 sats noted. Odynophagia denied. No emesis demonstrated. Note that pt underwent an outpatient Modified Barium Swallow Study(MBS) at this facility on 2/22/23 which was negative for Oropharyngeal Dysphagia as well as negative for Aspiration. With that being stated, pt has a h/o radiographically confirmed Achalasia which places her at a relatively increased risk for episodic post prandial aspiration of Reflux, particularly nocturnally.

## 2023-03-03 NOTE — SWALLOW BEDSIDE ASSESSMENT ADULT - SWALLOW EVAL: CRITERIA FOR SKILLED INTERVENTION MET
DO NOT FEEL THAT ACUTE SPEECH PATHOLOGY FOLLOW UP WOULD CHANGE CLINICAL MANAGEMENT/OUTCOME IN HOSPITAL SETTING. PT'S SPEECH-LANGUAGE ABILITIES AND OROPHARYNGEAL SWALLOWING ABILITIES ARE FUNCTIONAL/AT USUAL STATE/MAXIMIZED. GIVEN ABOVE, THIS SERVICE WILL NOT ACTIVELY FOLLOW. RECONSULT PRN SHOULD STATUS CHANGE AND CONDITION WARRANT.

## 2023-03-03 NOTE — SWALLOW BEDSIDE ASSESSMENT ADULT - SWALLOW EVAL: PROGNOSIS
2) On encounter, a cervical kyphosis was evident and an age acceptable loss of bulk was apparent in pt's strap muscle regions. The pt was alert and interactive. She was able to verbalize during communicative probes without evidence of a primary motor speech or primary linguistic pathology. Pt is able to verbalize needs and feels that she is at communicative baseline.

## 2023-03-03 NOTE — CONSULT NOTE ADULT - SUBJECTIVE AND OBJECTIVE BOX
Patient is a 87y old  Female who presents with a chief complaint of SOB/Cough/Pleurisy/Large loculate left pleural effusion (02 Mar 2023 16:35)      HPI:  87F with PMH of DVT (Dx 2022 on eliquis), Neuropathy, Osteoenia, MILTON presents with progressive SOB, left pleurisy and non-productive cough x 2 weeks. Associated weakness and fatigue. Denies dizziness, fever, syncope, nausea, vomiting. Outpatient evaluation with CT revealed loculated left pleural effusion and advised to report to ER for further evaluation by PMD.     In the ER vitals stable with SpO2 low of 90% on RA. WBC 18.46, Hgb 9.9 from 11.5 (2022). Troponin negative. CXRAY with large left pleural efffusion. Also noted to have have been recently diagnosed with DVT 2 months prior to admission. CTA chest ordered to rule out PE as a concurrent problem. CTA chest reported no PE, complete left lower lobe consolidation and likely pneumonia in nature. moderate left loculated pleural effusion and small right pleural effusion.    Reported recent cardiac work up outpatient with stress and echocardiogram reported as unremarkable by patient's . In addition recent swallow evaluation with barium swallow outpatient a little more than a week prior to admission also reported as unremarkable.   (02 Mar 2023 16:35)      PAST MEDICAL & SURGICAL HISTORY:  Osteoarthritis      Osteopenia      Deep vein thrombosis (DVT)      S/P Hysterectomy        Lumbar Laminectomy/ Spinal Fusion        S/P Arthroscopy of Right Knee        S/P Appendectomy  @ 12 years old      S/P Tonsillectomy  @ 2 years old          MEDICATIONS  (STANDING):  ascorbic acid 500 milliGRAM(s) Oral daily  azithromycin   Tablet 500 milliGRAM(s) Oral daily  cholecalciferol 400 Unit(s) Oral daily  donepezil 5 milliGRAM(s) Oral at bedtime  dorzolamide 2%/timolol 0.5% Ophthalmic Solution 1 Drop(s) Right EYE two times a day  latanoprost 0.005% Ophthalmic Solution 1 Drop(s) Right EYE at bedtime  magnesium oxide 400 milliGRAM(s) Oral daily  multivitamin 1 Tablet(s) Oral daily  piperacillin/tazobactam IVPB.- 3.375 Gram(s) IV Intermittent once  piperacillin/tazobactam IVPB.. 3.375 Gram(s) IV Intermittent every 8 hours    MEDICATIONS  (PRN):  acetaminophen     Tablet .. 650 milliGRAM(s) Oral every 6 hours PRN Temp greater or equal to 38C (100.4F), Mild Pain (1 - 3)  albuterol    90 MICROgram(s) HFA Inhaler 2 Puff(s) Inhalation every 6 hours PRN Shortness of Breath and/or Wheezing  aluminum hydroxide/magnesium hydroxide/simethicone Suspension 30 milliLiter(s) Oral every 4 hours PRN Dyspepsia  benzonatate 100 milliGRAM(s) Oral every 8 hours PRN Cough  guaifenesin/dextromethorphan Oral Liquid 10 milliLiter(s) Oral every 4 hours PRN Cough  melatonin 3 milliGRAM(s) Oral at bedtime PRN Insomnia  ondansetron Injectable 4 milliGRAM(s) IV Push every 8 hours PRN Nausea and/or Vomiting      FAMILY HISTORY:  FH: heart disease        SOCIAL HISTORY:    REVIEW OF SYSTEMS:  CONSTITUTIONAL:    No fatigue, malaise, lethargy.  No fever or chills.  RESPIRATORY:  No cough.  No wheeze.  No hemoptysis.    CARDIOVASCULAR:  No chest pains.  No palpitations. No shortness of breath, No orthopnea or PND.  GASTROINTESTINAL:  No abdominal pain.  No nausea or vomiting.    GENITOURINARY:    No hematuria.    MUSCULOSKELETAL:  No musculoskeletal pain.  No joint swelling.  No arthritis.  NEUROLOGICAL:  No tingling or numbness or weakness.  PSYCHIATRIC:  No confusion  SKIN:  No rashes.            Vital Signs Last 24 Hrs  T(C): 36.2 (02 Mar 2023 20:51), Max: 36.8 (02 Mar 2023 17:20)  T(F): 97.2 (02 Mar 2023 20:51), Max: 98.3 (02 Mar 2023 17:20)  HR: 69 (02 Mar 2023 20:51) (63 - 78)  BP: 149/69 (02 Mar 2023 20:51) (115/56 - 149/69)  BP(mean): 81 (02 Mar 2023 15:03) (79 - 81)  RR: 18 (02 Mar 2023 20:51) (18 - 19)  SpO2: 93% (02 Mar 2023 20:51) (91% - 93%)    Parameters below as of 02 Mar 2023 20:51  Patient On (Oxygen Delivery Method): nasal cannula  O2 Flow (L/min): 2      PHYSICAL EXAM-    Constitutional:  no acute distress     Head: Head is normocephalic and atraumatic.      Neck:  No JVD.     Cardiovascular: Regular rate and rhythm without S3, S4. No murmurs or rubs are appreciated.      Respiratory: Breathsounds are normal. No rales. No wheezing.    Abdomen: Soft, nontender, nondistended with positive bowel sounds.      Extremity: No tenderness. No  pitting edema     Neurologic: The patient is alert and oriented.      Skin: No rash, no obvious lesions noted.      Psychiatric: The patient appears to be emotionally stable.      INTERPRETATION OF TELEMETRY:    ECG: Sinus rythm ,  no ST T changes.     I&O's Detail    02 Mar 2023 07:01  -  03 Mar 2023 07:00  --------------------------------------------------------  IN:    IV PiggyBack: 200 mL  Total IN: 200 mL    OUT:  Total OUT: 0 mL    Total NET: 200 mL          LABS:                        9.2    14.62 )-----------( 377      ( 03 Mar 2023 06:05 )             27.7     03-03    137  |  104  |  22  ----------------------------<  113<H>  3.8   |  30  |  0.64    Ca    8.9      03 Mar 2023 06:05  Phos  3.8     03-03  Mg     2.1     -03    TPro  5.2<L>  /  Alb  1.6<L>  /  TBili  0.3  /  DBili  x   /  AST  36  /  ALT  61  /  AlkPhos  397<H>  03-03        PT/INR - ( 02 Mar 2023 08:58 )   PT: 23.0 sec;   INR: 1.97 ratio         PTT - ( 02 Mar 2023 08:58 )  PTT:38.9 sec  Urinalysis Basic - ( 02 Mar 2023 10:21 )    Color: Yellow / Appearance: Clear / S.005 / pH: x  Gluc: x / Ketone: Negative  / Bili: Negative / Urobili: Negative   Blood: x / Protein: Negative / Nitrite: Negative   Leuk Esterase: Negative / RBC: x / WBC x   Sq Epi: x / Non Sq Epi: x / Bacteria: x      I&O's Summary    02 Mar 2023 07:01  -  03 Mar 2023 07:00  --------------------------------------------------------  IN: 200 mL / OUT: 0 mL / NET: 200 mL      BNPSerum Pro-Brain Natriuretic Peptide: 1428 pg/mL ( @ 08:58)    RADIOLOGY & ADDITIONAL STUDIES: Patient is a 87y old  Female who presents with a chief complaint of SOB/Cough/Pleurisy/Large loculate left pleural effusion.    HPI:  87F with PMH of DVT (Dx 2022 on eliquis), Neuropathy, Osteoenia, MILTON presents with progressive SOB, left pleurisy and non-productive cough x 2 weeks. Associated weakness and fatigue.  Cardiology team was consulted for further evaluation.    Patient was seen and examined by me this morning.    She is in the bed comfortable.    She denies any chest pain in the a.m..    She was planned to have a PleurX catheter.        PAST MEDICAL & SURGICAL HISTORY:  Osteoarthritis      Osteopenia      Deep vein thrombosis (DVT)      S/P Hysterectomy        Lumbar Laminectomy/ Spinal Fusion        S/P Arthroscopy of Right Knee        S/P Appendectomy  @ 12 years old      S/P Tonsillectomy  @ 2 years old          MEDICATIONS  (STANDING):  ascorbic acid 500 milliGRAM(s) Oral daily  azithromycin   Tablet 500 milliGRAM(s) Oral daily  cholecalciferol 400 Unit(s) Oral daily  donepezil 5 milliGRAM(s) Oral at bedtime  dorzolamide 2%/timolol 0.5% Ophthalmic Solution 1 Drop(s) Right EYE two times a day  latanoprost 0.005% Ophthalmic Solution 1 Drop(s) Right EYE at bedtime  magnesium oxide 400 milliGRAM(s) Oral daily  multivitamin 1 Tablet(s) Oral daily  piperacillin/tazobactam IVPB.- 3.375 Gram(s) IV Intermittent once  piperacillin/tazobactam IVPB.. 3.375 Gram(s) IV Intermittent every 8 hours    MEDICATIONS  (PRN):  acetaminophen     Tablet .. 650 milliGRAM(s) Oral every 6 hours PRN Temp greater or equal to 38C (100.4F), Mild Pain (1 - 3)  albuterol    90 MICROgram(s) HFA Inhaler 2 Puff(s) Inhalation every 6 hours PRN Shortness of Breath and/or Wheezing  aluminum hydroxide/magnesium hydroxide/simethicone Suspension 30 milliLiter(s) Oral every 4 hours PRN Dyspepsia  benzonatate 100 milliGRAM(s) Oral every 8 hours PRN Cough  guaifenesin/dextromethorphan Oral Liquid 10 milliLiter(s) Oral every 4 hours PRN Cough  melatonin 3 milliGRAM(s) Oral at bedtime PRN Insomnia  ondansetron Injectable 4 milliGRAM(s) IV Push every 8 hours PRN Nausea and/or Vomiting      FAMILY HISTORY:  FH: heart disease        SOCIAL HISTORY:No recent smoking    REVIEW OF SYSTEMS:  CONSTITUTIONAL:    No fatigue, malaise, lethargy.  No fever or chills.  RESPIRATORY:  No cough.  No wheeze.  No hemoptysis.    CARDIOVASCULAR:  No chest pains.  No palpitations.  admits shortness of breath, No orthopnea or PND.  GASTROINTESTINAL:  No abdominal pain.  No nausea or vomiting.    GENITOURINARY:    No hematuria.    MUSCULOSKELETAL:   admits musculoskeletal pain.  No joint swelling.  No arthritis.  NEUROLOGICAL:  No tingling or numbness or weakness.  PSYCHIATRIC:  No confusion  SKIN:  No rashes.            Vital Signs Last 24 Hrs  T(C): 36.2 (02 Mar 2023 20:51), Max: 36.8 (02 Mar 2023 17:20)  T(F): 97.2 (02 Mar 2023 20:51), Max: 98.3 (02 Mar 2023 17:20)  HR: 69 (02 Mar 2023 20:51) (63 - 78)  BP: 149/69 (02 Mar 2023 20:51) (115/56 - 149/69)  BP(mean): 81 (02 Mar 2023 15:03) (79 - 81)  RR: 18 (02 Mar 2023 20:51) (18 - 19)  SpO2: 93% (02 Mar 2023 20:51) (91% - 93%)    Parameters below as of 02 Mar 2023 20:51  Patient On (Oxygen Delivery Method): nasal cannula  O2 Flow (L/min): 2      PHYSICAL EXAM-    Constitutional:  no acute distress , elderly looking female    Head: Head is normocephalic and atraumatic.      Neck:  No JVD.     Cardiovascular: Regular rate and rhythm without S3, S4. No murmurs or rubs are appreciated.      Respiratory: Breathsounds  decreased at left base    Abdomen: Soft, nontender, nondistended with positive bowel sounds.      Extremity: No tenderness. No  pitting edema     Neurologic: The patient is alert and oriented.      Skin: No rash, no obvious lesions noted.      Psychiatric: The patient appears to be emotionally stable.      INTERPRETATION OF TELEMETRY: not on    ECG: Sinus rythm ,  left bundle-branch block    I&O's Detail    02 Mar 2023 07:01  -  03 Mar 2023 07:00  --------------------------------------------------------  IN:    IV PiggyBack: 200 mL  Total IN: 200 mL    OUT:  Total OUT: 0 mL    Total NET: 200 mL          LABS:                        9.2    14.62 )-----------( 377      ( 03 Mar 2023 06:05 )             27.7     03    137  |  104  |  22  ----------------------------<  113<H>  3.8   |  30  |  0.64    Ca    8.9      03 Mar 2023 06:05  Phos  3.8     0303  Mg     2.1     03    TPro  5.2<L>  /  Alb  1.6<L>  /  TBili  0.3  /  DBili  x   /  AST  36  /  ALT  61  /  AlkPhos  397<H>  03-03        PT/INR - ( 02 Mar 2023 08:58 )   PT: 23.0 sec;   INR: 1.97 ratio         PTT - ( 02 Mar 2023 08:58 )  PTT:38.9 sec  Urinalysis Basic - ( 02 Mar 2023 10:21 )    Color: Yellow / Appearance: Clear / S.005 / pH: x  Gluc: x / Ketone: Negative  / Bili: Negative / Urobili: Negative   Blood: x / Protein: Negative / Nitrite: Negative   Leuk Esterase: Negative / RBC: x / WBC x   Sq Epi: x / Non Sq Epi: x / Bacteria: x      I&O's Summary    02 Mar 2023 07:01  -  03 Mar 2023 07:00  --------------------------------------------------------  IN: 200 mL / OUT: 0 mL / NET: 200 mL      BNPSerum Pro-Brain Natriuretic Peptide: 1428 pg/mL ( @ 08:58)    RADIOLOGY & ADDITIONAL STUDIES:  < from: CT Angio Chest PE Protocol w/ IV Cont (23 @ 13:49) >  IMPRESSION:.    No pulmonary embolism.    Complete left lower lobe consolidation, which appears to be new compared   to 2022. This likely represents pneumonia (possibly necrotizing   pneumonia). Recommend CT chest follow-up in one month to ensure clearing   and to exclude alternative etiologies.    Moderate size loculated left pleural effusion and small right pleural   effusion.    --- End of Report ---            LATISHA GONZALES MD; Attending Radiologist  This document has been electronically signed. Mar  2 2023  1:54PM    < end of copied text >  < from: Xray Chest 1 View-PORTABLE IMMEDIATE (23 @ 09:00) >    ACC: [id number] EXAM:  XR CHEST PORTABLE IMMED 1V   ORDERED BY: GUSTAVO MCGHEE     PROCEDURE DATE:  2023          INTERPRETATION:  AP chest on 2023 at 8:51 AM. Patient has sepsis.    Heart size cannot be assessed.    Presently there is a large left effusion which is new since 2022. There may also be a slight right base effusion.    IMPRESSION: Large left effusion and minimal right base effusion presently   seen.    --- End of Report ---            LEEROY CRISTINA MD;Attending Radiologist  This document has been electronically signed. Mar  2 2023  9:44AM    < end of copied text >

## 2023-03-03 NOTE — CONSULT NOTE ADULT - REASON FOR ADMISSION
SOB/Cough/Pleurisy/Large loculate left pleural effusion
SOB

## 2023-03-04 LAB
ANION GAP SERPL CALC-SCNC: 5 MMOL/L — SIGNIFICANT CHANGE UP (ref 5–17)
BASOPHILS # BLD AUTO: 0.12 K/UL — SIGNIFICANT CHANGE UP (ref 0–0.2)
BASOPHILS NFR BLD AUTO: 1 % — SIGNIFICANT CHANGE UP (ref 0–2)
BUN SERPL-MCNC: 19 MG/DL — SIGNIFICANT CHANGE UP (ref 7–23)
CALCIUM SERPL-MCNC: 8.8 MG/DL — SIGNIFICANT CHANGE UP (ref 8.5–10.1)
CHLORIDE SERPL-SCNC: 105 MMOL/L — SIGNIFICANT CHANGE UP (ref 96–108)
CO2 SERPL-SCNC: 29 MMOL/L — SIGNIFICANT CHANGE UP (ref 22–31)
CREAT SERPL-MCNC: 0.85 MG/DL — SIGNIFICANT CHANGE UP (ref 0.5–1.3)
EGFR: 66 ML/MIN/1.73M2 — SIGNIFICANT CHANGE UP
EOSINOPHIL # BLD AUTO: 0.12 K/UL — SIGNIFICANT CHANGE UP (ref 0–0.5)
EOSINOPHIL NFR BLD AUTO: 1 % — SIGNIFICANT CHANGE UP (ref 0–6)
GLUCOSE SERPL-MCNC: 122 MG/DL — HIGH (ref 70–99)
HCT VFR BLD CALC: 29.5 % — LOW (ref 34.5–45)
HGB BLD-MCNC: 9.4 G/DL — LOW (ref 11.5–15.5)
LYMPHOCYTES # BLD AUTO: 1.18 K/UL — SIGNIFICANT CHANGE UP (ref 1–3.3)
LYMPHOCYTES # BLD AUTO: 10 % — LOW (ref 13–44)
MCHC RBC-ENTMCNC: 29.6 PG — SIGNIFICANT CHANGE UP (ref 27–34)
MCHC RBC-ENTMCNC: 31.9 GM/DL — LOW (ref 32–36)
MCV RBC AUTO: 92.8 FL — SIGNIFICANT CHANGE UP (ref 80–100)
MONOCYTES # BLD AUTO: 0.71 K/UL — SIGNIFICANT CHANGE UP (ref 0–0.9)
MONOCYTES NFR BLD AUTO: 6 % — SIGNIFICANT CHANGE UP (ref 2–14)
NEUTROPHILS # BLD AUTO: 9.42 K/UL — HIGH (ref 1.8–7.4)
NEUTROPHILS NFR BLD AUTO: 73 % — SIGNIFICANT CHANGE UP (ref 43–77)
NRBC # BLD: SIGNIFICANT CHANGE UP /100 WBCS (ref 0–0)
PLATELET # BLD AUTO: 409 K/UL — HIGH (ref 150–400)
POTASSIUM SERPL-MCNC: 3.5 MMOL/L — SIGNIFICANT CHANGE UP (ref 3.5–5.3)
POTASSIUM SERPL-SCNC: 3.5 MMOL/L — SIGNIFICANT CHANGE UP (ref 3.5–5.3)
RBC # BLD: 3.18 M/UL — LOW (ref 3.8–5.2)
RBC # FLD: 14.7 % — HIGH (ref 10.3–14.5)
SODIUM SERPL-SCNC: 139 MMOL/L — SIGNIFICANT CHANGE UP (ref 135–145)
WBC # BLD: 11.77 K/UL — HIGH (ref 3.8–10.5)
WBC # FLD AUTO: 11.77 K/UL — HIGH (ref 3.8–10.5)

## 2023-03-04 PROCEDURE — 99233 SBSQ HOSP IP/OBS HIGH 50: CPT

## 2023-03-04 RX ADMIN — PIPERACILLIN AND TAZOBACTAM 25 GRAM(S): 4; .5 INJECTION, POWDER, LYOPHILIZED, FOR SOLUTION INTRAVENOUS at 10:59

## 2023-03-04 RX ADMIN — PIPERACILLIN AND TAZOBACTAM 25 GRAM(S): 4; .5 INJECTION, POWDER, LYOPHILIZED, FOR SOLUTION INTRAVENOUS at 02:32

## 2023-03-04 RX ADMIN — DORZOLAMIDE HYDROCHLORIDE TIMOLOL MALEATE 1 DROP(S): 20; 5 SOLUTION/ DROPS OPHTHALMIC at 22:58

## 2023-03-04 RX ADMIN — AZITHROMYCIN 500 MILLIGRAM(S): 500 TABLET, FILM COATED ORAL at 10:53

## 2023-03-04 RX ADMIN — ENOXAPARIN SODIUM 50 MILLIGRAM(S): 100 INJECTION SUBCUTANEOUS at 10:54

## 2023-03-04 RX ADMIN — DORZOLAMIDE HYDROCHLORIDE TIMOLOL MALEATE 1 DROP(S): 20; 5 SOLUTION/ DROPS OPHTHALMIC at 10:55

## 2023-03-04 RX ADMIN — PIPERACILLIN AND TAZOBACTAM 25 GRAM(S): 4; .5 INJECTION, POWDER, LYOPHILIZED, FOR SOLUTION INTRAVENOUS at 18:52

## 2023-03-04 RX ADMIN — Medication 500 MILLIGRAM(S): at 10:53

## 2023-03-04 RX ADMIN — ENOXAPARIN SODIUM 50 MILLIGRAM(S): 100 INJECTION SUBCUTANEOUS at 22:57

## 2023-03-04 RX ADMIN — Medication 100 MILLIGRAM(S): at 10:53

## 2023-03-04 RX ADMIN — LATANOPROST 1 DROP(S): 0.05 SOLUTION/ DROPS OPHTHALMIC; TOPICAL at 22:56

## 2023-03-04 RX ADMIN — Medication 400 UNIT(S): at 10:53

## 2023-03-04 RX ADMIN — DONEPEZIL HYDROCHLORIDE 5 MILLIGRAM(S): 10 TABLET, FILM COATED ORAL at 22:56

## 2023-03-04 RX ADMIN — Medication 3 MILLIGRAM(S): at 22:56

## 2023-03-04 RX ADMIN — Medication 1 TABLET(S): at 10:52

## 2023-03-04 NOTE — PROGRESS NOTE ADULT - ASSESSMENT
88 y/o female with h/o neuropathy, osteoporosis, MILTON, and DVT on Eliquis was admitted on 3/2 for worsening SOB x 2 weeks. Pt describes feeling like it is hard to take a breath in. Pt notes associated dry cough and left sided chest pain radiating to armpit. Pt was sent in by PCP Dr. Bg Villsaenor secondary to CT chest non-contrast results revealing left sided pleural effusion/consolidation. Denies fevers, chills, sore throat at home. In ER she received zosyn.     1. LLL pneumonia. ?necrotizing pneumonia. Loculated left side pleural effusion. ?empyema.   -respiratory frail  -leukocytosis improving  -BC x 2, sputum c/s noted  -on zosyn 3.375 gm IV q8h # 3  -tolerating abx well so far; no side effects noted  -cardiothoracic evaluation appreciated  -respiratory care  -continue abx coverage   -monitor temps  -f/u CBC  -supportive care  2. Other issues:   -care per medicine    d/w  in [place]

## 2023-03-04 NOTE — PROGRESS NOTE ADULT - ASSESSMENT
87F with PMH of DVT (Dx 12/2022 on eliquis), Neuropathy, Osteopenia, MILTON presented with progressive SOB, left pleurisy and non-productive cough x 2 weeks. Associated weakness and fatigue. Outpatient evaluation with CT revealed loculated left pleural effusion and advised to report to ER for further evaluation by PMD.     Acute Hypoxic respiratory failure secondary to Pneumonia/ Left pleural effusion   Pneumonia. Possibly necrotizing but unable to clinically determine  Loculated Left Pleural Effusion worrisome picture with hx of achalasia for Gram negative aspiration PNA  -Supplemental O2 via NC to maintain Spo2> 92%  -Pain management as per orders  -CTA findings noted  -Continue Zosyn 3.375g q8h --> day #3  -Cultures noted   -ID  and Pulm consult appreciated   -CTS consult appreciated: no intervention at this time, recommended IR eval   -F/u IR consult     Hx of achalasia  -GI consult appreciated   -Aspiration precautions      Recent Dx of LLE DVT   -Eliquis on hold in anticipation for chest tube placement   -Resume AC pending IR consult     DVT Prophylaxis: Lovenox subq     87F with PMH of DVT (Dx 12/2022 on eliquis), Neuropathy, Osteopenia, MILTON presented with progressive SOB, left pleurisy and non-productive cough x 2 weeks. Associated weakness and fatigue. Outpatient evaluation with CT revealed loculated left pleural effusion and advised to report to ER for further evaluation by PMD.     Acute Hypoxic respiratory failure secondary to Pneumonia/ Left pleural effusion   Pneumonia. Possibly necrotizing but unable to clinically determine  Loculated Left Pleural Effusion worrisome picture with hx of achalasia for Gram negative aspiration PNA  -Supplemental O2 via NC to maintain Spo2> 92%  -Pain management as per orders  -CTA findings noted  -Continue Zosyn 3.375g q8h --> day #3  -Cultures noted   -ID  and Pulm consult appreciated   -CTS consult appreciated: no intervention at this time, recommended IR eval   -F/u IR consult     Diarrhea  -2 episodes of watery BM this morning  -Continue to monitor  -WBC is actually better today, no abdominal pain  -She did receive one dose of Magnesium oxide yesterday AM    Hx of achalasia  -GI consult appreciated   -Aspiration precautions      Recent Dx of LLE DVT   -Eliquis on hold in anticipation for chest tube placement   -Resume AC pending IR consult     DVT Prophylaxis: Lovenox subq

## 2023-03-05 LAB
BASOPHILS # BLD AUTO: 0.04 K/UL — SIGNIFICANT CHANGE UP (ref 0–0.2)
BASOPHILS NFR BLD AUTO: 0.4 % — SIGNIFICANT CHANGE UP (ref 0–2)
EOSINOPHIL # BLD AUTO: 0.06 K/UL — SIGNIFICANT CHANGE UP (ref 0–0.5)
EOSINOPHIL NFR BLD AUTO: 0.6 % — SIGNIFICANT CHANGE UP (ref 0–6)
HCT VFR BLD CALC: 27.6 % — LOW (ref 34.5–45)
HGB BLD-MCNC: 8.9 G/DL — LOW (ref 11.5–15.5)
IMM GRANULOCYTES NFR BLD AUTO: 2.4 % — HIGH (ref 0–0.9)
LYMPHOCYTES # BLD AUTO: 0.95 K/UL — LOW (ref 1–3.3)
LYMPHOCYTES # BLD AUTO: 9.1 % — LOW (ref 13–44)
MCHC RBC-ENTMCNC: 29.7 PG — SIGNIFICANT CHANGE UP (ref 27–34)
MCHC RBC-ENTMCNC: 32.2 GM/DL — SIGNIFICANT CHANGE UP (ref 32–36)
MCV RBC AUTO: 92 FL — SIGNIFICANT CHANGE UP (ref 80–100)
MONOCYTES # BLD AUTO: 0.93 K/UL — HIGH (ref 0–0.9)
MONOCYTES NFR BLD AUTO: 8.9 % — SIGNIFICANT CHANGE UP (ref 2–14)
NEUTROPHILS # BLD AUTO: 8.18 K/UL — HIGH (ref 1.8–7.4)
NEUTROPHILS NFR BLD AUTO: 78.6 % — HIGH (ref 43–77)
PLATELET # BLD AUTO: 400 K/UL — SIGNIFICANT CHANGE UP (ref 150–400)
RBC # BLD: 3 M/UL — LOW (ref 3.8–5.2)
RBC # FLD: 14.6 % — HIGH (ref 10.3–14.5)
WBC # BLD: 10.41 K/UL — SIGNIFICANT CHANGE UP (ref 3.8–10.5)
WBC # FLD AUTO: 10.41 K/UL — SIGNIFICANT CHANGE UP (ref 3.8–10.5)

## 2023-03-05 PROCEDURE — 71046 X-RAY EXAM CHEST 2 VIEWS: CPT | Mod: 26

## 2023-03-05 PROCEDURE — 99233 SBSQ HOSP IP/OBS HIGH 50: CPT

## 2023-03-05 RX ADMIN — DONEPEZIL HYDROCHLORIDE 5 MILLIGRAM(S): 10 TABLET, FILM COATED ORAL at 23:07

## 2023-03-05 RX ADMIN — Medication 1 TABLET(S): at 09:17

## 2023-03-05 RX ADMIN — Medication 400 UNIT(S): at 09:17

## 2023-03-05 RX ADMIN — LATANOPROST 1 DROP(S): 0.05 SOLUTION/ DROPS OPHTHALMIC; TOPICAL at 23:08

## 2023-03-05 RX ADMIN — DORZOLAMIDE HYDROCHLORIDE TIMOLOL MALEATE 1 DROP(S): 20; 5 SOLUTION/ DROPS OPHTHALMIC at 09:17

## 2023-03-05 RX ADMIN — PIPERACILLIN AND TAZOBACTAM 25 GRAM(S): 4; .5 INJECTION, POWDER, LYOPHILIZED, FOR SOLUTION INTRAVENOUS at 17:22

## 2023-03-05 RX ADMIN — PIPERACILLIN AND TAZOBACTAM 25 GRAM(S): 4; .5 INJECTION, POWDER, LYOPHILIZED, FOR SOLUTION INTRAVENOUS at 02:23

## 2023-03-05 RX ADMIN — Medication 10 MILLILITER(S): at 23:09

## 2023-03-05 RX ADMIN — ENOXAPARIN SODIUM 50 MILLIGRAM(S): 100 INJECTION SUBCUTANEOUS at 23:09

## 2023-03-05 RX ADMIN — PIPERACILLIN AND TAZOBACTAM 25 GRAM(S): 4; .5 INJECTION, POWDER, LYOPHILIZED, FOR SOLUTION INTRAVENOUS at 09:18

## 2023-03-05 RX ADMIN — Medication 500 MILLIGRAM(S): at 09:17

## 2023-03-05 RX ADMIN — ENOXAPARIN SODIUM 50 MILLIGRAM(S): 100 INJECTION SUBCUTANEOUS at 09:17

## 2023-03-05 RX ADMIN — AZITHROMYCIN 500 MILLIGRAM(S): 500 TABLET, FILM COATED ORAL at 09:17

## 2023-03-05 RX ADMIN — DORZOLAMIDE HYDROCHLORIDE TIMOLOL MALEATE 1 DROP(S): 20; 5 SOLUTION/ DROPS OPHTHALMIC at 23:08

## 2023-03-05 NOTE — PROGRESS NOTE ADULT - ASSESSMENT
87F with PMH of DVT (Dx 12/2022 on eliquis), Neuropathy, Osteopenia, MILTON presented with progressive SOB, left pleurisy and non-productive cough x 2 weeks. Associated weakness and fatigue. Outpatient evaluation with CT revealed loculated left pleural effusion and advised to report to ER for further evaluation by PMD.     Acute Hypoxic respiratory failure secondary to Pneumonia/ Left pleural effusion   Pneumonia. Possibly necrotizing but unable to clinically determine  Loculated Left Pleural Effusion worrisome picture with hx of achalasia for Gram negative aspiration PNA  -Supplemental O2 via NC to maintain Spo2> 92%  -Pain management as per orders  -CTA findings noted  -Continue Zosyn 3.375g q8h --> day #4  -Cultures noted   -ID and Pulm consult appreciated   -Pt would benefit from GI work up to evaluate further given findings of esophogram suggestive of achalasia, as pt may continue to aspirate and at risk of recurrent asp PNA/ hospitalization - will discuss with Dr. Seth   -CTS consult appreciated: no intervention at this time, recommended IR eval   -F/u IR consult     Diarrhea - Improved   -2 episodes of watery BM  3/4  -One BM this AM, soft   -Continue to monitor  -WBC is actually better today, no abdominal pain  -She did receive one dose of Magnesium oxide yesterday AM    Hx of achalasia  -GI consult appreciated   -Aspiration precautions      Recent Dx of LLE DVT   -Eliquis on hold in anticipation for chest tube placement   -Resume AC pending IR consult     DVT Prophylaxis: Lovenox subq

## 2023-03-05 NOTE — PROGRESS NOTE ADULT - ASSESSMENT
86 y/o female with h/o neuropathy, osteoporosis, MILTON, and DVT on Eliquis was admitted on 3/2 for worsening SOB x 2 weeks. Pt describes feeling like it is hard to take a breath in. Pt notes associated dry cough and left sided chest pain radiating to armpit. Pt was sent in by PCP Dr. Bg Villasenor secondary to CT chest non-contrast results revealing left sided pleural effusion/consolidation. Denies fevers, chills, sore throat at home. In ER she received zosyn.     1. LLL pneumonia. ?necrotizing pneumonia. Loculated left side pleural effusion. ?empyema.   -respiratory frail  -leukocytosis improving  -BC x 2, sputum c/s noted  -on zosyn 3.375 gm IV q8h # 3  -tolerating abx well so far; no side effects noted  -cardiothoracic evaluation appreciated  -plan for thoracocentesis in AM  -respiratory care  -continue abx coverage   -monitor temps  -f/u CBC  -supportive care  2. Other issues:   -care per medicine    d/w  at bedside

## 2023-03-06 LAB
ALBUMIN FLD-MCNC: 1.8 G/DL — SIGNIFICANT CHANGE UP
ANION GAP SERPL CALC-SCNC: 1 MMOL/L — LOW (ref 5–17)
APTT BLD: 46.6 SEC — HIGH (ref 27.5–35.5)
B PERT IGG+IGM PNL SER: ABNORMAL
BASOPHILS # BLD AUTO: 0.04 K/UL — SIGNIFICANT CHANGE UP (ref 0–0.2)
BASOPHILS NFR BLD AUTO: 0.3 % — SIGNIFICANT CHANGE UP (ref 0–2)
BUN SERPL-MCNC: 13 MG/DL — SIGNIFICANT CHANGE UP (ref 7–23)
CALCIUM SERPL-MCNC: 9.1 MG/DL — SIGNIFICANT CHANGE UP (ref 8.5–10.1)
CHLORIDE SERPL-SCNC: 106 MMOL/L — SIGNIFICANT CHANGE UP (ref 96–108)
CO2 SERPL-SCNC: 32 MMOL/L — HIGH (ref 22–31)
COLOR FLD: SIGNIFICANT CHANGE UP
CREAT SERPL-MCNC: 0.74 MG/DL — SIGNIFICANT CHANGE UP (ref 0.5–1.3)
EGFR: 78 ML/MIN/1.73M2 — SIGNIFICANT CHANGE UP
EOSINOPHIL # BLD AUTO: 0.06 K/UL — SIGNIFICANT CHANGE UP (ref 0–0.5)
EOSINOPHIL NFR BLD AUTO: 0.5 % — SIGNIFICANT CHANGE UP (ref 0–6)
FLUAV AG NPH QL: SIGNIFICANT CHANGE UP
FLUBV AG NPH QL: SIGNIFICANT CHANGE UP
FLUID INTAKE SUBSTANCE CLASS: SIGNIFICANT CHANGE UP
GLUCOSE FLD-MCNC: 19 MG/DL — SIGNIFICANT CHANGE UP
GLUCOSE SERPL-MCNC: 97 MG/DL — SIGNIFICANT CHANGE UP (ref 70–99)
GRAM STN FLD: SIGNIFICANT CHANGE UP
HCT VFR BLD CALC: 30.6 % — LOW (ref 34.5–45)
HGB BLD-MCNC: 9.5 G/DL — LOW (ref 11.5–15.5)
IMM GRANULOCYTES NFR BLD AUTO: 3 % — HIGH (ref 0–0.9)
INR BLD: 1.39 RATIO — HIGH (ref 0.88–1.16)
LDH SERPL L TO P-CCNC: 2937 U/L — SIGNIFICANT CHANGE UP
LYMPHOCYTES # BLD AUTO: 1.05 K/UL — SIGNIFICANT CHANGE UP (ref 1–3.3)
LYMPHOCYTES # BLD AUTO: 9 % — LOW (ref 13–44)
LYMPHOCYTES # FLD: 1 % — SIGNIFICANT CHANGE UP
MCHC RBC-ENTMCNC: 29.2 PG — SIGNIFICANT CHANGE UP (ref 27–34)
MCHC RBC-ENTMCNC: 31 GM/DL — LOW (ref 32–36)
MCV RBC AUTO: 94.2 FL — SIGNIFICANT CHANGE UP (ref 80–100)
MONOCYTES # BLD AUTO: 0.97 K/UL — HIGH (ref 0–0.9)
MONOCYTES NFR BLD AUTO: 8.3 % — SIGNIFICANT CHANGE UP (ref 2–14)
MONOS+MACROS # FLD: 5 % — SIGNIFICANT CHANGE UP
NEUTROPHILS # BLD AUTO: 9.23 K/UL — HIGH (ref 1.8–7.4)
NEUTROPHILS NFR BLD AUTO: 78.9 % — HIGH (ref 43–77)
NEUTROPHILS-BODY FLUID: 94 % — SIGNIFICANT CHANGE UP
PLATELET # BLD AUTO: 392 K/UL — SIGNIFICANT CHANGE UP (ref 150–400)
POTASSIUM SERPL-MCNC: 3.8 MMOL/L — SIGNIFICANT CHANGE UP (ref 3.5–5.3)
POTASSIUM SERPL-SCNC: 3.8 MMOL/L — SIGNIFICANT CHANGE UP (ref 3.5–5.3)
PROT FLD-MCNC: 3.4 G/DL — SIGNIFICANT CHANGE UP
PROTHROM AB SERPL-ACNC: 16.2 SEC — HIGH (ref 10.5–13.4)
RBC # BLD: 3.25 M/UL — LOW (ref 3.8–5.2)
RBC # FLD: 14.7 % — HIGH (ref 10.3–14.5)
RCV VOL RI: HIGH /UL (ref 0–0)
RSV RNA NPH QL NAA+NON-PROBE: SIGNIFICANT CHANGE UP
SARS-COV-2 RNA SPEC QL NAA+PROBE: SIGNIFICANT CHANGE UP
SODIUM SERPL-SCNC: 139 MMOL/L — SIGNIFICANT CHANGE UP (ref 135–145)
SPECIMEN SOURCE: SIGNIFICANT CHANGE UP
TOTAL NUCLEATED CELL COUNT, BODY FLUID: 1043 /UL — SIGNIFICANT CHANGE UP
TUBE TYPE: SIGNIFICANT CHANGE UP
WBC # BLD: 11.7 K/UL — HIGH (ref 3.8–10.5)
WBC # FLD AUTO: 11.7 K/UL — HIGH (ref 3.8–10.5)

## 2023-03-06 PROCEDURE — 99233 SBSQ HOSP IP/OBS HIGH 50: CPT

## 2023-03-06 PROCEDURE — 32555 ASPIRATE PLEURA W/ IMAGING: CPT | Mod: LT

## 2023-03-06 RX ORDER — ENOXAPARIN SODIUM 100 MG/ML
50 INJECTION SUBCUTANEOUS EVERY 12 HOURS
Refills: 0 | Status: DISCONTINUED | OUTPATIENT
Start: 2023-03-06 | End: 2023-03-07

## 2023-03-06 RX ADMIN — Medication 500 MILLIGRAM(S): at 10:20

## 2023-03-06 RX ADMIN — Medication 400 UNIT(S): at 10:21

## 2023-03-06 RX ADMIN — DORZOLAMIDE HYDROCHLORIDE TIMOLOL MALEATE 1 DROP(S): 20; 5 SOLUTION/ DROPS OPHTHALMIC at 21:45

## 2023-03-06 RX ADMIN — DORZOLAMIDE HYDROCHLORIDE TIMOLOL MALEATE 1 DROP(S): 20; 5 SOLUTION/ DROPS OPHTHALMIC at 10:21

## 2023-03-06 RX ADMIN — ENOXAPARIN SODIUM 50 MILLIGRAM(S): 100 INJECTION SUBCUTANEOUS at 21:45

## 2023-03-06 RX ADMIN — LATANOPROST 1 DROP(S): 0.05 SOLUTION/ DROPS OPHTHALMIC; TOPICAL at 21:45

## 2023-03-06 RX ADMIN — PIPERACILLIN AND TAZOBACTAM 25 GRAM(S): 4; .5 INJECTION, POWDER, LYOPHILIZED, FOR SOLUTION INTRAVENOUS at 10:19

## 2023-03-06 RX ADMIN — PIPERACILLIN AND TAZOBACTAM 25 GRAM(S): 4; .5 INJECTION, POWDER, LYOPHILIZED, FOR SOLUTION INTRAVENOUS at 02:00

## 2023-03-06 RX ADMIN — Medication 1 TABLET(S): at 10:21

## 2023-03-06 RX ADMIN — Medication 10 MILLILITER(S): at 22:17

## 2023-03-06 RX ADMIN — Medication 100 MILLIGRAM(S): at 10:20

## 2023-03-06 RX ADMIN — DONEPEZIL HYDROCHLORIDE 5 MILLIGRAM(S): 10 TABLET, FILM COATED ORAL at 21:45

## 2023-03-06 RX ADMIN — PIPERACILLIN AND TAZOBACTAM 25 GRAM(S): 4; .5 INJECTION, POWDER, LYOPHILIZED, FOR SOLUTION INTRAVENOUS at 18:51

## 2023-03-06 NOTE — PROGRESS NOTE ADULT - ASSESSMENT
88 y/o female with h/o neuropathy, osteoporosis, MILTON, and DVT on Eliquis was admitted on 3/2 for worsening SOB x 2 weeks. Pt describes feeling like it is hard to take a breath in. Pt notes associated dry cough and left sided chest pain radiating to armpit. Pt was sent in by PCP Dr. Bg Villasenor secondary to CT chest non-contrast results revealing left sided pleural effusion/consolidation. Denies fevers, chills, sore throat at home. In ER she received zosyn.     1. LLL pneumonia. ?necrotizing pneumonia. Loculated left side pleural effusion s/p thoracentesis. ?empyema.   -respiratory frail  -leukocytosis improving  -BC x 2, sputum c/s noted  -on zosyn 3.375 gm IV q8h # 4  -tolerating abx well so far; no side effects noted  -cardiothoracic evaluation appreciated  -s/p thoracocentesis - f/u pleural fluid analysis  -respiratory care  -continue abx coverage   -monitor temps  -f/u CBC  -supportive care  2. Other issues:   -care per medicine    d/w  at bedside

## 2023-03-06 NOTE — PROGRESS NOTE ADULT - ASSESSMENT
1. left lower lobe consolidation    2. Dysphagia with possible achalasia, normal MBS    Recommendation  1. Continue full liquid diet with aspiration precautions  2. Follow up path from thoracentesis  3. Based on clinical course, may consider EGD this week

## 2023-03-06 NOTE — PROGRESS NOTE ADULT - ASSESSMENT
87F with PMH of DVT (Dx 12/2022 on eliquis), Neuropathy, Osteopenia, MILTON presented with progressive SOB, left pleurisy and non-productive cough x 2 weeks. Associated weakness and fatigue. Outpatient evaluation with CT revealed loculated left pleural effusion and advised to report to ER for further evaluation by PMD.     Acute Hypoxic respiratory failure secondary to Pneumonia/ Left pleural effusion   Pneumonia. Possibly necrotizing but unable to clinically determine  Loculated Left Pleural Effusion worrisome picture with hx of achalasia for Gram negative aspiration PNA  -Supplemental O2 via NC to maintain Spo2> 92%  -Pain management as per orders  -CTA findings noted  -Continue Zosyn 3.375g q8h --> day #5  -Cultures noted   -ID and Pulm consult appreciated   -Pt would benefit from GI work up to evaluate further given findings of esophogram suggestive of achalasia, as pt may continue to aspirate and at risk of recurrent asp PNA/ hospitalization   -CTS consult appreciated: no intervention at this time, recommended IR eval   -S/p IR procedure 3/6/23, ~ 30cc fluid removed for studies     Diarrhea - Improved   -2 episodes of watery BM  3/4  -Continue to monitor  -WBC is actually better today, no abdominal pain    Hx of achalasia  -GI consult appreciated   -Aspiration precautions      Recent Dx of LLE DVT   -Eliquis on hold in anticipation for chest tube placement   -Resume AC pending IR consult     DVT Prophylaxis: Lovenox subq

## 2023-03-07 LAB
ANION GAP SERPL CALC-SCNC: 3 MMOL/L — LOW (ref 5–17)
BUN SERPL-MCNC: 16 MG/DL — SIGNIFICANT CHANGE UP (ref 7–23)
CALCIUM SERPL-MCNC: 8.9 MG/DL — SIGNIFICANT CHANGE UP (ref 8.5–10.1)
CHLORIDE SERPL-SCNC: 106 MMOL/L — SIGNIFICANT CHANGE UP (ref 96–108)
CO2 SERPL-SCNC: 31 MMOL/L — SIGNIFICANT CHANGE UP (ref 22–31)
CREAT SERPL-MCNC: 0.71 MG/DL — SIGNIFICANT CHANGE UP (ref 0.5–1.3)
CULTURE RESULTS: SIGNIFICANT CHANGE UP
CULTURE RESULTS: SIGNIFICANT CHANGE UP
EGFR: 82 ML/MIN/1.73M2 — SIGNIFICANT CHANGE UP
GLUCOSE SERPL-MCNC: 88 MG/DL — SIGNIFICANT CHANGE UP (ref 70–99)
HCT VFR BLD CALC: 29.7 % — LOW (ref 34.5–45)
HGB BLD-MCNC: 9.3 G/DL — LOW (ref 11.5–15.5)
MCHC RBC-ENTMCNC: 29.5 PG — SIGNIFICANT CHANGE UP (ref 27–34)
MCHC RBC-ENTMCNC: 31.3 GM/DL — LOW (ref 32–36)
MCV RBC AUTO: 94.3 FL — SIGNIFICANT CHANGE UP (ref 80–100)
PLATELET # BLD AUTO: 391 K/UL — SIGNIFICANT CHANGE UP (ref 150–400)
POTASSIUM SERPL-MCNC: 3.5 MMOL/L — SIGNIFICANT CHANGE UP (ref 3.5–5.3)
POTASSIUM SERPL-SCNC: 3.5 MMOL/L — SIGNIFICANT CHANGE UP (ref 3.5–5.3)
RBC # BLD: 3.15 M/UL — LOW (ref 3.8–5.2)
RBC # FLD: 14.6 % — HIGH (ref 10.3–14.5)
SODIUM SERPL-SCNC: 140 MMOL/L — SIGNIFICANT CHANGE UP (ref 135–145)
SPECIMEN SOURCE: SIGNIFICANT CHANGE UP
SPECIMEN SOURCE: SIGNIFICANT CHANGE UP
WBC # BLD: 9.47 K/UL — SIGNIFICANT CHANGE UP (ref 3.8–10.5)
WBC # FLD AUTO: 9.47 K/UL — SIGNIFICANT CHANGE UP (ref 3.8–10.5)

## 2023-03-07 PROCEDURE — 99233 SBSQ HOSP IP/OBS HIGH 50: CPT

## 2023-03-07 PROCEDURE — 71045 X-RAY EXAM CHEST 1 VIEW: CPT | Mod: 26

## 2023-03-07 PROCEDURE — 32557 INSERT CATH PLEURA W/ IMAGE: CPT | Mod: LT

## 2023-03-07 RX ORDER — MORPHINE SULFATE 50 MG/1
0.5 CAPSULE, EXTENDED RELEASE ORAL EVERY 4 HOURS
Refills: 0 | Status: DISCONTINUED | OUTPATIENT
Start: 2023-03-07 | End: 2023-03-11

## 2023-03-07 RX ORDER — LIDOCAINE 4 G/100G
1 CREAM TOPICAL DAILY
Refills: 0 | Status: DISCONTINUED | OUTPATIENT
Start: 2023-03-07 | End: 2023-03-11

## 2023-03-07 RX ADMIN — DORZOLAMIDE HYDROCHLORIDE TIMOLOL MALEATE 1 DROP(S): 20; 5 SOLUTION/ DROPS OPHTHALMIC at 10:34

## 2023-03-07 RX ADMIN — DONEPEZIL HYDROCHLORIDE 5 MILLIGRAM(S): 10 TABLET, FILM COATED ORAL at 21:11

## 2023-03-07 RX ADMIN — PIPERACILLIN AND TAZOBACTAM 25 GRAM(S): 4; .5 INJECTION, POWDER, LYOPHILIZED, FOR SOLUTION INTRAVENOUS at 10:32

## 2023-03-07 RX ADMIN — PIPERACILLIN AND TAZOBACTAM 25 GRAM(S): 4; .5 INJECTION, POWDER, LYOPHILIZED, FOR SOLUTION INTRAVENOUS at 01:08

## 2023-03-07 RX ADMIN — DORZOLAMIDE HYDROCHLORIDE TIMOLOL MALEATE 1 DROP(S): 20; 5 SOLUTION/ DROPS OPHTHALMIC at 21:10

## 2023-03-07 RX ADMIN — ENOXAPARIN SODIUM 50 MILLIGRAM(S): 100 INJECTION SUBCUTANEOUS at 10:34

## 2023-03-07 RX ADMIN — Medication 650 MILLIGRAM(S): at 19:49

## 2023-03-07 RX ADMIN — Medication 1 TABLET(S): at 10:33

## 2023-03-07 RX ADMIN — Medication 650 MILLIGRAM(S): at 15:03

## 2023-03-07 RX ADMIN — LATANOPROST 1 DROP(S): 0.05 SOLUTION/ DROPS OPHTHALMIC; TOPICAL at 21:11

## 2023-03-07 RX ADMIN — MORPHINE SULFATE 0.5 MILLIGRAM(S): 50 CAPSULE, EXTENDED RELEASE ORAL at 21:10

## 2023-03-07 RX ADMIN — PIPERACILLIN AND TAZOBACTAM 25 GRAM(S): 4; .5 INJECTION, POWDER, LYOPHILIZED, FOR SOLUTION INTRAVENOUS at 19:43

## 2023-03-07 RX ADMIN — MORPHINE SULFATE 0.5 MILLIGRAM(S): 50 CAPSULE, EXTENDED RELEASE ORAL at 21:40

## 2023-03-07 RX ADMIN — Medication 500 MILLIGRAM(S): at 10:33

## 2023-03-07 RX ADMIN — MORPHINE SULFATE 0.5 MILLIGRAM(S): 50 CAPSULE, EXTENDED RELEASE ORAL at 16:32

## 2023-03-07 RX ADMIN — Medication 400 UNIT(S): at 10:35

## 2023-03-07 RX ADMIN — Medication 3 MILLIGRAM(S): at 21:11

## 2023-03-07 RX ADMIN — Medication 100 MILLIGRAM(S): at 10:32

## 2023-03-07 NOTE — PROGRESS NOTE ADULT - ASSESSMENT
1. left lower lobe consolidation possible empyema undergoing chest tube placement.     2. Dysphagia with possible achalasia, normal MBS    Recommendation  1. Continue full liquid diet with aspiration precautions and NPO after midnight for possible endoscopy.   2. Follow up path from thoracentesis  3. Diagnostic EGD on 3/8 or 3/9 based on endo availability

## 2023-03-07 NOTE — PROCEDURE NOTE - SPECIMEN OBTAINED
Fluid sent for gram stain and culture
Fluid sent for chemistry/Fluid sent for cytology/Fluid sent for gram stain and culture

## 2023-03-07 NOTE — PROGRESS NOTE ADULT - ASSESSMENT
87F with PMH of DVT (Dx 12/2022 on eliquis), Neuropathy, Osteopenia, MILTON presented with progressive SOB, left pleurisy and non-productive cough x 2 weeks. Associated weakness and fatigue. Outpatient evaluation with CT revealed loculated left pleural effusion and advised to report to ER for further evaluation by PMD.     Acute Hypoxic respiratory failure secondary to Pneumonia/ Left pleural effusion   Pneumonia. Possibly necrotizing but unable to clinically determine  Loculated Left Pleural Effusion worrisome picture with hx of achalasia for Gram negative aspiration PNA  -Supplemental O2 via NC to maintain Spo2> 92%  -Pain management as per orders  -CTA findings noted  -Continue Zosyn 3.375g q8h --> day #6  -Cultures noted   -ID and Pulm consult appreciated   -CTS consult appreciated  -S/p IR procedure 3/6/23, ~ 30cc fluid removed for studies  -S/p L chest tube placement 3/7/23    -Pain management as per orders     Diarrhea - Resolved   -2 episodes of watery BM  3/4  -Continue to monitor  -WBC is actually better today, no abdominal pain    Hx of achalasia  -GI consult appreciated   -Aspiration precautions    -EGD planned for tomorrow 3/8/23    Recent Dx of LLE DVT   -Eliquis on hold in anticipation for chest tube placement   -Resume AC pending IR consult     DVT Prophylaxis: Lovenox subq      DISPO: EGD planned for tomorrow 3/8/23

## 2023-03-07 NOTE — PROCEDURE NOTE - PROCEDURE FINDINGS AND DETAILS
Successful placement of 14Fr left chest tube.   Chest tube to suction for now, will be managed by thoracic surgery.
Successful aspiration of 30cc
3000cc of ya ascitic fluid removed from right abdomen under sterile conditions and ultrasound guidance.

## 2023-03-08 LAB
ANION GAP SERPL CALC-SCNC: 4 MMOL/L — LOW (ref 5–17)
BASOPHILS # BLD AUTO: 0.03 K/UL — SIGNIFICANT CHANGE UP (ref 0–0.2)
BASOPHILS NFR BLD AUTO: 0.4 % — SIGNIFICANT CHANGE UP (ref 0–2)
BUN SERPL-MCNC: 10 MG/DL — SIGNIFICANT CHANGE UP (ref 7–23)
CALCIUM SERPL-MCNC: 8.8 MG/DL — SIGNIFICANT CHANGE UP (ref 8.5–10.1)
CHLORIDE SERPL-SCNC: 106 MMOL/L — SIGNIFICANT CHANGE UP (ref 96–108)
CO2 SERPL-SCNC: 31 MMOL/L — SIGNIFICANT CHANGE UP (ref 22–31)
CREAT SERPL-MCNC: 0.65 MG/DL — SIGNIFICANT CHANGE UP (ref 0.5–1.3)
EGFR: 85 ML/MIN/1.73M2 — SIGNIFICANT CHANGE UP
EOSINOPHIL # BLD AUTO: 0.05 K/UL — SIGNIFICANT CHANGE UP (ref 0–0.5)
EOSINOPHIL NFR BLD AUTO: 0.6 % — SIGNIFICANT CHANGE UP (ref 0–6)
GLUCOSE SERPL-MCNC: 93 MG/DL — SIGNIFICANT CHANGE UP (ref 70–99)
HCT VFR BLD CALC: 29.1 % — LOW (ref 34.5–45)
HGB BLD-MCNC: 9.2 G/DL — LOW (ref 11.5–15.5)
IMM GRANULOCYTES NFR BLD AUTO: 1.8 % — HIGH (ref 0–0.9)
LYMPHOCYTES # BLD AUTO: 1.11 K/UL — SIGNIFICANT CHANGE UP (ref 1–3.3)
LYMPHOCYTES # BLD AUTO: 13.4 % — SIGNIFICANT CHANGE UP (ref 13–44)
MCHC RBC-ENTMCNC: 29.5 PG — SIGNIFICANT CHANGE UP (ref 27–34)
MCHC RBC-ENTMCNC: 31.6 GM/DL — LOW (ref 32–36)
MCV RBC AUTO: 93.3 FL — SIGNIFICANT CHANGE UP (ref 80–100)
MONOCYTES # BLD AUTO: 0.62 K/UL — SIGNIFICANT CHANGE UP (ref 0–0.9)
MONOCYTES NFR BLD AUTO: 7.5 % — SIGNIFICANT CHANGE UP (ref 2–14)
NEUTROPHILS # BLD AUTO: 6.32 K/UL — SIGNIFICANT CHANGE UP (ref 1.8–7.4)
NEUTROPHILS NFR BLD AUTO: 76.3 % — SIGNIFICANT CHANGE UP (ref 43–77)
PLATELET # BLD AUTO: 402 K/UL — HIGH (ref 150–400)
POTASSIUM SERPL-MCNC: 3.5 MMOL/L — SIGNIFICANT CHANGE UP (ref 3.5–5.3)
POTASSIUM SERPL-SCNC: 3.5 MMOL/L — SIGNIFICANT CHANGE UP (ref 3.5–5.3)
RBC # BLD: 3.12 M/UL — LOW (ref 3.8–5.2)
RBC # FLD: 14.6 % — HIGH (ref 10.3–14.5)
SODIUM SERPL-SCNC: 141 MMOL/L — SIGNIFICANT CHANGE UP (ref 135–145)
WBC # BLD: 8.28 K/UL — SIGNIFICANT CHANGE UP (ref 3.8–10.5)
WBC # FLD AUTO: 8.28 K/UL — SIGNIFICANT CHANGE UP (ref 3.8–10.5)

## 2023-03-08 PROCEDURE — 99232 SBSQ HOSP IP/OBS MODERATE 35: CPT

## 2023-03-08 PROCEDURE — 88313 SPECIAL STAINS GROUP 2: CPT | Mod: 26

## 2023-03-08 PROCEDURE — 88312 SPECIAL STAINS GROUP 1: CPT | Mod: 26

## 2023-03-08 PROCEDURE — 88305 TISSUE EXAM BY PATHOLOGIST: CPT | Mod: 26

## 2023-03-08 PROCEDURE — 70486 CT MAXILLOFACIAL W/O DYE: CPT | Mod: 26

## 2023-03-08 PROCEDURE — 99231 SBSQ HOSP IP/OBS SF/LOW 25: CPT

## 2023-03-08 PROCEDURE — 71045 X-RAY EXAM CHEST 1 VIEW: CPT | Mod: 26

## 2023-03-08 RX ORDER — PANTOPRAZOLE SODIUM 20 MG/1
40 TABLET, DELAYED RELEASE ORAL
Refills: 0 | Status: DISCONTINUED | OUTPATIENT
Start: 2023-03-08 | End: 2023-03-11

## 2023-03-08 RX ADMIN — MORPHINE SULFATE 0.5 MILLIGRAM(S): 50 CAPSULE, EXTENDED RELEASE ORAL at 02:30

## 2023-03-08 RX ADMIN — DORZOLAMIDE HYDROCHLORIDE TIMOLOL MALEATE 1 DROP(S): 20; 5 SOLUTION/ DROPS OPHTHALMIC at 09:35

## 2023-03-08 RX ADMIN — MORPHINE SULFATE 0.5 MILLIGRAM(S): 50 CAPSULE, EXTENDED RELEASE ORAL at 01:32

## 2023-03-08 RX ADMIN — DORZOLAMIDE HYDROCHLORIDE TIMOLOL MALEATE 1 DROP(S): 20; 5 SOLUTION/ DROPS OPHTHALMIC at 21:47

## 2023-03-08 RX ADMIN — MORPHINE SULFATE 0.5 MILLIGRAM(S): 50 CAPSULE, EXTENDED RELEASE ORAL at 21:15

## 2023-03-08 RX ADMIN — PIPERACILLIN AND TAZOBACTAM 25 GRAM(S): 4; .5 INJECTION, POWDER, LYOPHILIZED, FOR SOLUTION INTRAVENOUS at 09:34

## 2023-03-08 RX ADMIN — Medication 1 TABLET(S): at 09:35

## 2023-03-08 RX ADMIN — PIPERACILLIN AND TAZOBACTAM 25 GRAM(S): 4; .5 INJECTION, POWDER, LYOPHILIZED, FOR SOLUTION INTRAVENOUS at 01:32

## 2023-03-08 RX ADMIN — Medication 500 MILLIGRAM(S): at 09:34

## 2023-03-08 RX ADMIN — DONEPEZIL HYDROCHLORIDE 5 MILLIGRAM(S): 10 TABLET, FILM COATED ORAL at 21:49

## 2023-03-08 RX ADMIN — LATANOPROST 1 DROP(S): 0.05 SOLUTION/ DROPS OPHTHALMIC; TOPICAL at 21:47

## 2023-03-08 RX ADMIN — MORPHINE SULFATE 0.5 MILLIGRAM(S): 50 CAPSULE, EXTENDED RELEASE ORAL at 20:15

## 2023-03-08 RX ADMIN — Medication 400 UNIT(S): at 09:48

## 2023-03-08 RX ADMIN — PIPERACILLIN AND TAZOBACTAM 25 GRAM(S): 4; .5 INJECTION, POWDER, LYOPHILIZED, FOR SOLUTION INTRAVENOUS at 17:10

## 2023-03-08 NOTE — PROGRESS NOTE ADULT - ASSESSMENT
87F with PMH of DVT (Dx 12/2022 on eliquis), Neuropathy, Osteopenia, MILTON presented with progressive SOB, left pleurisy and non-productive cough x 2 weeks. Associated weakness and fatigue. Outpatient evaluation with CT revealed loculated left pleural effusion and advised to report to ER for further evaluation by PMD.     Acute Hypoxic respiratory failure secondary to Pneumonia/ Left pleural effusion   Pneumonia. Possibly necrotizing but unable to clinically determine  Loculated Left Pleural Effusion worrisome picture with hx of achalasia for Gram negative aspiration PNA  -Supplemental O2 via NC to maintain Spo2> 92%  -Pain management as per orders  -CTA findings noted  -Continue Zosyn 3.375g q8h --> day #7  -Cultures noted   -ID and Pulm consult appreciated   -CTS consult appreciated  -S/p IR procedure 3/6/23, ~ 30cc fluid removed for studies  -S/p L chest tube placement 3/7/23    -Pain management as per orders   -Family requests imaging of sinuses, given long hx persistent post nasal drip/cough ---> CT sinuses ordered --> no significant findings noted --> will need to discuss with patient tomorrow     Hx of achalasia  -GI consult appreciated   -Aspiration precautions    -S/p EGD 3/8: no significant findings as per Dr. Seth   -Diet advanced     Recent Dx of LLE DVT   -Eliquis on hold in anticipation for chest tube placement   -Resume AC pending IR consult   -As per CTsx--> hold eliquis today --> f/u to verify when to restart AC     Diarrhea - Resolved   -2 episodes of watery BM  3/4  -Continue to monitor  -WBC is actually better today, no abdominal pain    DVT Prophylaxis: Lovenox subq      DISPO: Continue medical management

## 2023-03-08 NOTE — PROGRESS NOTE ADULT - ASSESSMENT
88 y/o female with h/o neuropathy, osteoporosis, MILTON, and DVT on Eliquis was admitted on 3/2 for worsening SOB x 2 weeks. Pt describes feeling like it is hard to take a breath in. Pt notes associated dry cough and left sided chest pain radiating to armpit. Pt was sent in by PCP Dr. Bg Villasenor secondary to CT chest non-contrast results revealing left sided pleural effusion/consolidation. Denies fevers, chills, sore throat at home. In ER she received zosyn.     1. LLL pneumonia. ?necrotizing pneumonia. Loculated left side pleural effusion s/p thoracentesis. ?empyema.   -respiratory frail  -leukocytosis improving  -BC x 2, sputum c/s noted  -on zosyn 3.375 gm IV q8h # 6  -tolerating abx well so far; no side effects noted  -cardiothoracic evaluation appreciated  - f/u pleural fluid culture  -respiratory care  -continue abx coverage   -monitor temps  -f/u CBC  -supportive care  2. Other issues:   -care per medicine    d/w  at bedside

## 2023-03-09 LAB
ANION GAP SERPL CALC-SCNC: 1 MMOL/L — LOW (ref 5–17)
BASOPHILS # BLD AUTO: 0.03 K/UL — SIGNIFICANT CHANGE UP (ref 0–0.2)
BASOPHILS NFR BLD AUTO: 0.4 % — SIGNIFICANT CHANGE UP (ref 0–2)
BUN SERPL-MCNC: 12 MG/DL — SIGNIFICANT CHANGE UP (ref 7–23)
CALCIUM SERPL-MCNC: 9.2 MG/DL — SIGNIFICANT CHANGE UP (ref 8.5–10.1)
CHLORIDE SERPL-SCNC: 108 MMOL/L — SIGNIFICANT CHANGE UP (ref 96–108)
CO2 SERPL-SCNC: 31 MMOL/L — SIGNIFICANT CHANGE UP (ref 22–31)
CREAT SERPL-MCNC: 0.65 MG/DL — SIGNIFICANT CHANGE UP (ref 0.5–1.3)
EGFR: 85 ML/MIN/1.73M2 — SIGNIFICANT CHANGE UP
EOSINOPHIL # BLD AUTO: 0.07 K/UL — SIGNIFICANT CHANGE UP (ref 0–0.5)
EOSINOPHIL NFR BLD AUTO: 0.8 % — SIGNIFICANT CHANGE UP (ref 0–6)
GLUCOSE SERPL-MCNC: 94 MG/DL — SIGNIFICANT CHANGE UP (ref 70–99)
HCT VFR BLD CALC: 29.9 % — LOW (ref 34.5–45)
HGB BLD-MCNC: 9.3 G/DL — LOW (ref 11.5–15.5)
IMM GRANULOCYTES NFR BLD AUTO: 1.5 % — HIGH (ref 0–0.9)
LYMPHOCYTES # BLD AUTO: 1.08 K/UL — SIGNIFICANT CHANGE UP (ref 1–3.3)
LYMPHOCYTES # BLD AUTO: 12.6 % — LOW (ref 13–44)
MCHC RBC-ENTMCNC: 29.5 PG — SIGNIFICANT CHANGE UP (ref 27–34)
MCHC RBC-ENTMCNC: 31.1 GM/DL — LOW (ref 32–36)
MCV RBC AUTO: 94.9 FL — SIGNIFICANT CHANGE UP (ref 80–100)
MONOCYTES # BLD AUTO: 0.52 K/UL — SIGNIFICANT CHANGE UP (ref 0–0.9)
MONOCYTES NFR BLD AUTO: 6.1 % — SIGNIFICANT CHANGE UP (ref 2–14)
NEUTROPHILS # BLD AUTO: 6.71 K/UL — SIGNIFICANT CHANGE UP (ref 1.8–7.4)
NEUTROPHILS NFR BLD AUTO: 78.6 % — HIGH (ref 43–77)
PLATELET # BLD AUTO: 394 K/UL — SIGNIFICANT CHANGE UP (ref 150–400)
POTASSIUM SERPL-MCNC: 3.3 MMOL/L — LOW (ref 3.5–5.3)
POTASSIUM SERPL-SCNC: 3.3 MMOL/L — LOW (ref 3.5–5.3)
RBC # BLD: 3.15 M/UL — LOW (ref 3.8–5.2)
RBC # FLD: 14.6 % — HIGH (ref 10.3–14.5)
SODIUM SERPL-SCNC: 140 MMOL/L — SIGNIFICANT CHANGE UP (ref 135–145)
SURGICAL PATHOLOGY STUDY: SIGNIFICANT CHANGE UP
WBC # BLD: 8.54 K/UL — SIGNIFICANT CHANGE UP (ref 3.8–10.5)
WBC # FLD AUTO: 8.54 K/UL — SIGNIFICANT CHANGE UP (ref 3.8–10.5)

## 2023-03-09 PROCEDURE — 71045 X-RAY EXAM CHEST 1 VIEW: CPT | Mod: 26

## 2023-03-09 PROCEDURE — 99232 SBSQ HOSP IP/OBS MODERATE 35: CPT

## 2023-03-09 PROCEDURE — 99231 SBSQ HOSP IP/OBS SF/LOW 25: CPT

## 2023-03-09 RX ORDER — POTASSIUM CHLORIDE 20 MEQ
40 PACKET (EA) ORAL EVERY 4 HOURS
Refills: 0 | Status: COMPLETED | OUTPATIENT
Start: 2023-03-09 | End: 2023-03-09

## 2023-03-09 RX ORDER — APIXABAN 2.5 MG/1
2.5 TABLET, FILM COATED ORAL EVERY 12 HOURS
Refills: 0 | Status: DISCONTINUED | OUTPATIENT
Start: 2023-03-09 | End: 2023-03-11

## 2023-03-09 RX ADMIN — Medication 1 TABLET(S): at 10:16

## 2023-03-09 RX ADMIN — LIDOCAINE 1 PATCH: 4 CREAM TOPICAL at 22:50

## 2023-03-09 RX ADMIN — Medication 400 UNIT(S): at 10:17

## 2023-03-09 RX ADMIN — Medication 500 MILLIGRAM(S): at 10:16

## 2023-03-09 RX ADMIN — PANTOPRAZOLE SODIUM 40 MILLIGRAM(S): 20 TABLET, DELAYED RELEASE ORAL at 05:51

## 2023-03-09 RX ADMIN — DORZOLAMIDE HYDROCHLORIDE TIMOLOL MALEATE 1 DROP(S): 20; 5 SOLUTION/ DROPS OPHTHALMIC at 10:18

## 2023-03-09 RX ADMIN — Medication 100 MILLIGRAM(S): at 10:17

## 2023-03-09 RX ADMIN — PIPERACILLIN AND TAZOBACTAM 25 GRAM(S): 4; .5 INJECTION, POWDER, LYOPHILIZED, FOR SOLUTION INTRAVENOUS at 01:40

## 2023-03-09 RX ADMIN — Medication 40 MILLIEQUIVALENT(S): at 10:19

## 2023-03-09 RX ADMIN — LATANOPROST 1 DROP(S): 0.05 SOLUTION/ DROPS OPHTHALMIC; TOPICAL at 22:41

## 2023-03-09 RX ADMIN — DORZOLAMIDE HYDROCHLORIDE TIMOLOL MALEATE 1 DROP(S): 20; 5 SOLUTION/ DROPS OPHTHALMIC at 22:42

## 2023-03-09 RX ADMIN — Medication 40 MILLIEQUIVALENT(S): at 18:55

## 2023-03-09 RX ADMIN — LIDOCAINE 1 PATCH: 4 CREAM TOPICAL at 20:00

## 2023-03-09 RX ADMIN — PIPERACILLIN AND TAZOBACTAM 25 GRAM(S): 4; .5 INJECTION, POWDER, LYOPHILIZED, FOR SOLUTION INTRAVENOUS at 10:15

## 2023-03-09 RX ADMIN — APIXABAN 2.5 MILLIGRAM(S): 2.5 TABLET, FILM COATED ORAL at 22:44

## 2023-03-09 RX ADMIN — LIDOCAINE 1 PATCH: 4 CREAM TOPICAL at 10:17

## 2023-03-09 RX ADMIN — PIPERACILLIN AND TAZOBACTAM 25 GRAM(S): 4; .5 INJECTION, POWDER, LYOPHILIZED, FOR SOLUTION INTRAVENOUS at 16:59

## 2023-03-09 RX ADMIN — DONEPEZIL HYDROCHLORIDE 5 MILLIGRAM(S): 10 TABLET, FILM COATED ORAL at 22:41

## 2023-03-09 NOTE — DIETITIAN INITIAL EVALUATION ADULT - ADD RECOMMEND
1) C/w Soft & Bite-Sized diet per GI recs  2) Add Magic Cup TID to optimize PO intake (provides 290 kcal, 9g protein/ shake)  3) Obtain vitamin D 25OH level to assess nutriture  4) Please obtain weekly weights  5) Consider adding thiamine 100 mg daily 2/2 poor PO intake/ malnutrition  6) MVI w/ minerals daily to ensure 100% RDA met  7) Encourage protein-rich foods, maximize food preferences  8) Monitor bowel movements, if no BM for >3 days, consider implementing bowel regimen.  9) Consider adding appetite stimulant such as Remeron or Marinol 2/2 chronically poor appetite/ PO intake  10) Confirm goals of care regarding nutrition support  RD will continue to monitor PO intake, labs, hydration, and wt prn.

## 2023-03-09 NOTE — DIETITIAN INITIAL EVALUATION ADULT - NSFNSGIIOFT_GEN_A_CORE
03-08-23 @ 07:01  -  03-09-23 @ 07:00  --------------------------------------------------------  OUT:    Chest Tube (mL): 700 mL  Total OUT: 700 mL    Total NET: -700 mL

## 2023-03-09 NOTE — DIETITIAN NUTRITION RISK NOTIFICATION - TREATMENT: THE FOLLOWING DIET HAS BEEN RECOMMENDED
Diet, Soft and Bite Sized (03-08-23 @ 11:54) [Active]  Diet, NPO after Midnight:      NPO Start Date: 07-Mar-2023,   NPO Start Time: 23:59 (03-07-23 @ 14:12) [Active]  Diet, NPO after Midnight:      NPO Start Date: 07-Mar-2023,   NPO Start Time: 23:59 (03-07-23 @ 13:41) [Active]

## 2023-03-09 NOTE — PROGRESS NOTE ADULT - ASSESSMENT
1. Dysphagia with concern for achalasia. S/p EGD with no stenosis at GEJ, normal appearing esophagus.     Recommendations  1. Await pathology  2. Soft diet and discussed to eat slowly  3. Aspiration precautions  4. PPI 40 mg daily before breakfast  5. No further interventions planned, follow up as outpatient.

## 2023-03-09 NOTE — PROGRESS NOTE ADULT - ASSESSMENT
87F with PMH of DVT (Dx 12/2022 on eliquis), Neuropathy, Osteopenia, MILTON presented with progressive SOB, left pleurisy and non-productive cough x 2 weeks. Associated weakness and fatigue. Outpatient evaluation with CT revealed loculated left pleural effusion and advised to report to ER for further evaluation by PMD.     Acute Hypoxic respiratory failure secondary to Pneumonia/ Left pleural effusion   Pneumonia. Possibly necrotizing but unable to clinically determine  Loculated Left Pleural Effusion worrisome picture with hx of achalasia for Gram negative aspiration PNA  -Supplemental O2 via NC to maintain Spo2> 92%  -Pain management as per orders  -CTA findings noted  -Continue Zosyn 3.375g q8h --> day #8  -Cultures noted   -ID and Pulm consult appreciated   -CTS consult appreciated  -S/p IR procedure 3/6/23, ~ 30cc fluid removed for studies  -S/p L chest tube placement 3/7/23  and liekly remove in AM as per CT surgery   -Pain management as per orders   -Family requests imaging of sinuses, given long hx persistent post nasal drip/cough ---> CT sinuses ordered --> no significant findings noted --> will need to discuss with patient tomorrow     Hx of achalasia  -GI consult appreciated   -Aspiration precautions    -S/p EGD 3/8: no significant findings as per Dr. Seth   -Diet advanced     Recent Dx of LLE DVT   - okay to resume eliquis as per CT surg    Diarrhea - Resolved   -2 episodes of watery BM  3/4  -Continue to monitor  -WBC is actually better today, no abdominal pain    DVT Prophylaxis: eliquis      DISPO: Continue medical management

## 2023-03-09 NOTE — DIETITIAN INITIAL EVALUATION ADULT - OTHER INFO
88 y/o F with a PMHx of DVT (Dx 12/2022 on eliquis), Neuropathy, Osteopenia, MILTON presented to ED with progressive SOB, left pleurisy and non-productive cough x 2 weeks. Associated weakness and fatigue. outpatient evaluation with CT revealed loculated left pleural effusion and advised to report to ER for further evaluation by PMD. Cxr: large left pleural effusion. Also noted to have been recently diagnosed with DVT 2 months prior to admission. CTA chest reported no PE, complete left lower lobe consolidation and likely pneumonia in nature. moderate left loculated pleural effusion and small right pleural effusion. Recent swallow evaluation with barium swallow outpatient a little more than a week prior to admission also reported as unremarkable. Admitted for PNA, possibly necrotizing but unable to clinically determine and loculated left pleural effusion. SLP Eval (3/3): Regular, Thin Liquids; NPO -> FLD (3/3). GI following, plan for EGD. S/p L thoracentesis (3/6): aspiration of 30cc. S/p L chest tube placement (3/7). S/p EGD (3/8): Normal esophagus (bx at proximal esophagus), irregular Z-line at 40 cm (bx), gastric polyps but otherwise normal esophagus.    NPO/ FLD x 6 days, diet just advanced yesterday afternoon. Pt eating breakfast at time of visit, very little on tray (2 yogurts, cottage cheese, diced pears). States that she is hungry and would like more food, however very limited on soft & bite-sized diet and has a egg allergy. Multiple Ensure Plus High Proteins noted at bedside, pt states that she does not love the taste of Ensure so she has not been drinking them. Discussed the need for increased kcal/ prot intake 2/2 poor po intake since admission - pt receptive to trying Magic Cup (van) TID instead in effort to optimize poor po intake. Pt unsure of her body weight since being in the hospital but states her UBW is 120#. RD unable to obtain bedscale wt at time of visit 2/2 pt being OOB in chair. Pt's wt has not been taken since admission per chart review; no weight hx to review. Pt thin, frail, junie appearing. NFPE reveals moderate to severe muscle/ fat wasting, pt meets criteria for PCM at this time. Encouraged high kcal/ high protein intake with meals in addition to ONS. Consider adding appetite stimulant such as Remeron or Marinol 2/2 chronically poor appetite/ PO intake. Please see additional recommendations below.

## 2023-03-09 NOTE — PROGRESS NOTE ADULT - ASSESSMENT
86 y/o female with h/o neuropathy, osteoporosis, MILTON, and DVT on Eliquis was admitted on 3/2 for worsening SOB x 2 weeks. Pt describes feeling like it is hard to take a breath in. Pt notes associated dry cough and left sided chest pain radiating to armpit. Pt was sent in by PCP Dr. Bg Villasenor secondary to CT chest non-contrast results revealing left sided pleural effusion/consolidation. Denies fevers, chills, sore throat at home. In ER she received zosyn.     1. LLL pneumonia. ?necrotizing pneumonia. Loculated left side pleural effusion s/p drainage. ?empyema.   -respiratory frail  -leukocytosis improving  -BC x 2, sputum c/s noted  -on zosyn 3.375 gm IV q8h # 7  -tolerating abx well so far; no side effects noted  -cardiothoracic evaluation appreciated  - f/u pleural fluid culture  -respiratory care  -continue abx coverage   -monitor temps  -f/u CBC  -supportive care  2. Other issues:   -care per medicine    d/w  at bedside

## 2023-03-09 NOTE — DIETITIAN INITIAL EVALUATION ADULT - PERTINENT LABORATORY DATA
03-09    140  |  108  |  12  ----------------------------<  94  3.3<L>   |  31  |  0.65    Ca    9.2      09 Mar 2023 06:48

## 2023-03-09 NOTE — DIETITIAN INITIAL EVALUATION ADULT - PERTINENT MEDS FT
MEDICATIONS  (STANDING):  ascorbic acid 500 milliGRAM(s) Oral daily  cholecalciferol 400 Unit(s) Oral daily  donepezil 5 milliGRAM(s) Oral at bedtime  dorzolamide 2%/timolol 0.5% Ophthalmic Solution 1 Drop(s) Right EYE two times a day  latanoprost 0.005% Ophthalmic Solution 1 Drop(s) Right EYE at bedtime  lidocaine   4% Patch 1 Patch Transdermal daily  multivitamin 1 Tablet(s) Oral daily  pantoprazole    Tablet 40 milliGRAM(s) Oral before breakfast  piperacillin/tazobactam IVPB.. 3.375 Gram(s) IV Intermittent every 8 hours    MEDICATIONS  (PRN):  acetaminophen     Tablet .. 650 milliGRAM(s) Oral every 6 hours PRN Temp greater or equal to 38C (100.4F), Mild Pain (1 - 3)  albuterol    90 MICROgram(s) HFA Inhaler 2 Puff(s) Inhalation every 6 hours PRN Shortness of Breath and/or Wheezing  aluminum hydroxide/magnesium hydroxide/simethicone Suspension 30 milliLiter(s) Oral every 4 hours PRN Dyspepsia  benzonatate 100 milliGRAM(s) Oral every 8 hours PRN Cough  guaifenesin/dextromethorphan Oral Liquid 10 milliLiter(s) Oral every 4 hours PRN Cough  melatonin 3 milliGRAM(s) Oral at bedtime PRN Insomnia  morphine  - Injectable 0.5 milliGRAM(s) IV Push every 4 hours PRN Severe Pain (7 - 10)  ondansetron Injectable 4 milliGRAM(s) IV Push every 8 hours PRN Nausea and/or Vomiting    Home Medications:  apixaban 2.5 mg oral tablet: 1 tab(s) orally 2 times a day (02 Mar 2023 13:08)  biotin 300 mcg oral tablet: 1 tab(s) orally once a day (02 Mar 2023 13:08)  Citracal 250 mg + D 200 intl units oral tablet: 1 tab(s) orally 2 times a day (02 Mar 2023 13:08)  donepezil 5 mg oral tablet: 1 tab(s) orally once a day (at bedtime) (02 Mar 2023 13:08)  dorzolamide-timolol 22.3-6.8mg/mL: 1 drop(s) in the right eye 2 times a day (02 Mar 2023 13:08)  Negrita-C 500 mg oral tablet: 1 tab(s) orally once a day (02 Mar 2023 13:08)  ipratropium 42 mcg/inh (0.06%) nasal spray: 2 spray(s) in each nostril 3 times a day, As Needed (02 Mar 2023 13:08)  latanoprost 0.005% ophthalmic solution: 1 drop(s) in the right eye once a day (in the evening) (02 Mar 2023 13:08)  magnesium glycinate 200 mg oral tablet: 2 tab(s) orally once a day (after a meal) (02 Mar 2023 13:08)  Multiple Vitamins oral tablet: 1 tab(s) orally once a day (02 Mar 2023 13:08)  Vitamin D3 400 intl units (10 mcg) oral tablet: 1 tab(s) orally once a day (02 Mar 2023 13:08)

## 2023-03-09 NOTE — DIETITIAN INITIAL EVALUATION ADULT - CALCULATED TO (CAL/KG)
Visit Discharge/Physician Orders:  - be careful not to use too much tape  - Continue working on weight loss, work on lower sodium meals, try to eat more fresh fruit and veggies  - Eat less fast food, canned, and processed meals.  - Keep working on losing weight, about a pound a week  - goal of 5# weight loss before next appointment         Wound Location: Abdomen     Home Health: [facility] orders:      OK to shower take shower before dressing change. Clean wound after shower then re-dress. Abdomen wounds- Cleanse wound with normal saline or wound cleanser and gauze. Pat dry with clean gauze. - okay to use zinc oxide around wounds if needed     *Be careful not to use too much tape as it irritates her skin*    ** Please use skin prep to bridger wound to protect fragile skin and help dressing stick**     Upper Abdominal wound and Lower abdominal wounds- Triad to the wounds. Apply optilock or ABD pads to wounds (okay to cut lengthwise). Change daily     Follow up visit: 3 Months on Wednesday May 10th at 11 am     Keep next scheduled appointment. Please give 24 hour notice if unable to keep appointment. [phone]     If you experience any of the following, please call the Wound Care Service during business hours: Monday through Friday 8:00 am - 4:30 pm  ([phone]). *Increase in pain               *Temperature over 101              *Increase in drainage from your wound or a foul odor              *Uncontrolled swelling              *Need for compression bandage changes due to slippage, breakthrough drainage     If you need medical attention outside of business hours, please contact your Primary Care Doctor or go to the nearest emergency room.
1904

## 2023-03-09 NOTE — DIETITIAN INITIAL EVALUATION ADULT - RD TO REMAIN AVAILABLE
Rec'd fax from [facility] pharmacy stating patient's insurance requires a 90 day supply of levothyroxine. Ok to give a 90 day supply. Script sent to pharmacy.    yes

## 2023-03-09 NOTE — DIETITIAN INITIAL EVALUATION ADULT - ORAL INTAKE PTA/DIET HISTORY
Reports excellent appetite/ intake pta. Normally consumes 3 meal/day but states she eats only when she is hungry. Does not follow any diet restrictions and has Ensure at home but does not drink it because she does not like the medicinal taste. Pt does the cooking at home but pt's  and son do the shopping

## 2023-03-09 NOTE — PROGRESS NOTE ADULT - ASSESSMENT
87F with PMH of DVT (Dx 12/2022 on eliquis), Neuropathy, Osteoenia, MILTON presents with progressive SOB, left pleurisy and non-productive cough x 2 weeks found to b/l PNA and small pleural effusions. S/p Left thoracentesis 3/6/23. Exudative w LDH 2937 and glucose of 19. Cultures NTD. S/p 14F chest tube to left chest 3/7/23    Plan   drain fluid as able on suction for today, CXR improved.   no indication for TPA   can resume eliquis   cont ABX for PNA  pulmonary following  IS  OOB  s/p EGD nml esophagus,  biopsy done    Plan to d/c tube tomorrow       Discussed with Cardiothoracic Team at AM rounds.

## 2023-03-10 ENCOUNTER — TRANSCRIPTION ENCOUNTER (OUTPATIENT)
Age: 88
End: 2023-03-10

## 2023-03-10 LAB
ANION GAP SERPL CALC-SCNC: 5 MMOL/L — SIGNIFICANT CHANGE UP (ref 5–17)
BUN SERPL-MCNC: 13 MG/DL — SIGNIFICANT CHANGE UP (ref 7–23)
CALCIUM SERPL-MCNC: 8.8 MG/DL — SIGNIFICANT CHANGE UP (ref 8.5–10.1)
CHLORIDE SERPL-SCNC: 111 MMOL/L — HIGH (ref 96–108)
CO2 SERPL-SCNC: 29 MMOL/L — SIGNIFICANT CHANGE UP (ref 22–31)
CREAT SERPL-MCNC: 0.73 MG/DL — SIGNIFICANT CHANGE UP (ref 0.5–1.3)
EGFR: 80 ML/MIN/1.73M2 — SIGNIFICANT CHANGE UP
GLUCOSE SERPL-MCNC: 85 MG/DL — SIGNIFICANT CHANGE UP (ref 70–99)
HCT VFR BLD CALC: 29.7 % — LOW (ref 34.5–45)
HGB BLD-MCNC: 9.1 G/DL — LOW (ref 11.5–15.5)
MCHC RBC-ENTMCNC: 29 PG — SIGNIFICANT CHANGE UP (ref 27–34)
MCHC RBC-ENTMCNC: 30.6 GM/DL — LOW (ref 32–36)
MCV RBC AUTO: 94.6 FL — SIGNIFICANT CHANGE UP (ref 80–100)
PLATELET # BLD AUTO: 410 K/UL — HIGH (ref 150–400)
POTASSIUM SERPL-MCNC: 3.9 MMOL/L — SIGNIFICANT CHANGE UP (ref 3.5–5.3)
POTASSIUM SERPL-SCNC: 3.9 MMOL/L — SIGNIFICANT CHANGE UP (ref 3.5–5.3)
RBC # BLD: 3.14 M/UL — LOW (ref 3.8–5.2)
RBC # FLD: 14.6 % — HIGH (ref 10.3–14.5)
SODIUM SERPL-SCNC: 145 MMOL/L — SIGNIFICANT CHANGE UP (ref 135–145)
WBC # BLD: 8.03 K/UL — SIGNIFICANT CHANGE UP (ref 3.8–10.5)
WBC # FLD AUTO: 8.03 K/UL — SIGNIFICANT CHANGE UP (ref 3.8–10.5)

## 2023-03-10 PROCEDURE — 71045 X-RAY EXAM CHEST 1 VIEW: CPT | Mod: 26,77

## 2023-03-10 PROCEDURE — 71045 X-RAY EXAM CHEST 1 VIEW: CPT | Mod: 26

## 2023-03-10 PROCEDURE — 99232 SBSQ HOSP IP/OBS MODERATE 35: CPT

## 2023-03-10 RX ADMIN — DORZOLAMIDE HYDROCHLORIDE TIMOLOL MALEATE 1 DROP(S): 20; 5 SOLUTION/ DROPS OPHTHALMIC at 22:27

## 2023-03-10 RX ADMIN — Medication 1 TABLET(S): at 11:09

## 2023-03-10 RX ADMIN — DONEPEZIL HYDROCHLORIDE 5 MILLIGRAM(S): 10 TABLET, FILM COATED ORAL at 22:25

## 2023-03-10 RX ADMIN — APIXABAN 2.5 MILLIGRAM(S): 2.5 TABLET, FILM COATED ORAL at 11:08

## 2023-03-10 RX ADMIN — Medication 650 MILLIGRAM(S): at 14:55

## 2023-03-10 RX ADMIN — LATANOPROST 1 DROP(S): 0.05 SOLUTION/ DROPS OPHTHALMIC; TOPICAL at 22:26

## 2023-03-10 RX ADMIN — DORZOLAMIDE HYDROCHLORIDE TIMOLOL MALEATE 1 DROP(S): 20; 5 SOLUTION/ DROPS OPHTHALMIC at 11:08

## 2023-03-10 RX ADMIN — LIDOCAINE 1 PATCH: 4 CREAM TOPICAL at 23:30

## 2023-03-10 RX ADMIN — PIPERACILLIN AND TAZOBACTAM 25 GRAM(S): 4; .5 INJECTION, POWDER, LYOPHILIZED, FOR SOLUTION INTRAVENOUS at 09:30

## 2023-03-10 RX ADMIN — APIXABAN 2.5 MILLIGRAM(S): 2.5 TABLET, FILM COATED ORAL at 22:25

## 2023-03-10 RX ADMIN — Medication 400 UNIT(S): at 11:09

## 2023-03-10 RX ADMIN — Medication 650 MILLIGRAM(S): at 22:26

## 2023-03-10 RX ADMIN — LIDOCAINE 1 PATCH: 4 CREAM TOPICAL at 19:30

## 2023-03-10 RX ADMIN — Medication 650 MILLIGRAM(S): at 14:25

## 2023-03-10 RX ADMIN — Medication 500 MILLIGRAM(S): at 11:09

## 2023-03-10 RX ADMIN — PIPERACILLIN AND TAZOBACTAM 25 GRAM(S): 4; .5 INJECTION, POWDER, LYOPHILIZED, FOR SOLUTION INTRAVENOUS at 00:30

## 2023-03-10 RX ADMIN — PANTOPRAZOLE SODIUM 40 MILLIGRAM(S): 20 TABLET, DELAYED RELEASE ORAL at 06:02

## 2023-03-10 RX ADMIN — LIDOCAINE 1 PATCH: 4 CREAM TOPICAL at 11:09

## 2023-03-10 NOTE — PROGRESS NOTE ADULT - NUTRITIONAL ASSESSMENT
This patient has been assessed with a concern for Malnutrition and has been determined to have a diagnosis/diagnoses of Severe protein-calorie malnutrition.    This patient is being managed with:   Diet Soft and Bite Sized-  Entered: Mar  8 2023 11:54AM    Diet NPO after Midnight-     NPO Start Date: 07-Mar-2023   NPO Start Time: 23:59  Entered: Mar  7 2023  2:12PM    Diet NPO after Midnight-     NPO Start Date: 07-Mar-2023   NPO Start Time: 23:59  Entered: Mar  7 2023  1:41PM    

## 2023-03-10 NOTE — PROGRESS NOTE ADULT - ASSESSMENT
87F with PMH of DVT (Dx 12/2022 on eliquis), Neuropathy, Osteopenia, MILTON presented with progressive SOB, left pleurisy and non-productive cough x 2 weeks. Associated weakness and fatigue. Outpatient evaluation with CT revealed loculated left pleural effusion and advised to report to ER for further evaluation by PMD.     Acute Hypoxic respiratory failure secondary to Pneumonia/ Left pleural effusion   Pneumonia. Possibly necrotizing but unable to clinically determine  Loculated Left Pleural Effusion worrisome picture with hx of achalasia for Gram negative aspiration PNA  - patient qualifies for home oxygen. Patient has diagnosis of loculated pleural effusion and resolving pneumonia and requires home oxygen based on home oxygen eval with o2 sat 88% on RA with ambulation  -CTA findings noted  -Continue Zosyn 3.375g q8h --> day #9  -Cultures noted   -ID and Pulm consult appreciated   -CTS consult appreciated  -S/p IR procedure 3/6/23, ~ 30cc fluid removed for studies  -S/p L chest tube placement 3/7/23  and likely remove in AM as per CT surgery   -Pain management as per orders   -Family requests imaging of sinuses, given long hx persistent post nasal drip/cough ---> CT sinuses ordered --> no significant findings noted --> will need to discuss with patient tomorrow     Hx of achalasia  -GI consult appreciated   -Aspiration precautions    -S/p EGD 3/8: no significant findings as per Dr. Seth   -Diet advanced     Recent Dx of LLE DVT   - okay to resume eliquis as per CT surg    Diarrhea - Resolved   -2 episodes of watery BM  3/4  -Continue to monitor    DVT Prophylaxis: eliquis      DISPO: Continue medical management

## 2023-03-10 NOTE — PROGRESS NOTE ADULT - ASSESSMENT
86 y/o female with h/o neuropathy, osteoporosis, MILTON, and DVT on Eliquis was admitted on 3/2 for worsening SOB x 2 weeks. Pt describes feeling like it is hard to take a breath in. Pt notes associated dry cough and left sided chest pain radiating to armpit. Pt was sent in by PCP Dr. Bg Villasenor secondary to CT chest non-contrast results revealing left sided pleural effusion/consolidation. Denies fevers, chills, sore throat at home. In ER she received zosyn.     1. LLL pneumonia. ?necrotizing pneumonia. Loculated left side pleural effusion s/p drainage. ?empyema.   -respiratory frail  -leukocytosis improving  -BC x 2, sputum c/s noted  -on zosyn 3.375 gm IV q8h # 8  -tolerating abx well so far; no side effects noted  -cardiothoracic evaluation appreciated  - f/u pleural fluid culture  -respiratory care  -may change abx to augmentin 875 mg PO q12h for 7 more days  -monitor temps  -f/u CBC  -supportive care  2. Other issues:   -care per medicine    d/w  at bedside

## 2023-03-10 NOTE — CHART NOTE - NSCHARTNOTESELECT_GEN_ALL_CORE
Procedure note
Empyema/Event Note
Event Note
Event Note
chest tube removal/Event Note
sign off/Event Note

## 2023-03-10 NOTE — DISCHARGE NOTE NURSING/CASE MANAGEMENT/SOCIAL WORK - NSDCPNPNATDISSUGG_GEN_ALL_CORE
Patient: Yodit Lai  * No procedures listed *  Anesthesia type: general    Patient location: PACU  Last vitals:   Vitals:    08/28/17 0930   BP: 131/72   Pulse: 88   Resp: 14   Temp:    SpO2: 93%     Post vital signs: stable  Level of consciousness: awake, alert and oriented  Post-anesthesia pain: pain controlled  Post-anesthesia nausea and vomiting: no  Pulmonary: unassisted, nasal cannula  Cardiovascular: stable and blood pressure at baseline  Hydration: adequate  Anesthetic events: no    QCDR Measures:  ASA# 11 - Jessie-op Cardiac Arrest: ASA11B - Patient did NOT experience unanticipated cardiac arrest  ASA# 12 - Jessie-op Mortality Rate: ASA12B - Patient did NOT die  ASA# 13 - PACU Re-Intubation Rate: NA - No ETT / LMA used for case  ASA# 10 - Composite Anes Safety: ASA10A - No serious adverse event  ASA# 38 - New Corneal Injury: ASA38A - No new exposure keratitis or corneal abrasion in PACU    Additional Notes:   No

## 2023-03-10 NOTE — CHART NOTE - NSCHARTNOTEFT_GEN_A_CORE
87F with PMH of DVT (Dx 12/2022 on eliquis), Neuropathy, Osteoenia, MILTON presents with progressive SOB, left pleurisy and non-productive cough x 2 weeks found to b/l PNA and small pleural effusions     Plan   s/p IR thoracentesis - 30cc drained   pleural studies P   no thoracic procedures at this time   will follow up pleural cultures   DW Dr Hahn
87F with PMH of DVT (Dx 12/2022 on eliquis), Neuropathy, Osteoenia, MILTON presents with progressive SOB, left pleurisy and non-productive cough x 2 weeks. Associated weakness and fatigue. Denies dizziness, fever, syncope, nausea, vomiting. Outpatient evaluation with CT revealed loculated left pleural effusion and advised to report to ER for further evaluation by PMD.     pt seen at bedside.  Stable on 2-3 L NC.  Pt took Eliquis this am   Will evaluate for possible pigtail tomorrow   full consult to follow     Hold Jennifer   spoke with Dr Edwards
D/W Thoracic. multiple pockets noted  recommend IR  will order subq A/C for now  hold eliquis  IR consult for drain
Patient seen and examined bedside.  Chart reviewed.    Patient no longer warrants left sided Chest tube.  Currently without air leak, chest xray reviewed without pneumo, flow sheets with minimal drainage.    Chest tube d/c without incident.  Pt tolerated procedure well.  CXR ordered pending official read.
cxr without apparent PTX   pending official read   notably appears to have residual left effusion - would optimize diuretics as tolerated by blood pressure and creatine   nothing more to offer from a thoracic surgery standpoint   consult appreciated   will sign off   please reconsult if needed
EGD    Normal esophagus ( biopsied at proximal esophagus). There was no resistance at the GEJ or visualized narrowing of the esophagus.  irregular Z-line at 40 cm (biopsied)  Gastric polyps but otherwise normal esophagus  Normal duodenum    Recommendation  - Follow up pathology  - Soft diet and aspiration precautions  - PPI 40 mg daily before breakfast  - Rest of care per primary team

## 2023-03-10 NOTE — DISCHARGE NOTE NURSING/CASE MANAGEMENT/SOCIAL WORK - PATIENT PORTAL LINK FT
You can access the FollowMyHealth Patient Portal offered by [facility] by registering at the following website: [email or web address]. By joining [facility]’s FollowMyHealth portal, you will also be able to view your health information using other applications (apps) compatible with our system.

## 2023-03-10 NOTE — PROGRESS NOTE ADULT - ASSESSMENT
87F with PMH of DVT (Dx 12/2022 on eliquis), Neuropathy, Osteoenia, MILTON presents with progressive SOB, left pleurisy and non-productive cough x 2 weeks found to b/l PNA and small pleural effusions. S/p Left thoracentesis 3/6/23. Exudative w LDH 2937 and glucose of 19. Cultures NTD. S/p 14F chest tube to left chest 3/7/23    Plan   will d/c chest tube today   to follow up CXR   cont ABX for PNA  pulmonary following  IS  OOB        Discussed with Cardiothoracic Team at AM rounds.

## 2023-03-11 ENCOUNTER — TRANSCRIPTION ENCOUNTER (OUTPATIENT)
Age: 88
End: 2023-03-11

## 2023-03-11 VITALS
SYSTOLIC BLOOD PRESSURE: 138 MMHG | TEMPERATURE: 96 F | HEART RATE: 73 BPM | DIASTOLIC BLOOD PRESSURE: 50 MMHG | RESPIRATION RATE: 18 BRPM | OXYGEN SATURATION: 92 %

## 2023-03-11 LAB
CULTURE RESULTS: SIGNIFICANT CHANGE UP
SPECIMEN SOURCE: SIGNIFICANT CHANGE UP

## 2023-03-11 PROCEDURE — 99239 HOSP IP/OBS DSCHRG MGMT >30: CPT

## 2023-03-11 PROCEDURE — 99231 SBSQ HOSP IP/OBS SF/LOW 25: CPT

## 2023-03-11 RX ORDER — PANTOPRAZOLE SODIUM 20 MG/1
1 TABLET, DELAYED RELEASE ORAL
Qty: 30 | Refills: 0
Start: 2023-03-11 | End: 2023-04-09

## 2023-03-11 RX ORDER — ALBUTEROL 90 UG/1
2 AEROSOL, METERED ORAL
Qty: 1 | Refills: 0
Start: 2023-03-11

## 2023-03-11 RX ORDER — FUROSEMIDE 40 MG
1 TABLET ORAL
Qty: 30 | Refills: 0
Start: 2023-03-11

## 2023-03-11 RX ORDER — ACETAMINOPHEN 500 MG
2 TABLET ORAL
Qty: 0 | Refills: 0 | DISCHARGE
Start: 2023-03-11

## 2023-03-11 RX ADMIN — APIXABAN 2.5 MILLIGRAM(S): 2.5 TABLET, FILM COATED ORAL at 10:06

## 2023-03-11 RX ADMIN — Medication 400 UNIT(S): at 10:08

## 2023-03-11 RX ADMIN — PANTOPRAZOLE SODIUM 40 MILLIGRAM(S): 20 TABLET, DELAYED RELEASE ORAL at 05:51

## 2023-03-11 RX ADMIN — Medication 500 MILLIGRAM(S): at 10:06

## 2023-03-11 RX ADMIN — DORZOLAMIDE HYDROCHLORIDE TIMOLOL MALEATE 1 DROP(S): 20; 5 SOLUTION/ DROPS OPHTHALMIC at 10:08

## 2023-03-11 RX ADMIN — LIDOCAINE 1 PATCH: 4 CREAM TOPICAL at 10:07

## 2023-03-11 RX ADMIN — Medication 1 TABLET(S): at 10:06

## 2023-03-11 NOTE — DISCHARGE NOTE PROVIDER - NSDCCPCAREPLAN_GEN_ALL_CORE_FT
PRINCIPAL DISCHARGE DIAGNOSIS  Diagnosis: CAP (community acquired pneumonia)  Assessment and Plan of Treatment: complete course of antibiotics  follow up with dr steele as outptatient for follow up  returnt o ER if fever or chills, worsenign shortness of breath         SECONDARY DISCHARGE DIAGNOSES  Diagnosis: Pleural effusion  Assessment and Plan of Treatment: - start low dose water pill   see ludmila next week.

## 2023-03-11 NOTE — DISCHARGE NOTE PROVIDER - HOSPITAL COURSE
pt is 87F with PMH of DVT (Dx 12/2022 on eliquis), Neuropathy, Osteopenia, MILTON presented with progressive SOB, left pleurisy and non-productive cough x 2 weeks. Associated weakness and fatigue. Outpatient evaluation with CT revealed loculated left pleural effusion and advised to report to ER for further evaluation by PMD.   pt admitted and noted to have LLL PNA and left effusion. pt seen by CT surg s/p chest tube placed on 3/7 and removed 3/10.  pt seen by pulm and ID and continued on 8 days of IV zosyn given possible necrotizing pna.  plan to dc on 7 more days of augmentin as per ID recs.  pt still with residual loculated pleural effusion.  she is aware to f/u with dr christy as outpt.  pt qualified for home oxygen and o2 delivered to bedside today prior to dc.    course complicated by achalasia and s/p EGD without significant findings. will f/u with dr guerrero as outpt.   Vital Signs Last 24 Hrs  T(C): 35.7 (11 Mar 2023 08:10), Max: 36.3 (10 Mar 2023 22:45)  T(F): 96.2 (11 Mar 2023 08:10), Max: 97.3 (10 Mar 2023 22:45)  HR: 73 (11 Mar 2023 08:10) (69 - 75)  BP: 138/50 (11 Mar 2023 08:10) (138/50 - 157/78)  BP(mean): --  RR: 18 (11 Mar 2023 08:10) (18 - 18)  SpO2: 92% (11 Mar 2023 08:10) (92% - 93%)    Parameters below as of 11 Mar 2023 08:10  Patient On (Oxygen Delivery Method): nasal cannula  O2 Flow (L/min): 2  PHYSICAL EXAM:  GENERAL: NAD, lying in bed comfortably  HEAD:  Atraumatic, Normocephalic  CHEST/LUNG: Bilateral air entry, decreased breath sounds bilaterally, L>R; No rales, rhonchi, wheezing. Unlabored respirations, Chest tube in place   HEART: Regular rate and rhythm; No murmurs  ABDOMEN: Bowel sounds present; Soft, Nontender, Nondistended.   EXTREMITIES:  2+ Peripheral Pulses, brisk capillary refill. No clubbing, cyanosis, or edema  NERVOUS SYSTEM:  Alert & Oriented X3, speech clear. No deficits   MSK: FROM all 4 extremities, full and equal strength    < from: CT Angio Chest PE Protocol w/ IV Cont (03.02.23 @ 13:49) >  IMPRESSION:.    No pulmonary embolism.    Complete left lower lobe consolidation, which appears to be new compared   to 12/29/2022. This likely represents pneumonia (possibly necrotizing   pneumonia). Recommend CT chest follow-up in one month to ensure clearing   and to exclude alternative etiologies.    Moderate size loculated left pleural effusion and small right pleural   effusion.      Acute Hypoxic respiratory failure secondary to Pneumonia/ Left pleural effusion possible empyema   Pneumonia. Possibly necrotizing but unable to clinically determine fluid c/w parapneumonic effusion with pleural fluid cx negative   Loculated Left Pleural Effusion worrisome picture with hx of achalasia for Gram negative aspiration PNA  - patient qualifies for home oxygen. Patient has diagnosis of loculated pleural effusion and resolving pneumonia and requires home oxygen based on home oxygen eval with o2 sat 88% on RA with ambulation  -leukocytosis improving  -BC x 2, sputum c/s noted  -on zosyn 3.375 gm IV q8h # 8 ->  change abx to augmentin 875 mg PO q12h for 7 more days as per ID recs   -Family requests imaging of sinuses, given long hx persistent post nasal drip/cough ---> CT sinuses ordered --> no significant findings noted --> will need to discuss with patient tomorrow     Hx of achalasia  -GI consult appreciated   -Aspiration precautions    -S/p EGD 3/8: no significant findings as per Dr. Seth   -Diet advanced     Recent Dx of LLE DVT   - continue eliquis     Diarrhea - Resolved     DVT Prophylaxis: eliqumilvia martinez home time spent 40 mins

## 2023-03-11 NOTE — PROGRESS NOTE ADULT - REASON FOR ADMISSION
SOB/Cough/Pleurisy/Large loculate left pleural effusion

## 2023-03-11 NOTE — DISCHARGE NOTE PROVIDER - CARE PROVIDERS DIRECT ADDRESSES
,DirectAddress_Unknown,DirectAddress_Unknown,[email or web address]

## 2023-03-11 NOTE — DISCHARGE NOTE PROVIDER - NSDCFUSCHEDAPPT_GEN_ALL_CORE_FT
Karlos Wilson  [facility]  OPHTHALM Shelia E Brendon S  Scheduled Appointment: 04/20/2023    [facility]  PULED [address]  Scheduled Appointment: 05/15/2023    Zhang Corley  [facility]  [facility] [address]  Scheduled Appointment: 05/15/2023

## 2023-03-11 NOTE — PROGRESS NOTE ADULT - SUBJECTIVE AND OBJECTIVE BOX
Subjective:  Pt seen, breathing well. S/p left pigtail catheter placement. Drained ~600cc serous output. On suction.    Vital Signs:  Vital Signs Last 24 Hrs  T(C): 36.4 (03-08-23 @ 07:20), Max: 36.8 (03-07-23 @ 23:38)  T(F): 97.5 (03-08-23 @ 07:20), Max: 98.2 (03-07-23 @ 23:38)  HR: 84 (03-08-23 @ 07:20) (71 - 84)  BP: 148/60 (03-08-23 @ 07:20) (136/58 - 164/78)  RR: 18 (03-08-23 @ 07:20) (18 - 19)  SpO2: 96% (03-08-23 @ 07:20) (96% - 100%) on (O2)    Telemetry/Alarms:    Relevant labs, radiology and Medications reviewed                        9.2    8.28  )-----------( 402      ( 08 Mar 2023 07:40 )             29.1     03-08    141  |  106  |  10  ----------------------------<  93  3.5   |  31  |  0.65    Ca    8.8      08 Mar 2023 07:40        MEDICATIONS  (STANDING):  ascorbic acid 500 milliGRAM(s) Oral daily  cholecalciferol 400 Unit(s) Oral daily  donepezil 5 milliGRAM(s) Oral at bedtime  dorzolamide 2%/timolol 0.5% Ophthalmic Solution 1 Drop(s) Right EYE two times a day  latanoprost 0.005% Ophthalmic Solution 1 Drop(s) Right EYE at bedtime  lidocaine   4% Patch 1 Patch Transdermal daily  multivitamin 1 Tablet(s) Oral daily  piperacillin/tazobactam IVPB.. 3.375 Gram(s) IV Intermittent every 8 hours    MEDICATIONS  (PRN):  acetaminophen     Tablet .. 650 milliGRAM(s) Oral every 6 hours PRN Temp greater or equal to 38C (100.4F), Mild Pain (1 - 3)  albuterol    90 MICROgram(s) HFA Inhaler 2 Puff(s) Inhalation every 6 hours PRN Shortness of Breath and/or Wheezing  aluminum hydroxide/magnesium hydroxide/simethicone Suspension 30 milliLiter(s) Oral every 4 hours PRN Dyspepsia  benzonatate 100 milliGRAM(s) Oral every 8 hours PRN Cough  guaifenesin/dextromethorphan Oral Liquid 10 milliLiter(s) Oral every 4 hours PRN Cough  melatonin 3 milliGRAM(s) Oral at bedtime PRN Insomnia  morphine  - Injectable 0.5 milliGRAM(s) IV Push every 4 hours PRN Severe Pain (7 - 10)  ondansetron Injectable 4 milliGRAM(s) IV Push every 8 hours PRN Nausea and/or Vomiting      Physical exam  Gen NAD  Neuro AAOx3  neck supple  HEENT NC, AT, no cyanosis  Card RRR  Pulm decreased bases, left pigtial to suction, no AL, drainged 600cc  Abd soft  Ext warm, mild edema  skin no rashes  psych normal affect    I&O's Summary    07 Mar 2023 07:01  -  08 Mar 2023 07:00  --------------------------------------------------------  IN: 450 mL / OUT: 570 mL / NET: -120 mL        Assessment  87y Female  w/ PAST MEDICAL & SURGICAL HISTORY:  Osteoarthritis      Osteopenia      Deep vein thrombosis (DVT)      S/P Hysterectomy  2007      Lumbar Laminectomy/ Spinal Fusion  1999      S/P Arthroscopy of Right Knee  1998      S/P Appendectomy  @ 12 years old      S/P Tonsillectomy  @ 2 years old      admitted with complaints of Patient is a 87y old  Female who presents with a chief complaint of SOB/Cough/Pleurisy/Large loculate left pleural effusion (08 Mar 2023 08:14)  .  87F with PMH of DVT (Dx 12/2022 on eliquis), Neuropathy, Osteoenia, MILTON presents with progressive SOB, left pleurisy and non-productive cough x 2 weeks found to b/l PNA and small pleural effusions. S/p Left thoracentesis 3/6/23. Exudative w LDH 2937 and glucose of 19. Cultures NTD. S/p 14F chest tube to left chest 3/7/23    drain fluid as able on suction for today, CXR improved. Will consider TPA and dornase as needed. Will hold off today  hold eliquis today  cont ABX for PNA  pulmonary following  IS  OOB  possible EGD today, pt NPO for dysphagia evaluation      Discussed with Cardiothoracic Team at AM rounds.  
   Subjective:  Pt seen, s/p thoracentesis yesterday, exudative effusion, concerning for empyema, cultures NTD.    Vital Signs:  Vital Signs Last 24 Hrs  T(C): 36.1 (03-07-23 @ 08:11), Max: 36.4 (03-06-23 @ 21:45)  T(F): 97 (03-07-23 @ 08:11), Max: 97.5 (03-06-23 @ 21:45)  HR: 67 (03-07-23 @ 08:11) (67 - 72)  BP: 139/62 (03-07-23 @ 08:11) (130/65 - 139/62)  RR: 18 (03-07-23 @ 08:11) (18 - 18)  SpO2: 93% (03-07-23 @ 08:11) (93% - 95%) on (O2)    Telemetry/Alarms:    Relevant labs, radiology and Medications reviewed                        9.3    9.47  )-----------( 391      ( 07 Mar 2023 06:29 )             29.7     03-07    140  |  106  |  16  ----------------------------<  88  3.5   |  31  |  0.71    Ca    8.9      07 Mar 2023 06:29      PT/INR - ( 06 Mar 2023 08:43 )   PT: 16.2 sec;   INR: 1.39 ratio         PTT - ( 06 Mar 2023 08:43 )  PTT:46.6 sec  MEDICATIONS  (STANDING):  ascorbic acid 500 milliGRAM(s) Oral daily  cholecalciferol 400 Unit(s) Oral daily  donepezil 5 milliGRAM(s) Oral at bedtime  dorzolamide 2%/timolol 0.5% Ophthalmic Solution 1 Drop(s) Right EYE two times a day  enoxaparin Injectable 50 milliGRAM(s) SubCutaneous every 12 hours  latanoprost 0.005% Ophthalmic Solution 1 Drop(s) Right EYE at bedtime  multivitamin 1 Tablet(s) Oral daily  piperacillin/tazobactam IVPB.. 3.375 Gram(s) IV Intermittent every 8 hours    MEDICATIONS  (PRN):  acetaminophen     Tablet .. 650 milliGRAM(s) Oral every 6 hours PRN Temp greater or equal to 38C (100.4F), Mild Pain (1 - 3)  albuterol    90 MICROgram(s) HFA Inhaler 2 Puff(s) Inhalation every 6 hours PRN Shortness of Breath and/or Wheezing  aluminum hydroxide/magnesium hydroxide/simethicone Suspension 30 milliLiter(s) Oral every 4 hours PRN Dyspepsia  benzonatate 100 milliGRAM(s) Oral every 8 hours PRN Cough  guaifenesin/dextromethorphan Oral Liquid 10 milliLiter(s) Oral every 4 hours PRN Cough  melatonin 3 milliGRAM(s) Oral at bedtime PRN Insomnia  ondansetron Injectable 4 milliGRAM(s) IV Push every 8 hours PRN Nausea and/or Vomiting      Physical exam  Gen NAD  Neuro AAOx3  neck supple  HEENT NC, AT, no cyanosis  Card RRR  Pulm decreased left  Abd soft  Ext warm, mild edema  skin no rashes  psych normal affect        I&O's Summary      Assessment  87y Female  w/ PAST MEDICAL & SURGICAL HISTORY:  Osteoarthritis      Osteopenia      Deep vein thrombosis (DVT)      S/P Hysterectomy  2007      Lumbar Laminectomy/ Spinal Fusion  1999      S/P Arthroscopy of Right Knee  1998      S/P Appendectomy  @ 12 years old      S/P Tonsillectomy  @ 2 years old      admitted with complaints of Patient is a 87y old  Female who presents with a chief complaint of SOB/Cough/Pleurisy/Large loculate left pleural effusion (07 Mar 2023 08:10)  .  87F with PMH of DVT (Dx 12/2022 on eliquis), Neuropathy, Osteoenia, MILTON presents with progressive SOB, left pleurisy and non-productive cough x 2 weeks found to b/l PNA and small pleural effusions. S/p Left thoracentesis 3/6/23. Exudative w LDH 2937 and glucose of 19. Cultures NTD.    Requested IR to place 14F chest tube to left chest  will drain fluid as able and consider TPA and dornase as needed.  cont ABX for PNA  pulmonary following  IS  OOB  Discussed surgical option of VATS, decortication but pt is not a good surgical candidate based on age and comorbidities will try more conservative approach. Pt in agreement   d/w IR PA    Discussed with Cardiothoracic Team at AM rounds.  
HPI: 87F with PMH of DVT (Dx 12/2022 on eliquis), Neuropathy, Osteopenia, MILTON presented with progressive SOB, left pleurisy and non-productive cough x 2 weeks. Associated weakness and fatigue. Outpatient evaluation with CT revealed loculated left pleural effusion and advised to report to ER for further evaluation by PMD.     Interval Hx: Patient seen this AM, reported feeling well, had discussion with pulmonologist, CT surgery about possible surgical intervention vs chest tube placement, patient decided on chest tube placement, which was done today with IR. Planned for EGD tomorrow with GI. Seen again after chest tube placement, with c/o pain and found to have elevated bp.     REVIEW OF SYSTEMS:    CONSTITUTIONAL: No weakness, fevers or chills  EYES/ENT: No visual changes;  No vertigo or throat pain   NECK: No pain or stiffness  RESPIRATORY: No cough, wheezing, hemoptysis; No shortness of breath  CARDIOVASCULAR: No chest pain or palpitations  GASTROINTESTINAL: No abdominal or epigastric pain. No nausea, vomiting, or hematemesis; + diarrhea, no constipation. No melena or hematochezia.  GENITOURINARY: No dysuria, frequency or hematuria  NEUROLOGICAL: No numbness or weakness  SKIN: No itching, rashes    Vital Signs Last 24 Hrs  T(C): 36.2 (07 Mar 2023 16:28), Max: 36.4 (06 Mar 2023 21:45)  T(F): 97.1 (07 Mar 2023 16:28), Max: 97.5 (06 Mar 2023 21:45)  HR: 73 (07 Mar 2023 16:28) (67 - 73)  BP: 164/78 (07 Mar 2023 16:28) (137/59 - 164/78)  RR: 18 (07 Mar 2023 16:28) (18 - 18)  SpO2: 99% (07 Mar 2023 16:28) (93% - 100%)    Parameters below as of 07 Mar 2023 16:28  Patient On (Oxygen Delivery Method): nasal cannula  O2 Flow (L/min): 2    PHYSICAL EXAM:  GENERAL: NAD, lying in bed comfortably  HEAD:  Atraumatic, Normocephalic  EYES: conjunctiva and sclera clear  ENT: Moist mucous membranes  NECK: Supple, No JVD  CHEST/LUNG: Bilateral air entry, decreased breath sounds bilaterally, L>R; No rales, rhonchi, wheezing. Unlabored respirations, Chest tube in place   HEART: Regular rate and rhythm; No murmurs  ABDOMEN: Bowel sounds present; Soft, Nontender, Nondistended.   EXTREMITIES:  2+ Peripheral Pulses, brisk capillary refill. No clubbing, cyanosis, or edema  NERVOUS SYSTEM:  Alert & Oriented X3, speech clear. No deficits   MSK: FROM all 4 extremities, full and equal strength    MEDICATIONS  (STANDING):  ascorbic acid 500 milliGRAM(s) Oral daily  cholecalciferol 400 Unit(s) Oral daily  donepezil 5 milliGRAM(s) Oral at bedtime  dorzolamide 2%/timolol 0.5% Ophthalmic Solution 1 Drop(s) Right EYE two times a day  latanoprost 0.005% Ophthalmic Solution 1 Drop(s) Right EYE at bedtime  lidocaine   4% Patch 1 Patch Transdermal daily  multivitamin 1 Tablet(s) Oral daily  piperacillin/tazobactam IVPB.. 3.375 Gram(s) IV Intermittent every 8 hours    MEDICATIONS  (PRN):  acetaminophen     Tablet .. 650 milliGRAM(s) Oral every 6 hours PRN Temp greater or equal to 38C (100.4F), Mild Pain (1 - 3)  albuterol    90 MICROgram(s) HFA Inhaler 2 Puff(s) Inhalation every 6 hours PRN Shortness of Breath and/or Wheezing  aluminum hydroxide/magnesium hydroxide/simethicone Suspension 30 milliLiter(s) Oral every 4 hours PRN Dyspepsia  benzonatate 100 milliGRAM(s) Oral every 8 hours PRN Cough  guaifenesin/dextromethorphan Oral Liquid 10 milliLiter(s) Oral every 4 hours PRN Cough  melatonin 3 milliGRAM(s) Oral at bedtime PRN Insomnia  morphine  - Injectable 0.5 milliGRAM(s) IV Push every 4 hours PRN Severe Pain (7 - 10)  ondansetron Injectable 4 milliGRAM(s) IV Push every 8 hours PRN Nausea and/or Vomiting      LABS:                                     9.3    9.47  )-----------( 391      ( 07 Mar 2023 06:29 )             29.7   03-07    140  |  106  |  16  ----------------------------<  88  3.5   |  31  |  0.71    Ca    8.9      07 Mar 2023 06:29                      RADIOLOGY:    < from: CT Angio Chest PE Protocol w/ IV Cont (03.02.23 @ 13:49) >  IMPRESSION:.    No pulmonary embolism.    Complete left lower lobe consolidation, which appears to be new compared   to 12/29/2022. This likely represents pneumonia (possibly necrotizing   pneumonia). Recommend CT chest follow-up in one month to ensure clearing   and to exclude alternative etiologies.    Moderate size loculated left pleural effusion and small right pleural   effusion.          
HPI: 87F with PMH of DVT (Dx 12/2022 on eliquis), Neuropathy, Osteopenia, MILTON presented with progressive SOB, left pleurisy and non-productive cough x 2 weeks. Associated weakness and fatigue. Outpatient evaluation with CT revealed loculated left pleural effusion and advised to report to ER for further evaluation by PMD.     Subjective: Patient seen this AM, feels well, ambulating with walker, SpO2 down to 88% after walking to restroom off O2. Improved with O2 at 2L/min.     REVIEW OF SYSTEMS:    CONSTITUTIONAL: No weakness, fevers or chills  EYES/ENT: No visual changes;  No vertigo or throat pain   NECK: No pain or stiffness  RESPIRATORY: No cough, wheezing, hemoptysis; No shortness of breath  CARDIOVASCULAR: No chest pain or palpitations  GASTROINTESTINAL: No abdominal or epigastric pain. No nausea, vomiting, or hematemesis; + diarrhea, no constipation. No melena or hematochezia.  GENITOURINARY: No dysuria, frequency or hematuria  NEUROLOGICAL: No numbness or weakness  SKIN: No itching, rashes    Vital Signs Last 24 Hrs  T(C): 36.3 (06 Mar 2023 16:13), Max: 36.3 (05 Mar 2023 21:48)  T(F): 97.3 (06 Mar 2023 16:13), Max: 97.3 (05 Mar 2023 21:48)  HR: 72 (06 Mar 2023 16:13) (64 - 72)  BP: 130/65 (06 Mar 2023 16:13) (130/65 - 143/57)  RR: 18 (06 Mar 2023 16:13) (18 - 19)  SpO2: 95% (06 Mar 2023 16:13) (92% - 95%)    Parameters below as of 06 Mar 2023 16:13  Patient On (Oxygen Delivery Method): nasal cannula  O2 Flow (L/min): 2    PHYSICAL EXAM:  GENERAL: NAD, lying in bed comfortably  HEAD:  Atraumatic, Normocephalic  EYES: conjunctiva and sclera clear  ENT: Moist mucous membranes  NECK: Supple, No JVD  CHEST/LUNG: Bilateral air entry, decreased breath sounds bilaterally, L>R; No rales, rhonchi, wheezing, or rubs. Unlabored respirations  HEART: Regular rate and rhythm; No murmurs, rubs, or gallops  ABDOMEN: Bowel sounds present; Soft, Nontender, Nondistended.   EXTREMITIES:  2+ Peripheral Pulses, brisk capillary refill. No clubbing, cyanosis, or edema  NERVOUS SYSTEM:  Alert & Oriented X3, speech clear. No deficits   MSK: FROM all 4 extremities, full and equal strength    MEDICATIONS  (STANDING):  ascorbic acid 500 milliGRAM(s) Oral daily  azithromycin   Tablet 500 milliGRAM(s) Oral daily  cholecalciferol 400 Unit(s) Oral daily  donepezil 5 milliGRAM(s) Oral at bedtime  dorzolamide 2%/timolol 0.5% Ophthalmic Solution 1 Drop(s) Right EYE two times a day  enoxaparin Injectable 50 milliGRAM(s) SubCutaneous every 12 hours  latanoprost 0.005% Ophthalmic Solution 1 Drop(s) Right EYE at bedtime  multivitamin 1 Tablet(s) Oral daily  piperacillin/tazobactam IVPB.. 3.375 Gram(s) IV Intermittent every 8 hours    MEDICATIONS  (PRN):  acetaminophen     Tablet .. 650 milliGRAM(s) Oral every 6 hours PRN Temp greater or equal to 38C (100.4F), Mild Pain (1 - 3)  albuterol    90 MICROgram(s) HFA Inhaler 2 Puff(s) Inhalation every 6 hours PRN Shortness of Breath and/or Wheezing  aluminum hydroxide/magnesium hydroxide/simethicone Suspension 30 milliLiter(s) Oral every 4 hours PRN Dyspepsia  benzonatate 100 milliGRAM(s) Oral every 8 hours PRN Cough  guaifenesin/dextromethorphan Oral Liquid 10 milliLiter(s) Oral every 4 hours PRN Cough  melatonin 3 milliGRAM(s) Oral at bedtime PRN Insomnia  ondansetron Injectable 4 milliGRAM(s) IV Push every 8 hours PRN Nausea and/or Vomiting    LABS:                                     9.5    11.70 )-----------( 392      ( 06 Mar 2023 06:51 )             30.6   03-06    139  |  106  |  13  ----------------------------<  97  3.8   |  32<H>  |  0.74    Ca    9.1      06 Mar 2023 06:51                    RADIOLOGY:    < from: CT Angio Chest PE Protocol w/ IV Cont (03.02.23 @ 13:49) >  IMPRESSION:.    No pulmonary embolism.    Complete left lower lobe consolidation, which appears to be new compared   to 12/29/2022. This likely represents pneumonia (possibly necrotizing   pneumonia). Recommend CT chest follow-up in one month to ensure clearing   and to exclude alternative etiologies.    Moderate size loculated left pleural effusion and small right pleural   effusion.          
OTTO PIÑA  MRN: [id number]    S: HPI: 88 yo F presented to ER c/o 2 week hx of cough, left sided chest discomfort, and shortness of breath. Chest CT shows left lower lobe consolidation and a loculated left pleural effusion. She denies history of chronic lung disease. She admits to having a cough when eating. She c/o weakness and fatigue. Denies dizziness, fever, syncope, nausea, vomiting.     An esophagram done 2/22/2023 demonstrates achalasia with markedly decreased clearance of barium.       In the ER vitals stable with SpO2 low of 90% on RA. WBC 18.46.    3/3/2023: No new complaints this morning. C/O cough; left chest discomfort.     3/4/2023: Comfortable sitting upright in bed. C/O mild, dull/persistent left sided chest pain. Less cough today. Swallow therapy and GI consult notes appreciated. Now on full liquid diet; aspiration precautions. C/O loose stools.     3/5/2023: No new complaints. Less cough. Still c/o left sided left lower cheat discomfort.  Still has ARELLANO when walking to bathroom. on full liquid diet.     3/6/2023: Comfortable at rest. Still ARELLANO. Requiring O2 supplement. Left sided chest discomfort persists. Remains on broad spectrum antibiotics. Full liquid diet per GI. Afebrile.     3/7/2023: Still dyspneic with any activity. Left chest discomfort persists. Occasional cough. Had thoracentesis yesterday; as expected, minimal fluid (30 cc's) was drained. Afebrile.     3/8/2023: Breathing easier after drainage of left pleural effusion; Chest tube placed at IR yesterday. Thoracic surgery follow up appreciated. Patient reports mild discomfort at site of chest tube insertion. CXR post chest tube shows significant improvement. Patient now NPO for upper endoscopy scheduled for 10 AM.        3/9/2023: Denies shortness of breath at rest. Chest discomfort has resolved post drainage of left pleural collection. Minimal additional drainage overnight. Afebrile. Upper endoscopy results noted. Diet advanced per GI.     3/10/2023: Denies shortness of breath when walking to the bathroom. O2 sat on RA 90% with exertion this AM; 93% at rest. Denies chest pain or cough. Left chest tube remains in place - minimal drainage over night (17 ml).     3/11/1013: Anxious to go home. Feels stronger. Denied shortness of breath. Minimal cough. Chest tube removed yesterday. Thoracic surgery note appreciated. Home oxygen eval noted - patient desats to 88% while walking; qualifies for home O2. Afebrile.     PAST MEDICAL & SURGICAL HISTORY:  Osteoarthritis      Osteopenia      Deep vein thrombosis (DVT)      S/P Hysterectomy  2007      Lumbar Laminectomy/ Spinal Fusion  1999      S/P Arthroscopy of Right Knee  1998      S/P Appendectomy  @ 12 years old      S/P Tonsillectomy  @ 2 years old          O: T(C): 35.7 (03-11-23 @ 08:10), Max: 36.3 (03-10-23 @ 22:45)  HR: 73 (03-11-23 @ 08:10) (69 - 75)  BP: 138/50 (03-11-23 @ 08:10) (138/50 - 157/78)  RR: 18 (03-11-23 @ 08:10) (18 - 18)  SpO2: 92% (03-11-23 @ 08:10) (92% - 93%)  Wt(kg): --    PHYSICAL EXAM:      GENERAL: comfortable. No dyspnea at rest    NEURO: awake / aleert    NECK: no JVD    CHEST: diminished breath sounds left lower lung field. Chest tube removed; dressing dry and intact    CARDIAC: RR    EXT: no edema      LABS:      Home Oxygen Evaluation:  Pulse ox (SpO2) on room air at rest:	94 %  Pulse ox (SpO2) on room air with exertion:	88 %  Pulse ox (SpO2) on	2 L/min  O2 with exertion:	96 %        Electronic Signatures:  Kvng Malik (Respiratory Therapist)  (Signed 10-Mar-2023 12:06)  	Authored: Home Oxygen Evaluation      Last Updated: 10-Mar-2023 12:06 by Kvng Malik (Respiratory Therapist)                          9.1    8.03  )-----------( 410      ( 10 Mar 2023 06:34 )             29.7       03-10    145  |  111<H>  |  13  ----------------------------<  85  3.9   |  29  |  0.73    Ca    8.8      10 Mar 2023 06:34     EXAM:  XR CHEST PORTABLE URGENT 1V   ORDERED BY: MIGUELITO LUONG     PROCEDURE DATE:  03/10/2023          INTERPRETATION:  Clinical history: 87-year-old female, chest tube removed.    Portable view of the chest is compared to 4 hours prior.    FINDINGS: Left chest tube removed, no pneumothorax. Improvement in   atelectasis at the left base, moderate pleural effusion, grossly   unchanged.    Small right pleural effusion, unchanged.    Cardiac silhouette and pulmonary vasculaturewithin normal limits with no   right pneumothorax or acute osseous finding.    IMPRESSION:  Left chest tube removed with improvement in atelectasis and no   pneumothorax.    Moderate left and small right pleural effusions, grossly unchanged    --- End of Report ---            MAXWELL NIEVES DO; Attending Radiologist          MEDICATIONS  (STANDING):  apixaban 2.5 milliGRAM(s) Oral every 12 hours  ascorbic acid 500 milliGRAM(s) Oral daily  cholecalciferol 400 Unit(s) Oral daily  donepezil 5 milliGRAM(s) Oral at bedtime  dorzolamide 2%/timolol 0.5% Ophthalmic Solution 1 Drop(s) Right EYE two times a day  latanoprost 0.005% Ophthalmic Solution 1 Drop(s) Right EYE at bedtime  lidocaine   4% Patch 1 Patch Transdermal daily  multivitamin 1 Tablet(s) Oral daily  pantoprazole    Tablet 40 milliGRAM(s) Oral before breakfast    MEDICATIONS  (PRN):  acetaminophen     Tablet .. 650 milliGRAM(s) Oral every 6 hours PRN Temp greater or equal to 38C (100.4F), Mild Pain (1 - 3)  albuterol    90 MICROgram(s) HFA Inhaler 2 Puff(s) Inhalation every 6 hours PRN Shortness of Breath and/or Wheezing  aluminum hydroxide/magnesium hydroxide/simethicone Suspension 30 milliLiter(s) Oral every 4 hours PRN Dyspepsia  benzonatate 100 milliGRAM(s) Oral every 8 hours PRN Cough  guaifenesin/dextromethorphan Oral Liquid 10 milliLiter(s) Oral every 4 hours PRN Cough  melatonin 3 milliGRAM(s) Oral at bedtime PRN Insomnia  morphine  - Injectable 0.5 milliGRAM(s) IV Push every 4 hours PRN Severe Pain (7 - 10)  ondansetron Injectable 4 milliGRAM(s) IV Push every 8 hours PRN Nausea and/or Vomiting        A/P:  1. Left lower lobe pneumonia/hypoxemia. Continue antibiotics per ID recommendations; to transition to Augmentin on discharge. Follow up chest imaging to resolution as outpatient. Qualifies for home O2.        2. Left loculated pleural effusion - c/w complicated parapneumonic effusion; pleural fluid cultures negative). Chest tube removed. Residual left effusion. Will follow up CXR as an outpatient. Continue antibiotics per ID. Thoracic surgery "signed off" - no plan for additional intervention.      3. Small right effusion. Follow up CXR as outpatient. Consider low dose diuretic.          3. Dysphagia. EGD result noted. Management per GI. Diet advanced. On Protonix. Continue aspiration precautions and dietary recommendations per GI. Outpatient follow up with Dr. Stevens.      4. Hx of DVT - back on Eliquis.    5. Discharge planning. Per hospitalist team.  Increase activity. May benefit from VNS. Anxious to go home. Patient should follow up in our office within 2 weeks of discharge. Will arrange for follow up imaging in the office.  
OTTO PIÑA  MRN: [id number]    S:   HPI: 88 yo F presented to ER c/o 2 week hx of cough, left sided chest discomfort, and shortness of breath. Chest CT shows left lower lobe consolidation and a loculated left pleural effusion. She denies history of chronic lung disease. She admits to having a cough when eating. She c/o weakness and fatigue. Denies dizziness, fever, syncope, nausea, vomiting.     An esophagram done 2/22/2023 demonstrates achalasia with markedly decreased clearance of barium.       In the ER vitals stable with SpO2 low of 90% on RA. WBC 18.46.    3/3/2023: No new complaints this morning. C/O cough; left chest discomfort.     3/4/2023: Comfortable sitting upright in bed. C/O mild, dull/persistent left sided chest pain. Less cough today. Swallow therapy and GI consult notes appreciated. Now on full liquid diet; aspiration precautions. C/O loose stools.     PAST MEDICAL & SURGICAL HISTORY:  Osteoarthritis      Osteopenia      Deep vein thrombosis (DVT)      S/P Hysterectomy  2007      Lumbar Laminectomy/ Spinal Fusion  1999      S/P Arthroscopy of Right Knee  1998      S/P Appendectomy  @ 12 years old      S/P Tonsillectomy  @ 2 years old          O: T(C): 36.3 (03-04-23 @ 07:40), Max: 36.9 (03-03-23 @ 21:50)  HR: 67 (03-04-23 @ 07:40) (67 - 85)  BP: 135/58 (03-04-23 @ 07:40) (130/61 - 135/58)  RR: 19 (03-04-23 @ 07:40) (18 - 19)  SpO2: 92% (03-04-23 @ 07:40) (92% - 93%)  Wt(kg): --    PHYSICAL EXAM:      GENERAL: frail; no distress    NEURO: awake / alert    NECK: no JVD    CHEST: diminished breath sounds left lower lung field    CARDIAC: RR    EXT: no edema    LABS:                          9.4    11.77 )-----------( 409      ( 04 Mar 2023 09:09 )             29.5       03-04    139  |  105  |  19  ----------------------------<  122<H>  3.5   |  29  |  0.85    Ca    8.8      04 Mar 2023 09:09  Phos  3.8     03-03  Mg     2.1     03-03    TPro  5.2<L>  /  Alb  1.6<L>  /  TBili  0.3  /  DBili  x   /  AST  36  /  ALT  61  /  AlkPhos  397<H>  03-03      RADIOLOGY:     [facility]   ORDERED BY: GERALDO PETTIT     PROCEDURE DATE:  03/02/2023          INTERPRETATION:  HISTORY: Shortness of breath.    EXAMINATION: CTA CHEST was performed for evaluation of the pulmonary   arteries. Multiplanar reformatted images and MIPS were acquired.  CONTRAST/COMPLICATIONS:  IV Contrast: Omnipaque 350  68 cc administered   32 cc discarded  Oral Contrast: NONE  Complications: None reported at time of study completion    COMPARISON: No priorCT chest comparison. Correlation with chest   radiograph dated 2/22/2023 and 12/29/2022.    FINDINGS:    PULMONARY ARTERIES: No pulmonary embolism.    MEDIASTINUM: Heart size normal. No pericardial effusion. Normal caliber   thoracic aorta. Fluid present within the lumen of the mid esophagus.    AIRWAYS, LUNGS, PLEURA: Trachea and mainstem bronchi patent. Complete   left lower lobe consolidation with areas of hypoenhancement. When   comparing the current  radiograph with prior chest radiograph dated   12/29/2022, findings appear to be new. Moderate size loculated left   pleural effusion and small right pleural effusion.    IMAGED ABDOMEN: Unremarkable.    SOFT TISSUES: Unremarkable.    BONES: Unremarkable.      IMPRESSION:.    No pulmonary embolism.    Complete left lower lobe consolidation, which appears to be new compared   to 12/29/2022. This likely represents pneumonia (possibly necrotizing   pneumonia). Recommend CT chest follow-up in one month to ensure clearing   and to exclude alternative etiologies.    Moderate size loculated left pleural effusion and small right pleural   effusion.           EXAM:  XR ESOPH SNGL CON STUDY   ORDERED BY: JULIANA CLARK   PROCEDURE DATE:  02/22/2023      INTERPRETATION:  CLINICAL INFORMATION: Dysphagia    TECHNIQUE: An esophagram was performed under fluoroscopic guidance   utilizing single contrast barium. The study is limited due to the   patient's inability to stand.  Fluoroscopy time:  1.1 minutes    COMPARISON: None    FINDINGS:  radiograph of the chest demonstrates the visualized lungs   to be clear and fusion hardware in the lumbar spine..    Barium mixes with retained intraluminal esophageal fluid at the beginning   of the examination. The esophagus is moderately dilated. There is   significant delay in contrast passing through the gastroesophageal   junction and into the stomach. There is marked narrowing of the   gastroesophageal junction measuring 5 mm in diameter when maximally   distended. Contrast does eventually pass into the stomach and duodenum.    Postprocedure radiograph obtained at least 7 minutes after the patient   completed drinking demonstrates residual contrast column to the level of   the proximal esophagus. Note that a timed esophagram could not be   performed due to the patient's inability to stand.    IMPRESSION:    Dilated esophagus withmarkedly decreased clearance of barium and marked   narrowing of the gastroesophageal junction, suspicious for achalasia.      MERCEDES CASTRO MD; Attending Radiologist      MEDICATIONS  (STANDING):  ascorbic acid 500 milliGRAM(s) Oral daily  azithromycin   Tablet 500 milliGRAM(s) Oral daily  cholecalciferol 400 Unit(s) Oral daily  donepezil 5 milliGRAM(s) Oral at bedtime  dorzolamide 2%/timolol 0.5% Ophthalmic Solution 1 Drop(s) Right EYE two times a day  enoxaparin Injectable 50 milliGRAM(s) SubCutaneous every 12 hours  latanoprost 0.005% Ophthalmic Solution 1 Drop(s) Right EYE at bedtime  multivitamin 1 Tablet(s) Oral daily  piperacillin/tazobactam IVPB.. 3.375 Gram(s) IV Intermittent every 8 hours    MEDICATIONS  (PRN):  acetaminophen     Tablet .. 650 milliGRAM(s) Oral every 6 hours PRN Temp greater or equal to 38C (100.4F), Mild Pain (1 - 3)  albuterol    90 MICROgram(s) HFA Inhaler 2 Puff(s) Inhalation every 6 hours PRN Shortness of Breath and/or Wheezing  aluminum hydroxide/magnesium hydroxide/simethicone Suspension 30 milliLiter(s) Oral every 4 hours PRN Dyspepsia  benzonatate 100 milliGRAM(s) Oral every 8 hours PRN Cough  guaifenesin/dextromethorphan Oral Liquid 10 milliLiter(s) Oral every 4 hours PRN Cough  melatonin 3 milliGRAM(s) Oral at bedtime PRN Insomnia  ondansetron Injectable 4 milliGRAM(s) IV Push every 8 hours PRN Nausea and/or Vomiting        A/P: 1. Left lower lobe pneumonia - consider aspiration pneumonia in patient with achalasia.    2. Left loculated pleural effusion -  likely represents complicated parapneumonic effusion; ? empyema. Thoracic surgical input appreciated. Continue antibiotics. Patient has shown initial improvement with antibiotics with decreased WBC; feels better. IR consulted for diagnostic thoracentesis.       3. Dysphagia - longstanding with chronic cough after meals and at night. Esophagram with findings suggesting achalasia; marked narrowing of GE junction. Swallow therapy / GI consults appreciated. Continue aspiration precautions and dietary recommendations  per GI/swallow therapy.  GI follow up for additional evaluation; ? upper endoscopy.     4. Hx of DVT - on Eliquis; no evidence of PE on CTA. Lovenox for DVT prophylaxis while off Eliquis; hold for procedures. Resume anticoagulation after procedures.       
OTTO PIÑA  MRN: [id number]    S: HPI: 88 yo F presented to ER c/o 2 week hx of cough, left sided chest discomfort, and shortness of breath. Chest CT shows left lower lobe consolidation and a loculated left pleural effusion. She denies history of chronic lung disease. She admits to having a cough when eating. She c/o weakness and fatigue. Denies dizziness, fever, syncope, nausea, vomiting.     An esophagram done 2023 demonstrates achalasia with markedly decreased clearance of barium.       In the ER vitals stable with SpO2 low of 90% on RA. WBC 18.46.    3/3/2023: No new complaints this morning. C/O cough; left chest discomfort.     3/4/2023: Comfortable sitting upright in bed. C/O mild, dull/persistent left sided chest pain. Less cough today. Swallow therapy and GI consult notes appreciated. Now on full liquid diet; aspiration precautions. C/O loose stools.     3/5/2023: No new complaints. Less cough. Still c/o left sided left lower cheat discomfort.  Still has ARELLANO when walking to bathroom. on full liquid diet.     3/6/2023: Comfortable at rest. Still ARELLANO. Requiring O2 supplement. Left sided chest discomfort persists. Remains on broad spectrum antibiotics. Full liquid diet per GI. Afebrile.     3/7/2023: Still dyspneic with any activity. Left chest discomfort persists. Occasional cough. Had thoracentesis yesterday; as expected, minimal fluid (30 cc's) was drained. Afebrile.     3/8/2023: Breathing easier after drainage of left pleural effusion; Chest tube placed at IR yesterday. Thoracic surgery follow up appreciated. Patient reports mild discomfort at site of chest tube insertion. CXR post chest tube shows significant improvement. Patient now NPO for upper endoscopy scheduled for 10 AM.        3/9/2023: Denies shortness of breath at rest. Chest discomfort has resolved post drainage of left pleural collection. Minimal additional drainage overnight. Afebrile. Upper endoscopy results noted. Diet advanced per GI.     PAST MEDICAL & SURGICAL HISTORY:  Osteoarthritis      Osteopenia      Deep vein thrombosis (DVT)      S/P Hysterectomy  2007      Lumbar Laminectomy/ Spinal Fusion        S/P Arthroscopy of Right Knee        S/P Appendectomy  @ 12 years old      S/P Tonsillectomy  @ 2 years old          O: T(C): 36.5 (23 @ 20:06), Max: 36.5 (23 @ 20:06)  HR: 72 (23 @ 20:06) (70 - 72)  BP: 158/68 (23 @ 20:06) (155/85 - 158/68)  RR: 18 (23 @ 20:06) (18 - 18)  SpO2: 94% (23 @ 20:06) (94% - 98%)  Wt(kg): --    PHYSICAL EXAM:      GENERAL: comfortable. No dyspnea at rest    NEURO: awake / aleert    NECK: no JVD    CHEST: left chest tube in place; improved breath sounds in L hemithorax.     CARDIAC: RR    EXT: no edema      LABS:                        9.3    8.54  )-----------( 394      ( 09 Mar 2023 06:48 )             29.9       03-    140  |  108  |  12  ----------------------------<  94  3.3<L>   |  31  |  0.65    Ca    9.2      09 Mar 2023 06:48      · Note Type	Procedure note      EGD    Normal esophagus ( biopsied at proximal esophagus). There was no resistance at the GEJ or visualized narrowing of the esophagus.  irregular Z-line at 40 cm (biopsied)  Gastric polyps but otherwise normal esophagus  Normal duodenum    Recommendation  - Follow up pathology  - Soft diet and aspiration precautions  - PPI 40 mg daily before breakfast  - Rest of care per primary team.      Electronic Signatures:  Karlos Seth)  (Signed 08-Mar-2023 11:54)      RADIOLOGY:    PROCEDURE:   · Procedure Name	Interventional Radiology  · Procedure Name	Left chest tube placement  · TIME OUT	Patient's first and last name, , procedure, and correct site confirmed prior to the start of procedure.  · Procedure Date/Time	07-Mar-2023 15:31  · Informed Consent	Benefits, risks, and possible complications of procedure explained to patient/caregiver who verbalized understanding and gave written consent.  · Procedure Performed By	Myself  · Procedure Assisted By	PA  · Access Site (if applicable)	Left  · Estimated Blood Loss	Mild  · Complications	No complications  · Contrast	None  · Local Anesthesia	1% Lidocaine  · Procedure Findings and Details	Successful placement of 14Fr left chest tube.   Chest tube to suction for now, will be managed by thoracic surgery.  · Patient Condition/Disposition	Back to floor  · Anticoagulation Management (if applicable)	may resume anticoagulation if applicable tomorrow.      Electronic Signatures:  Halaibeh, Mohammad (MD)  (Signed 07-Mar-2023 15:32)  	Authored: PROCEDURE      Last Updated: 07-Mar-2023 15:32 by Halaibeh, Mohammad (MD)        EXAM:  XR CHEST PORTABLE ROUTINE 1V   ORDERED BY: DUNG MCBRIDE II     PROCEDURE DATE:  2023          INTERPRETATION:  AP chest on 2023 at 2:32 PM. Patient had left   chest tube insertion.    Moderate right lower thoracic curve and lumbar hardware again noted.    Heart magnified by technique.    Catheter left chest tube is been inserted and large left effusion on    markedly reduced with mild residual. There may be some left lower   lobe atelectasis.    There is persistent mild right base fluid. No pneumothorax.    IMPRESSION: Markedly diminished left effusion after catheter chest tube.        LEEROY CRISTINA MD; Attending Radiologist  This document has been electronically signed. Mar  7 2023  3:26PM    MEDICATIONS  (STANDING):  ascorbic acid 500 milliGRAM(s) Oral daily  cholecalciferol 400 Unit(s) Oral daily  donepezil 5 milliGRAM(s) Oral at bedtime  dorzolamide 2%/timolol 0.5% Ophthalmic Solution 1 Drop(s) Right EYE two times a day  latanoprost 0.005% Ophthalmic Solution 1 Drop(s) Right EYE at bedtime  lidocaine   4% Patch 1 Patch Transdermal daily  multivitamin 1 Tablet(s) Oral daily  pantoprazole    Tablet 40 milliGRAM(s) Oral before breakfast  piperacillin/tazobactam IVPB.. 3.375 Gram(s) IV Intermittent every 8 hours    MEDICATIONS  (PRN):  acetaminophen     Tablet .. 650 milliGRAM(s) Oral every 6 hours PRN Temp greater or equal to 38C (100.4F), Mild Pain (1 - 3)  albuterol    90 MICROgram(s) HFA Inhaler 2 Puff(s) Inhalation every 6 hours PRN Shortness of Breath and/or Wheezing  aluminum hydroxide/magnesium hydroxide/simethicone Suspension 30 milliLiter(s) Oral every 4 hours PRN Dyspepsia  benzonatate 100 milliGRAM(s) Oral every 8 hours PRN Cough  guaifenesin/dextromethorphan Oral Liquid 10 milliLiter(s) Oral every 4 hours PRN Cough  melatonin 3 milliGRAM(s) Oral at bedtime PRN Insomnia  morphine  - Injectable 0.5 milliGRAM(s) IV Push every 4 hours PRN Severe Pain (7 - 10)  ondansetron Injectable 4 milliGRAM(s) IV Push every 8 hours PRN Nausea and/or Vomiting        A/P: 1. Left lower lobe pneumonia/hypoxemia. Continue Zosyn per ID recommendations. Follow up chest imaging to resolution as outpatient. Check RA O2 sat; unlikely that patient will need O2.       2. Left loculated pleural effusion - c/w complicated parapneumonic effusion. CXR with marked improvement after chest tube placement. Continue chest tube management per thoracic surgery. Antibiotics per ID. Minimal additional drainage. If follow up imaging does not demonstrate significant residual fluid, may not need any additional intervention.         3. Dysphagia. EGD result noted. Esophagram with findings suggesting achalasia; marked narrowing of GE junction. Management per GI. Diet advanced. Continue aspiration precautions and dietary recommendations per GI. Outpatient follow up with Dr. Stevens. .      4. Hx of DVT - on Eliquis; no evidence of PE on CTA. Lovenox for DVT prophylaxis while off Eliquis; hold for procedures. Resume anticoagulation after procedures.     5. Discharge planning. Increase activity. May benefit from PT. Walks with walker. Anxious to go home.     
OTTO PIÑA  MRN: [id number]    OTTO PIÑA  MRN: [id number]    HPI: 86 yo F presented to ER c/o 2 week hx of cough, left sided chest discomfort, and shortness of breath. Chest CT shows left lower lobe consolidation and a loculated left pleural effusion. She denies history of chronic lung disease. She admits to having a cough when eating. She c/o weakness and fatigue. Denies dizziness, fever, syncope, nausea, vomiting.     An esophagram done 2023 demonstrates achalasia with markedly decreased clearance of barium.       In the ER vitals stable with SpO2 low of 90% on RA. WBC 18.46.    3/3/2023: No new complaints this morning. C/O cough; left chest discomfort.     3/4/2023: Comfortable sitting upright in bed. C/O mild, dull/persistent left sided chest pain. Less cough today. Swallow therapy and GI consult notes appreciated. Now on full liquid diet; aspiration precautions. C/O loose stools.     3/5/2023: No new complaints. Less cough. Still c/o left sided left lower cheat discomfort.  Still has ARELLANO when walking to bathroom. on full liquid diet.     3/6/2023: Comfortable at rest. Still ARELLANO. Requiring O2 supplement. Left sided chest discomfort persists. Remains on broad spectrum antibiotics. Full liquid diet per GI. Afebrile.     3/7/2023: Still dyspneic with any activity. Left chest discomfort persists. Occasional cough. Had thoracentesis yesterday; as expected, minimal fluid (30 cc's) was drained. Afebrile.     3/8/2023: Breathing easier after drainage gf left pleural effusion; Chest tube placed at IR yesterday. Thoracic surgery follow up appreciated. Patient reports mild discomfort at site of chest tube insertion. CXR post chest tube shows significant improvement. Patient now NPO for upper endoscopy scheduled for 10 AM.          PAST MEDICAL & SURGICAL HISTORY:  Osteoarthritis      Osteopenia      Deep vein thrombosis (DVT)      S/P Hysterectomy  2007      Lumbar Laminectomy/ Spinal Fusion        S/P Arthroscopy of Right Knee        S/P Appendectomy  @ 12 years old      S/P Tonsillectomy  @ 2 years old          O: T(C): 36.8 (23 @ 23:38), Max: 36.8 (23 @ 23:38)  HR: 72 (23 @ 23:38) (71 - 73)  BP: 136/58 (23 @ 23:38) (136/58 - 164/78)  RR: 19 (23 @ 23:38) (18 - 19)  SpO2: 99% (23 @ 23:38) (99% - 100%)  Wt(kg): --    PHYSICAL EXAM:      GENERAL: comfortable. No dyspnea at rest    NEURO: awake / aleert    NECK: no JVD    CHEST: left chest tube in place; improved breath sounds in L hemithorax.     CARDIAC: RR    EXT: no edema      LABS:                        9.3    9.47  )-----------( 391      ( 07 Mar 2023 06:29 )             29.7           140  |  106  |  16  ----------------------------<  88  3.5   |  31  |  0.71    Ca    8.9      07 Mar 2023 06:29      RADIOLOGY:      PROCEDURE:   · Procedure Name	Interventional Radiology  · Procedure Name	Left chest tube placement  · TIME OUT	Patient's first and last name, , procedure, and correct site confirmed prior to the start of procedure.  · Procedure Date/Time	07-Mar-2023 15:31  · Informed Consent	Benefits, risks, and possible complications of procedure explained to patient/caregiver who verbalized understanding and gave written consent.  · Procedure Performed By	Myself  · Procedure Assisted By	PA  · Access Site (if applicable)	Left  · Estimated Blood Loss	Mild  · Complications	No complications  · Contrast	None  · Local Anesthesia	1% Lidocaine  · Procedure Findings and Details	Successful placement of 14Fr left chest tube.   Chest tube to suction for now, will be managed by thoracic surgery.  · Patient Condition/Disposition	Back to floor  · Anticoagulation Management (if applicable)	may resume anticoagulation if applicable tomorrow.      Electronic Signatures:  Halaibeh, Mohammad (MD)  (Signed 07-Mar-2023 15:32)  	Authored: PROCEDURE      Last Updated: 07-Mar-2023 15:32 by Halaibeh, Mohammad (MD)        EXAM:  XR CHEST PORTABLE ROUTINE 1V   ORDERED BY: DUNG MCBRIDE II     PROCEDURE DATE:  2023          INTERPRETATION:  AP chest on 2023 at 2:32 PM. Patient had left   chest tube insertion.    Moderate right lower thoracic curve and lumbar hardware again noted.    Heart magnified by technique.    Catheter left chest tube is been inserted and large left effusion on    markedly reduced with mild residual. There may be some left lower   lobe atelectasis.    There is persistent mild right base fluid. No pneumothorax.    IMPRESSION: Markedly diminished left effusion after catheter chest tube.        LEEROY CRISTINA MD; Attending Radiologist  This document has been electronically signed. Mar  7 2023  3:26PM        MEDICATIONS  (STANDING):  ascorbic acid 500 milliGRAM(s) Oral daily  cholecalciferol 400 Unit(s) Oral daily  donepezil 5 milliGRAM(s) Oral at bedtime  dorzolamide 2%/timolol 0.5% Ophthalmic Solution 1 Drop(s) Right EYE two times a day  latanoprost 0.005% Ophthalmic Solution 1 Drop(s) Right EYE at bedtime  lidocaine   4% Patch 1 Patch Transdermal daily  multivitamin 1 Tablet(s) Oral daily  piperacillin/tazobactam IVPB.. 3.375 Gram(s) IV Intermittent every 8 hours    MEDICATIONS  (PRN):  acetaminophen     Tablet .. 650 milliGRAM(s) Oral every 6 hours PRN Temp greater or equal to 38C (100.4F), Mild Pain (1 - 3)  albuterol    90 MICROgram(s) HFA Inhaler 2 Puff(s) Inhalation every 6 hours PRN Shortness of Breath and/or Wheezing  aluminum hydroxide/magnesium hydroxide/simethicone Suspension 30 milliLiter(s) Oral every 4 hours PRN Dyspepsia  benzonatate 100 milliGRAM(s) Oral every 8 hours PRN Cough  guaifenesin/dextromethorphan Oral Liquid 10 milliLiter(s) Oral every 4 hours PRN Cough  melatonin 3 milliGRAM(s) Oral at bedtime PRN Insomnia  morphine  - Injectable 0.5 milliGRAM(s) IV Push every 4 hours PRN Severe Pain (7 - 10)  ondansetron Injectable 4 milliGRAM(s) IV Push every 8 hours PRN Nausea and/or Vomiting        A/P: 1. Left lower lobe pneumonia/hypoxemia. Consider aspiration pneumonia in patient with achalasia. Continue antibiotics. Continue O2 supplement prn (RA O2 sat 88% at rest). Patient does not have chronic lung disease.     2. Left loculated pleural effusion - c/w complicated parapneumonic effusion. CXR with marked improvement after chest tube placement. Continue chest tube management per thoracic surgery. Antibiotics per ID. Follow up chest imaging  as clinically indicated.        3. Dysphagia - longstanding with chronic cough after meals and at night. Esophagram with findings suggesting achalasia; marked narrowing of GE junction. Swallow therapy / GI consults appreciated. Continue aspiration precautions and dietary recommendations per GI/swallow therapy.  Upper endoscopy scheduled for today.      4. Hx of DVT - on Eliquis; no evidence of PE on CTA. Lovenox for DVT prophylaxis while off Eliquis; hold for procedures. Resume anticoagulation after procedures.         
Patient is a 87y old  Female who presents with a chief complaint of SOB/Cough/Pleurisy/Large loculate left pleural effusion (09 Mar 2023 15:34)      Subective: Patient seen and examined at bedside. No overnight events. Tolerating soft diet. Denies dysphagia, odynophagia, nausea, or vomiting.       PAST MEDICAL & SURGICAL HISTORY:  Osteoarthritis      Osteopenia      Deep vein thrombosis (DVT)      S/P Hysterectomy  2007      Lumbar Laminectomy/ Spinal Fusion  1999      S/P Arthroscopy of Right Knee  1998      S/P Appendectomy  @ 12 years old      S/P Tonsillectomy  @ 2 years old          MEDICATIONS  (STANDING):  ascorbic acid 500 milliGRAM(s) Oral daily  cholecalciferol 400 Unit(s) Oral daily  donepezil 5 milliGRAM(s) Oral at bedtime  dorzolamide 2%/timolol 0.5% Ophthalmic Solution 1 Drop(s) Right EYE two times a day  latanoprost 0.005% Ophthalmic Solution 1 Drop(s) Right EYE at bedtime  lidocaine   4% Patch 1 Patch Transdermal daily  multivitamin 1 Tablet(s) Oral daily  pantoprazole    Tablet 40 milliGRAM(s) Oral before breakfast  piperacillin/tazobactam IVPB.. 3.375 Gram(s) IV Intermittent every 8 hours  potassium chloride   Powder 40 milliEquivalent(s) Oral every 4 hours    MEDICATIONS  (PRN):  acetaminophen     Tablet .. 650 milliGRAM(s) Oral every 6 hours PRN Temp greater or equal to 38C (100.4F), Mild Pain (1 - 3)  albuterol    90 MICROgram(s) HFA Inhaler 2 Puff(s) Inhalation every 6 hours PRN Shortness of Breath and/or Wheezing  aluminum hydroxide/magnesium hydroxide/simethicone Suspension 30 milliLiter(s) Oral every 4 hours PRN Dyspepsia  benzonatate 100 milliGRAM(s) Oral every 8 hours PRN Cough  guaifenesin/dextromethorphan Oral Liquid 10 milliLiter(s) Oral every 4 hours PRN Cough  melatonin 3 milliGRAM(s) Oral at bedtime PRN Insomnia  morphine  - Injectable 0.5 milliGRAM(s) IV Push every 4 hours PRN Severe Pain (7 - 10)  ondansetron Injectable 4 milliGRAM(s) IV Push every 8 hours PRN Nausea and/or Vomiting      REVIEW OF SYSTEMS:    RESPIRATORY: No shortness of breath  CARDIOVASCULAR: No chest pain  All other review of systems is negative unless indicated above.    Vital Signs Last 24 Hrs  T(C): 36.4 (09 Mar 2023 07:56), Max: 36.5 (08 Mar 2023 20:06)  T(F): 97.6 (09 Mar 2023 07:56), Max: 97.7 (08 Mar 2023 20:06)  HR: 70 (09 Mar 2023 15:35) (70 - 75)  BP: 168/75 (09 Mar 2023 15:35) (152/73 - 168/75)  BP(mean): 92 (08 Mar 2023 20:06) (92 - 92)  RR: 18 (09 Mar 2023 15:35) (18 - 18)  SpO2: 96% (09 Mar 2023 15:35) (93% - 96%)    Parameters below as of 09 Mar 2023 15:35  Patient On (Oxygen Delivery Method): nasal cannula        PHYSICAL EXAM:    Constitutional: NAD  Respiratory: CTAB, chest tube in place  Cardiovascular: S1 and S2, RRR  Gastrointestinal: BS+, soft, NT/ND  Extremities: No peripheral edema  Psychiatric: Normal mood, normal affect    LABS:                        9.3    8.54  )-----------( 394      ( 09 Mar 2023 06:48 )             29.9     03-09    140  |  108  |  12  ----------------------------<  94  3.3<L>   |  31  |  0.65    Ca    9.2      09 Mar 2023 06:48            RADIOLOGY & ADDITIONAL STUDIES:
Date of service: 23 @ 14:42    Lying in bed in NAD  Has dry cough  SOB is improved s/p thoracocentesis  No fever    ROS: no fever or chills; denies dizziness, no HA, no abdominal pain, no diarrhea or constipation; no dysuria, no legs pain, no rashes    MEDICATIONS  (STANDING):  ascorbic acid 500 milliGRAM(s) Oral daily  cholecalciferol 400 Unit(s) Oral daily  donepezil 5 milliGRAM(s) Oral at bedtime  dorzolamide 2%/timolol 0.5% Ophthalmic Solution 1 Drop(s) Right EYE two times a day  latanoprost 0.005% Ophthalmic Solution 1 Drop(s) Right EYE at bedtime  lidocaine   4% Patch 1 Patch Transdermal daily  multivitamin 1 Tablet(s) Oral daily  pantoprazole    Tablet 40 milliGRAM(s) Oral before breakfast  piperacillin/tazobactam IVPB.. 3.375 Gram(s) IV Intermittent every 8 hours    Vital Signs Last 24 Hrs  T(C): 36.4 (08 Mar 2023 07:20), Max: 36.8 (07 Mar 2023 23:38)  T(F): 97.5 (08 Mar 2023 07:20), Max: 98.2 (07 Mar 2023 23:38)  HR: 84 (08 Mar 2023 07:20) (71 - 84)  BP: 148/60 (08 Mar 2023 07:20) (136/58 - 164/78)  BP(mean): --  RR: 18 (08 Mar 2023 07:20) (18 - 19)  SpO2: 96% (08 Mar 2023 07:20) (96% - 100%)    Parameters below as of 08 Mar 2023 07:20  Patient On (Oxygen Delivery Method): nasal cannula  O2 Flow (L/min): 2     Physical exam:    Constitutional:  No acute distress  HEENT: NC/AT, EOMI, PERRLA, conjunctivae clear; ears and nose atraumatic  Neck: supple; thyroid not palpable  Back: no tenderness  Respiratory: respiratory effort normal; crackles at bases  Cardiovascular: S1S2 regular, no murmurs  Abdomen: soft, not tender, not distended, positive BS  Genitourinary: no suprapubic tenderness  Lymphatic: no LN palpable  Musculoskeletal: no muscle tenderness, no joint swelling or tenderness  Extremities: no pedal edema  Neurological/ Psychiatric: AxOx3, moving all extremities  Skin: no rashes; no palpable lesions    Labs: reviewed                        9.2    8.28  )-----------( 402      ( 08 Mar 2023 07:40 )             29.1     03-08    141  |  106  |  10  ----------------------------<  93  3.5   |  31  |  0.65    Ca    8.8      08 Mar 2023 07:40                        9.5    11.70 )-----------( 392      ( 06 Mar 2023 06:51 )             30.6     03-06    139  |  106  |  13  ----------------------------<  97  3.8   |  32<H>  |  0.74    Ca    9.1      06 Mar 2023 06:51                        8.9    10.41 )-----------( 400      ( 05 Mar 2023 06:40 )             27.6     03-04    139  |  105  |  19  ----------------------------<  122<H>  3.5   |  29  |  0.85    Ca    8.8      04 Mar 2023 09:09                                   9.9    18.46 )-----------( 400      ( 02 Mar 2023 08:58 )             29.7     03-02    136  |  102  |  26<H>  ----------------------------<  107<H>  3.9   |  30  |  0.78    Ca    9.1      02 Mar 2023 08:58    TPro  5.6<L>  /  Alb  1.7<L>  /  TBili  0.3  /  DBili  x   /  AST  39<H>  /  ALT  66  /  AlkPhos  451<H>  0302     LIVER FUNCTIONS - ( 02 Mar 2023 08:58 )  Alb: 1.7 g/dL / Pro: 5.6 gm/dL / ALK PHOS: 451 U/L / ALT: 66 U/L / AST: 39 U/L / GGT: x           Urinalysis Basic - ( 02 Mar 2023 10:21 )    Color: Yellow / Appearance: Clear / S.005 / pH: x  Gluc: x / Ketone: Negative  / Bili: Negative / Urobili: Negative   Blood: x / Protein: Negative / Nitrite: Negative   Leuk Esterase: Negative / RBC: x / WBC x   Sq Epi: x / Non Sq Epi: x / Bacteria: x    ( @ 08:58)  NotDetec      Culture - Fungal, Body Fluid (collected 06 Mar 2023 11:15)  Source: Pleural Fl Pleural Fluid  Preliminary Report (07 Mar 2023 07:07):    Testing in progress    Culture - Body Fluid with Gram Stain (collected 06 Mar 2023 11:15)  Source: Pleural Fl Pleural Fluid  Gram Stain (06 Mar 2023 19:24):    polymorphonuclear leukocytes seen    No organisms seen    by cytocentrifuge  Preliminary Report (07 Mar 2023 13:30):    No growth    Culture - Urine (collected 02 Mar 2023 10:21)  Source: Clean Catch None  Final Report (03 Mar 2023 21:13):    <10,000 CFU/mL Normal Urogenital Yasmine    Culture - Blood (collected 02 Mar 2023 08:58)  Source: .Blood Blood-Peripheral  Final Report (07 Mar 2023 15:01):    No Growth Final    Culture - Blood (collected 02 Mar 2023 08:58)  Source: .Blood Blood-Peripheral  Final Report (07 Mar 2023 15:01):    No Growth Final    Radiology: all available radiological tests reviewed    < from: CT Angio Chest PE Protocol w/ IV Cont (23 @ 13:49) >  No pulmonary embolism.    Complete left lower lobe consolidation, which appears to be new compared   to 2022. This likely represents pneumonia (possibly necrotizing   pneumonia). Recommend CT chest follow-up in one month to ensure clearing   and to exclude alternative etiologies.    Moderate size loculated left pleural effusion and small right pleural effusion.  < end of copied text >      Advanced directives addressed: full resuscitation
HPI: 87F with PMH of DVT (Dx 12/2022 on eliquis), Neuropathy, Osteopenia, MILTON presented with progressive SOB, left pleurisy and non-productive cough x 2 weeks. Associated weakness and fatigue. Outpatient evaluation with CT revealed loculated left pleural effusion and advised to report to ER for further evaluation by PMD.     Interval Hx: Patient seen today,  at bedside, feels well, s/p EGD today, no findings suggestive of achalasia/mass, biopsies were taken. Diet advanced. Chest tube in place, CXR is improved.     ROS:   All 10 systems reviewed and found to be negative with the exception of what has been described above.    Vital Signs Last 24 Hrs  T(C): 36.3 (10 Mar 2023 05:55), Max: 36.3 (10 Mar 2023 05:55)  T(F): 97.3 (10 Mar 2023 05:55), Max: 97.3 (10 Mar 2023 05:55)  HR: 76 (10 Mar 2023 07:54) (66 - 76)  BP: 121/62 (10 Mar 2023 07:54) (120/74 - 168/75)  BP(mean): --  RR: 18 (10 Mar 2023 07:54) (18 - 18)  SpO2: 93% (10 Mar 2023 07:54) (93% - 96%)    Parameters below as of 10 Mar 2023 07:54  Patient On (Oxygen Delivery Method): nasal cannula      PHYSICAL EXAM:  GENERAL: NAD, lying in bed comfortably  HEAD:  Atraumatic, Normocephalic  EYES: conjunctiva and sclera clear  ENT: Moist mucous membranes  NECK: Supple, No JVD  CHEST/LUNG: Bilateral air entry, decreased breath sounds bilaterally, L>R; No rales, rhonchi, wheezing. Unlabored respirations, Chest tube in place   HEART: Regular rate and rhythm; No murmurs  ABDOMEN: Bowel sounds present; Soft, Nontender, Nondistended.   EXTREMITIES:  2+ Peripheral Pulses, brisk capillary refill. No clubbing, cyanosis, or edema  NERVOUS SYSTEM:  Alert & Oriented X3, speech clear. No deficits   MSK: FROM all 4 extremities, full and equal strength                                  9.1    8.03  )-----------( 410      ( 10 Mar 2023 06:34 )             29.7     03-10    145  |  111<H>  |  13  ----------------------------<  85  3.9   |  29  |  0.73    Ca    8.8      10 Mar 2023 06:34        RADIOLOGY:    < from: CT Angio Chest PE Protocol w/ IV Cont (03.02.23 @ 13:49) >  IMPRESSION:.    No pulmonary embolism.    Complete left lower lobe consolidation, which appears to be new compared   to 12/29/2022. This likely represents pneumonia (possibly necrotizing   pneumonia). Recommend CT chest follow-up in one month to ensure clearing   and to exclude alternative etiologies.    Moderate size loculated left pleural effusion and small right pleural   effusion.      < from: CT Sinuses No Cont (03.08.23 @ 14:36) >  FINDINGS:   No prior similar studies are available for review.    The paranasal sinuses are normally developed.  The nasal cavity is well   aerated. The nasal septum appears intact. Bilateral lisa bullosa are   noted    The paranasal sinuses are clear.  No abnormal paranasal sinus fluid   collection is found.  Noosseous erosion or expansion is present.  The   sinus ostia are clear.    The orbits are unremarkable.  Preseptal structures are preserved.  The   globes are intact.  Intraconal and extraconal fat is preserved.  The   extraocular muscles remain symmetric.  The superior ophthalmic veins are   also symmetric.  Orbital rims remain intact.    The deep facial spaces are intact.   No radiopaque foreign body is seen.    The nasopharynx is symmetric.  The central skull base is intact.  The   visualized intracranial contents appear unremarkable.  The lenses are   surgically small.      IMPRESSION:        1.   No acute abnormality.        2.   No active sinusitis identified.      
HPI: 87F with PMH of DVT (Dx 12/2022 on eliquis), Neuropathy, Osteopenia, MILTON presented with progressive SOB, left pleurisy and non-productive cough x 2 weeks. Associated weakness and fatigue. Outpatient evaluation with CT revealed loculated left pleural effusion and advised to report to ER for further evaluation by PMD.     Interval Hx: Patient seen today,  at bedside, feels well, s/p EGD today, no findings suggestive of achalasia/mass, biopsies were taken. Diet advanced. Chest tube in place, CXR is improved.     ROS:   All 10 systems reviewed and found to be negative with the exception of what has been described above.    Vital Signs Last 24 Hrs  T(C): 36.4 (09 Mar 2023 07:56), Max: 36.5 (08 Mar 2023 20:06)  T(F): 97.6 (09 Mar 2023 07:56), Max: 97.7 (08 Mar 2023 20:06)  HR: 70 (09 Mar 2023 15:35) (70 - 75)  BP: 168/75 (09 Mar 2023 15:35) (152/73 - 168/75)  BP(mean): 92 (08 Mar 2023 20:06) (92 - 92)  RR: 18 (09 Mar 2023 15:35) (18 - 18)  SpO2: 96% (09 Mar 2023 15:35) (93% - 96%)    Parameters below as of 09 Mar 2023 15:35  Patient On (Oxygen Delivery Method): nasal cannula    PHYSICAL EXAM:  GENERAL: NAD, lying in bed comfortably  HEAD:  Atraumatic, Normocephalic  EYES: conjunctiva and sclera clear  ENT: Moist mucous membranes  NECK: Supple, No JVD  CHEST/LUNG: Bilateral air entry, decreased breath sounds bilaterally, L>R; No rales, rhonchi, wheezing. Unlabored respirations, Chest tube in place   HEART: Regular rate and rhythm; No murmurs  ABDOMEN: Bowel sounds present; Soft, Nontender, Nondistended.   EXTREMITIES:  2+ Peripheral Pulses, brisk capillary refill. No clubbing, cyanosis, or edema  NERVOUS SYSTEM:  Alert & Oriented X3, speech clear. No deficits   MSK: FROM all 4 extremities, full and equal strength                                9.3    8.54  )-----------( 394      ( 09 Mar 2023 06:48 )             29.9     03-09    140  |  108  |  12  ----------------------------<  94  3.3<L>   |  31  |  0.65    Ca    9.2      09 Mar 2023 06:48                              RADIOLOGY:    < from: CT Angio Chest PE Protocol w/ IV Cont (03.02.23 @ 13:49) >  IMPRESSION:.    No pulmonary embolism.    Complete left lower lobe consolidation, which appears to be new compared   to 12/29/2022. This likely represents pneumonia (possibly necrotizing   pneumonia). Recommend CT chest follow-up in one month to ensure clearing   and to exclude alternative etiologies.    Moderate size loculated left pleural effusion and small right pleural   effusion.      < from: CT Sinuses No Cont (03.08.23 @ 14:36) >  FINDINGS:   No prior similar studies are available for review.    The paranasal sinuses are normally developed.  The nasal cavity is well   aerated. The nasal septum appears intact. Bilateral lisa bullosa are   noted    The paranasal sinuses are clear.  No abnormal paranasal sinus fluid   collection is found.  Noosseous erosion or expansion is present.  The   sinus ostia are clear.    The orbits are unremarkable.  Preseptal structures are preserved.  The   globes are intact.  Intraconal and extraconal fat is preserved.  The   extraocular muscles remain symmetric.  The superior ophthalmic veins are   also symmetric.  Orbital rims remain intact.    The deep facial spaces are intact.   No radiopaque foreign body is seen.    The nasopharynx is symmetric.  The central skull base is intact.  The   visualized intracranial contents appear unremarkable.  The lenses are   surgically small.      IMPRESSION:        1.   No acute abnormality.        2.   No active sinusitis identified.      
OTTO PIÑA  MRN: [id number]    S:  HPI: 88 yo F presented to ER c/o 2 week hx of cough, left sided chest discomfort, and shortness of breath. Chest CT shows left lower lobe consolidation and a loculated left pleural effusion. She denies history of chronic lung disease. She admits to having a cough when eating. She c/o weakness and fatigue. Denies dizziness, fever, syncope, nausea, vomiting.     An esophagram done 2/22/2023 demonstrates achalasia with markedly decreased clearance of barium.       In the ER vitals stable with SpO2 low of 90% on RA. WBC 18.46.    3/3/2023: No new complaints this morning. C/O cough; left chest discomfort.     3/4/2023: Comfortable sitting upright in bed. C/O mild, dull/persistent left sided chest pain. Less cough today. Swallow therapy and GI consult notes appreciated. Now on full liquid diet; aspiration precautions. C/O loose stools.     3/5/2023: No new complaints. Less cough. Still c/o left sided left lower cheat discomfort.  Still has ARELLANO when walking to bathroom. on full liquid diet.     PAST MEDICAL & SURGICAL HISTORY:  Osteoarthritis      Osteopenia      Deep vein thrombosis (DVT)      S/P Hysterectomy  2007      Lumbar Laminectomy/ Spinal Fusion  1999      S/P Arthroscopy of Right Knee  1998      S/P Appendectomy  @ 12 years old      S/P Tonsillectomy  @ 2 years old          O: T(C): 36.4 (03-05-23 @ 07:52), Max: 36.5 (03-04-23 @ 23:00)  HR: 77 (03-05-23 @ 07:52) (73 - 85)  BP: 139/62 (03-05-23 @ 07:52) (133/69 - 155/70)  RR: 20 (03-05-23 @ 09:22) (18 - 22)  SpO2: 97% (03-05-23 @ 09:22) (88% - 97%)  Wt(kg): --    PHYSICAL EXAM:      GENERAL: frail; no distress    NEURO: awake / alert    NECK: no JVD    CHEST: diminished breath sounds left lower lung field    CARDIAC: RR    EXT: no edema    LABS:                          8.9    10.41 )-----------( 400      ( 05 Mar 2023 06:40 )             27.6       03-04    139  |  105  |  19  ----------------------------<  122<H>  3.5   |  29  |  0.85    Ca    8.8      04 Mar 2023 09:09    CT ANGIO CHEST PULM ART WAWIC   ORDERED BY: GERALDO PETTIT     PROCEDURE DATE:  03/02/2023          INTERPRETATION:  HISTORY: Shortness of breath.    EXAMINATION: CTA CHEST was performed for evaluation of the pulmonary   arteries. Multiplanar reformatted images and MIPS were acquired.  CONTRAST/COMPLICATIONS:  IV Contrast: Omnipaque 350  68 cc administered   32 cc discarded  Oral Contrast: NONE  Complications: None reported at time of study completion    COMPARISON: No priorCT chest comparison. Correlation with chest   radiograph dated 2/22/2023 and 12/29/2022.    FINDINGS:    PULMONARY ARTERIES: No pulmonary embolism.    MEDIASTINUM: Heart size normal. No pericardial effusion. Normal caliber   thoracic aorta. Fluid present within the lumen of the mid esophagus.    AIRWAYS, LUNGS, PLEURA: Trachea and mainstem bronchi patent. Complete   left lower lobe consolidation with areas of hypoenhancement. When   comparing the current  radiograph with prior chest radiograph dated   12/29/2022, findings appear to be new. Moderate size loculated left   pleural effusion and small right pleural effusion.    IMAGED ABDOMEN: Unremarkable.    SOFT TISSUES: Unremarkable.    BONES: Unremarkable.      IMPRESSION:.    No pulmonary embolism.    Complete left lower lobe consolidation, which appears to be new compared   to 12/29/2022. This likely represents pneumonia (possibly necrotizing   pneumonia). Recommend CT chest follow-up in one month to ensure clearing   and to exclude alternative etiologies.    Moderate size loculated left pleural effusion and small right pleural   effusion.           EXAM:  XR ESOPH SNGL CON STUDY   ORDERED BY: JULIANA CLARK   PROCEDURE DATE:  02/22/2023      INTERPRETATION:  CLINICAL INFORMATION: Dysphagia    TECHNIQUE: An esophagram was performed under fluoroscopic guidance   utilizing single contrast barium. The study is limited due to the   patient's inability to stand.  Fluoroscopy time:  1.1 minutes    COMPARISON: None    FINDINGS:  radiograph of the chest demonstrates the visualized lungs   to be clear and fusion hardware in the lumbar spine..    Barium mixes with retained intraluminal esophageal fluid at the beginning   of the examination. The esophagus is moderately dilated. There is   significant delay in contrast passing through the gastroesophageal   junction and into the stomach. There is marked narrowing of the   gastroesophageal junction measuring 5 mm in diameter when maximally   distended. Contrast does eventually pass into the stomach and duodenum.    Postprocedure radiograph obtained at least 7 minutes after the patient   completed drinking demonstrates residual contrast column to the level of   the proximal esophagus. Note that a timed esophagram could not be   performed due to the patient's inability to stand.    IMPRESSION:    Dilated esophagus withmarkedly decreased clearance of barium and marked   narrowing of the gastroesophageal junction, suspicious for achalasia.      MERCEDES CASTRO MD; Attending Radiologist      MEDICATIONS  (STANDING):  ascorbic acid 500 milliGRAM(s) Oral daily  azithromycin   Tablet 500 milliGRAM(s) Oral daily  cholecalciferol 400 Unit(s) Oral daily  donepezil 5 milliGRAM(s) Oral at bedtime  dorzolamide 2%/timolol 0.5% Ophthalmic Solution 1 Drop(s) Right EYE two times a day  enoxaparin Injectable 50 milliGRAM(s) SubCutaneous every 12 hours  latanoprost 0.005% Ophthalmic Solution 1 Drop(s) Right EYE at bedtime  multivitamin 1 Tablet(s) Oral daily  piperacillin/tazobactam IVPB.. 3.375 Gram(s) IV Intermittent every 8 hours    MEDICATIONS  (PRN):  acetaminophen     Tablet .. 650 milliGRAM(s) Oral every 6 hours PRN Temp greater or equal to 38C (100.4F), Mild Pain (1 - 3)  albuterol    90 MICROgram(s) HFA Inhaler 2 Puff(s) Inhalation every 6 hours PRN Shortness of Breath and/or Wheezing  aluminum hydroxide/magnesium hydroxide/simethicone Suspension 30 milliLiter(s) Oral every 4 hours PRN Dyspepsia  benzonatate 100 milliGRAM(s) Oral every 8 hours PRN Cough  guaifenesin/dextromethorphan Oral Liquid 10 milliLiter(s) Oral every 4 hours PRN Cough  melatonin 3 milliGRAM(s) Oral at bedtime PRN Insomnia  ondansetron Injectable 4 milliGRAM(s) IV Push every 8 hours PRN Nausea and/or Vomiting        A/P: 1. Left lower lobe pneumonia/hypoxemia. Consider aspiration pneumonia in patient with achalasia. Continue antibiotics per ID. Continue O2 supplement prn (RA O2 sat 88% today at rest). Patient does not have chronic lung disease.     2. Left loculated pleural effusion -  likely represents complicated parapneumonic effusion; ? empyema. Thoracic surgical input appreciated. Continue antibiotics. Patient has shown initial improvement with antibiotics with decreased WBC; feels better. IR consulted for diagnostic thoracentesis.  Will follow up CXR today.      3. Dysphagia - longstanding with chronic cough after meals and at night. Esophagram with findings suggesting achalasia; marked narrowing of GE junction. Swallow therapy / GI consults appreciated. Continue aspiration precautions and dietary recommendations  per GI/swallow therapy.  GI follow up for additional evaluation; ? upper endoscopy. My impression is that patient is stable from a pulmonary perspective for upper endoscopy with conscious sedation.     4. Hx of DVT - on Eliquis; no evidence of PE on CTA. Lovenox for DVT prophylaxis while off Eliquis; hold for procedures. Resume anticoagulation after procedures.     
OTTO PIÑA  MRN: [id number]    S: HPI: 88 yo F presented to ER c/o 2 week hx of cough, left sided chest discomfort, and shortness of breath. Chest CT shows left lower lobe consolidation and a loculated left pleural effusion. She denies history of chronic lung disease. She admits to having a cough when eating. She c/o weakness and fatigue. Denies dizziness, fever, syncope, nausea, vomiting.     An esophagram done 2/22/2023 demonstrates achalasia with markedly decreased clearance of barium.       In the ER vitals stable with SpO2 low of 90% on RA. WBC 18.46.    3/3/2023: No new complaints this morning. C/O cough; left chest discomfort.     3/4/2023: Comfortable sitting upright in bed. C/O mild, dull/persistent left sided chest pain. Less cough today. Swallow therapy and GI consult notes appreciated. Now on full liquid diet; aspiration precautions. C/O loose stools.     3/5/2023: No new complaints. Less cough. Still c/o left sided left lower cheat discomfort.  Still has ARELLANO when walking to bathroom. on full liquid diet.     3/6/2023: Comfortable at rest. Still ARELLANO. Requiring O2 supplement. Left sided chest discomfort persists. Remains on broad spectrum antibiotics. Full liquid diet per GI. Afebrile.     PAST MEDICAL & SURGICAL HISTORY:  Osteoarthritis      Osteopenia      Deep vein thrombosis (DVT)      S/P Hysterectomy  2007      Lumbar Laminectomy/ Spinal Fusion  1999      S/P Arthroscopy of Right Knee  1998      S/P Appendectomy  @ 12 years old      S/P Tonsillectomy  @ 2 years old          O: T(C): 36.3 (03-05-23 @ 21:48), Max: 36.3 (03-05-23 @ 21:48)  HR: 67 (03-05-23 @ 21:48) (67 - 72)  BP: 143/57 (03-05-23 @ 21:48) (131/65 - 143/57)  RR: 19 (03-05-23 @ 21:48) (19 - 22)  SpO2: 92% (03-05-23 @ 21:48) (88% - 97%)  Wt(kg): --    PHYSICAL EXAM:      GENERAL: frail; no distress    NEURO: awake / alert    NECK: no JVD    CHEST: diminished breath sounds left lower lung field    CARDIAC: RR    EXT: no edema      LABS:                          9.5    11.70 )-----------( 392      ( 06 Mar 2023 06:51 )             30.6       03-06    139  |  106  |  13  ----------------------------<  97  3.8   |  32<H>  |  0.74    Ca    9.1      06 Mar 2023 06:51    Radiology:    CXR 3/5/2023: Report pending - per my review - Persistent opacification of left lower lung field c/w findings on CT done 3/2 (loculated left effusion, LLLconsolidation/atelectasis). Small right pleural effusion.     CT ANGIO CHEST PULM ART WAWI   ORDERED BY: GERALDO PETTIT     PROCEDURE DATE:  03/02/2023          INTERPRETATION:  HISTORY: Shortness of breath.    EXAMINATION: CTA CHEST was performed for evaluation of the pulmonary   arteries. Multiplanar reformatted images and MIPS were acquired.  CONTRAST/COMPLICATIONS:  IV Contrast: Omnipaque 350  68 cc administered   32 cc discarded  Oral Contrast: NONE  Complications: None reported at time of study completion    COMPARISON: No priorCT chest comparison. Correlation with chest   radiograph dated 2/22/2023 and 12/29/2022.    FINDINGS:    PULMONARY ARTERIES: No pulmonary embolism.    MEDIASTINUM: Heart size normal. No pericardial effusion. Normal caliber   thoracic aorta. Fluid present within the lumen of the mid esophagus.    AIRWAYS, LUNGS, PLEURA: Trachea and mainstem bronchi patent. Complete   left lower lobe consolidation with areas of hypoenhancement. When   comparing the current  radiograph with prior chest radiograph dated   12/29/2022, findings appear to be new. Moderate size loculated left   pleural effusion and small right pleural effusion.    IMAGED ABDOMEN: Unremarkable.    SOFT TISSUES: Unremarkable.    BONES: Unremarkable.      IMPRESSION:.    No pulmonary embolism.    Complete left lower lobe consolidation, which appears to be new compared   to 12/29/2022. This likely represents pneumonia (possibly necrotizing   pneumonia). Recommend CT chest follow-up in one month to ensure clearing   and to exclude alternative etiologies.    Moderate size loculated left pleural effusion and small right pleural   effusion.           EXAM:  XR ESOPH SNGL CON STUDY   ORDERED BY: JULIANA CLARK   PROCEDURE DATE:  02/22/2023      INTERPRETATION:  CLINICAL INFORMATION: Dysphagia    TECHNIQUE: An esophagram was performed under fluoroscopic guidance   utilizing single contrast barium. The study is limited due to the   patient's inability to stand.  Fluoroscopy time:  1.1 minutes    COMPARISON: None    FINDINGS:  radiograph of the chest demonstrates the visualized lungs   to be clear and fusion hardware in the lumbar spine..    Barium mixes with retained intraluminal esophageal fluid at the beginning   of the examination. The esophagus is moderately dilated. There is   significant delay in contrast passing through the gastroesophageal   junction and into the stomach. There is marked narrowing of the   gastroesophageal junction measuring 5 mm in diameter when maximally   distended. Contrast does eventually pass into the stomach and duodenum.    Postprocedure radiograph obtained at least 7 minutes after the patient   completed drinking demonstrates residual contrast column to the level of   the proximal esophagus. Note that a timed esophagram could not be   performed due to the patient's inability to stand.    IMPRESSION:    Dilated esophagus withmarkedly decreased clearance of barium and marked   narrowing of the gastroesophageal junction, suspicious for achalasia.      MERCEDES CASTRO MD; Attending Radiologist      MEDICATIONS  (STANDING):  ascorbic acid 500 milliGRAM(s) Oral daily  azithromycin   Tablet 500 milliGRAM(s) Oral daily  cholecalciferol 400 Unit(s) Oral daily  donepezil 5 milliGRAM(s) Oral at bedtime  dorzolamide 2%/timolol 0.5% Ophthalmic Solution 1 Drop(s) Right EYE two times a day  enoxaparin Injectable 50 milliGRAM(s) SubCutaneous every 12 hours  latanoprost 0.005% Ophthalmic Solution 1 Drop(s) Right EYE at bedtime  multivitamin 1 Tablet(s) Oral daily  piperacillin/tazobactam IVPB.. 3.375 Gram(s) IV Intermittent every 8 hours    MEDICATIONS  (PRN):  acetaminophen     Tablet .. 650 milliGRAM(s) Oral every 6 hours PRN Temp greater or equal to 38C (100.4F), Mild Pain (1 - 3)  albuterol    90 MICROgram(s) HFA Inhaler 2 Puff(s) Inhalation every 6 hours PRN Shortness of Breath and/or Wheezing  aluminum hydroxide/magnesium hydroxide/simethicone Suspension 30 milliLiter(s) Oral every 4 hours PRN Dyspepsia  benzonatate 100 milliGRAM(s) Oral every 8 hours PRN Cough  guaifenesin/dextromethorphan Oral Liquid 10 milliLiter(s) Oral every 4 hours PRN Cough  melatonin 3 milliGRAM(s) Oral at bedtime PRN Insomnia  ondansetron Injectable 4 milliGRAM(s) IV Push every 8 hours PRN Nausea and/or Vomiting        A/P:  1. Left lower lobe pneumonia/hypoxemia. Consider aspiration pneumonia in patient with achalasia. Continue antibiotics per ID. Continue O2 supplement prn (RA O2 sat 88% at rest). Patient does not have chronic lung disease.     2. Left loculated pleural effusion -  likely represents complicated parapneumonic effusion. Thoracic surgical input appreciated.  IR consulted for thoracentesis. Continue antibiotics. Patient has shown initial improvement with antibiotics with decreased WBC. CXR shows no improvement. My impression is that it is unlikely that a significant amount of fluid can be drained from the left pleural space. More aggressive intervention (e.g. VATS decortication) might be considered if the patient agrees and is felt to be an acceptable risk for surgery. I have requested thoracic surgery follow up to discuss options with patient and her . Will continue antibiotics. She will likely have significant exercise limitation if her left lower lobe remains trapped. Although she is elderly and weak/frail, she does not have any prior history of lung disease.       3. Dysphagia - longstanding with chronic cough after meals and at night. Esophagram with findings suggesting achalasia; marked narrowing of GE junction. Swallow therapy / GI consults appreciated. Continue aspiration precautions and dietary recommendations per GI/swallow therapy.  GI follow up for additional evaluation; ? upper endoscopy.     4. Hx of DVT - on Eliquis; no evidence of PE on CTA. Lovenox for DVT prophylaxis while off Eliquis; hold for procedures. Resume anticoagulation after procedures.   
Date of service: 23 @ 15:37    Lying in bed in NAD  Has dry cough  Weak looking    ROS: no fever or chills; denies dizziness, no HA, no abdominal pain, no diarrhea or constipation; no dysuria, no legs pain, no rashes    MEDICATIONS  (STANDING):  ascorbic acid 500 milliGRAM(s) Oral daily  azithromycin   Tablet 500 milliGRAM(s) Oral daily  cholecalciferol 400 Unit(s) Oral daily  donepezil 5 milliGRAM(s) Oral at bedtime  dorzolamide 2%/timolol 0.5% Ophthalmic Solution 1 Drop(s) Right EYE two times a day  enoxaparin Injectable 50 milliGRAM(s) SubCutaneous every 12 hours  latanoprost 0.005% Ophthalmic Solution 1 Drop(s) Right EYE at bedtime  multivitamin 1 Tablet(s) Oral daily  piperacillin/tazobactam IVPB.. 3.375 Gram(s) IV Intermittent every 8 hours    Vital Signs Last 24 Hrs  T(C): 36.3 (04 Mar 2023 07:40), Max: 36.9 (03 Mar 2023 21:50)  T(F): 97.3 (04 Mar 2023 07:40), Max: 98.4 (03 Mar 2023 21:50)  HR: 67 (04 Mar 2023 07:40) (67 - 85)  BP: 135/58 (04 Mar 2023 07:40) (130/61 - 135/58)  BP(mean): --  RR: 19 (04 Mar 2023 07:40) (18 - 19)  SpO2: 92% (04 Mar 2023 07:40) (92% - 93%)    Parameters below as of 04 Mar 2023 07:40  Patient On (Oxygen Delivery Method): nasal cannula  O2 Flow (L/min): 2     Physical exam:    Constitutional:  No acute distress  HEENT: NC/AT, EOMI, PERRLA, conjunctivae clear; ears and nose atraumatic  Neck: supple; thyroid not palpable  Back: no tenderness  Respiratory: respiratory effort normal; crackles at bases  Cardiovascular: S1S2 regular, no murmurs  Abdomen: soft, not tender, not distended, positive BS  Genitourinary: no suprapubic tenderness  Lymphatic: no LN palpable  Musculoskeletal: no muscle tenderness, no joint swelling or tenderness  Extremities: no pedal edema  Neurological/ Psychiatric: AxOx3, moving all extremities  Skin: no rashes; no palpable lesions    Labs: reviewed                        9.4    11.77 )-----------( 409      ( 04 Mar 2023 09:09 )             29.5     03-04    139  |  105  |  19  ----------------------------<  122<H>  3.5   |  29  |  0.85    Ca    8.8      04 Mar 2023 09:09  Phos  3.8     03-03  Mg     2.1     03-03    TPro  5.2<L>  /  Alb  1.6<L>  /  TBili  0.3  /  DBili  x   /  AST  36  /  ALT  61  /  AlkPhos  397<H>  03-03                        9.2    14.62 )-----------( 377      ( 03 Mar 2023 06:05 )             27.7     03-03    137  |  104  |  22  ----------------------------<  113<H>  3.8   |  30  |  0.64    Ca    8.9      03 Mar 2023 06:05  Phos  3.8     03-03  Mg     2.1     03-03    TPro  5.2<L>  /  Alb  1.6<L>  /  TBili  0.3  /  DBili  x   /  AST  36  /  ALT  61  /  AlkPhos  397<H>  03-03                        9.9    18.46 )-----------( 400      ( 02 Mar 2023 08:58 )             29.7     03-02    136  |  102  |  26<H>  ----------------------------<  107<H>  3.9   |  30  |  0.78    Ca    9.1      02 Mar 2023 08:58    TPro  5.6<L>  /  Alb  1.7<L>  /  TBili  0.3  /  DBili  x   /  AST  39<H>  /  ALT  66  /  AlkPhos  451<H>  03-02     LIVER FUNCTIONS - ( 02 Mar 2023 08:58 )  Alb: 1.7 g/dL / Pro: 5.6 gm/dL / ALK PHOS: 451 U/L / ALT: 66 U/L / AST: 39 U/L / GGT: x           Urinalysis Basic - ( 02 Mar 2023 10:21 )    Color: Yellow / Appearance: Clear / S.005 / pH: x  Gluc: x / Ketone: Negative  / Bili: Negative / Urobili: Negative   Blood: x / Protein: Negative / Nitrite: Negative   Leuk Esterase: Negative / RBC: x / WBC x   Sq Epi: x / Non Sq Epi: x / Bacteria: x    ( @ 08:58)  NotDete    Culture - Blood (collected 02 Mar 2023 08:58)  Source: .Blood Blood-Peripheral  Preliminary Report (03 Mar 2023 15:02):    No growth to date.    Culture - Blood (collected 02 Mar 2023 08:58)  Source: .Blood Blood-Peripheral  Preliminary Report (03 Mar 2023 15:02):    No growth to date.    Radiology: all available radiological tests reviewed    < from: CT Angio Chest PE Protocol w/ IV Cont (23 @ 13:49) >  No pulmonary embolism.    Complete left lower lobe consolidation, which appears to be new compared   to 2022. This likely represents pneumonia (possibly necrotizing   pneumonia). Recommend CT chest follow-up in one month to ensure clearing   and to exclude alternative etiologies.    Moderate size loculated left pleural effusion and small right pleural effusion.  < end of copied text >      Advanced directives addressed: full resuscitation
OTTO PIÑA  MRN: [id number]    S: HPI: 88 yo F presented to ER c/o 2 week hx of cough, left sided chest discomfort, and shortness of breath. Chest CT shows left lower lobe consolidation and a loculated left pleural effusion. She denies history of chronic lung disease. She admits to having a cough when eating. She c/o weakness and fatigue. Denies dizziness, fever, syncope, nausea, vomiting.     An esophagram done 2023 demonstrates achalasia with markedly decreased clearance of barium.       In the ER vitals stable with SpO2 low of 90% on RA. WBC 18.46.    3/3/2023: No new complaints this morning. C/O cough; left chest discomfort.     3/4/2023: Comfortable sitting upright in bed. C/O mild, dull/persistent left sided chest pain. Less cough today. Swallow therapy and GI consult notes appreciated. Now on full liquid diet; aspiration precautions. C/O loose stools.     3/5/2023: No new complaints. Less cough. Still c/o left sided left lower cheat discomfort.  Still has ARELLANO when walking to bathroom. on full liquid diet.     3/6/2023: Comfortable at rest. Still ARELLANO. Requiring O2 supplement. Left sided chest discomfort persists. Remains on broad spectrum antibiotics. Full liquid diet per GI. Afebrile.     3/7/2023: Still dyspneic with any activity. Left chest discomfort persists. Occasional cough. Had thoracentesis yesterday; as expected, minimal fluid (30 cc's) was drained. Afebrile.     3/8/2023: Breathing easier after drainage of left pleural effusion; Chest tube placed at IR yesterday. Thoracic surgery follow up appreciated. Patient reports mild discomfort at site of chest tube insertion. CXR post chest tube shows significant improvement. Patient now NPO for upper endoscopy scheduled for 10 AM.        3/9/2023: Denies shortness of breath at rest. Chest discomfort has resolved post drainage of left pleural collection. Minimal additional drainage overnight. Afebrile. Upper endoscopy results noted. Diet advanced per GI.     3/10/2023: Denies shortness of breath when walking to the bathroom. O2 sat on RA 90% with exertion this AM; 93% at rest. Denies chest pain or cough. Left chest tube remains in place - minimal drainage over night (17 ml).     PAST MEDICAL & SURGICAL HISTORY:  Osteoarthritis      Osteopenia      Deep vein thrombosis (DVT)      S/P Hysterectomy  2007      Lumbar Laminectomy/ Spinal Fusion        S/P Arthroscopy of Right Knee        S/P Appendectomy  @ 12 years old      S/P Tonsillectomy  @ 2 years old          O: T(C): 36.3 (03-10-23 @ 05:55), Max: 36.3 (03-10-23 @ 05:55)  HR: 66 (23 @ 21:25) (66 - 70)  BP: 120/74 (23 @ 21:25) (120/74 - 168/75)  RR: 18 (23 @ 21:25) (18 - 18)  SpO2: 96% (23 @ 21:25) (96% - 96%)  Wt(kg): --    PHYSICAL EXAM:      GENERAL: comfortable. No dyspnea at rest    NEURO: awake / aleert    NECK: no JVD    CHEST: left chest tube in place; improved breath sounds in L hemithorax.     CARDIAC: RR    EXT: no edema      LABS:                          9.1    8.03  )-----------( 410      ( 10 Mar 2023 06:34 )             29.7       03-10    145  |  111<H>  |  13  ----------------------------<  85  3.9   |  29  |  0.73    Ca    8.8      10 Mar 2023 06:34    · Note Type	Procedure note      EGD    Normal esophagus ( biopsied at proximal esophagus). There was no resistance at the GEJ or visualized narrowing of the esophagus.  irregular Z-line at 40 cm (biopsied)  Gastric polyps but otherwise normal esophagus  Normal duodenum    Recommendation  - Follow up pathology  - Soft diet and aspiration precautions  - PPI 40 mg daily before breakfast  - Rest of care per primary team.      Electronic Signatures:  Karlos Seth)  (Signed 08-Mar-2023 11:54)      RADIOLOGY:    PROCEDURE:   · Procedure Name	Interventional Radiology  · Procedure Name	Left chest tube placement  · TIME OUT	Patient's first and last name, , procedure, and correct site confirmed prior to the start of procedure.  · Procedure Date/Time	07-Mar-2023 15:31  · Informed Consent	Benefits, risks, and possible complications of procedure explained to patient/caregiver who verbalized understanding and gave written consent.  · Procedure Performed By	Myself  · Procedure Assisted By	PA  · Access Site (if applicable)	Left  · Estimated Blood Loss	Mild  · Complications	No complications  · Contrast	None  · Local Anesthesia	1% Lidocaine  · Procedure Findings and Details	Successful placement of 14Fr left chest tube.   Chest tube to suction for now, will be managed by thoracic surgery.  · Patient Condition/Disposition	Back to floor  · Anticoagulation Management (if applicable)	may resume anticoagulation if applicable tomorrow.      Electronic Signatures:  Halaibeh, Mohammad (MD)  (Signed 07-Mar-2023 15:32)  	Authored: PROCEDURE      Last Updated: 07-Mar-2023 15:32 by Halaibeh, Mohammad (MD)        EXAM:  XR CHEST PORTABLE ROUTINE 1V   ORDERED BY: DUNG MCBRIDE II     PROCEDURE DATE:  2023          INTERPRETATION:  AP chest on 2023 at 2:32 PM. Patient had left   chest tube insertion.    Moderate right lower thoracic curve and lumbar hardware again noted.    Heart magnified by technique.    Catheter left chest tube is been inserted and large left effusion on    markedly reduced with mild residual. There may be some left lower   lobe atelectasis.    There is persistent mild right base fluid. No pneumothorax.    IMPRESSION: Markedly diminished left effusion after catheter chest tube.    LEEROY CRISTINA MD; Attending Radiologist      EXAM:  XR CHEST PORTABLE ROUTINE 1V   ORDERED BY: NEETU PETERSON     PROCEDURE DATE:  2023      INTERPRETATION:  AP chest on 2023 at 9:17 AM. Patient is being   followed for left chest tube. 2 images.    Heart magnified by technique. Extensive lumbar hardware again noted.    There is a catheter left chest tube still in place. There is an adjacent   moderate effusion. There is likely a component of left lower lobe   atelectasis. Small right base effusion is seen.    Chest is similar to .    No pneumothorax.    IMPRESSION: Stable findings as above.      LEEROY CRISTINA MD; Attending Radiologist        MEDICATIONS  (STANDING):  apixaban 2.5 milliGRAM(s) Oral every 12 hours  ascorbic acid 500 milliGRAM(s) Oral daily  cholecalciferol 400 Unit(s) Oral daily  donepezil 5 milliGRAM(s) Oral at bedtime  dorzolamide 2%/timolol 0.5% Ophthalmic Solution 1 Drop(s) Right EYE two times a day  latanoprost 0.005% Ophthalmic Solution 1 Drop(s) Right EYE at bedtime  lidocaine   4% Patch 1 Patch Transdermal daily  multivitamin 1 Tablet(s) Oral daily  pantoprazole    Tablet 40 milliGRAM(s) Oral before breakfast  piperacillin/tazobactam IVPB.. 3.375 Gram(s) IV Intermittent every 8 hours    MEDICATIONS  (PRN):  acetaminophen     Tablet .. 650 milliGRAM(s) Oral every 6 hours PRN Temp greater or equal to 38C (100.4F), Mild Pain (1 - 3)  albuterol    90 MICROgram(s) HFA Inhaler 2 Puff(s) Inhalation every 6 hours PRN Shortness of Breath and/or Wheezing  aluminum hydroxide/magnesium hydroxide/simethicone Suspension 30 milliLiter(s) Oral every 4 hours PRN Dyspepsia  benzonatate 100 milliGRAM(s) Oral every 8 hours PRN Cough  guaifenesin/dextromethorphan Oral Liquid 10 milliLiter(s) Oral every 4 hours PRN Cough  melatonin 3 milliGRAM(s) Oral at bedtime PRN Insomnia  morphine  - Injectable 0.5 milliGRAM(s) IV Push every 4 hours PRN Severe Pain (7 - 10)  ondansetron Injectable 4 milliGRAM(s) IV Push every 8 hours PRN Nausea and/or Vomiting        A/P: 1. Left lower lobe pneumonia/hypoxemia. Continue antibiotics per ID recommendations. Follow up chest imaging to resolution as outpatient. Check RA O2 sat at rest and with ambulation prior to discharge. She may benefit from home O2.        2. Left loculated pleural effusion - c/w complicated parapneumonic effusion; pleural fluid cultures negative). CXR with marked improvement after chest tube placement (with drainage of @ 700 ccs of fluid) BUT there is residual loculated fluid with associated compressive atelectasis. Thoracic surgery following; any additional intervention per thoracic surgery.  Continue chest tube management per thoracic surgery.  Continue antibiotics per ID.          3. Dysphagia. EGD result noted. Esophagram with findings suggesting achalasia; marked narrowing of GE junction. Management per GI. Diet advanced. Continue aspiration precautions and dietary recommendations per GI. Outpatient follow up with Dr. Stevens.      4. Hx of DVT - back on Eliquis. .     5. Discharge planning. Per hospitalist team.  Increase activity. May benefit from PT. Walks with walker. Anxious to go home. Patient should follow up in our office within 2 weeks of discharge. Will arrange for follow up imaging in the office.    
OTTO PIÑA  MRN: [id number]    S: HPI: 88 yo F presented to ER c/o 2 week hx of cough, left sided chest discomfort, and shortness of breath. Chest CT shows left lower lobe consolidation and a loculated left pleural effusion. She denies history of chronic lung disease. She admits to having a cough when eating. She c/o weakness and fatigue. Denies dizziness, fever, syncope, nausea, vomiting.     An esophagram done 2023 demonstrates achalasia with markedly decreased clearance of barium.       In the ER vitals stable with SpO2 low of 90% on RA. WBC 18.46.    3/3/2023: No new complaints this morning. C/O cough; left chest discomfort.     3/4/2023: Comfortable sitting upright in bed. C/O mild, dull/persistent left sided chest pain. Less cough today. Swallow therapy and GI consult notes appreciated. Now on full liquid diet; aspiration precautions. C/O loose stools.     3/5/2023: No new complaints. Less cough. Still c/o left sided left lower cheat discomfort.  Still has ARELLANO when walking to bathroom. on full liquid diet.     3/6/2023: Comfortable at rest. Still ARELLANO. Requiring O2 supplement. Left sided chest discomfort persists. Remains on broad spectrum antibiotics. Full liquid diet per GI. Afebrile.     3/7/2023: Still dyspneic with any activity. Left chest discomfort persists. Occasional cough. Had thoracentesis yesterday; as expected, minimal fluid (30 cc's) was drained. Afebrile.     PAST MEDICAL & SURGICAL HISTORY:  Osteoarthritis      Osteopenia      Deep vein thrombosis (DVT)      S/P Hysterectomy  2007      Lumbar Laminectomy/ Spinal Fusion        S/P Arthroscopy of Right Knee        S/P Appendectomy  @ 12 years old      S/P Tonsillectomy  @ 2 years old          O: T(C): 36.4 (23 @ 21:45), Max: 36.4 (23 @ 21:45)  HR: 71 (23 @ 21:45) (64 - 72)  BP: 137/59 (23 @ 21:45) (130/65 - 139/73)  RR: 18 (23 @ 21:45) (18 - 18)  SpO2: 94% (23 @ 21:45) (94% - 95%)  Wt(kg): --    PHYSICAL EXAM:      GENERAL: frail; no distress    NEURO: awake / alert    NECK: no JVD    CHEST: diminished breath sounds left lower lung field    CARDIAC: RR    EXT: no edema      LABS:                          9.3    9.47  )-----------( 391      ( 07 Mar 2023 06:29 )             29.7           140  |  106  |  16  ----------------------------<  88  3.5   |  31  |  0.71    Ca    8.9      07 Mar 2023 06:29    RADIOLOGY:    PROCEDURE:   · Procedure Name	Interventional Radiology  · Procedure Name	Left thoracentesis  · TIME OUT	Patient's first and last name, , procedure, and correct site confirmed prior to the start of procedure.  · Procedure Date/Time	06-Mar-2023 12:10  · Informed Consent	Benefits, risks, and possible complications of procedure explained to patient/caregiver who verbalized understanding and gave written consent.  · Procedure Performed By	Attending  · Specimen Obtained	Fluid sent for gram stain and culture  · Estimated Blood Loss	None  · Local Anesthesia	1% Lidocaine  · Procedure Findings and Details	Successful aspiration of 30cc      Electronic Signatures:  Nacho Solis (MD)  (Signature Pending)        EXAM:  XR CHEST PA LAT 2V   ORDERED BY: OSCAR HESTER   PROCEDURE DATE:  2023      INTERPRETATION:  INDICATION: Pleural effusion    PA and lateral chest    COMPARISON: 3/2/2023    FINDINGS:  Heart/Vascular: The heart size, mediastinum, hilum and aorta cannot be   adequately evaluated.  Pulmonary: Midline trachea. There is a moderate to large left effusion   with consolidation/atelectasis. There is a small right pleural effusion.   There is no pulmonary venous congestion.    Bones: There is no fracture.  Lines and catheter: None    Impression:    There is a moderate to large left effusion with consolidation/atelectasis.    There is a small right pleural effusion.      MAYRA CLARKE DO; Attending Radiologist    < from: CT Angio Chest PE Protocol w/ IV Cont (23 @ 13:49) >  EXAM:  CT ANGIO CHEST PULM ART [facility]   ORDERED BY: GERALDO PETTIT     PROCEDURE DATE:  2023          INTERPRETATION:  HISTORY: Shortness of breath.    EXAMINATION: CTA CHEST was performed for evaluation of the pulmonary   arteries. Multiplanar reformatted images and MIPS were acquired.  CONTRAST/COMPLICATIONS:  IV Contrast: Omnipaque 350  68 cc administered   32 cc discarded  Oral Contrast: NONE  Complications: None reported at time of study completion    COMPARISON: No priorCT chest comparison. Correlation with chest   radiograph dated 2023 and 2022.    FINDINGS:    PULMONARY ARTERIES: No pulmonary embolism.    MEDIASTINUM: Heart size normal. No pericardial effusion. Normal caliber   thoracic aorta. Fluid present within the lumen of the mid esophagus.    AIRWAYS, LUNGS, PLEURA: Trachea and mainstem bronchi patent. Complete   left lower lobe consolidation with areas of hypoenhancement. When   comparing the current  radiograph with prior chest radiograph dated   2022, findings appear to be new. Moderate size loculated left   pleural effusion and small right pleural effusion.    IMAGED ABDOMEN: Unremarkable.    SOFT TISSUES: Unremarkable.    BONES: Unremarkable.      IMPRESSION:.    No pulmonary embolism.    Complete left lower lobe consolidation, which appears to be new compared   to 2022. This likely represents pneumonia (possibly necrotizing   pneumonia). Recommend CT chest follow-up in one month to ensure clearing   and to exclude alternative etiologies.    Moderate size loculated left pleural effusion and small right pleural           MEDICATIONS  (STANDING):  ascorbic acid 500 milliGRAM(s) Oral daily  cholecalciferol 400 Unit(s) Oral daily  donepezil 5 milliGRAM(s) Oral at bedtime  dorzolamide 2%/timolol 0.5% Ophthalmic Solution 1 Drop(s) Right EYE two times a day  enoxaparin Injectable 50 milliGRAM(s) SubCutaneous every 12 hours  latanoprost 0.005% Ophthalmic Solution 1 Drop(s) Right EYE at bedtime  multivitamin 1 Tablet(s) Oral daily  piperacillin/tazobactam IVPB.. 3.375 Gram(s) IV Intermittent every 8 hours    MEDICATIONS  (PRN):  acetaminophen     Tablet .. 650 milliGRAM(s) Oral every 6 hours PRN Temp greater or equal to 38C (100.4F), Mild Pain (1 - 3)  albuterol    90 MICROgram(s) HFA Inhaler 2 Puff(s) Inhalation every 6 hours PRN Shortness of Breath and/or Wheezing  aluminum hydroxide/magnesium hydroxide/simethicone Suspension 30 milliLiter(s) Oral every 4 hours PRN Dyspepsia  benzonatate 100 milliGRAM(s) Oral every 8 hours PRN Cough  guaifenesin/dextromethorphan Oral Liquid 10 milliLiter(s) Oral every 4 hours PRN Cough  melatonin 3 milliGRAM(s) Oral at bedtime PRN Insomnia  ondansetron Injectable 4 milliGRAM(s) IV Push every 8 hours PRN Nausea and/or Vomiting        A/P: 1. Left lower lobe pneumonia/hypoxemia. Consider aspiration pneumonia in patient with achalasia. Continue antibiotics. Continue O2 supplement prn (RA O2 sat 88% at rest). Patient does not have chronic lung disease.     2. Left loculated pleural effusion -  likely represents complicated parapneumonic effusion. Thoracentesis results noted; high LDH, low glucose (exudate). Cultures pending; no organisms seen on initial Gram Stain.  Cytology not sent but malignancy is less likely in light of clinical history which suggests infectious process. Minimal fluid obtained as expected (30 ml).  CXR done 2022 did not show any pulmonary pathology.  Patient has shown initial improvement with antibiotics with decreased WBC. CXR shows no improvement.  More aggressive intervention (e.g. VATS decortication) might be considered if the patient agrees and is felt to be an acceptable risk for surgery. I have discussed management with Dr. Hahn this AM.  I have discussed option of surgical intervention with the patient this AM. She will likely have significant chronic exercise limitation without intervention. Although she is elderly and weak/frail, she does not have any prior history of lung disease.       3. Dysphagia - longstanding with chronic cough after meals and at night. Esophagram with findings suggesting achalasia; marked narrowing of GE junction. Swallow therapy / GI consults appreciated. Continue aspiration precautions and dietary recommendations per GI/swallow therapy.  GI follow up for additional evaluation; eventual upper endoscopy.     4. Hx of DVT - on Eliquis; no evidence of PE on CTA. Lovenox for DVT prophylaxis while off Eliquis; hold for procedures. Resume anticoagulation after procedures.   
Date of service: 03-10-23 @ 16:37    Lying in bed in NAD  Has dry cough  CT was removed  No fever    ROS: no fever or chills; denies dizziness, no HA, no abdominal pain, no diarrhea or constipation; no dysuria, no legs pain, no rashes    MEDICATIONS  (STANDING):  apixaban 2.5 milliGRAM(s) Oral every 12 hours  ascorbic acid 500 milliGRAM(s) Oral daily  cholecalciferol 400 Unit(s) Oral daily  donepezil 5 milliGRAM(s) Oral at bedtime  dorzolamide 2%/timolol 0.5% Ophthalmic Solution 1 Drop(s) Right EYE two times a day  latanoprost 0.005% Ophthalmic Solution 1 Drop(s) Right EYE at bedtime  lidocaine   4% Patch 1 Patch Transdermal daily  multivitamin 1 Tablet(s) Oral daily  pantoprazole    Tablet 40 milliGRAM(s) Oral before breakfast    Vital Signs Last 24 Hrs  T(C): 36.3 (10 Mar 2023 05:55), Max: 36.3 (10 Mar 2023 05:55)  T(F): 97.3 (10 Mar 2023 05:55), Max: 97.3 (10 Mar 2023 05:55)  HR: 76 (10 Mar 2023 07:54) (66 - 76)  BP: 121/62 (10 Mar 2023 07:54) (120/74 - 121/62)  BP(mean): --  RR: 18 (10 Mar 2023 07:54) (18 - 18)  SpO2: 93% (10 Mar 2023 07:54) (93% - 96%)    Parameters below as of 10 Mar 2023 07:54  Patient On (Oxygen Delivery Method): nasal cannula     Physical exam:    Constitutional:  No acute distress  HEENT: NC/AT, EOMI, PERRLA, conjunctivae clear; ears and nose atraumatic  Neck: supple; thyroid not palpable  Back: no tenderness  Respiratory: respiratory effort normal; crackles at bases  Cardiovascular: S1S2 regular, no murmurs  Abdomen: soft, not tender, not distended, positive BS  Genitourinary: no suprapubic tenderness  Lymphatic: no LN palpable  Musculoskeletal: no muscle tenderness, no joint swelling or tenderness  Extremities: no pedal edema  Neurological/ Psychiatric: AxOx3, moving all extremities  Skin: no rashes; no palpable lesions    Labs: reviewed                        9.1    8.03  )-----------( 410      ( 10 Mar 2023 06:34 )             29.7     03-10    145  |  111<H>  |  13  ----------------------------<  85  3.9   |  29  |  0.73    Ca    8.8      10 Mar 2023 06:34                        9.2    8.28  )-----------( 402      ( 08 Mar 2023 07:40 )             29.1     03-08    141  |  106  |  10  ----------------------------<  93  3.5   |  31  |  0.65    Ca    8.8      08 Mar 2023 07:40                        9.9    18.46 )-----------( 400      ( 02 Mar 2023 08:58 )             29.7     03-02    136  |  102  |  26<H>  ----------------------------<  107<H>  3.9   |  30  |  0.78    Ca    9.1      02 Mar 2023 08:58    TPro  5.6<L>  /  Alb  1.7<L>  /  TBili  0.3  /  DBili  x   /  AST  39<H>  /  ALT  66  /  AlkPhos  451<H>  03-02     LIVER FUNCTIONS - ( 02 Mar 2023 08:58 )  Alb: 1.7 g/dL / Pro: 5.6 gm/dL / ALK PHOS: 451 U/L / ALT: 66 U/L / AST: 39 U/L / GGT: x           Urinalysis Basic - ( 02 Mar 2023 10:21 )    Color: Yellow / Appearance: Clear / S.005 / pH: x  Gluc: x / Ketone: Negative  / Bili: Negative / Urobili: Negative   Blood: x / Protein: Negative / Nitrite: Negative   Leuk Esterase: Negative / RBC: x / WBC x   Sq Epi: x / Non Sq Epi: x / Bacteria: x    ( @ 08:58)  NotDete      Culture - Fungal, Body Fluid (collected 06 Mar 2023 11:15)  Source: Pleural Fl Pleural Fluid  Preliminary Report (07 Mar 2023 07:07):    Testing in progress    Culture - Body Fluid with Gram Stain (collected 06 Mar 2023 11:15)  Source: Pleural Fl Pleural Fluid  Gram Stain (06 Mar 2023 19:24):    polymorphonuclear leukocytes seen    No organisms seen    by cytocentrifuge  Preliminary Report (07 Mar 2023 13:30):    No growth    Culture - Urine (collected 02 Mar 2023 10:21)  Source: Clean Catch None  Final Report (03 Mar 2023 21:13):    <10,000 CFU/mL Normal Urogenital Yasmine    Culture - Blood (collected 02 Mar 2023 08:58)  Source: .Blood Blood-Peripheral  Final Report (07 Mar 2023 15:01):    No Growth Final    Culture - Blood (collected 02 Mar 2023 08:58)  Source: .Blood Blood-Peripheral  Final Report (07 Mar 2023 15:01):    No Growth Final    Radiology: all available radiological tests reviewed    < from: CT Angio Chest PE Protocol w/ IV Cont (23 @ 13:49) >  No pulmonary embolism.    Complete left lower lobe consolidation, which appears to be new compared   to 2022. This likely represents pneumonia (possibly necrotizing   pneumonia). Recommend CT chest follow-up in one month to ensure clearing   and to exclude alternative etiologies.  Moderate size loculated left pleural effusion and small right pleural effusion.  < end of copied text >      Advanced directives addressed: full resuscitation
Date of service: 23 @ 15:28    Lying in bed in NAD  Has left side chest discomfort  Has dry cough    ROS: no fever or chills; denies dizziness, no HA, no abdominal pain, no diarrhea or constipation; no dysuria, no legs pain, no rashes    MEDICATIONS  (STANDING):  ascorbic acid 500 milliGRAM(s) Oral daily  azithromycin   Tablet 500 milliGRAM(s) Oral daily  cholecalciferol 400 Unit(s) Oral daily  donepezil 5 milliGRAM(s) Oral at bedtime  dorzolamide 2%/timolol 0.5% Ophthalmic Solution 1 Drop(s) Right EYE two times a day  latanoprost 0.005% Ophthalmic Solution 1 Drop(s) Right EYE at bedtime  magnesium oxide 400 milliGRAM(s) Oral daily  multivitamin 1 Tablet(s) Oral daily  piperacillin/tazobactam IVPB.. 3.375 Gram(s) IV Intermittent every 8 hours    Vital Signs Last 24 Hrs  T(C): 36.3 (03 Mar 2023 09:16), Max: 36.8 (02 Mar 2023 17:20)  T(F): 97.3 (03 Mar 2023 09:16), Max: 98.3 (02 Mar 2023 17:20)  HR: 75 (03 Mar 2023 09:16) (69 - 78)  BP: 115/53 (03 Mar 2023 09:16) (115/53 - 149/69)  BP(mean): --  RR: 18 (03 Mar 2023 09:16) (18 - 18)  SpO2: 93% (03 Mar 2023 09:16) (93% - 93%)    Parameters below as of 03 Mar 2023 09:16  Patient On (Oxygen Delivery Method): nasal cannula  O2 Flow (L/min): 2     Physical exam:    Constitutional:  No acute distress  HEENT: NC/AT, EOMI, PERRLA, conjunctivae clear; ears and nose atraumatic  Neck: supple; thyroid not palpable  Back: no tenderness  Respiratory: respiratory effort normal; crackles at bases  Cardiovascular: S1S2 regular, no murmurs  Abdomen: soft, not tender, not distended, positive BS  Genitourinary: no suprapubic tenderness  Lymphatic: no LN palpable  Musculoskeletal: no muscle tenderness, no joint swelling or tenderness  Extremities: no pedal edema  Neurological/ Psychiatric: AxOx3, moving all extremities  Skin: no rashes; no palpable lesions    Labs: reviewed                        9.2    14.62 )-----------( 377      ( 03 Mar 2023 06:05 )             27.7     03-    137  |  104  |  22  ----------------------------<  113<H>  3.8   |  30  |  0.64    Ca    8.9      03 Mar 2023 06:05  Phos  3.8     03-03  Mg     2.1     03-03    TPro  5.2<L>  /  Alb  1.6<L>  /  TBili  0.3  /  DBili  x   /  AST  36  /  ALT  61  /  AlkPhos  397<H>                          9.9    18.46 )-----------( 400      ( 02 Mar 2023 08:58 )             29.7     03-02    136  |  102  |  26<H>  ----------------------------<  107<H>  3.9   |  30  |  0.78    Ca    9.1      02 Mar 2023 08:58    TPro  5.6<L>  /  Alb  1.7<L>  /  TBili  0.3  /  DBili  x   /  AST  39<H>  /  ALT  66  /  AlkPhos  451<H>  0302     LIVER FUNCTIONS - ( 02 Mar 2023 08:58 )  Alb: 1.7 g/dL / Pro: 5.6 gm/dL / ALK PHOS: 451 U/L / ALT: 66 U/L / AST: 39 U/L / GGT: x           Urinalysis Basic - ( 02 Mar 2023 10:21 )    Color: Yellow / Appearance: Clear / S.005 / pH: x  Gluc: x / Ketone: Negative  / Bili: Negative / Urobili: Negative   Blood: x / Protein: Negative / Nitrite: Negative   Leuk Esterase: Negative / RBC: x / WBC x   Sq Epi: x / Non Sq Epi: x / Bacteria: x    ( @ 08:58)  NotDete    Culture - Blood (collected 02 Mar 2023 08:58)  Source: .Blood Blood-Peripheral  Preliminary Report (03 Mar 2023 15:02):    No growth to date.    Culture - Blood (collected 02 Mar 2023 08:58)  Source: .Blood Blood-Peripheral  Preliminary Report (03 Mar 2023 15:02):    No growth to date.    Radiology: all available radiological tests reviewed    < from: CT Angio Chest PE Protocol w/ IV Cont (23 @ 13:49) >  No pulmonary embolism.    Complete left lower lobe consolidation, which appears to be new compared   to 2022. This likely represents pneumonia (possibly necrotizing   pneumonia). Recommend CT chest follow-up in one month to ensure clearing   and to exclude alternative etiologies.    Moderate size loculated left pleural effusion and small right pleural effusion.  < end of copied text >      Advanced directives addressed: full resuscitation
Date of service: 23 @ 16:14    Lying in bed in NAD  s/p thoracentesis  Has dry cough  SOB is improving    ROS: no fever or chills; denies dizziness, no HA, no abdominal pain, no diarrhea or constipation; no dysuria, no legs pain, no rashes    MEDICATIONS  (STANDING):  ascorbic acid 500 milliGRAM(s) Oral daily  cholecalciferol 400 Unit(s) Oral daily  donepezil 5 milliGRAM(s) Oral at bedtime  dorzolamide 2%/timolol 0.5% Ophthalmic Solution 1 Drop(s) Right EYE two times a day  latanoprost 0.005% Ophthalmic Solution 1 Drop(s) Right EYE at bedtime  multivitamin 1 Tablet(s) Oral daily  piperacillin/tazobactam IVPB.. 3.375 Gram(s) IV Intermittent every 8 hours    Vital Signs Last 24 Hrs  T(C): 36.3 (06 Mar 2023 16:13), Max: 36.3 (05 Mar 2023 21:48)  T(F): 97.3 (06 Mar 2023 16:13), Max: 97.3 (05 Mar 2023 21:48)  HR: 72 (06 Mar 2023 16:13) (64 - 72)  BP: 130/65 (06 Mar 2023 16:13) (130/65 - 143/57)  BP(mean): --  RR: 18 (06 Mar 2023 16:13) (18 - 20)  SpO2: 95% (06 Mar 2023 16:13) (92% - 95%)    Parameters below as of 06 Mar 2023 16:13  Patient On (Oxygen Delivery Method): nasal cannula  O2 Flow (L/min): 2       Physical exam:    Constitutional:  No acute distress  HEENT: NC/AT, EOMI, PERRLA, conjunctivae clear; ears and nose atraumatic  Neck: supple; thyroid not palpable  Back: no tenderness  Respiratory: respiratory effort normal; crackles at bases  Cardiovascular: S1S2 regular, no murmurs  Abdomen: soft, not tender, not distended, positive BS  Genitourinary: no suprapubic tenderness  Lymphatic: no LN palpable  Musculoskeletal: no muscle tenderness, no joint swelling or tenderness  Extremities: no pedal edema  Neurological/ Psychiatric: AxOx3, moving all extremities  Skin: no rashes; no palpable lesions    Labs: reviewed                        9.5    11.70 )-----------( 392      ( 06 Mar 2023 06:51 )             30.6     03-06    139  |  106  |  13  ----------------------------<  97  3.8   |  32<H>  |  0.74    Ca    9.1      06 Mar 2023 06:51                        8.9    10.41 )-----------( 400      ( 05 Mar 2023 06:40 )             27.6     03-04    139  |  105  |  19  ----------------------------<  122<H>  3.5   |  29  |  0.85    Ca    8.8      04 Mar 2023 09:09                                   9.9    18.46 )-----------( 400      ( 02 Mar 2023 08:58 )             29.7     03-02    136  |  102  |  26<H>  ----------------------------<  107<H>  3.9   |  30  |  0.78    Ca    9.1      02 Mar 2023 08:58    TPro  5.6<L>  /  Alb  1.7<L>  /  TBili  0.3  /  DBili  x   /  AST  39<H>  /  ALT  66  /  AlkPhos  451<H>  03-02     LIVER FUNCTIONS - ( 02 Mar 2023 08:58 )  Alb: 1.7 g/dL / Pro: 5.6 gm/dL / ALK PHOS: 451 U/L / ALT: 66 U/L / AST: 39 U/L / GGT: x           Urinalysis Basic - ( 02 Mar 2023 10:21 )    Color: Yellow / Appearance: Clear / S.005 / pH: x  Gluc: x / Ketone: Negative  / Bili: Negative / Urobili: Negative   Blood: x / Protein: Negative / Nitrite: Negative   Leuk Esterase: Negative / RBC: x / WBC x   Sq Epi: x / Non Sq Epi: x / Bacteria: x    ( @ 08:58)  [facility]    Culture - Blood (collected 02 Mar 2023 08:58)  Source: .Blood Blood-Peripheral  Preliminary Report (03 Mar 2023 15:02):    No growth to date.    Culture - Blood (collected 02 Mar 2023 08:58)  Source: .Blood Blood-Peripheral  Preliminary Report (03 Mar 2023 15:02):    No growth to date.    Radiology: all available radiological tests reviewed    < from: CT Angio Chest PE Protocol w/ IV Cont (23 @ 13:49) >  No pulmonary embolism.    Complete left lower lobe consolidation, which appears to be new compared   to 2022. This likely represents pneumonia (possibly necrotizing   pneumonia). Recommend CT chest follow-up in one month to ensure clearing   and to exclude alternative etiologies.    Moderate size loculated left pleural effusion and small right pleural effusion.  < end of copied text >      Advanced directives addressed: full resuscitation
Date of service: 23 @ 17:41    Lying in bed in NAD  Has dry cough  SOB with light exercise    ROS: no fever or chills; denies dizziness, no HA, no abdominal pain, no diarrhea or constipation; no dysuria, no legs pain, no rashes    MEDICATIONS  (STANDING):  ascorbic acid 500 milliGRAM(s) Oral daily  azithromycin   Tablet 500 milliGRAM(s) Oral daily  cholecalciferol 400 Unit(s) Oral daily  donepezil 5 milliGRAM(s) Oral at bedtime  dorzolamide 2%/timolol 0.5% Ophthalmic Solution 1 Drop(s) Right EYE two times a day  enoxaparin Injectable 50 milliGRAM(s) SubCutaneous every 12 hours  latanoprost 0.005% Ophthalmic Solution 1 Drop(s) Right EYE at bedtime  multivitamin 1 Tablet(s) Oral daily  piperacillin/tazobactam IVPB.. 3.375 Gram(s) IV Intermittent every 8 hours    Vital Signs Last 24 Hrs  T(C): 36.2 (05 Mar 2023 16:32), Max: 36.5 (04 Mar 2023 23:00)  T(F): 97.2 (05 Mar 2023 16:32), Max: 97.7 (04 Mar 2023 23:00)  HR: 72 (05 Mar 2023 16:32) (72 - 85)  BP: 131/65 (05 Mar 2023 16:32) (131/65 - 139/62)  BP(mean): --  RR: 20 (05 Mar 2023 16:32) (18 - 22)  SpO2: 94% (05 Mar 2023 16:32) (88% - 97%)    Parameters below as of 05 Mar 2023 16:32  Patient On (Oxygen Delivery Method): nasal cannula     Physical exam:    Constitutional:  No acute distress  HEENT: NC/AT, EOMI, PERRLA, conjunctivae clear; ears and nose atraumatic  Neck: supple; thyroid not palpable  Back: no tenderness  Respiratory: respiratory effort normal; crackles at bases  Cardiovascular: S1S2 regular, no murmurs  Abdomen: soft, not tender, not distended, positive BS  Genitourinary: no suprapubic tenderness  Lymphatic: no LN palpable  Musculoskeletal: no muscle tenderness, no joint swelling or tenderness  Extremities: no pedal edema  Neurological/ Psychiatric: AxOx3, moving all extremities  Skin: no rashes; no palpable lesions    Labs: reviewed                        8.9    10.41 )-----------( 400      ( 05 Mar 2023 06:40 )             27.6     03-04    139  |  105  |  19  ----------------------------<  122<H>  3.5   |  29  |  0.85    Ca    8.8      04 Mar 2023 09:09                                   9.2    14.62 )-----------( 377      ( 03 Mar 2023 06:05 )             27.7     03-03    137  |  104  |  22  ----------------------------<  113<H>  3.8   |  30  |  0.64    Ca    8.9      03 Mar 2023 06:05  Phos  3.8     03-03  Mg     2.1     03-03    TPro  5.2<L>  /  Alb  1.6<L>  /  TBili  0.3  /  DBili  x   /  AST  36  /  ALT  61  /  AlkPhos  397<H>  03-03                        9.9    18.46 )-----------( 400      ( 02 Mar 2023 08:58 )             29.7     03-02    136  |  102  |  26<H>  ----------------------------<  107<H>  3.9   |  30  |  0.78    Ca    9.1      02 Mar 2023 08:58    TPro  5.6<L>  /  Alb  1.7<L>  /  TBili  0.3  /  DBili  x   /  AST  39<H>  /  ALT  66  /  AlkPhos  451<H>  03-02     LIVER FUNCTIONS - ( 02 Mar 2023 08:58 )  Alb: 1.7 g/dL / Pro: 5.6 gm/dL / ALK PHOS: 451 U/L / ALT: 66 U/L / AST: 39 U/L / GGT: x           Urinalysis Basic - ( 02 Mar 2023 10:21 )    Color: Yellow / Appearance: Clear / S.005 / pH: x  Gluc: x / Ketone: Negative  / Bili: Negative / Urobili: Negative   Blood: x / Protein: Negative / Nitrite: Negative   Leuk Esterase: Negative / RBC: x / WBC x   Sq Epi: x / Non Sq Epi: x / Bacteria: x    ( @ 08:58)  [facility]    Culture - Blood (collected 02 Mar 2023 08:58)  Source: .Blood Blood-Peripheral  Preliminary Report (03 Mar 2023 15:02):    No growth to date.    Culture - Blood (collected 02 Mar 2023 08:58)  Source: .Blood Blood-Peripheral  Preliminary Report (03 Mar 2023 15:02):    No growth to date.    Radiology: all available radiological tests reviewed    < from: CT Angio Chest PE Protocol w/ IV Cont (23 @ 13:49) >  No pulmonary embolism.    Complete left lower lobe consolidation, which appears to be new compared   to 2022. This likely represents pneumonia (possibly necrotizing   pneumonia). Recommend CT chest follow-up in one month to ensure clearing   and to exclude alternative etiologies.    Moderate size loculated left pleural effusion and small right pleural effusion.  < end of copied text >      Advanced directives addressed: full resuscitation
HPI: 87F with PMH of DVT (Dx 12/2022 on eliquis), Neuropathy, Osteopenia, MILTON presented with progressive SOB, left pleurisy and non-productive cough x 2 weeks. Associated weakness and fatigue. Outpatient evaluation with CT revealed loculated left pleural effusion and advised to report to ER for further evaluation by PMD.     Interval Hx: Patient seen today,  at bedside, feels well, s/p EGD today, no findings suggestive of achalasia/mass, biopsies were taken. Diet advanced. Chest tube in place, CXR is improved.     REVIEW OF SYSTEMS:    CONSTITUTIONAL: No weakness, fevers or chills  EYES/ENT: No visual changes;  No vertigo or throat pain   NECK: No pain or stiffness  RESPIRATORY: No cough, wheezing, hemoptysis; No shortness of breath  CARDIOVASCULAR: No chest pain or palpitations  GASTROINTESTINAL: No abdominal or epigastric pain. No nausea, vomiting, or hematemesis; + diarrhea, no constipation. No melena or hematochezia.  GENITOURINARY: No dysuria, frequency or hematuria  NEUROLOGICAL: No numbness or weakness  SKIN: No itching, rashes    Vital Signs Last 24 Hrs  T(C): 36.3 (08 Mar 2023 15:43), Max: 36.8 (07 Mar 2023 23:38)  T(F): 97.3 (08 Mar 2023 15:43), Max: 98.2 (07 Mar 2023 23:38)  HR: 70 (08 Mar 2023 15:43) (70 - 84)  BP: 155/85 (08 Mar 2023 15:43) (136/58 - 155/85)  RR: 18 (08 Mar 2023 15:43) (18 - 19)  SpO2: 98% (08 Mar 2023 15:43) (96% - 99%)    Parameters below as of 08 Mar 2023 15:43  Patient On (Oxygen Delivery Method): nasal cannula w/ humidification  O2 Flow (L/min): 2    PHYSICAL EXAM:  GENERAL: NAD, lying in bed comfortably  HEAD:  Atraumatic, Normocephalic  EYES: conjunctiva and sclera clear  ENT: Moist mucous membranes  NECK: Supple, No JVD  CHEST/LUNG: Bilateral air entry, decreased breath sounds bilaterally, L>R; No rales, rhonchi, wheezing. Unlabored respirations, Chest tube in place   HEART: Regular rate and rhythm; No murmurs  ABDOMEN: Bowel sounds present; Soft, Nontender, Nondistended.   EXTREMITIES:  2+ Peripheral Pulses, brisk capillary refill. No clubbing, cyanosis, or edema  NERVOUS SYSTEM:  Alert & Oriented X3, speech clear. No deficits   MSK: FROM all 4 extremities, full and equal strength    MEDICATIONS  (STANDING):  ascorbic acid 500 milliGRAM(s) Oral daily  cholecalciferol 400 Unit(s) Oral daily  donepezil 5 milliGRAM(s) Oral at bedtime  dorzolamide 2%/timolol 0.5% Ophthalmic Solution 1 Drop(s) Right EYE two times a day  latanoprost 0.005% Ophthalmic Solution 1 Drop(s) Right EYE at bedtime  lidocaine   4% Patch 1 Patch Transdermal daily  multivitamin 1 Tablet(s) Oral daily  piperacillin/tazobactam IVPB.. 3.375 Gram(s) IV Intermittent every 8 hours    MEDICATIONS  (PRN):  acetaminophen     Tablet .. 650 milliGRAM(s) Oral every 6 hours PRN Temp greater or equal to 38C (100.4F), Mild Pain (1 - 3)  albuterol    90 MICROgram(s) HFA Inhaler 2 Puff(s) Inhalation every 6 hours PRN Shortness of Breath and/or Wheezing  aluminum hydroxide/magnesium hydroxide/simethicone Suspension 30 milliLiter(s) Oral every 4 hours PRN Dyspepsia  benzonatate 100 milliGRAM(s) Oral every 8 hours PRN Cough  guaifenesin/dextromethorphan Oral Liquid 10 milliLiter(s) Oral every 4 hours PRN Cough  melatonin 3 milliGRAM(s) Oral at bedtime PRN Insomnia  morphine  - Injectable 0.5 milliGRAM(s) IV Push every 4 hours PRN Severe Pain (7 - 10)  ondansetron Injectable 4 milliGRAM(s) IV Push every 8 hours PRN Nausea and/or Vomiting      LABS:                                     9.3    9.47  )-----------( 391      ( 07 Mar 2023 06:29 )             29.7   03-07    140  |  106  |  16  ----------------------------<  88  3.5   |  31  |  0.71    Ca    8.9      07 Mar 2023 06:29                      RADIOLOGY:    < from: CT Angio Chest PE Protocol w/ IV Cont (03.02.23 @ 13:49) >  IMPRESSION:.    No pulmonary embolism.    Complete left lower lobe consolidation, which appears to be new compared   to 12/29/2022. This likely represents pneumonia (possibly necrotizing   pneumonia). Recommend CT chest follow-up in one month to ensure clearing   and to exclude alternative etiologies.    Moderate size loculated left pleural effusion and small right pleural   effusion.      < from: CT Sinuses No Cont (03.08.23 @ 14:36) >  FINDINGS:   No prior similar studies are available for review.    The paranasal sinuses are normally developed.  The nasal cavity is well   aerated. The nasal septum appears intact. Bilateral lisa bullosa are   noted    The paranasal sinuses are clear.  No abnormal paranasal sinus fluid   collection is found.  Noosseous erosion or expansion is present.  The   sinus ostia are clear.    The orbits are unremarkable.  Preseptal structures are preserved.  The   globes are intact.  Intraconal and extraconal fat is preserved.  The   extraocular muscles remain symmetric.  The superior ophthalmic veins are   also symmetric.  Orbital rims remain intact.    The deep facial spaces are intact.   No radiopaque foreign body is seen.    The nasopharynx is symmetric.  The central skull base is intact.  The   visualized intracranial contents appear unremarkable.  The lenses are   surgically small.      IMPRESSION:        1.   No acute abnormality.        2.   No active sinusitis identified.      
HPI: 87F with PMH of DVT (Dx 12/2022 on eliquis), Neuropathy, Osteopenia, MILTON presented with progressive SOB, left pleurisy and non-productive cough x 2 weeks. Associated weakness and fatigue. Outpatient evaluation with CT revealed loculated left pleural effusion and advised to report to ER for further evaluation by PMD.     Subjective: Patient seen this AM, feels a little better today, reports 2 loose BM this morning, no abdominal pain, N/V.     REVIEW OF SYSTEMS:    CONSTITUTIONAL: No weakness, fevers or chills  EYES/ENT: No visual changes;  No vertigo or throat pain   NECK: No pain or stiffness  RESPIRATORY: No cough, wheezing, hemoptysis; No shortness of breath  CARDIOVASCULAR: No chest pain or palpitations  GASTROINTESTINAL: No abdominal or epigastric pain. No nausea, vomiting, or hematemesis; + diarrhea, no constipation. No melena or hematochezia.  GENITOURINARY: No dysuria, frequency or hematuria  NEUROLOGICAL: No numbness or weakness  SKIN: No itching, rashes      Vital Signs Last 24 Hrs  T(C): 36.3 (04 Mar 2023 07:40), Max: 36.9 (03 Mar 2023 21:50)  T(F): 97.3 (04 Mar 2023 07:40), Max: 98.4 (03 Mar 2023 21:50)  HR: 67 (04 Mar 2023 07:40) (67 - 85)  BP: 135/58 (04 Mar 2023 07:40) (130/61 - 135/58)  RR: 19 (04 Mar 2023 07:40) (18 - 19)  SpO2: 92% (04 Mar 2023 07:40) (92% - 93%)    Parameters below as of 04 Mar 2023 07:40  Patient On (Oxygen Delivery Method): nasal cannula  O2 Flow (L/min): 2    PHYSICAL EXAM:  GENERAL: NAD, lying in bed comfortably  HEAD:  Atraumatic, Normocephalic  EYES: conjunctiva and sclera clear  ENT: Moist mucous membranes  NECK: Supple, No JVD  CHEST/LUNG: Bilateral air entry, decreased breath sounds bilaterally, L>R; No rales, rhonchi, wheezing, or rubs. Unlabored respirations  HEART: Regular rate and rhythm; No murmurs, rubs, or gallops  ABDOMEN: Bowel sounds present; Soft, Nontender, Nondistended.   EXTREMITIES:  2+ Peripheral Pulses, brisk capillary refill. No clubbing, cyanosis, or edema  NERVOUS SYSTEM:  Alert & Oriented X3, speech clear. No deficits   MSK: FROM all 4 extremities, full and equal strength    MEDICATIONS  (STANDING):  ascorbic acid 500 milliGRAM(s) Oral daily  azithromycin   Tablet 500 milliGRAM(s) Oral daily  cholecalciferol 400 Unit(s) Oral daily  donepezil 5 milliGRAM(s) Oral at bedtime  dorzolamide 2%/timolol 0.5% Ophthalmic Solution 1 Drop(s) Right EYE two times a day  enoxaparin Injectable 50 milliGRAM(s) SubCutaneous every 12 hours  latanoprost 0.005% Ophthalmic Solution 1 Drop(s) Right EYE at bedtime  multivitamin 1 Tablet(s) Oral daily  piperacillin/tazobactam IVPB.. 3.375 Gram(s) IV Intermittent every 8 hours    MEDICATIONS  (PRN):  acetaminophen     Tablet .. 650 milliGRAM(s) Oral every 6 hours PRN Temp greater or equal to 38C (100.4F), Mild Pain (1 - 3)  albuterol    90 MICROgram(s) HFA Inhaler 2 Puff(s) Inhalation every 6 hours PRN Shortness of Breath and/or Wheezing  aluminum hydroxide/magnesium hydroxide/simethicone Suspension 30 milliLiter(s) Oral every 4 hours PRN Dyspepsia  benzonatate 100 milliGRAM(s) Oral every 8 hours PRN Cough  guaifenesin/dextromethorphan Oral Liquid 10 milliLiter(s) Oral every 4 hours PRN Cough  melatonin 3 milliGRAM(s) Oral at bedtime PRN Insomnia  ondansetron Injectable 4 milliGRAM(s) IV Push every 8 hours PRN Nausea and/or Vomiting    LABS:                          9.4    11.77 )-----------( 409      ( 04 Mar 2023 09:09 )             29.5     04 Mar 2023 09:09    139    |  105    |  19     ----------------------------<  122    3.5     |  29     |  0.85     Ca    8.8        04 Mar 2023 09:09  Phos  3.8       03 Mar 2023 06:05  Mg     2.1       03 Mar 2023 06:05    TPro  5.2    /  Alb  1.6    /  TBili  0.3    /  DBili  x      /  AST  36     /  ALT  61     /  AlkPhos  397    03 Mar 2023 06:05    LIVER FUNCTIONS - ( 03 Mar 2023 06:05 )  Alb: 1.6 g/dL / Pro: 5.2 gm/dL / ALK PHOS: 397 U/L / ALT: 61 U/L / AST: 36 U/L / GGT: x             CAPILLARY BLOOD GLUCOSE                RADIOLOGY:    < from: CT Angio Chest PE Protocol w/ IV Cont (03.02.23 @ 13:49) >  IMPRESSION:.    No pulmonary embolism.    Complete left lower lobe consolidation, which appears to be new compared   to 12/29/2022. This likely represents pneumonia (possibly necrotizing   pneumonia). Recommend CT chest follow-up in one month to ensure clearing   and to exclude alternative etiologies.    Moderate size loculated left pleural effusion and small right pleural   effusion.          
HPI: 87F with PMH of DVT (Dx 12/2022 on eliquis), Neuropathy, Osteopenia, MILTON presented with progressive SOB, left pleurisy and non-productive cough x 2 weeks. Associated weakness and fatigue. Outpatient evaluation with CT revealed loculated left pleural effusion and advised to report to ER for further evaluation by PMD.     Subjective: Patient seen this AM, feels well, ambulating with walker, SpO2 down to 88% after walking to restroom off O2. Improved with O2 at 2L/min.     REVIEW OF SYSTEMS:    CONSTITUTIONAL: No weakness, fevers or chills  EYES/ENT: No visual changes;  No vertigo or throat pain   NECK: No pain or stiffness  RESPIRATORY: No cough, wheezing, hemoptysis; No shortness of breath  CARDIOVASCULAR: No chest pain or palpitations  GASTROINTESTINAL: No abdominal or epigastric pain. No nausea, vomiting, or hematemesis; + diarrhea, no constipation. No melena or hematochezia.  GENITOURINARY: No dysuria, frequency or hematuria  NEUROLOGICAL: No numbness or weakness  SKIN: No itching, rashes    Vital Signs Last 24 Hrs  T(C): 36.4 (05 Mar 2023 07:52), Max: 36.5 (04 Mar 2023 23:00)  T(F): 97.5 (05 Mar 2023 07:52), Max: 97.7 (04 Mar 2023 23:00)  HR: 77 (05 Mar 2023 07:52) (73 - 85)  BP: 139/62 (05 Mar 2023 07:52) (133/69 - 155/70)  RR: 20 (05 Mar 2023 09:22) (18 - 22)  SpO2: 97% (05 Mar 2023 09:22) (88% - 97%)    Parameters below as of 05 Mar 2023 09:22  Patient On (Oxygen Delivery Method): nasal cannula  O2 Flow (L/min): 2    PHYSICAL EXAM:  GENERAL: NAD, lying in bed comfortably  HEAD:  Atraumatic, Normocephalic  EYES: conjunctiva and sclera clear  ENT: Moist mucous membranes  NECK: Supple, No JVD  CHEST/LUNG: Bilateral air entry, decreased breath sounds bilaterally, L>R; No rales, rhonchi, wheezing, or rubs. Unlabored respirations  HEART: Regular rate and rhythm; No murmurs, rubs, or gallops  ABDOMEN: Bowel sounds present; Soft, Nontender, Nondistended.   EXTREMITIES:  2+ Peripheral Pulses, brisk capillary refill. No clubbing, cyanosis, or edema  NERVOUS SYSTEM:  Alert & Oriented X3, speech clear. No deficits   MSK: FROM all 4 extremities, full and equal strength    MEDICATIONS  (STANDING):  ascorbic acid 500 milliGRAM(s) Oral daily  azithromycin   Tablet 500 milliGRAM(s) Oral daily  cholecalciferol 400 Unit(s) Oral daily  donepezil 5 milliGRAM(s) Oral at bedtime  dorzolamide 2%/timolol 0.5% Ophthalmic Solution 1 Drop(s) Right EYE two times a day  enoxaparin Injectable 50 milliGRAM(s) SubCutaneous every 12 hours  latanoprost 0.005% Ophthalmic Solution 1 Drop(s) Right EYE at bedtime  multivitamin 1 Tablet(s) Oral daily  piperacillin/tazobactam IVPB.. 3.375 Gram(s) IV Intermittent every 8 hours    MEDICATIONS  (PRN):  acetaminophen     Tablet .. 650 milliGRAM(s) Oral every 6 hours PRN Temp greater or equal to 38C (100.4F), Mild Pain (1 - 3)  albuterol    90 MICROgram(s) HFA Inhaler 2 Puff(s) Inhalation every 6 hours PRN Shortness of Breath and/or Wheezing  aluminum hydroxide/magnesium hydroxide/simethicone Suspension 30 milliLiter(s) Oral every 4 hours PRN Dyspepsia  benzonatate 100 milliGRAM(s) Oral every 8 hours PRN Cough  guaifenesin/dextromethorphan Oral Liquid 10 milliLiter(s) Oral every 4 hours PRN Cough  melatonin 3 milliGRAM(s) Oral at bedtime PRN Insomnia  ondansetron Injectable 4 milliGRAM(s) IV Push every 8 hours PRN Nausea and/or Vomiting    LABS:                          8.9    10.41 )-----------( 400      ( 05 Mar 2023 06:40 )             27.6   03-04    139  |  105  |  19  ----------------------------<  122<H>  3.5   |  29  |  0.85    Ca    8.8      04 Mar 2023 09:09          CAPILLARY BLOOD GLUCOSE                RADIOLOGY:    < from: CT Angio Chest PE Protocol w/ IV Cont (03.02.23 @ 13:49) >  IMPRESSION:.    No pulmonary embolism.    Complete left lower lobe consolidation, which appears to be new compared   to 12/29/2022. This likely represents pneumonia (possibly necrotizing   pneumonia). Recommend CT chest follow-up in one month to ensure clearing   and to exclude alternative etiologies.    Moderate size loculated left pleural effusion and small right pleural   effusion.          
Patient is a 87y old  Female who presents with a chief complaint of SOB/Cough/Pleurisy/Large loculate left pleural effusion (06 Mar 2023 08:06)      Subective: Patient seen and examined at bedside. S/p diagnostic paracentesis. Denies abdominal pain, nausea, vomiting. Eager to advance diet.       PAST MEDICAL & SURGICAL HISTORY:  Osteoarthritis      Osteopenia      Deep vein thrombosis (DVT)      S/P Hysterectomy  2007      Lumbar Laminectomy/ Spinal Fusion  1999      S/P Arthroscopy of Right Knee  1998      S/P Appendectomy  @ 12 years old      S/P Tonsillectomy  @ 2 years old          MEDICATIONS  (STANDING):  ascorbic acid 500 milliGRAM(s) Oral daily  cholecalciferol 400 Unit(s) Oral daily  donepezil 5 milliGRAM(s) Oral at bedtime  dorzolamide 2%/timolol 0.5% Ophthalmic Solution 1 Drop(s) Right EYE two times a day  latanoprost 0.005% Ophthalmic Solution 1 Drop(s) Right EYE at bedtime  multivitamin 1 Tablet(s) Oral daily  piperacillin/tazobactam IVPB.. 3.375 Gram(s) IV Intermittent every 8 hours    MEDICATIONS  (PRN):  acetaminophen     Tablet .. 650 milliGRAM(s) Oral every 6 hours PRN Temp greater or equal to 38C (100.4F), Mild Pain (1 - 3)  albuterol    90 MICROgram(s) HFA Inhaler 2 Puff(s) Inhalation every 6 hours PRN Shortness of Breath and/or Wheezing  aluminum hydroxide/magnesium hydroxide/simethicone Suspension 30 milliLiter(s) Oral every 4 hours PRN Dyspepsia  benzonatate 100 milliGRAM(s) Oral every 8 hours PRN Cough  guaifenesin/dextromethorphan Oral Liquid 10 milliLiter(s) Oral every 4 hours PRN Cough  melatonin 3 milliGRAM(s) Oral at bedtime PRN Insomnia  ondansetron Injectable 4 milliGRAM(s) IV Push every 8 hours PRN Nausea and/or Vomiting      REVIEW OF SYSTEMS:    RESPIRATORY: No shortness of breath  CARDIOVASCULAR: No chest pain  All other review of systems is negative unless indicated above.    Vital Signs Last 24 Hrs  T(C): 36.2 (06 Mar 2023 08:17), Max: 36.3 (05 Mar 2023 21:48)  T(F): 97.2 (06 Mar 2023 08:17), Max: 97.3 (05 Mar 2023 21:48)  HR: 64 (06 Mar 2023 08:17) (64 - 72)  BP: 139/73 (06 Mar 2023 08:17) (131/65 - 143/57)  BP(mean): --  RR: 18 (06 Mar 2023 08:17) (18 - 20)  SpO2: 95% (06 Mar 2023 08:17) (92% - 95%)    Parameters below as of 06 Mar 2023 08:17  Patient On (Oxygen Delivery Method): nasal cannula  O2 Flow (L/min): 2      PHYSICAL EXAM:    Constitutional: NAD  Respiratory: CTAB  Cardiovascular: S1 and S2, RRR  Gastrointestinal: BS+, soft, NT/ND  Extremities: No peripheral edema  Psychiatric: Normal mood, normal affect    LABS:                        9.5    11.70 )-----------( 392      ( 06 Mar 2023 06:51 )             30.6     03-06    139  |  106  |  13  ----------------------------<  97  3.8   |  32<H>  |  0.74    Ca    9.1      06 Mar 2023 06:51      PT/INR - ( 06 Mar 2023 08:43 )   PT: 16.2 sec;   INR: 1.39 ratio         PTT - ( 06 Mar 2023 08:43 )  PTT:46.6 sec      RADIOLOGY & ADDITIONAL STUDIES:
Patient is a 87y old  Female who presents with a chief complaint of SOB/Cough/Pleurisy/Large loculate left pleural effusion (07 Mar 2023 11:42)      Subective: Patient seen and examined at bedside. Headed to IR for chest tube placement. Tolerating diet without nausea, vomiting.       PAST MEDICAL & SURGICAL HISTORY:  Osteoarthritis      Osteopenia      Deep vein thrombosis (DVT)      S/P Hysterectomy  2007      Lumbar Laminectomy/ Spinal Fusion  1999      S/P Arthroscopy of Right Knee  1998      S/P Appendectomy  @ 12 years old      S/P Tonsillectomy  @ 2 years old          MEDICATIONS  (STANDING):  ascorbic acid 500 milliGRAM(s) Oral daily  cholecalciferol 400 Unit(s) Oral daily  donepezil 5 milliGRAM(s) Oral at bedtime  dorzolamide 2%/timolol 0.5% Ophthalmic Solution 1 Drop(s) Right EYE two times a day  enoxaparin Injectable 50 milliGRAM(s) SubCutaneous every 12 hours  latanoprost 0.005% Ophthalmic Solution 1 Drop(s) Right EYE at bedtime  multivitamin 1 Tablet(s) Oral daily  piperacillin/tazobactam IVPB.. 3.375 Gram(s) IV Intermittent every 8 hours    MEDICATIONS  (PRN):  acetaminophen     Tablet .. 650 milliGRAM(s) Oral every 6 hours PRN Temp greater or equal to 38C (100.4F), Mild Pain (1 - 3)  albuterol    90 MICROgram(s) HFA Inhaler 2 Puff(s) Inhalation every 6 hours PRN Shortness of Breath and/or Wheezing  aluminum hydroxide/magnesium hydroxide/simethicone Suspension 30 milliLiter(s) Oral every 4 hours PRN Dyspepsia  benzonatate 100 milliGRAM(s) Oral every 8 hours PRN Cough  guaifenesin/dextromethorphan Oral Liquid 10 milliLiter(s) Oral every 4 hours PRN Cough  melatonin 3 milliGRAM(s) Oral at bedtime PRN Insomnia  ondansetron Injectable 4 milliGRAM(s) IV Push every 8 hours PRN Nausea and/or Vomiting      REVIEW OF SYSTEMS:    RESPIRATORY: No shortness of breath  CARDIOVASCULAR: No chest pain  All other review of systems is negative unless indicated above.    Vital Signs Last 24 Hrs  T(C): 36.1 (07 Mar 2023 08:11), Max: 36.4 (06 Mar 2023 21:45)  T(F): 97 (07 Mar 2023 08:11), Max: 97.5 (06 Mar 2023 21:45)  HR: 67 (07 Mar 2023 08:11) (67 - 72)  BP: 139/62 (07 Mar 2023 08:11) (130/65 - 139/62)  BP(mean): --  RR: 18 (07 Mar 2023 08:11) (18 - 18)  SpO2: 93% (07 Mar 2023 08:11) (93% - 95%)    Parameters below as of 07 Mar 2023 08:11    O2 Flow (L/min): 2      PHYSICAL EXAM:    Constitutional: NAD  Respiratory: CTAB  Cardiovascular: S1 and S2, RRR  Gastrointestinal: BS+, soft, NT/ND  Extremities: No peripheral edema  Psychiatric: Normal mood, normal affect    LABS:                        9.3    9.47  )-----------( 391      ( 07 Mar 2023 06:29 )             29.7     03-07    140  |  106  |  16  ----------------------------<  88  3.5   |  31  |  0.71    Ca    8.9      07 Mar 2023 06:29      PT/INR - ( 06 Mar 2023 08:43 )   PT: 16.2 sec;   INR: 1.39 ratio         PTT - ( 06 Mar 2023 08:43 )  PTT:46.6 sec      RADIOLOGY & ADDITIONAL STUDIES:
Patient seen and examined  reports weaknes and cough  denies fever chills  Seen by Pulm and Cardio today  on abx as per ID  CT Consulted for empyema drainage  speech reconsulted today  white count better today  Review of Systems:  General:denies fever chills, headache, +weakness  HEENT: denies blurry vision,diffculty swallowing, difficulty hearing, tinnitus  Cardiovascular: denies chest pain  ,palpitations  Pulmonary:denies shortness of breath, +cough, wheezing, hemoptysis  Gastrointestinal: denies abdominal pain, constipation, diarrhea,nausea , vomiting, hematochezia  : denies hematuria, dysuria, or incontinence  Neurological: denies weakness, numbness , tingling, dizziness, tremors  MSK: denies muscle pain, difficulty ambulating, swelling, back pain  skin: denies skin rash, itching, burning, or  skin lesions  Psychiatrical: denies mood disturbances, anxierty, feeling depressed, depression , or difficulty sleeping    Objective:  Vitals  T(C): 36.3 (03-03-23 @ 09:16), Max: 36.8 (03-02-23 @ 17:20)  HR: 75 (03-03-23 @ 09:16) (63 - 78)  BP: 115/53 (03-03-23 @ 09:16) (115/53 - 149/69)  RR: 18 (03-03-23 @ 09:16) (18 - 19)  SpO2: 93% (03-03-23 @ 09:16) (93% - 93%)    Physical Exam:  General: comfortable, no acute distress  HEENT: Atraumatic, no LAD, trachea midline, PERRLA  Cardiovascular: normal s1s2, no murmurs, gallops or fricition rubs  Pulmonary: diminished, no wheezing , rhonchi  Gastrointestinal: soft non tender non distended, no masses felt, no organomegally  Muscloskeletal: no lower extremity edema, intact bilateral lower extremity pulses  Neurological: CN II-12 intact. No focal weakness  Psychiatrical: normal mood, cooperative  SKIN: no rash, lesions or ulcers    Labs:                          9.2    14.62 )-----------( 377      ( 03 Mar 2023 06:05 )             27.7     03-03    137  |  104  |  22  ----------------------------<  113<H>  3.8   |  30  |  0.64    Ca    8.9      03 Mar 2023 06:05  Phos  3.8     03-03  Mg     2.1     03-03    TPro  5.2<L>  /  Alb  1.6<L>  /  TBili  0.3  /  DBili  x   /  AST  36  /  ALT  61  /  AlkPhos  397<H>  03-03    LIVER FUNCTIONS - ( 03 Mar 2023 06:05 )  Alb: 1.6 g/dL / Pro: 5.2 gm/dL / ALK PHOS: 397 U/L / ALT: 61 U/L / AST: 36 U/L / GGT: x           PT/INR - ( 02 Mar 2023 08:58 )   PT: 23.0 sec;   INR: 1.97 ratio         PTT - ( 02 Mar 2023 08:58 )  PTT:38.9 sec      Active Medications  MEDICATIONS  (STANDING):  ascorbic acid 500 milliGRAM(s) Oral daily  azithromycin   Tablet 500 milliGRAM(s) Oral daily  cholecalciferol 400 Unit(s) Oral daily  donepezil 5 milliGRAM(s) Oral at bedtime  dorzolamide 2%/timolol 0.5% Ophthalmic Solution 1 Drop(s) Right EYE two times a day  latanoprost 0.005% Ophthalmic Solution 1 Drop(s) Right EYE at bedtime  magnesium oxide 400 milliGRAM(s) Oral daily  multivitamin 1 Tablet(s) Oral daily  piperacillin/tazobactam IVPB.. 3.375 Gram(s) IV Intermittent every 8 hours    MEDICATIONS  (PRN):  acetaminophen     Tablet .. 650 milliGRAM(s) Oral every 6 hours PRN Temp greater or equal to 38C (100.4F), Mild Pain (1 - 3)  albuterol    90 MICROgram(s) HFA Inhaler 2 Puff(s) Inhalation every 6 hours PRN Shortness of Breath and/or Wheezing  aluminum hydroxide/magnesium hydroxide/simethicone Suspension 30 milliLiter(s) Oral every 4 hours PRN Dyspepsia  benzonatate 100 milliGRAM(s) Oral every 8 hours PRN Cough  guaifenesin/dextromethorphan Oral Liquid 10 milliLiter(s) Oral every 4 hours PRN Cough  melatonin 3 milliGRAM(s) Oral at bedtime PRN Insomnia  ondansetron Injectable 4 milliGRAM(s) IV Push every 8 hours PRN Nausea and/or Vomiting    
  Subjective:  Pt in chair NAD.  pigtail in with minimal drainage     T(C): 36.4 (03-09-23 @ 07:56), Max: 36.5 (03-08-23 @ 20:06)  HR: 75 (03-09-23 @ 07:56) (70 - 75)  BP: 152/73 (03-09-23 @ 07:56) (152/73 - 158/68)  ABP: --  ABP(mean): --  RR: 18 (03-09-23 @ 07:56) (18 - 18)  SpO2: 93% (03-09-23 @ 07:56) (93% - 98%) 2 L NC   Wt(kg): --  CVP(mm Hg): --  CO: --  CI: --  PA: --                                              Tele:     CHEST TUBE:   ?30cc - pleuravac tipped over as per RN -unsure of output                            OUTPUT:     per 24 hours    AIR LEAKS:  [ ] YES [x ] NO          03-09    140  |  108  |  12  ----------------------------<  94  3.3<L>   |  31  |  0.65    Ca    9.2      09 Mar 2023 06:48                                 9.3    8.54  )-----------( 394      ( 09 Mar 2023 06:48 )             29.9                 CAPILLARY BLOOD GLUCOSE               CXR: < from: Xray Chest 1 View- PORTABLE-Routine (Xray Chest 1 View- PORTABLE-Routine in AM.) (03.09.23 @ 10:05) >  INTERPRETATION:  AP chest on March 9, 2023 at 9:17 AM. Patient is being   followed for left chest tube. 2 images.    Heart magnified by technique. Extensive lumbar hardware again noted.    There is a catheter left chest tube still in place. There is an adjacent   moderate effusion. There is likely a component of left lower lobe   atelectasis. Small right base effusion is seen.    Chest is similar to March 8.    No pneumothorax.    IMPRESSION: Stable findings as above.    < end of copied text >          Exam  Neuro: Alert awake NAD   Pulm: clear b/l + Pigtail   CV:  RRR S1 S2   Abd: soft   Extremities:  warm         Assessment:  87yFemale    with PAST MEDICAL & SURGICAL HISTORY:  Osteoarthritis      Osteopenia      Deep vein thrombosis (DVT)      S/P Hysterectomy  2007      Lumbar Laminectomy/ Spinal Fusion  1999      S/P Arthroscopy of Right Knee  1998      S/P Appendectomy  @ 12 years old      S/P Tonsillectomy  @ 2 years old            Plan:  
Date of service: 23 @ 15:25    Lying in bed in NAD  s/p EGD  Right side chest tube in place  No fever  Chalo dry cough    ROS: no fever or chills; denies dizziness, no HA, no abdominal pain, no diarrhea or constipation; no dysuria, no legs pain, no rashes    MEDICATIONS  (STANDING):  ascorbic acid 500 milliGRAM(s) Oral daily  cholecalciferol 400 Unit(s) Oral daily  donepezil 5 milliGRAM(s) Oral at bedtime  dorzolamide 2%/timolol 0.5% Ophthalmic Solution 1 Drop(s) Right EYE two times a day  latanoprost 0.005% Ophthalmic Solution 1 Drop(s) Right EYE at bedtime  lidocaine   4% Patch 1 Patch Transdermal daily  multivitamin 1 Tablet(s) Oral daily  pantoprazole    Tablet 40 milliGRAM(s) Oral before breakfast  piperacillin/tazobactam IVPB.. 3.375 Gram(s) IV Intermittent every 8 hours  potassium chloride   Powder 40 milliEquivalent(s) Oral every 4 hours    Vital Signs Last 24 Hrs  T(C): 36.4 (09 Mar 2023 07:56), Max: 36.5 (08 Mar 2023 20:06)  T(F): 97.6 (09 Mar 2023 07:56), Max: 97.7 (08 Mar 2023 20:06)  HR: 75 (09 Mar 2023 07:56) (70 - 75)  BP: 152/73 (09 Mar 2023 07:56) (152/73 - 158/68)  BP(mean): 92 (08 Mar 2023 20:06) (92 - 92)  RR: 18 (09 Mar 2023 07:56) (18 - 18)  SpO2: 93% (09 Mar 2023 07:56) (93% - 98%)    Parameters below as of 09 Mar 2023 07:56  Patient On (Oxygen Delivery Method): nasal cannula  O2 Flow (L/min): 2     Physical exam:    Constitutional:  No acute distress  HEENT: NC/AT, EOMI, PERRLA, conjunctivae clear; ears and nose atraumatic  Neck: supple; thyroid not palpable  Back: no tenderness  Respiratory: respiratory effort normal; crackles at bases  Cardiovascular: S1S2 regular, no murmurs  Abdomen: soft, not tender, not distended, positive BS  Genitourinary: no suprapubic tenderness  Lymphatic: no LN palpable  Musculoskeletal: no muscle tenderness, no joint swelling or tenderness  Extremities: no pedal edema  Neurological/ Psychiatric: AxOx3, moving all extremities  Skin: no rashes; no palpable lesions    Labs: reviewed                        9.3    8.54  )-----------( 394      ( 09 Mar 2023 06:48 )             29.9     03-09    140  |  108  |  12  ----------------------------<  94  3.3<L>   |  31  |  0.65    Ca    9.2      09 Mar 2023 06:48                        9.2    8.28  )-----------( 402      ( 08 Mar 2023 07:40 )             29.1     03-08    141  |  106  |  10  ----------------------------<  93  3.5   |  31  |  0.65    Ca    8.8      08 Mar 2023 07:40                        9.9    18.46 )-----------( 400      ( 02 Mar 2023 08:58 )             29.7     03-02    136  |  102  |  26<H>  ----------------------------<  107<H>  3.9   |  30  |  0.78    Ca    9.1      02 Mar 2023 08:58    TPro  5.6<L>  /  Alb  1.7<L>  /  TBili  0.3  /  DBili  x   /  AST  39<H>  /  ALT  66  /  AlkPhos  451<H>  03     LIVER FUNCTIONS - ( 02 Mar 2023 08:58 )  Alb: 1.7 g/dL / Pro: 5.6 gm/dL / ALK PHOS: 451 U/L / ALT: 66 U/L / AST: 39 U/L / GGT: x           Urinalysis Basic - ( 02 Mar 2023 10:21 )    Color: Yellow / Appearance: Clear / S.005 / pH: x  Gluc: x / Ketone: Negative  / Bili: Negative / Urobili: Negative   Blood: x / Protein: Negative / Nitrite: Negative   Leuk Esterase: Negative / RBC: x / WBC x   Sq Epi: x / Non Sq Epi: x / Bacteria: x    ( @ 08:58)  NotDetec      Culture - Fungal, Body Fluid (collected 06 Mar 2023 11:15)  Source: Pleural Fl Pleural Fluid  Preliminary Report (07 Mar 2023 07:07):    Testing in progress    Culture - Body Fluid with Gram Stain (collected 06 Mar 2023 11:15)  Source: Pleural Fl Pleural Fluid  Gram Stain (06 Mar 2023 19:24):    polymorphonuclear leukocytes seen    No organisms seen    by cytocentrifuge  Preliminary Report (07 Mar 2023 13:30):    No growth    Culture - Urine (collected 02 Mar 2023 10:21)  Source: Clean Catch None  Final Report (03 Mar 2023 21:13):    <10,000 CFU/mL Normal Urogenital Yasmine    Culture - Blood (collected 02 Mar 2023 08:58)  Source: .Blood Blood-Peripheral  Final Report (07 Mar 2023 15:01):    No Growth Final    Culture - Blood (collected 02 Mar 2023 08:58)  Source: .Blood Blood-Peripheral  Final Report (07 Mar 2023 15:01):    No Growth Final    Radiology: all available radiological tests reviewed    < from: CT Angio Chest PE Protocol w/ IV Cont (23 @ 13:49) >  No pulmonary embolism.    Complete left lower lobe consolidation, which appears to be new compared   to 2022. This likely represents pneumonia (possibly necrotizing   pneumonia). Recommend CT chest follow-up in one month to ensure clearing   and to exclude alternative etiologies.  Moderate size loculated left pleural effusion and small right pleural effusion.  < end of copied text >      Advanced directives addressed: full resuscitation
SUBJECTIVE       INTERIM HISTORY SIGNIFICANT FOR   without acute issues overnight   d/w team in AM   for chest tube to be d/c toda 3/10 and sign off thereafter     Patient is a 87y old  Female who presents with a chief complaint of SOB/Cough/Pleurisy/Large loculate left pleural effusion (09 Mar 2023 16:55)    HPI:  87F with PMH of DVT (Dx 12/2022 on eliquis), Neuropathy, Osteoenia, MILTON presents with progressive SOB, left pleurisy and non-productive cough x 2 weeks. Associated weakness and fatigue. Denies dizziness, fever, syncope, nausea, vomiting. Outpatient evaluation with CT revealed loculated left pleural effusion and advised to report to ER for further evaluation by PMD.     In the ER vitals stable with SpO2 low of 90% on RA. WBC 18.46, Hgb 9.9 from 11.5 (12/2022). Troponin negative. CXRAY with large left pleural efffusion. Also noted to have have been recently diagnosed with DVT 2 months prior to admission. CTA chest ordered to rule out PE as a concurrent problem. CTA chest reported no PE, complete left lower lobe consolidation and likely pneumonia in nature. moderate left loculated pleural effusion and small right pleural effusion.    Reported recent cardiac work up outpatient with stress and echocardiogram reported as unremarkable by patient's . In addition recent swallow evaluation with barium swallow outpatient a little more than a week prior to admission also reported as unremarkable.   (02 Mar 2023 16:35)    OBJECTIVE  PAST MEDICAL & SURGICAL HISTORY:  Osteoarthritis      Osteopenia      Deep vein thrombosis (DVT)      S/P Hysterectomy  2007      Lumbar Laminectomy/ Spinal Fusion  1999      S/P Arthroscopy of Right Knee  1998      S/P Appendectomy  @ 12 years old      S/P Tonsillectomy  @ 2 years old        [This allergen will not trigger allergy alert] hazelnuts (Swelling)  [This allergen will not trigger allergy alert] horse serum (Unknown)  [This allergen will not trigger allergy alert] Originally Entered as [SEE COMMENT] reaction to [HORSE SERUM] (Unknown)  latex (Rash)  No Known Drug Allergies    Home Medications:  apixaban 2.5 mg oral tablet: 1 tab(s) orally 2 times a day (02 Mar 2023 13:08)  biotin 300 mcg oral tablet: 1 tab(s) orally once a day (02 Mar 2023 13:08)  Citracal 250 mg + D 200 intl units oral tablet: 1 tab(s) orally 2 times a day (02 Mar 2023 13:08)  donepezil 5 mg oral tablet: 1 tab(s) orally once a day (at bedtime) (02 Mar 2023 13:08)  dorzolamide-timolol 22.3-6.8mg/mL: 1 drop(s) in the right eye 2 times a day (02 Mar 2023 13:08)  Negrita-C 500 mg oral tablet: 1 tab(s) orally once a day (02 Mar 2023 13:08)  ipratropium 42 mcg/inh (0.06%) nasal spray: 2 spray(s) in each nostril 3 times a day, As Needed (02 Mar 2023 13:08)  latanoprost 0.005% ophthalmic solution: 1 drop(s) in the right eye once a day (in the evening) (02 Mar 2023 13:08)  magnesium glycinate 200 mg oral tablet: 2 tab(s) orally once a day (after a meal) (02 Mar 2023 13:08)  Multiple Vitamins oral tablet: 1 tab(s) orally once a day (02 Mar 2023 13:08)  Vitamin D3 400 intl units (10 mcg) oral tablet: 1 tab(s) orally once a day (02 Mar 2023 13:08)    VITALS  Currently in sinus rhythm with vitals as below  ICU Vital Signs Last 24 Hrs  T(C): 36.3 (10 Mar 2023 05:55), Max: 36.3 (10 Mar 2023 05:55)  T(F): 97.3 (10 Mar 2023 05:55), Max: 97.3 (10 Mar 2023 05:55)  HR: 66 (09 Mar 2023 21:25) (66 - 70)  BP: 120/74 (09 Mar 2023 21:25) (120/74 - 168/75)  BP(mean): --  ABP: --  ABP(mean): --  RR: 18 (09 Mar 2023 21:25) (18 - 18)  SpO2: 96% (09 Mar 2023 21:25) (96% - 96%)    O2 Parameters below as of 09 Mar 2023 21:25  Patient On (Oxygen Delivery Method): nasal cannula          Adult Advanced Hemodynamics Last 24 Hrs  CVP(mm Hg): --  CVP(cm H2O): --  CO: --  CI: --  PA: --  PA(mean): --  PCWP: --  SVR: --  SVRI: --  PVR: --  PVRI: --  LABS                        9.1    8.03  )-----------( 410      ( 10 Mar 2023 06:34 )             29.7   03-10    145  |  111<H>  |  13  ----------------------------<  85  3.9   |  29  |  0.73    Ca    8.8      10 Mar 2023 06:34    CAPILLARY BLOOD GLUCOSE              IN/OUT    03-09-23 @ 07:01  -  03-10-23 @ 07:00  --------------------------------------------------------  IN: 0 mL / OUT: 17 mL / NET: -17 mL      IMAGING  personally reviewed imaging   Xray Chest 1 View- PORTABLE-Routine:   ACC: [id number] EXAM:  XR CHEST PORTABLE ROUTINE 1V   ORDERED BY: NEETU PETERSON     PROCEDURE DATE:  03/09/2023          INTERPRETATION:  AP chest on March 9, 2023 at 9:17 AM. Patient is being   followed for left chest tube. 2 images.    Heart magnified by technique. Extensive lumbar hardware again noted.    There is a catheter left chest tube still in place. There is an adjacent   moderate effusion. There is likely a component of left lower lobe   atelectasis. Small right base effusion is seen.    Chest is similar to March 8.    No pneumothorax.    IMPRESSION: Stable findings as above.    --- End of Report ---            LEEROY CRISTINA MD; Attending Radiologist  This document has been electronically signed. Mar  9 2023 12:01PM (03-09-23 @ 10:05)    CURRENT MEDICATIONS  MEDICATIONS  (STANDING):  apixaban 2.5 milliGRAM(s) Oral every 12 hours  ascorbic acid 500 milliGRAM(s) Oral daily  cholecalciferol 400 Unit(s) Oral daily  donepezil 5 milliGRAM(s) Oral at bedtime  dorzolamide 2%/timolol 0.5% Ophthalmic Solution 1 Drop(s) Right EYE two times a day  latanoprost 0.005% Ophthalmic Solution 1 Drop(s) Right EYE at bedtime  lidocaine   4% Patch 1 Patch Transdermal daily  multivitamin 1 Tablet(s) Oral daily  pantoprazole    Tablet 40 milliGRAM(s) Oral before breakfast  piperacillin/tazobactam IVPB.. 3.375 Gram(s) IV Intermittent every 8 hours    MEDICATIONS  (PRN):  acetaminophen     Tablet .. 650 milliGRAM(s) Oral every 6 hours PRN Temp greater or equal to 38C (100.4F), Mild Pain (1 - 3)  albuterol    90 MICROgram(s) HFA Inhaler 2 Puff(s) Inhalation every 6 hours PRN Shortness of Breath and/or Wheezing  aluminum hydroxide/magnesium hydroxide/simethicone Suspension 30 milliLiter(s) Oral every 4 hours PRN Dyspepsia  benzonatate 100 milliGRAM(s) Oral every 8 hours PRN Cough  guaifenesin/dextromethorphan Oral Liquid 10 milliLiter(s) Oral every 4 hours PRN Cough  melatonin 3 milliGRAM(s) Oral at bedtime PRN Insomnia  morphine  - Injectable 0.5 milliGRAM(s) IV Push every 4 hours PRN Severe Pain (7 - 10)  ondansetron Injectable 4 milliGRAM(s) IV Push every 8 hours PRN Nausea and/or Vomiting

## 2023-03-11 NOTE — DISCHARGE NOTE PROVIDER - NSDCMRMEDTOKEN_GEN_ALL_CORE_FT
acetaminophen 325 mg oral tablet: 2 tab(s) orally every 6 hours, As needed, Temp greater or equal to 38C (100.4F), Mild Pain (1 - 3)  albuterol 90 mcg/inh inhalation aerosol: 2 puff(s) inhaled every 6 hours, As needed, Shortness of Breath and/or Wheezing  amoxicillin-clavulanate 875 mg-125 mg oral tablet: 1 tab(s) orally 2 times a day   apixaban 2.5 mg oral tablet: 1 tab(s) orally 2 times a day  biotin 300 mcg oral tablet: 1 tab(s) orally once a day  Citracal 250 mg + D 200 intl units oral tablet: 1 tab(s) orally 2 times a day  donepezil 5 mg oral tablet: 1 tab(s) orally once a day (at bedtime)  dorzolamide-timolol 22.3-6.8mg/mL: 1 drop(s) in the right eye 2 times a day  Negrita-C 500 mg oral tablet: 1 tab(s) orally once a day  ipratropium 42 mcg/inh (0.06%) nasal spray: 2 spray(s) in each nostril 3 times a day, As Needed  Lasix 20 mg oral tablet: 1 tab(s) orally 3 times a week on monday wed friday  latanoprost 0.005% ophthalmic solution: 1 drop(s) in the right eye once a day (in the evening)  magnesium glycinate 200 mg oral tablet: 2 tab(s) orally once a day (after a meal)  Multiple Vitamins oral tablet: 1 tab(s) orally once a day  pantoprazole 40 mg oral delayed release tablet: 1 tab(s) orally once a day (before a meal)  Vitamin D3 400 intl units (10 mcg) oral tablet: 1 tab(s) orally once a day

## 2023-03-11 NOTE — DISCHARGE NOTE PROVIDER - DETAILS OF MALNUTRITION DIAGNOSIS/DIAGNOSES
This patient has been assessed with a concern for Malnutrition and was treated during this hospitalization for the following Nutrition diagnosis/diagnoses:     -  03/09/2023: Severe protein-calorie malnutrition

## 2023-03-11 NOTE — DISCHARGE NOTE PROVIDER - PROVIDER TOKENS
PROVIDER:[TOKEN:[[id number]]],PROVIDER:[TOKEN:[[id number]]],PROVIDER:[TOKEN:[[id number]]]

## 2023-03-11 NOTE — PROGRESS NOTE ADULT - PROVIDER SPECIALTY LIST ADULT
Infectious Disease
Pulmonology
Pulmonology
Thoracic Surgery
Gastroenterology
Gastroenterology
Hospitalist
Pulmonology
Pulmonology
Thoracic Surgery
Thoracic Surgery
Gastroenterology
Hospitalist
Infectious Disease
Internal Medicine
Pulmonology
Thoracic Surgery
Infectious Disease
Hospitalist
Hospitalist

## 2023-03-11 NOTE — DISCHARGE NOTE PROVIDER - CARE PROVIDER_API CALL
EVARISTO ADDISON  Internal Medicine  [address] [place] [address]  Phone: [phone]  Fax: [phone]  Follow Up Time:     Karlos Seth)  Internal Medicine  [address] [place] [address]  Phone: [phone]  Follow Up Time:     Lavon Gleason)  Internal Medicine; Pulmonary Disease; Sleep Medicine  [address] [place] [address]  Phone: [phone]  Fax: [phone]  Follow Up Time:

## 2023-03-12 ENCOUNTER — TRANSCRIPTION ENCOUNTER (OUTPATIENT)
Age: 88
End: 2023-03-12

## 2023-03-13 ENCOUNTER — TRANSCRIPTION ENCOUNTER (OUTPATIENT)
Age: 88
End: 2023-03-13

## 2023-03-13 PROBLEM — I82.409 ACUTE EMBOLISM AND THROMBOSIS OF UNSPECIFIED DEEP VEINS OF UNSPECIFIED LOWER EXTREMITY: Chronic | Status: ACTIVE | Noted: 2023-03-02

## 2023-03-16 ENCOUNTER — APPOINTMENT (OUTPATIENT)
Dept: CARE COORDINATION | Facility: HOME HEALTH | Age: 88
End: 2023-03-16
Payer: MEDICARE

## 2023-03-16 VITALS
DIASTOLIC BLOOD PRESSURE: 58 MMHG | RESPIRATION RATE: 16 BRPM | SYSTOLIC BLOOD PRESSURE: 118 MMHG | HEART RATE: 66 BPM | OXYGEN SATURATION: 95 %

## 2023-03-16 DIAGNOSIS — H40.9 UNSPECIFIED GLAUCOMA: ICD-10-CM

## 2023-03-16 DIAGNOSIS — J31.0 CHRONIC RHINITIS: ICD-10-CM

## 2023-03-16 DIAGNOSIS — K22.0 ACHALASIA OF CARDIA: ICD-10-CM

## 2023-03-16 DIAGNOSIS — J90 PLEURAL EFFUSION, NOT ELSEWHERE CLASSIFIED: ICD-10-CM

## 2023-03-16 DIAGNOSIS — I82.492 ACUTE EMBOLISM AND THROMBOSIS OF OTHER SPECIFIED DEEP VEIN OF LEFT LOWER EXTREMITY: ICD-10-CM

## 2023-03-16 PROCEDURE — 99495 TRANSJ CARE MGMT MOD F2F 14D: CPT

## 2023-03-16 RX ORDER — TIMOLOL MALEATE 5 MG/ML
SOLUTION/ DROPS OPHTHALMIC
Refills: 0 | Status: DISCONTINUED | COMMUNITY
End: 2023-03-16

## 2023-03-16 RX ORDER — ACETAMINOPHEN 325 MG/1
325 TABLET, FILM COATED ORAL EVERY 6 HOURS
Refills: 0 | Status: ACTIVE | COMMUNITY
Start: 2023-03-16

## 2023-03-16 RX ORDER — RALOXIFENE HYDROCHLORIDE 60 MG/1
TABLET ORAL
Refills: 0 | Status: DISCONTINUED | COMMUNITY
End: 2023-03-16

## 2023-03-16 RX ORDER — HYALURONATE SODIUM 10 MG/ML
25 SYRINGE (ML) INTRAARTICULAR
Qty: 10 | Refills: 0 | Status: COMPLETED | OUTPATIENT
Start: 2021-10-18 | End: 2023-03-16

## 2023-03-16 RX ORDER — LATANOPROST/PF 0.005 %
0.01 DROPS OPHTHALMIC (EYE)
Refills: 0 | Status: ACTIVE | COMMUNITY
Start: 2023-03-16

## 2023-03-16 RX ORDER — FEXOFENADINE HCL 180 MG
180 TABLET ORAL
Refills: 0 | Status: DISCONTINUED | COMMUNITY
End: 2023-03-16

## 2023-03-16 RX ORDER — DORZOLAMIDE HYDROCHLORIDE TIMOLOL MALEATE 20; 5 MG/ML; MG/ML
22.3-6.8 SOLUTION/ DROPS OPHTHALMIC
Refills: 0 | Status: ACTIVE | COMMUNITY
Start: 2023-03-16

## 2023-03-16 NOTE — PHYSICAL EXAM
[No Acute Distress] : no acute distress [Well Nourished] : well nourished [Well Developed] : well developed [Normal Sclera/Conjunctiva] : normal sclera/conjunctiva [Normal Outer Ear/Nose] : the outer ears and nose were normal in appearance [Normal Oropharynx] : the oropharynx was normal [Supple] : supple [No Respiratory Distress] : no respiratory distress  [Clear to Auscultation] : lungs were clear to auscultation bilaterally [No Accessory Muscle Use] : no accessory muscle use [Normal Rate] : normal rate  [Regular Rhythm] : with a regular rhythm [Normal S1, S2] : normal S1 and S2 [Pedal Pulses Present] : the pedal pulses are present [No Extremity Clubbing/Cyanosis] : no extremity clubbing/cyanosis [Soft] : abdomen soft [Non Tender] : non-tender [Non-distended] : non-distended [Normal Bowel Sounds] : normal bowel sounds [No Spinal Tenderness] : no spinal tenderness [Grossly Normal Strength/Tone] : grossly normal strength/tone [No Rash] : no rash [Coordination Grossly Intact] : coordination grossly intact [Normal Affect] : the affect was normal [de-identified] : no thrush [de-identified] : diminished BS LLL [de-identified] : Trace - 1+ BLE ankle edema R>L

## 2023-03-16 NOTE — REVIEW OF SYSTEMS
[Lower Ext Edema] : lower extremity edema [Cough] : cough [Negative] : Psychiatric [de-identified] : forgetful

## 2023-03-16 NOTE — PLAN
[FreeTextEntry1] : A/P:\par # LLL PNA\par - Acute Hypoxic respiratory failure secondary to Pneumonia/ Left pleural effusion possible empyema \par Pneumonia. Possibly necrotizing but unable to clinically determine fluid c/w parapneumonic effusion with pleural fluid cx negative \par - Loculated Left Pleural Effusion worrisome picture with hx of achalasia for Gram negative aspiration PNA\par - patient qualifies for home oxygen. Patient has diagnosis of loculated pleural effusion and resolving pneumonia and requires home oxygen based on home oxygen eval with o2 sat 88% on RA with ambulation\par - leukocytosis improving\par - s/p zosyn then augmentin 875 mg PO q12h for 7 more days as per ID recs \par - Educated aspiration precautions for achalasia, small soft meals, eat slowly, upright while eating and to remain upright at least 30 minutes\par - call for worsening of sx. Lasix TIW, albuterol prn\par - recommendations per Dr. Gleason\par \par # DVT LLE:\par - eliquis BID x 6 months\par - PCP for routine follow up\par \par # Glaucoma:\par - con't drops as ordered\par - management per OPTH\par

## 2023-03-16 NOTE — HISTORY OF PRESENT ILLNESS
[Post-hospitalization from ___ Hospital] : Post-hospitalization from [unfilled] Hospital [Admitted on: ___] : The patient was admitted on [unfilled] [Discharged on ___] : discharged on [unfilled] [Discharge Summary] : discharge summary [Discharge Med List] : discharge medication list [Med Reconciliation] : medication reconciliation has been completed [Patient Contacted By: ____] : and contacted by [unfilled] [FreeTextEntry2] : Hospital Course	\par pt is 87F with PMH of DVT (Dx 12/2022 on eliquis), Neuropathy, Osteopenia, MILTON presented with progressive SOB, left pleurisy and non-productive cough x 2 weeks. Associated weakness and fatigue. Outpatient evaluation with CT revealed loculated left pleural effusion and advised to report to ER for further evaluation by PMD. \par pt admitted and noted to have LLL PNA and left effusion. pt seen by CT surg s/p chest tube placed on 3/7 and removed 3/10.  pt seen by pulm and ID and continued on 8 days of IV zosyn given possible necrotizing pna.  plan to dc on 7 more days of augmentin as per ID recs.  pt still with residual loculated pleural effusion.  she is aware to f/u with dr christy as outpt.  pt qualified for home oxygen and o2 delivered to bedside today prior to dc.  \par course complicated by achalasia and s/p EGD without significant findings. will f/u with dr guerrero as outpt. \par \par CC:\par Pt is seen at home s/p recent admission for PNA. Pt is temporarily unable to leave home as it requires a considerable and taxing effort with new home O2\par HPI:\par Pt Is a 86 y/o female enrolled in the [facility] seen at home s/p a recent admission for PNA. Pt was contacted by TCM RN on 3/13  and med rec was done within 48 hours of DC.\par \par Upon examination A&O, NAD, forgetful.  and CHETAN present and provided extensive medical history. She denies CP, SOB, headache, dizziness, pain, abd discomfort or difficulty with bowel or bladder. Reports intermittent dry cough which is slowly improving. PNA: s/p IV zosyn and will compete additional 7 days po augmentin per ID. Qualified for home O2 2L NC, using HS and prn during the day. Had f/u with Dr. Victorino Toney yesterday, xray still shows LLL effusion, will f/u in one month with CT chest per . Dx with LLE DVT in Dec 2022 at which time her raloxifene was discontinued & she was started on eliquis BID for 6 months. Achalasia: maintain soft diet, aspiration precautions. Glaucoma: continue drops, no reported visual changes. [facility] services in home

## 2023-03-17 ENCOUNTER — TRANSCRIPTION ENCOUNTER (OUTPATIENT)
Age: 88
End: 2023-03-17

## 2023-03-17 DIAGNOSIS — Z90.49 ACQUIRED ABSENCE OF OTHER SPECIFIED PARTS OF DIGESTIVE TRACT: ICD-10-CM

## 2023-03-17 DIAGNOSIS — Z91.012 ALLERGY TO EGGS: ICD-10-CM

## 2023-03-17 DIAGNOSIS — K31.7 POLYP OF STOMACH AND DUODENUM: ICD-10-CM

## 2023-03-17 DIAGNOSIS — J85.0 GANGRENE AND NECROSIS OF LUNG: ICD-10-CM

## 2023-03-17 DIAGNOSIS — J69.0 PNEUMONITIS DUE TO INHALATION OF FOOD AND VOMIT: ICD-10-CM

## 2023-03-17 DIAGNOSIS — R19.7 DIARRHEA, UNSPECIFIED: ICD-10-CM

## 2023-03-17 DIAGNOSIS — J86.9 PYOTHORAX WITHOUT FISTULA: ICD-10-CM

## 2023-03-17 DIAGNOSIS — Z98.890 OTHER SPECIFIED POSTPROCEDURAL STATES: ICD-10-CM

## 2023-03-17 DIAGNOSIS — I44.7 LEFT BUNDLE-BRANCH BLOCK, UNSPECIFIED: ICD-10-CM

## 2023-03-17 DIAGNOSIS — Z91.040 LATEX ALLERGY STATUS: ICD-10-CM

## 2023-03-17 DIAGNOSIS — R13.10 DYSPHAGIA, UNSPECIFIED: ICD-10-CM

## 2023-03-17 DIAGNOSIS — Z79.01 LONG TERM (CURRENT) USE OF ANTICOAGULANTS: ICD-10-CM

## 2023-03-17 DIAGNOSIS — G47.33 OBSTRUCTIVE SLEEP APNEA (ADULT) (PEDIATRIC): ICD-10-CM

## 2023-03-17 DIAGNOSIS — Z90.710 ACQUIRED ABSENCE OF BOTH CERVIX AND UTERUS: ICD-10-CM

## 2023-03-17 DIAGNOSIS — E43 UNSPECIFIED SEVERE PROTEIN-CALORIE MALNUTRITION: ICD-10-CM

## 2023-03-17 DIAGNOSIS — K22.0 ACHALASIA OF CARDIA: ICD-10-CM

## 2023-03-17 DIAGNOSIS — J90 PLEURAL EFFUSION, NOT ELSEWHERE CLASSIFIED: ICD-10-CM

## 2023-03-17 DIAGNOSIS — J96.01 ACUTE RESPIRATORY FAILURE WITH HYPOXIA: ICD-10-CM

## 2023-03-17 DIAGNOSIS — G62.9 POLYNEUROPATHY, UNSPECIFIED: ICD-10-CM

## 2023-03-17 DIAGNOSIS — Z98.1 ARTHRODESIS STATUS: ICD-10-CM

## 2023-03-17 DIAGNOSIS — M19.90 UNSPECIFIED OSTEOARTHRITIS, UNSPECIFIED SITE: ICD-10-CM

## 2023-03-17 DIAGNOSIS — Z86.718 PERSONAL HISTORY OF OTHER VENOUS THROMBOSIS AND EMBOLISM: ICD-10-CM

## 2023-03-22 ENCOUNTER — TRANSCRIPTION ENCOUNTER (OUTPATIENT)
Age: 88
End: 2023-03-22

## 2023-03-29 ENCOUNTER — TRANSCRIPTION ENCOUNTER (OUTPATIENT)
Age: 88
End: 2023-03-29

## 2023-04-05 ENCOUNTER — TRANSCRIPTION ENCOUNTER (OUTPATIENT)
Age: 88
End: 2023-04-05

## 2023-04-05 LAB
CULTURE RESULTS: SIGNIFICANT CHANGE UP
SPECIMEN SOURCE: SIGNIFICANT CHANGE UP

## 2023-04-20 ENCOUNTER — NON-APPOINTMENT (OUTPATIENT)
Age: 88
End: 2023-04-20

## 2023-04-20 ENCOUNTER — APPOINTMENT (OUTPATIENT)
Dept: OPHTHALMOLOGY | Facility: CLINIC | Age: 88
End: 2023-04-20
Payer: MEDICARE

## 2023-04-20 PROCEDURE — 92014 COMPRE OPH EXAM EST PT 1/>: CPT

## 2023-04-20 PROCEDURE — 92133 CPTRZD OPH DX IMG PST SGM ON: CPT

## 2023-05-15 ENCOUNTER — APPOINTMENT (OUTPATIENT)
Dept: PULMONOLOGY | Facility: CLINIC | Age: 88
End: 2023-05-15

## 2023-08-09 ENCOUNTER — APPOINTMENT (OUTPATIENT)
Dept: DERMATOLOGY | Facility: CLINIC | Age: 88
End: 2023-08-09

## 2023-08-28 ENCOUNTER — APPOINTMENT (OUTPATIENT)
Dept: OPHTHALMOLOGY | Facility: CLINIC | Age: 88
End: 2023-08-28
Payer: MEDICARE

## 2023-08-28 ENCOUNTER — NON-APPOINTMENT (OUTPATIENT)
Age: 88
End: 2023-08-28

## 2023-08-28 PROCEDURE — 92012 INTRM OPH EXAM EST PATIENT: CPT

## 2023-08-28 PROCEDURE — 92083 EXTENDED VISUAL FIELD XM: CPT

## 2023-09-01 NOTE — SWALLOW BEDSIDE ASSESSMENT ADULT - H & P REVIEW
Spoke with Jayro Perera at Dr. Erik Bell office to request that diagnosis be added to the referral to this office.
marioa called to verify that pt referral was placed. I advises that it is pending and someone should be calling the family in the next few days.      Anabella suggested that we use her daughter Ken Hayes as a contact. (that is who is listed)
yes

## 2023-11-10 ENCOUNTER — INPATIENT (INPATIENT)
Facility: HOSPITAL | Age: 88
LOS: 4 days | Discharge: NURSING, REHAB OR LONG-TERM CARE | DRG: 480 | End: 2023-11-15
Attending: INTERNAL MEDICINE | Admitting: INTERNAL MEDICINE
Payer: MEDICARE

## 2023-11-10 VITALS
HEART RATE: 56 BPM | WEIGHT: 110.01 LBS | DIASTOLIC BLOOD PRESSURE: 93 MMHG | RESPIRATION RATE: 16 BRPM | HEIGHT: 62 IN | SYSTOLIC BLOOD PRESSURE: 175 MMHG | OXYGEN SATURATION: 100 %

## 2023-11-10 DIAGNOSIS — S72.401A UNSPECIFIED FRACTURE OF LOWER END OF RIGHT FEMUR, INITIAL ENCOUNTER FOR CLOSED FRACTURE: ICD-10-CM

## 2023-11-10 LAB
ALBUMIN SERPL ELPH-MCNC: 3.1 G/DL — LOW (ref 3.3–5)
ALBUMIN SERPL ELPH-MCNC: 3.1 G/DL — LOW (ref 3.3–5)
ALP SERPL-CCNC: 199 U/L — HIGH (ref 40–120)
ALP SERPL-CCNC: 199 U/L — HIGH (ref 40–120)
ALT FLD-CCNC: 108 U/L — HIGH (ref 12–78)
ALT FLD-CCNC: 108 U/L — HIGH (ref 12–78)
ANION GAP SERPL CALC-SCNC: 2 MMOL/L — LOW (ref 5–17)
ANION GAP SERPL CALC-SCNC: 2 MMOL/L — LOW (ref 5–17)
ANION GAP SERPL CALC-SCNC: 4 MMOL/L — LOW (ref 5–17)
ANION GAP SERPL CALC-SCNC: 4 MMOL/L — LOW (ref 5–17)
APPEARANCE UR: CLEAR — SIGNIFICANT CHANGE UP
APPEARANCE UR: CLEAR — SIGNIFICANT CHANGE UP
APTT BLD: 51.2 SEC — HIGH (ref 24.5–35.6)
APTT BLD: 51.2 SEC — HIGH (ref 24.5–35.6)
AST SERPL-CCNC: 80 U/L — HIGH (ref 15–37)
AST SERPL-CCNC: 80 U/L — HIGH (ref 15–37)
BACTERIA # UR AUTO: NEGATIVE /HPF — SIGNIFICANT CHANGE UP
BACTERIA # UR AUTO: NEGATIVE /HPF — SIGNIFICANT CHANGE UP
BASOPHILS # BLD AUTO: 0.02 K/UL — SIGNIFICANT CHANGE UP (ref 0–0.2)
BASOPHILS # BLD AUTO: 0.02 K/UL — SIGNIFICANT CHANGE UP (ref 0–0.2)
BASOPHILS NFR BLD AUTO: 0.4 % — SIGNIFICANT CHANGE UP (ref 0–2)
BASOPHILS NFR BLD AUTO: 0.4 % — SIGNIFICANT CHANGE UP (ref 0–2)
BILIRUB SERPL-MCNC: 0.3 MG/DL — SIGNIFICANT CHANGE UP (ref 0.2–1.2)
BILIRUB SERPL-MCNC: 0.3 MG/DL — SIGNIFICANT CHANGE UP (ref 0.2–1.2)
BILIRUB UR-MCNC: NEGATIVE — SIGNIFICANT CHANGE UP
BILIRUB UR-MCNC: NEGATIVE — SIGNIFICANT CHANGE UP
BLD GP AB SCN SERPL QL: SIGNIFICANT CHANGE UP
BLD GP AB SCN SERPL QL: SIGNIFICANT CHANGE UP
BUN SERPL-MCNC: 36 MG/DL — HIGH (ref 7–23)
BUN SERPL-MCNC: 36 MG/DL — HIGH (ref 7–23)
BUN SERPL-MCNC: 38 MG/DL — HIGH (ref 7–23)
BUN SERPL-MCNC: 38 MG/DL — HIGH (ref 7–23)
CALCIUM SERPL-MCNC: 8.8 MG/DL — SIGNIFICANT CHANGE UP (ref 8.5–10.1)
CALCIUM SERPL-MCNC: 8.8 MG/DL — SIGNIFICANT CHANGE UP (ref 8.5–10.1)
CALCIUM SERPL-MCNC: 9.8 MG/DL — SIGNIFICANT CHANGE UP (ref 8.5–10.1)
CALCIUM SERPL-MCNC: 9.8 MG/DL — SIGNIFICANT CHANGE UP (ref 8.5–10.1)
CAST: 1 /LPF — SIGNIFICANT CHANGE UP (ref 0–4)
CAST: 1 /LPF — SIGNIFICANT CHANGE UP (ref 0–4)
CHLORIDE SERPL-SCNC: 104 MMOL/L — SIGNIFICANT CHANGE UP (ref 96–108)
CHLORIDE SERPL-SCNC: 104 MMOL/L — SIGNIFICANT CHANGE UP (ref 96–108)
CHLORIDE SERPL-SCNC: 106 MMOL/L — SIGNIFICANT CHANGE UP (ref 96–108)
CHLORIDE SERPL-SCNC: 106 MMOL/L — SIGNIFICANT CHANGE UP (ref 96–108)
CO2 SERPL-SCNC: 28 MMOL/L — SIGNIFICANT CHANGE UP (ref 22–31)
CO2 SERPL-SCNC: 28 MMOL/L — SIGNIFICANT CHANGE UP (ref 22–31)
CO2 SERPL-SCNC: 30 MMOL/L — SIGNIFICANT CHANGE UP (ref 22–31)
CO2 SERPL-SCNC: 30 MMOL/L — SIGNIFICANT CHANGE UP (ref 22–31)
COLOR SPEC: YELLOW — SIGNIFICANT CHANGE UP
COLOR SPEC: YELLOW — SIGNIFICANT CHANGE UP
CREAT SERPL-MCNC: 0.74 MG/DL — SIGNIFICANT CHANGE UP (ref 0.5–1.3)
CREAT SERPL-MCNC: 0.74 MG/DL — SIGNIFICANT CHANGE UP (ref 0.5–1.3)
CREAT SERPL-MCNC: 0.88 MG/DL — SIGNIFICANT CHANGE UP (ref 0.5–1.3)
CREAT SERPL-MCNC: 0.88 MG/DL — SIGNIFICANT CHANGE UP (ref 0.5–1.3)
DIFF PNL FLD: NEGATIVE — SIGNIFICANT CHANGE UP
DIFF PNL FLD: NEGATIVE — SIGNIFICANT CHANGE UP
EGFR: 63 ML/MIN/1.73M2 — SIGNIFICANT CHANGE UP
EGFR: 63 ML/MIN/1.73M2 — SIGNIFICANT CHANGE UP
EGFR: 78 ML/MIN/1.73M2 — SIGNIFICANT CHANGE UP
EGFR: 78 ML/MIN/1.73M2 — SIGNIFICANT CHANGE UP
EOSINOPHIL # BLD AUTO: 0.07 K/UL — SIGNIFICANT CHANGE UP (ref 0–0.5)
EOSINOPHIL # BLD AUTO: 0.07 K/UL — SIGNIFICANT CHANGE UP (ref 0–0.5)
EOSINOPHIL NFR BLD AUTO: 1.3 % — SIGNIFICANT CHANGE UP (ref 0–6)
EOSINOPHIL NFR BLD AUTO: 1.3 % — SIGNIFICANT CHANGE UP (ref 0–6)
GLUCOSE SERPL-MCNC: 281 MG/DL — HIGH (ref 70–99)
GLUCOSE SERPL-MCNC: 281 MG/DL — HIGH (ref 70–99)
GLUCOSE SERPL-MCNC: 95 MG/DL — SIGNIFICANT CHANGE UP (ref 70–99)
GLUCOSE SERPL-MCNC: 95 MG/DL — SIGNIFICANT CHANGE UP (ref 70–99)
GLUCOSE UR QL: NEGATIVE MG/DL — SIGNIFICANT CHANGE UP
GLUCOSE UR QL: NEGATIVE MG/DL — SIGNIFICANT CHANGE UP
HCT VFR BLD CALC: 26.1 % — LOW (ref 34.5–45)
HCT VFR BLD CALC: 26.1 % — LOW (ref 34.5–45)
HCT VFR BLD CALC: 32.6 % — LOW (ref 34.5–45)
HCT VFR BLD CALC: 32.6 % — LOW (ref 34.5–45)
HGB BLD-MCNC: 10.7 G/DL — LOW (ref 11.5–15.5)
HGB BLD-MCNC: 10.7 G/DL — LOW (ref 11.5–15.5)
HGB BLD-MCNC: 8.6 G/DL — LOW (ref 11.5–15.5)
HGB BLD-MCNC: 8.6 G/DL — LOW (ref 11.5–15.5)
IMM GRANULOCYTES NFR BLD AUTO: 0.2 % — SIGNIFICANT CHANGE UP (ref 0–0.9)
IMM GRANULOCYTES NFR BLD AUTO: 0.2 % — SIGNIFICANT CHANGE UP (ref 0–0.9)
INR BLD: 1.05 RATIO — SIGNIFICANT CHANGE UP (ref 0.85–1.18)
INR BLD: 1.05 RATIO — SIGNIFICANT CHANGE UP (ref 0.85–1.18)
KETONES UR-MCNC: NEGATIVE MG/DL — SIGNIFICANT CHANGE UP
KETONES UR-MCNC: NEGATIVE MG/DL — SIGNIFICANT CHANGE UP
LACTATE SERPL-SCNC: 1.9 MMOL/L — SIGNIFICANT CHANGE UP (ref 0.7–2)
LACTATE SERPL-SCNC: 1.9 MMOL/L — SIGNIFICANT CHANGE UP (ref 0.7–2)
LEUKOCYTE ESTERASE UR-ACNC: NEGATIVE — SIGNIFICANT CHANGE UP
LEUKOCYTE ESTERASE UR-ACNC: NEGATIVE — SIGNIFICANT CHANGE UP
LYMPHOCYTES # BLD AUTO: 1.45 K/UL — SIGNIFICANT CHANGE UP (ref 1–3.3)
LYMPHOCYTES # BLD AUTO: 1.45 K/UL — SIGNIFICANT CHANGE UP (ref 1–3.3)
LYMPHOCYTES # BLD AUTO: 26.1 % — SIGNIFICANT CHANGE UP (ref 13–44)
LYMPHOCYTES # BLD AUTO: 26.1 % — SIGNIFICANT CHANGE UP (ref 13–44)
MCHC RBC-ENTMCNC: 29.6 PG — SIGNIFICANT CHANGE UP (ref 27–34)
MCHC RBC-ENTMCNC: 29.6 PG — SIGNIFICANT CHANGE UP (ref 27–34)
MCHC RBC-ENTMCNC: 29.7 PG — SIGNIFICANT CHANGE UP (ref 27–34)
MCHC RBC-ENTMCNC: 29.7 PG — SIGNIFICANT CHANGE UP (ref 27–34)
MCHC RBC-ENTMCNC: 32.8 GM/DL — SIGNIFICANT CHANGE UP (ref 32–36)
MCHC RBC-ENTMCNC: 32.8 GM/DL — SIGNIFICANT CHANGE UP (ref 32–36)
MCHC RBC-ENTMCNC: 33 GM/DL — SIGNIFICANT CHANGE UP (ref 32–36)
MCHC RBC-ENTMCNC: 33 GM/DL — SIGNIFICANT CHANGE UP (ref 32–36)
MCV RBC AUTO: 90 FL — SIGNIFICANT CHANGE UP (ref 80–100)
MCV RBC AUTO: 90 FL — SIGNIFICANT CHANGE UP (ref 80–100)
MCV RBC AUTO: 90.1 FL — SIGNIFICANT CHANGE UP (ref 80–100)
MCV RBC AUTO: 90.1 FL — SIGNIFICANT CHANGE UP (ref 80–100)
MONOCYTES # BLD AUTO: 0.36 K/UL — SIGNIFICANT CHANGE UP (ref 0–0.9)
MONOCYTES # BLD AUTO: 0.36 K/UL — SIGNIFICANT CHANGE UP (ref 0–0.9)
MONOCYTES NFR BLD AUTO: 6.5 % — SIGNIFICANT CHANGE UP (ref 2–14)
MONOCYTES NFR BLD AUTO: 6.5 % — SIGNIFICANT CHANGE UP (ref 2–14)
NEUTROPHILS # BLD AUTO: 3.65 K/UL — SIGNIFICANT CHANGE UP (ref 1.8–7.4)
NEUTROPHILS # BLD AUTO: 3.65 K/UL — SIGNIFICANT CHANGE UP (ref 1.8–7.4)
NEUTROPHILS NFR BLD AUTO: 65.5 % — SIGNIFICANT CHANGE UP (ref 43–77)
NEUTROPHILS NFR BLD AUTO: 65.5 % — SIGNIFICANT CHANGE UP (ref 43–77)
NITRITE UR-MCNC: NEGATIVE — SIGNIFICANT CHANGE UP
NITRITE UR-MCNC: NEGATIVE — SIGNIFICANT CHANGE UP
PH UR: 7 — SIGNIFICANT CHANGE UP (ref 5–8)
PH UR: 7 — SIGNIFICANT CHANGE UP (ref 5–8)
PLATELET # BLD AUTO: 115 K/UL — LOW (ref 150–400)
PLATELET # BLD AUTO: 115 K/UL — LOW (ref 150–400)
PLATELET # BLD AUTO: 125 K/UL — LOW (ref 150–400)
PLATELET # BLD AUTO: 125 K/UL — LOW (ref 150–400)
POTASSIUM SERPL-MCNC: 5.4 MMOL/L — HIGH (ref 3.5–5.3)
POTASSIUM SERPL-MCNC: 5.4 MMOL/L — HIGH (ref 3.5–5.3)
POTASSIUM SERPL-MCNC: 5.7 MMOL/L — HIGH (ref 3.5–5.3)
POTASSIUM SERPL-MCNC: 5.7 MMOL/L — HIGH (ref 3.5–5.3)
POTASSIUM SERPL-SCNC: 5.4 MMOL/L — HIGH (ref 3.5–5.3)
POTASSIUM SERPL-SCNC: 5.4 MMOL/L — HIGH (ref 3.5–5.3)
POTASSIUM SERPL-SCNC: 5.7 MMOL/L — HIGH (ref 3.5–5.3)
POTASSIUM SERPL-SCNC: 5.7 MMOL/L — HIGH (ref 3.5–5.3)
PROT SERPL-MCNC: 6.6 GM/DL — SIGNIFICANT CHANGE UP (ref 6–8.3)
PROT SERPL-MCNC: 6.6 GM/DL — SIGNIFICANT CHANGE UP (ref 6–8.3)
PROT UR-MCNC: 30 MG/DL
PROT UR-MCNC: 30 MG/DL
PROTHROM AB SERPL-ACNC: 11.9 SEC — SIGNIFICANT CHANGE UP (ref 9.5–13)
PROTHROM AB SERPL-ACNC: 11.9 SEC — SIGNIFICANT CHANGE UP (ref 9.5–13)
RBC # BLD: 2.9 M/UL — LOW (ref 3.8–5.2)
RBC # BLD: 2.9 M/UL — LOW (ref 3.8–5.2)
RBC # BLD: 3.62 M/UL — LOW (ref 3.8–5.2)
RBC # BLD: 3.62 M/UL — LOW (ref 3.8–5.2)
RBC # FLD: 17.3 % — HIGH (ref 10.3–14.5)
RBC # FLD: 17.3 % — HIGH (ref 10.3–14.5)
RBC # FLD: 17.4 % — HIGH (ref 10.3–14.5)
RBC # FLD: 17.4 % — HIGH (ref 10.3–14.5)
RBC CASTS # UR COMP ASSIST: 0 /HPF — SIGNIFICANT CHANGE UP (ref 0–4)
RBC CASTS # UR COMP ASSIST: 0 /HPF — SIGNIFICANT CHANGE UP (ref 0–4)
SODIUM SERPL-SCNC: 136 MMOL/L — SIGNIFICANT CHANGE UP (ref 135–145)
SODIUM SERPL-SCNC: 136 MMOL/L — SIGNIFICANT CHANGE UP (ref 135–145)
SODIUM SERPL-SCNC: 138 MMOL/L — SIGNIFICANT CHANGE UP (ref 135–145)
SODIUM SERPL-SCNC: 138 MMOL/L — SIGNIFICANT CHANGE UP (ref 135–145)
SP GR SPEC: 1.02 — SIGNIFICANT CHANGE UP (ref 1–1.03)
SP GR SPEC: 1.02 — SIGNIFICANT CHANGE UP (ref 1–1.03)
SQUAMOUS # UR AUTO: 0 /HPF — SIGNIFICANT CHANGE UP (ref 0–5)
SQUAMOUS # UR AUTO: 0 /HPF — SIGNIFICANT CHANGE UP (ref 0–5)
TSH SERPL-MCNC: 2.66 UU/ML — SIGNIFICANT CHANGE UP (ref 0.34–4.82)
TSH SERPL-MCNC: 2.66 UU/ML — SIGNIFICANT CHANGE UP (ref 0.34–4.82)
UROBILINOGEN FLD QL: 0.2 MG/DL — SIGNIFICANT CHANGE UP (ref 0.2–1)
UROBILINOGEN FLD QL: 0.2 MG/DL — SIGNIFICANT CHANGE UP (ref 0.2–1)
WBC # BLD: 5.56 K/UL — SIGNIFICANT CHANGE UP (ref 3.8–10.5)
WBC # BLD: 5.56 K/UL — SIGNIFICANT CHANGE UP (ref 3.8–10.5)
WBC # BLD: 7.47 K/UL — SIGNIFICANT CHANGE UP (ref 3.8–10.5)
WBC # BLD: 7.47 K/UL — SIGNIFICANT CHANGE UP (ref 3.8–10.5)
WBC # FLD AUTO: 5.56 K/UL — SIGNIFICANT CHANGE UP (ref 3.8–10.5)
WBC # FLD AUTO: 5.56 K/UL — SIGNIFICANT CHANGE UP (ref 3.8–10.5)
WBC # FLD AUTO: 7.47 K/UL — SIGNIFICANT CHANGE UP (ref 3.8–10.5)
WBC # FLD AUTO: 7.47 K/UL — SIGNIFICANT CHANGE UP (ref 3.8–10.5)
WBC UR QL: 1 /HPF — SIGNIFICANT CHANGE UP (ref 0–5)
WBC UR QL: 1 /HPF — SIGNIFICANT CHANGE UP (ref 0–5)

## 2023-11-10 PROCEDURE — 86900 BLOOD TYPING SEROLOGIC ABO: CPT

## 2023-11-10 PROCEDURE — 71045 X-RAY EXAM CHEST 1 VIEW: CPT

## 2023-11-10 PROCEDURE — 76376 3D RENDER W/INTRP POSTPROCES: CPT | Mod: 26

## 2023-11-10 PROCEDURE — 93005 ELECTROCARDIOGRAM TRACING: CPT

## 2023-11-10 PROCEDURE — 71250 CT THORAX DX C-: CPT | Mod: 26,MA

## 2023-11-10 PROCEDURE — 74220 X-RAY XM ESOPHAGUS 1CNTRST: CPT

## 2023-11-10 PROCEDURE — 73706 CT ANGIO LWR EXTR W/O&W/DYE: CPT | Mod: RT

## 2023-11-10 PROCEDURE — 84443 ASSAY THYROID STIM HORMONE: CPT

## 2023-11-10 PROCEDURE — 99285 EMERGENCY DEPT VISIT HI MDM: CPT

## 2023-11-10 PROCEDURE — 82306 VITAMIN D 25 HYDROXY: CPT

## 2023-11-10 PROCEDURE — 86923 COMPATIBILITY TEST ELECTRIC: CPT

## 2023-11-10 PROCEDURE — 85027 COMPLETE CBC AUTOMATED: CPT

## 2023-11-10 PROCEDURE — 73564 X-RAY EXAM KNEE 4 OR MORE: CPT | Mod: 26,RT

## 2023-11-10 PROCEDURE — 70450 CT HEAD/BRAIN W/O DYE: CPT | Mod: 26,MA

## 2023-11-10 PROCEDURE — C1713: CPT

## 2023-11-10 PROCEDURE — 99291 CRITICAL CARE FIRST HOUR: CPT

## 2023-11-10 PROCEDURE — 93010 ELECTROCARDIOGRAM REPORT: CPT

## 2023-11-10 PROCEDURE — 76376 3D RENDER W/INTRP POSTPROCES: CPT

## 2023-11-10 PROCEDURE — 85610 PROTHROMBIN TIME: CPT

## 2023-11-10 PROCEDURE — 80048 BASIC METABOLIC PNL TOTAL CA: CPT

## 2023-11-10 PROCEDURE — 74176 CT ABD & PELVIS W/O CONTRAST: CPT | Mod: 26,MA

## 2023-11-10 PROCEDURE — 99223 1ST HOSP IP/OBS HIGH 75: CPT | Mod: 25

## 2023-11-10 PROCEDURE — 73552 X-RAY EXAM OF FEMUR 2/>: CPT | Mod: 26,RT

## 2023-11-10 PROCEDURE — 97535 SELF CARE MNGMENT TRAINING: CPT | Mod: GO

## 2023-11-10 PROCEDURE — 73503 X-RAY EXAM HIP UNI 4/> VIEWS: CPT | Mod: 26,RT

## 2023-11-10 PROCEDURE — 92610 EVALUATE SWALLOWING FUNCTION: CPT | Mod: GN

## 2023-11-10 PROCEDURE — 97530 THERAPEUTIC ACTIVITIES: CPT | Mod: GP

## 2023-11-10 PROCEDURE — 97116 GAIT TRAINING THERAPY: CPT | Mod: GP

## 2023-11-10 PROCEDURE — 80076 HEPATIC FUNCTION PANEL: CPT

## 2023-11-10 PROCEDURE — 86901 BLOOD TYPING SEROLOGIC RH(D): CPT

## 2023-11-10 PROCEDURE — C1889: CPT

## 2023-11-10 PROCEDURE — 73551 X-RAY EXAM OF FEMUR 1: CPT | Mod: RT

## 2023-11-10 PROCEDURE — 72125 CT NECK SPINE W/O DYE: CPT | Mod: 26,MA

## 2023-11-10 PROCEDURE — 87040 BLOOD CULTURE FOR BACTERIA: CPT

## 2023-11-10 PROCEDURE — 71045 X-RAY EXAM CHEST 1 VIEW: CPT | Mod: 26

## 2023-11-10 PROCEDURE — 97162 PT EVAL MOD COMPLEX 30 MIN: CPT | Mod: GP

## 2023-11-10 PROCEDURE — 81001 URINALYSIS AUTO W/SCOPE: CPT

## 2023-11-10 PROCEDURE — 73590 X-RAY EXAM OF LOWER LEG: CPT | Mod: 26,RT

## 2023-11-10 PROCEDURE — 73700 CT LOWER EXTREMITY W/O DYE: CPT | Mod: RT

## 2023-11-10 PROCEDURE — 86850 RBC ANTIBODY SCREEN: CPT

## 2023-11-10 PROCEDURE — 36430 TRANSFUSION BLD/BLD COMPNT: CPT

## 2023-11-10 PROCEDURE — 99497 ADVNCD CARE PLAN 30 MIN: CPT | Mod: 25

## 2023-11-10 PROCEDURE — 83605 ASSAY OF LACTIC ACID: CPT

## 2023-11-10 PROCEDURE — 73590 X-RAY EXAM OF LOWER LEG: CPT | Mod: RT

## 2023-11-10 PROCEDURE — C1769: CPT

## 2023-11-10 PROCEDURE — 73700 CT LOWER EXTREMITY W/O DYE: CPT | Mod: 26,RT

## 2023-11-10 PROCEDURE — 87086 URINE CULTURE/COLONY COUNT: CPT

## 2023-11-10 PROCEDURE — 85014 HEMATOCRIT: CPT

## 2023-11-10 PROCEDURE — 97166 OT EVAL MOD COMPLEX 45 MIN: CPT | Mod: GO

## 2023-11-10 PROCEDURE — 85018 HEMOGLOBIN: CPT

## 2023-11-10 PROCEDURE — P9016: CPT

## 2023-11-10 PROCEDURE — 36415 COLL VENOUS BLD VENIPUNCTURE: CPT

## 2023-11-10 PROCEDURE — 85730 THROMBOPLASTIN TIME PARTIAL: CPT

## 2023-11-10 RX ORDER — ASCORBIC ACID 60 MG
1 TABLET,CHEWABLE ORAL
Qty: 0 | Refills: 0 | DISCHARGE

## 2023-11-10 RX ORDER — SODIUM CHLORIDE 9 MG/ML
1000 INJECTION INTRAMUSCULAR; INTRAVENOUS; SUBCUTANEOUS
Refills: 0 | Status: DISCONTINUED | OUTPATIENT
Start: 2023-11-10 | End: 2023-11-13

## 2023-11-10 RX ORDER — LATANOPROST 0.05 MG/ML
1 SOLUTION/ DROPS OPHTHALMIC; TOPICAL
Qty: 0 | Refills: 0 | DISCHARGE

## 2023-11-10 RX ORDER — APIXABAN 2.5 MG/1
1 TABLET, FILM COATED ORAL
Qty: 0 | Refills: 0 | DISCHARGE

## 2023-11-10 RX ORDER — ENOXAPARIN SODIUM 100 MG/ML
40 INJECTION SUBCUTANEOUS EVERY 24 HOURS
Refills: 0 | Status: DISCONTINUED | OUTPATIENT
Start: 2023-11-10 | End: 2023-11-10

## 2023-11-10 RX ORDER — ACETAMINOPHEN 500 MG
650 TABLET ORAL EVERY 6 HOURS
Refills: 0 | Status: DISCONTINUED | OUTPATIENT
Start: 2023-11-10 | End: 2023-11-11

## 2023-11-10 RX ORDER — ONDANSETRON 8 MG/1
4 TABLET, FILM COATED ORAL EVERY 6 HOURS
Refills: 0 | Status: DISCONTINUED | OUTPATIENT
Start: 2023-11-10 | End: 2023-11-15

## 2023-11-10 RX ORDER — ACETAMINOPHEN 500 MG
650 TABLET ORAL ONCE
Refills: 0 | Status: COMPLETED | OUTPATIENT
Start: 2023-11-10 | End: 2023-11-10

## 2023-11-10 RX ORDER — MAGNESIUM GLYCINATE 100 MG
2 CAPSULE ORAL
Qty: 0 | Refills: 0 | DISCHARGE

## 2023-11-10 RX ORDER — DORZOLAMIDE HYDROCHLORIDE TIMOLOL MALEATE 20; 5 MG/ML; MG/ML
1 SOLUTION/ DROPS OPHTHALMIC
Refills: 0 | Status: DISCONTINUED | OUTPATIENT
Start: 2023-11-10 | End: 2023-11-10

## 2023-11-10 RX ORDER — TRANEXAMIC ACID 100 MG/ML
1000 INJECTION, SOLUTION INTRAVENOUS ONCE
Refills: 0 | Status: DISCONTINUED | OUTPATIENT
Start: 2023-11-10 | End: 2023-11-14

## 2023-11-10 RX ORDER — DONEPEZIL HYDROCHLORIDE 10 MG/1
5 TABLET, FILM COATED ORAL AT BEDTIME
Refills: 0 | Status: DISCONTINUED | OUTPATIENT
Start: 2023-11-10 | End: 2023-11-10

## 2023-11-10 RX ORDER — IPRATROPIUM BROMIDE 21 MCG
2 AEROSOL, SPRAY (ML) NASAL
Qty: 0 | Refills: 0 | DISCHARGE

## 2023-11-10 RX ORDER — HYDROMORPHONE HYDROCHLORIDE 2 MG/ML
0.5 INJECTION INTRAMUSCULAR; INTRAVENOUS; SUBCUTANEOUS EVERY 4 HOURS
Refills: 0 | Status: DISCONTINUED | OUTPATIENT
Start: 2023-11-10 | End: 2023-11-15

## 2023-11-10 RX ORDER — ONDANSETRON 8 MG/1
4 TABLET, FILM COATED ORAL EVERY 6 HOURS
Refills: 0 | Status: DISCONTINUED | OUTPATIENT
Start: 2023-11-10 | End: 2023-11-10

## 2023-11-10 RX ORDER — SODIUM CHLORIDE 9 MG/ML
1000 INJECTION INTRAMUSCULAR; INTRAVENOUS; SUBCUTANEOUS
Refills: 0 | Status: DISCONTINUED | OUTPATIENT
Start: 2023-11-10 | End: 2023-11-12

## 2023-11-10 RX ORDER — CHOLECALCIFEROL (VITAMIN D3) 125 MCG
1 CAPSULE ORAL
Qty: 0 | Refills: 0 | DISCHARGE

## 2023-11-10 RX ADMIN — SODIUM CHLORIDE 75 MILLILITER(S): 9 INJECTION INTRAMUSCULAR; INTRAVENOUS; SUBCUTANEOUS at 17:22

## 2023-11-10 RX ADMIN — HYDROMORPHONE HYDROCHLORIDE 0.5 MILLIGRAM(S): 2 INJECTION INTRAMUSCULAR; INTRAVENOUS; SUBCUTANEOUS at 17:29

## 2023-11-10 RX ADMIN — DORZOLAMIDE HYDROCHLORIDE TIMOLOL MALEATE 1 DROP(S): 20; 5 SOLUTION/ DROPS OPHTHALMIC at 23:42

## 2023-11-10 RX ADMIN — Medication 650 MILLIGRAM(S): at 12:11

## 2023-11-10 RX ADMIN — HYDROMORPHONE HYDROCHLORIDE 0.5 MILLIGRAM(S): 2 INJECTION INTRAMUSCULAR; INTRAVENOUS; SUBCUTANEOUS at 17:14

## 2023-11-10 NOTE — PROVIDER CONTACT NOTE (OTHER) - SITUATION
Pt heartrate on monitor dropped to 39-41bpm sinus porfirio per tele tech.  Unable to obtain oral/axillary temp, pt family refusing rectal temp at this time.

## 2023-11-10 NOTE — PHARMACOTHERAPY INTERVENTION NOTE - COMMENTS
Medication reconciliation completed.  Reviewed Medication list and confirmed med allergies with patient; confirmed with Dr. First Medbennie.

## 2023-11-10 NOTE — ED PROVIDER NOTE - NS ED ROS FT
Constitutional: No fever or chills  Eyes: No visual changes  HEENT: No throat pain  CV: No chest pain  Resp: No SOB no cough  GI: No abd pain, nausea or vomiting  : No dysuria  MSK: +R knee pain  Skin: No rash  Neuro: No headache Constitutional: No fever or chills  Eyes: No visual changes  HEENT: No throat pain  CV: No chest pain  Resp: No SOB no cough  GI: +abd pain, no nausea or vomiting  : No dysuria  MSK: +R knee pain, R hip pain  Skin: No rash  Neuro: No headache

## 2023-11-10 NOTE — H&P ADULT - CONVERSATION DETAILS
spoke to patient's daughter who is the HCP, her name is Miryam [phone].  Patient is Full Code and no limitations on care

## 2023-11-10 NOTE — CHART NOTE - NSCHARTNOTEFT_GEN_A_CORE
Called re: pt who was noted to have Temp 93 F.    Stat labs drawn      T(C): 34.3 (11-10-23 @ 20:55), Max: 36.8 (11-10-23 @ 14:02)  HR: 58 (11-10-23 @ 20:55) (51 - 58)  BP: 131/50 (11-10-23 @ 20:55) (122/68 - 175/93)  RR: 16 (11-10-23 @ 20:55) (16 - 16)  SpO2: 93% (11-10-23 @ 20:55) (93% - 100%)    CONSTITUTIONAL:  Elderly frail patient no apparent distress  EYES: PERRLA and symmetric, EOMI, No conjunctival or scleral injection, non-icteric  ENMT: Oral mucosa with moist membranes. Normal dentition; no pharyngeal injection or exudates             NECK: Supple, symmetric and without tracheal deviation   RESP: No respiratory distress, no use of accessory muscles; CTA b/l, no WRR  CV: RRR, +S1S2, no MRG; no JVD; no peripheral edema  GI: Soft, NT, ND, no rebound, no guarding; no palpable masses; no hepatosplenomegaly; no hernia palpated    MSK:  No digital clubbing or cyanosis; examination of the (head/neck/spine/ribs/pelvis, RUE, LUE, RLE, LLE) without misalignment,            Normal ROM without pain, no spinal tenderness, normal muscle strength/tone  SKIN: No rashes or ulcers noted; no subcutaneous nodules or induration palpable  NEURO: CN II-XII intact; , sensation intact in upper and lower extrem  PSYCH:  A+O x , mood and affect                            8.6    7.47  )-----------( 115      ( 10 Nov 2023 20:10 )             26.1   11-10    136  |  104  |  38<H>  ----------------------------<  281<H>  5.7<H>   |  28  |  0.88    Ca    8.8      10 Nov 2023 20:10    Urinalysis (11.10.23 @ 21:20)   Glucose Qualitative, Urine: Negative mg/dL  Blood, Urine: Negative  pH Urine: 7.0  Color: Yellow  Urine Appearance: Clear  Bilirubin: Negative  Ketone - Urine: Negative mg/dL  Specific Gravity: 1.017  Protein, Urine: 30 mg/dL  Urobilinogen: 0.2 mg/dL  Nitrite: Negative  Leukocyte Esterase Concentration: Negative    TPro  6.6  /  Alb  3.1<L>  /  TBili  0.3  /  DBili  x   /  AST  80<H>  /  ALT  108<H>  /  AlkPhos  199<H>  11-10  Thyroid Stimulating Hormone, Serum (11.10.23 @ 20:10)   Thyroid Stimulating Hormone, Serum: 2.66 uU/mL Called re: 89 y/o F pt admitted w/acute right distal femur and right periprosthetic hip fracture who was noted to have Temp 93 F.    She is status post mechanical fall down the steps resulting in right leg trauma and acute right distal femur and right periprosthetic hip fracture, Chronic Anemia, Sinus Bradycardia and Chronic LBBB on EKG,  Thrombocytopenia like due to fracture and consumption,  Mild Hyperkalemia, Mild Pre-Renal Azotemia,  Hypoalbuminemia      Stat labs drawn to       T(C): 34.3 (11-10-23 @ 20:55), Max: 36.8 (11-10-23 @ 14:02)  HR: 58 (11-10-23 @ 20:55) (51 - 58)  BP: 131/50 (11-10-23 @ 20:55) (122/68 - 175/93)  RR: 16 (11-10-23 @ 20:55) (16 - 16)  SpO2: 93% (11-10-23 @ 20:55) (93% - 100%)    CONSTITUTIONAL:  Elderly frail patient no apparent distress  EYES: PERRLA and symmetric, EOMI, No conjunctival or scleral injection, non-icteric  ENMT: Oral mucosa with moist membranes. Normal dentition; no pharyngeal injection or exudates             NECK: Supple, symmetric and without tracheal deviation   RESP: No respiratory distress, no use of accessory muscles; CTA b/l, no WRR  CV: RRR, +S1S2, no MRG; no JVD; no peripheral edema  GI: Soft, NT, ND, no rebound, no guarding; no palpable masses; no hepatosplenomegaly; no hernia palpated    MSK:  No digital clubbing or cyanosis; examination of the (head/neck/spine/ribs/pelvis, RUE, LUE, RLE, LLE) without misalignment,            Normal ROM without pain, no spinal tenderness, normal muscle strength/tone  SKIN: No rashes or ulcers noted; no subcutaneous nodules or induration palpable  NEURO: CN II-XII intact; , sensation intact in upper and lower extrem  PSYCH:  A+O x , mood and affect                            8.6    7.47  )-----------( 115      ( 10 Nov 2023 20:10 )             26.1   11-10    136  |  104  |  38<H>  ----------------------------<  281<H>  5.7<H>   |  28  |  0.88    Ca    8.8      10 Nov 2023 20:10    Urinalysis (11.10.23 @ 21:20)   Glucose Qualitative, Urine: Negative mg/dL  Blood, Urine: Negative  pH Urine: 7.0  Color: Yellow  Urine Appearance: Clear  Bilirubin: Negative  Ketone - Urine: Negative mg/dL  Specific Gravity: 1.017  Protein, Urine: 30 mg/dL  Urobilinogen: 0.2 mg/dL  Nitrite: Negative  Leukocyte Esterase Concentration: Negative    TPro  6.6  /  Alb  3.1<L>  /  TBili  0.3  /  DBili  x   /  AST  80<H>  /  ALT  108<H>  /  AlkPhos  199<H>  11-10  Thyroid Stimulating Hormone, Serum (11.10.23 @ 20:10)   Thyroid Stimulating Hormone, Serum: 2.66 uU/mL    89 y/o F pt admitted w/acute right distal femur and right periprosthetic hip fracture who was noted to have Temp 93 F.  DDX Sepsis, hypothyroidism, increasing blood loss anemia.  Pt is AAOx3, mentating well, though appearing pale.  No fever, normal lactate,  no leukocytosis.  Hemoglobin has dropped .    - discussed with Orthopedic team to eval for bleeding  - CTA to eval for bleeding  - f/u blood cx , u cx  - transfuse as needed    I spent a total of 45 minutes critical care.

## 2023-11-10 NOTE — ED PROVIDER NOTE - NSICDXPASTMEDICALHX_GEN_ALL_CORE_FT
PAST MEDICAL HISTORY:  Deep vein thrombosis (DVT)     Osteoarthritis     Osteopenia      Double O-Z Plasty Text: The defect edges were debeveled with a #15 scalpel blade.  Given the location of the defect, shape of the defect and the proximity to free margins a Double O-Z plasty (double transposition flap) was deemed most appropriate.  Using a sterile surgical marker, the appropriate transposition flaps were drawn incorporating the defect and placing the expected incisions within the relaxed skin tension lines where possible. The area thus outlined was incised deep to adipose tissue with a #15 scalpel blade.  The skin margins were undermined to an appropriate distance in all directions utilizing iris scissors.  Hemostasis was achieved with electrocautery.  The flaps were then transposed into place, one clockwise and the other counterclockwise, and anchored with interrupted buried subcutaneous sutures.

## 2023-11-10 NOTE — ED ADULT NURSE NOTE - OBJECTIVE STATEMENT
patient brought in by EMS from home s/p fall.  patient fell down 3 steps onto right knee.  hx right knee replacement with Dr. Villasenor years ago.  patient denies head strike, no on anticoagulation.

## 2023-11-10 NOTE — H&P ADULT - NSHPOUTPATIENTPROVIDERS_GEN_ALL_CORE
PCP - Shaka ALAMO - [facility]  Pulm - Victorino  Neuro - Reim PCP - Shaka Stevens  Pulm - Victorino  Neuro - Reim  Cardiology - [facility]

## 2023-11-10 NOTE — PROVIDER CONTACT NOTE (OTHER) - ACTION/TREATMENT ORDERED:
MD aware, no new orders at this time. Continue to monitor. MD aware will consider discontinuing aricept, otherwise no new orders at this time. Continue to monitor.

## 2023-11-10 NOTE — H&P ADULT - NSHPLABSRESULTS_GEN_ALL_CORE
10.7   5.56  )-----------( 125      ( 10 Nov 2023 12:05 )             32.6     11-10    138  |  106  |  36<H>  ----------------------------<  95  5.4<H>   |  30  |  0.74    Ca    9.8      10 Nov 2023 12:05    TPro  6.6  /  Alb  3.1<L>  /  TBili  0.3  /  DBili  x   /  AST  80<H>  /  ALT  108<H>  /  AlkPhos  199<H>  11-10    LIVER FUNCTIONS - ( 10 Nov 2023 12:05 )  Alb: 3.1 g/dL / Pro: 6.6 gm/dL / ALK PHOS: 199 U/L / ALT: 108 U/L / AST: 80 U/L / GGT: x           PT/INR - ( 10 Nov 2023 12:05 )   PT: 11.9 sec;   INR: 1.05 ratio       PTT - ( 10 Nov 2023 12:05 )  PTT:51.2 sec  Urinalysis Basic - ( 10 Nov 2023 12:05 )    Color: x / Appearance: x / SG: x / pH: x  Gluc: 95 mg/dL / Ketone: x  / Bili: x / Urobili: x   Blood: x / Protein: x / Nitrite: x   Leuk Esterase: x / RBC: x / WBC x   Sq Epi: x / Non Sq Epi: x / Bacteria: x      CT HEAD AND C-SPINE  IMPRESSION:  CT head:  -No acute intracranial findings.    CT cervical spine:  -No acute fracture or dislocation.  -Severe multilevel degenerative changes.    CT C/A/P:    IMPRESSION:  No acute traumatic pathology in the chest, abdomen, or pelvis.    Tree-in-bud nodularity at the left lung base favored to be infectious or   inflammatory in etiology.    FEMUR XRAY:  IMPRESSION: Right knee replacement. Fracture the lower medial shaft of   the right femur and knee and top of the knee replacement.    EKG - pending 10.7   5.56  )-----------( 125      ( 10 Nov 2023 12:05 )             32.6     11-10    138  |  106  |  36<H>  ----------------------------<  95  5.4<H>   |  30  |  0.74    Ca    9.8      10 Nov 2023 12:05    TPro  6.6  /  Alb  3.1<L>  /  TBili  0.3  /  DBili  x   /  AST  80<H>  /  ALT  108<H>  /  AlkPhos  199<H>  11-10    LIVER FUNCTIONS - ( 10 Nov 2023 12:05 )  Alb: 3.1 g/dL / Pro: 6.6 gm/dL / ALK PHOS: 199 U/L / ALT: 108 U/L / AST: 80 U/L / GGT: x           PT/INR - ( 10 Nov 2023 12:05 )   PT: 11.9 sec;   INR: 1.05 ratio       PTT - ( 10 Nov 2023 12:05 )  PTT:51.2 sec  Urinalysis Basic - ( 10 Nov 2023 12:05 )    Color: x / Appearance: x / SG: x / pH: x  Gluc: 95 mg/dL / Ketone: x  / Bili: x / Urobili: x   Blood: x / Protein: x / Nitrite: x   Leuk Esterase: x / RBC: x / WBC x   Sq Epi: x / Non Sq Epi: x / Bacteria: x    CT HEAD AND C-SPINE  IMPRESSION:  CT head:  -No acute intracranial findings.    CT cervical spine:  -No acute fracture or dislocation.  -Severe multilevel degenerative changes.    CT C/A/P:    IMPRESSION:  No acute traumatic pathology in the chest, abdomen, or pelvis.    Tree-in-bud nodularity at the left lung base favored to be infectious or   inflammatory in etiology.    FEMUR XRAY:  IMPRESSION: Right knee replacement. Fracture the lower medial shaft of   the right femur and knee and top of the knee replacement.    EKG - sinus porfirio at 45 bpm, QTc 454, LBBB, no acute ischemic changes, other than the rate, there is no change compared to EKG from March 2023

## 2023-11-10 NOTE — ED PROVIDER NOTE - NS_ ATTENDINGSCRIBEDETAILS _ED_A_ED_FT
I, Rodney Burr MD,  performed the initial face to face bedside interview with this patient regarding history of present illness, review of symptoms and relevant past medical, social and family history.  I completed an independent physical examination.  I was the initial provider who evaluated this patient.   I personally saw the patient and performed a substantive portion of the visit including all aspects of the medical decision making.  The history, relevant review of systems, past medical and surgical history, medical decision making, and physical examination was documented by the scribe in my presence and I attest to the accuracy of the documentation.

## 2023-11-10 NOTE — H&P ADULT - NSHPPHYSICALEXAM_GEN_ALL_CORE
HEENT:  pupils equal and reactive, EOMI, no oropharyngeal lesions, erythema, exudates, oral thrush  NECK:   supple, no carotid bruits, no palpable lymph nodes, no thyromegaly  CV:  +S1, +S2, regular, no murmurs or rubs  RESP:   lungs clear to auscultation bilaterally, no wheezing, rales, rhonchi, good air entry bilaterally  BREAST:  not examined  GI:  abdomen soft, non-tender, non-distended, normal BS, no bruits, no abdominal masses, no palpable masses  RECTAL:  not examined  :  not examined  MSK:   normal muscle tone, no atrophy, no rigidity, no contractions  EXT:  no clubbing, no cyanosis, no edema, no calf pain, swelling or erythema  VASCULAR:  pulses equal and symmetric in the upper and lower extremities  NEURO:  AAOX3, no focal neurological deficits, follows all commands, able to move extremities spontaneously  SKIN:  no ulcers, lesions or rashes HEENT:  pupils equal and reactive, EOMI, no oropharyngeal lesions, erythema, exudates, oral thrush  NECK:   supple, no carotid bruits, no palpable lymph nodes, no thyromegaly  CV:  +S1, +S2, regular, no murmurs or rubs  RESP:   lungs with mild rales at the bases, otherwise clear, no wheezing  GI:  abdomen soft, non-tender, non-distended, normal BS, no bruits, no abdominal masses, no palpable masses  EXT:  no clubbing, no cyanosis, no edema, no calf pain, swelling or erythema  NEURO:  AAOX3, no focal neurological deficits, follows all commands, able to move extremities spontaneously  SKIN:  no ulcers, lesions or rashes

## 2023-11-10 NOTE — H&P ADULT - ASSESSMENT
Status post mechanical fall down the steps resulting in right leg trauma and acute right distal femur and right periprosthetic hip fracture    Chronic Anemia, currently at baseline    Thrombocytopenia like due to fracture and consumption    Mild Hyperkalemia    Mild Pre-Renal Azotemia    Hypoalbuminemia    Elevated liver transaminases    Pulmonary nodules and nodularity at the left lung base    Achalasia    Dementia    Glaucoma        PLAN:  -  admit to 2N, inpatient, hospitalist service  -         Status post mechanical fall down the steps resulting in right leg trauma and acute right distal femur and right periprosthetic hip fracture    Chronic Anemia, currently at baseline    Thrombocytopenia like due to fracture and consumption    Mild Hyperkalemia    Mild Pre-Renal Azotemia    Hypoalbuminemia    Elevated liver transaminases    Pulmonary nodules and nodularity at the left lung base    Achalasia    Dementia    Glaucoma        PLAN:  -  admit to , inpatient, hospitalist service  -  pain control  -  ortho consult  -  bedrest for now  -  IVFs  -  I was able to get access to the EKG from her cardiologist's office - baseline sinus porfirio and LBBB and she had a negative stress test in Jan 2023 and EF was 63%  -  continue home medications  -  aspiration and fall precautions  -  DVT prophylaxis - venodynes and Lovenox  -  based on her clinical predictors and advanced age, patient is at moderate risk for perioperative complications for the proposed surgical procedure  -  at this time, she is medically optimized and does not need any further cardiac workup  -  repeat labs in am to ensure that the potassium level is stable  -  outpatient follow of pulm nodules with pulm   -  monitor platelets and renal function  -  Advance Directives - discussed with the patients daughter Miryam who is the HCP.  Her cell is [phone].  No limitations on care.  She is Full Code    d/w patient and family at the bedside   Status post mechanical fall down the steps resulting in right leg trauma and acute right distal femur and right periprosthetic hip fracture    Chronic Anemia, currently at baseline    Thrombocytopenia like due to fracture and consumption    Mild Hyperkalemia    Mild Pre-Renal Azotemia    Hypoalbuminemia    Elevated liver transaminases    Pulmonary nodules and nodularity at the left lung base    Achalasia    Dementia    Glaucoma        PLAN:  -  admit to 2N, inpatient, hospitalist service  -  pain control  -  ortho consult - Dr. He  -  bedrest for now  -  IVFs  -  I was able to get access to the EKG from her cardiologist's office - baseline sinus porfirio and LBBB and she had a negative stress test in Jan 2023 and EF was 63%  -  continue home medications  -  aspiration and fall precautions  -  check vitamin D level  -  DVT prophylaxis - venodynes and Lovenox  -  based on her clinical predictors and advanced age, patient is at moderate risk for perioperative complications for the proposed surgical procedure  -  at this time, she is medically optimized and does not need any further cardiac workup  -  repeat labs in am to ensure that the potassium level is stable  -  outpatient follow of pulm nodules with pulm   -  monitor platelets and renal function  -  Advance Directives - discussed with the patients daughter Miryam who is the HCP.  Her cell is [phone].  No limitations on care.  She is Full Code    d/w patient and family at the bedside   Status post mechanical fall down the steps resulting in right leg trauma and acute right distal femur and right periprosthetic hip fracture    Chronic Anemia, currently at baseline    Sinus Bradycardia and Chronic LBBB on EKG    Thrombocytopenia like due to fracture and consumption    Mild Hyperkalemia    Mild Pre-Renal Azotemia    Hypoalbuminemia    Elevated liver transaminases    Pulmonary nodules and nodularity at the left lung base    Achalasia    Dementia    Glaucoma        PLAN:  -  admit to , inpatient, hospitalist service  -  pain control  -  ortho consult - Dr. He  -  bedrest for now  -  IVFs  -  I was able to get access to the EKG from her cardiologist's office - baseline sinus porfirio and LBBB and she had a negative stress test in Jan 2023 and EF was 63%  -  continue home medications  -  aspiration and fall precautions  -  check vitamin D level  -  given the sinus bradycardia, will d/c Aricept for now  -  DVT prophylaxis - venodynes and Lovenox  -  based on her clinical predictors and advanced age, patient is at moderate risk for perioperative complications for the proposed surgical procedure  -  at this time, she is medically optimized and does not need any further cardiac workup  -  repeat labs in am to ensure that the potassium level is stable  -  outpatient follow of pulm nodules with pulm   -  monitor platelets and renal function  -  Advance Directives - discussed with the patients daughter Miryam who is the HCP.  Her cell is [phone].  No limitations on care.  She is Full Code  -  daughter informed me that patient is supposed to have an esophagram study next week that was ordered by Dr. Seth,  Spoke to Dr. Seth, he will see patient next week and we will attempt to do study while patient is admitted    d/w patient and family at the bedside   Status post mechanical fall down the steps resulting in right leg trauma and acute right distal femur and right periprosthetic hip fracture    Chronic Anemia, currently at baseline    Sinus Bradycardia and Chronic LBBB on EKG    Thrombocytopenia like due to fracture and consumption    Mild Hyperkalemia    Mild Pre-Renal Azotemia    Hypoalbuminemia    Elevated liver transaminases    Pulmonary nodules and nodularity at the left lung base    Achalasia    Dementia    Glaucoma        PLAN:  -  admit to , inpatient, hospitalist service  -  pain control  -  ortho consult - Dr. He  -  bedrest for now  -  IVFs  -  I was able to get access to the EKG from her cardiologist's office - baseline sinus porfirio and LBBB and she had a negative stress test in Jan 2023 and EF was 63%  -  aspiration and fall precautions  -  check vitamin D level  -  given the sinus bradycardia, will d/c Aricept for now  -  DVT prophylaxis - venodynes and Lovenox  -  based on her clinical predictors and advanced age, patient is at moderate risk for perioperative complications for the proposed surgical procedure  -  at this time, she is medically optimized and does not need any further cardiac workup  -  repeat labs in am to ensure that the potassium level is stable  -  outpatient follow of pulm nodules with pulm   -  monitor platelets and renal function  -  Advance Directives - discussed with the patients daughter Miryam who is the HCP.  Her cell is [phone].  No limitations on care.  She is Full Code  -  daughter informed me that patient is supposed to have an esophagram study next week that was ordered by Dr. Seth,  Spoke to Dr. Seth, he will see patient next week and we will attempt to do study while patient is admitted    d/w patient and family at the bedside

## 2023-11-10 NOTE — ED PROVIDER NOTE - PHYSICAL EXAMINATION
Constitutional: NAD AAOx3  Eyes: PERRLA EOMI  Head: Normocephalic atraumatic  ENT: No kaur sign, no raccoon eyes, no hemotympanum, no csf rhinorrhea, no nasal septal hematoma  Mouth: MMM  Cardiac: regular rate   Resp: unlabored breathing  GI: Abd s/nd, RLQ ttp  Neuro: grossly normal and intact GCS 15 no neuro deficits  Skin: No rashes, no bruising to chest, back, abdomen or extremities  Msk: No midline spinal ttp, full ROM of neck, c-collar cleared clinically and with provocative testing, no ttp of facial bones, no ttp to chest wall, pelvis stable, full ROM of all extremities without any ttp of extremities   ttp R hip and R knee Constitutional: NAD    Eyes: PERRLA EOMI  Head: Normocephalic atraumatic  ENT: No kaur sign, no raccoon eyes   Mouth: MMM  Cardiac: regular rate   Resp: unlabored breathing clear b/l   GI: Abd s/nd, RLQ ttp  Neuro: grossly normal and intact GCS 15 no neuro deficits  Skin: No rashes, no bruising to chest, back, abdomen or extremities  Msk: No midline spinal ttp, no ttp of facial bones, no ttp to chest wall, pelvis stable,  ttp R hip and R knee

## 2023-11-10 NOTE — ED ADULT NURSE NOTE - NSFALLHARMRISKINTERV_ED_ALL_ED

## 2023-11-10 NOTE — ED ADULT TRIAGE NOTE - CHIEF COMPLAINT QUOTE
Pt brought in by ambulance from home s/p slip and fall down 3-4 steps. No headstrike, no LOC. Pt with right knee deformity and pain. Pt with hx TKR on right.

## 2023-11-10 NOTE — ED PROVIDER NOTE - OBJECTIVE STATEMENT
89 yo female w/PMHx DVT, dementia, osteopenia, and osteoarthritis presents to the ED s/p fall down 3 steps. No LOC, no head strike, pt not on blood thinners. Pt with a history of knee replacement procedure on the R knee with Dr. Villasenor, pt c/o pain to R knee. Pt denying pain to chest, back, neck, or abdomen. Pt unable to ambulate after fall. 87 yo female w/PMHx DVT, dementia, osteopenia, and osteoarthritis presents to the ED s/p fall down 3 steps. No LOC, no head strike, pt not on blood thinners. Pt with a history of knee replacement procedure on the R knee with Dr. Villasenor, pt c/o pain to R knee and R hip. Pt denying pain to chest, back, neck, or abdomen. Pt unable to ambulate after fall.

## 2023-11-10 NOTE — H&P ADULT - HISTORY OF PRESENT ILLNESS
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 s/p chest tube drainage  Achalasia    PAST SURGICAL HISTORY:  s/p chest tube  s/p hysterectomy  s/p right total knee replacement - Dr. He  s/p appendectomy  s/p tonsillectomy  s/p spine surgery    FAMILY HISTORY:   Hx of lung cancer   88 F with Hx of dementia, was last admitted to  in March 2023 for pneumonia and left effusion for which she required chest tube drainage.  She presents to the ED today with a slip and fall while walking on the steps at home which resulted in her landing on her right side.  She reports that she slipped with her shoes on the steps, lost her footing and went down.  She denied any dizziness, lightheadedness, chest pain, chest pressure, nausea, vomiting, fevers, chills prior to the event and reports that she otherwise had been feeling good.      In the ED, she had imaging done which revealed an acute right distal femur fracture and a periprosthetic right knee fracture.      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 s/p chest tube drainage  Achalasia    PAST SURGICAL HISTORY:  s/p chest tube  s/p hysterectomy  s/p right total knee replacement - Dr. He  s/p appendectomy  s/p tonsillectomy  s/p spine surgery    FAMILY HISTORY:   Hx of lung cancer

## 2023-11-10 NOTE — ED PROVIDER NOTE - CLINICAL SUMMARY MEDICAL DECISION MAKING FREE TEXT BOX
87 yo female presents to the ED s/p trip and fall now with R hip and R knee pain. Will x-ray to r/o fx, ct to r/o traumatic injury and reassess. 87 yo female presents to the ED s/p trip and fall now with R hip and R knee pain. Will x-ray to r/o fx, ct to r/o traumatic injury and reassess.    CTs without traumatic injury x-ray shows right knee periprosthetic fracture spoke with Ortho make patient n.p.o. will see patient case discussed with hospitalist accepts.

## 2023-11-10 NOTE — ED ADULT TRIAGE NOTE - HEIGHT IN INCHES
Hospitalist Progress Note      Patient admitted this morning by my colleague for drug overdose and DKA after being found unresponsive by his mother. He has known opioid dependence and is currently on Suboxone therapy.   He was initiated on insulin drip and will be admitted to the ICU.    +++++++++++++++++++++++++++++++++++++++++++++++++  Anabell Dodd, 69 Estrella Sharma 69, [facility]

## 2023-11-11 LAB
24R-OH-CALCIDIOL SERPL-MCNC: 29.4 NG/ML — LOW (ref 30–80)
24R-OH-CALCIDIOL SERPL-MCNC: 29.4 NG/ML — LOW (ref 30–80)
ALBUMIN SERPL ELPH-MCNC: 2.5 G/DL — LOW (ref 3.3–5)
ALBUMIN SERPL ELPH-MCNC: 2.5 G/DL — LOW (ref 3.3–5)
ALP SERPL-CCNC: 154 U/L — HIGH (ref 40–120)
ALP SERPL-CCNC: 154 U/L — HIGH (ref 40–120)
ALT FLD-CCNC: 79 U/L — HIGH (ref 12–78)
ALT FLD-CCNC: 79 U/L — HIGH (ref 12–78)
ANION GAP SERPL CALC-SCNC: 5 MMOL/L — SIGNIFICANT CHANGE UP (ref 5–17)
ANION GAP SERPL CALC-SCNC: 5 MMOL/L — SIGNIFICANT CHANGE UP (ref 5–17)
APTT BLD: 38.4 SEC — HIGH (ref 24.5–35.6)
APTT BLD: 38.4 SEC — HIGH (ref 24.5–35.6)
AST SERPL-CCNC: 52 U/L — HIGH (ref 15–37)
AST SERPL-CCNC: 52 U/L — HIGH (ref 15–37)
BILIRUB DIRECT SERPL-MCNC: <0.1 MG/DL — SIGNIFICANT CHANGE UP (ref 0–0.3)
BILIRUB DIRECT SERPL-MCNC: <0.1 MG/DL — SIGNIFICANT CHANGE UP (ref 0–0.3)
BILIRUB INDIRECT FLD-MCNC: >0.2 MG/DL — SIGNIFICANT CHANGE UP (ref 0.2–1)
BILIRUB INDIRECT FLD-MCNC: >0.2 MG/DL — SIGNIFICANT CHANGE UP (ref 0.2–1)
BILIRUB SERPL-MCNC: 0.3 MG/DL — SIGNIFICANT CHANGE UP (ref 0.2–1.2)
BILIRUB SERPL-MCNC: 0.3 MG/DL — SIGNIFICANT CHANGE UP (ref 0.2–1.2)
BUN SERPL-MCNC: 35 MG/DL — HIGH (ref 7–23)
BUN SERPL-MCNC: 35 MG/DL — HIGH (ref 7–23)
CALCIUM SERPL-MCNC: 9 MG/DL — SIGNIFICANT CHANGE UP (ref 8.5–10.1)
CALCIUM SERPL-MCNC: 9 MG/DL — SIGNIFICANT CHANGE UP (ref 8.5–10.1)
CHLORIDE SERPL-SCNC: 107 MMOL/L — SIGNIFICANT CHANGE UP (ref 96–108)
CHLORIDE SERPL-SCNC: 107 MMOL/L — SIGNIFICANT CHANGE UP (ref 96–108)
CO2 SERPL-SCNC: 26 MMOL/L — SIGNIFICANT CHANGE UP (ref 22–31)
CO2 SERPL-SCNC: 26 MMOL/L — SIGNIFICANT CHANGE UP (ref 22–31)
CREAT SERPL-MCNC: 0.83 MG/DL — SIGNIFICANT CHANGE UP (ref 0.5–1.3)
CREAT SERPL-MCNC: 0.83 MG/DL — SIGNIFICANT CHANGE UP (ref 0.5–1.3)
EGFR: 68 ML/MIN/1.73M2 — SIGNIFICANT CHANGE UP
EGFR: 68 ML/MIN/1.73M2 — SIGNIFICANT CHANGE UP
GLUCOSE SERPL-MCNC: 100 MG/DL — HIGH (ref 70–99)
GLUCOSE SERPL-MCNC: 100 MG/DL — HIGH (ref 70–99)
HCT VFR BLD CALC: 23.7 % — LOW (ref 34.5–45)
HCT VFR BLD CALC: 23.7 % — LOW (ref 34.5–45)
HCT VFR BLD CALC: 25.4 % — LOW (ref 34.5–45)
HCT VFR BLD CALC: 25.4 % — LOW (ref 34.5–45)
HCT VFR BLD CALC: 25.8 % — LOW (ref 34.5–45)
HCT VFR BLD CALC: 25.8 % — LOW (ref 34.5–45)
HGB BLD-MCNC: 7.6 G/DL — LOW (ref 11.5–15.5)
HGB BLD-MCNC: 7.6 G/DL — LOW (ref 11.5–15.5)
HGB BLD-MCNC: 8.3 G/DL — LOW (ref 11.5–15.5)
HGB BLD-MCNC: 8.3 G/DL — LOW (ref 11.5–15.5)
HGB BLD-MCNC: 8.7 G/DL — LOW (ref 11.5–15.5)
HGB BLD-MCNC: 8.7 G/DL — LOW (ref 11.5–15.5)
INR BLD: 1.02 RATIO — SIGNIFICANT CHANGE UP (ref 0.85–1.18)
INR BLD: 1.02 RATIO — SIGNIFICANT CHANGE UP (ref 0.85–1.18)
MCHC RBC-ENTMCNC: 28.7 PG — SIGNIFICANT CHANGE UP (ref 27–34)
MCHC RBC-ENTMCNC: 28.7 PG — SIGNIFICANT CHANGE UP (ref 27–34)
MCHC RBC-ENTMCNC: 29.1 PG — SIGNIFICANT CHANGE UP (ref 27–34)
MCHC RBC-ENTMCNC: 29.1 PG — SIGNIFICANT CHANGE UP (ref 27–34)
MCHC RBC-ENTMCNC: 32.1 GM/DL — SIGNIFICANT CHANGE UP (ref 32–36)
MCHC RBC-ENTMCNC: 32.1 GM/DL — SIGNIFICANT CHANGE UP (ref 32–36)
MCHC RBC-ENTMCNC: 32.7 GM/DL — SIGNIFICANT CHANGE UP (ref 32–36)
MCHC RBC-ENTMCNC: 32.7 GM/DL — SIGNIFICANT CHANGE UP (ref 32–36)
MCV RBC AUTO: 89.1 FL — SIGNIFICANT CHANGE UP (ref 80–100)
MCV RBC AUTO: 89.1 FL — SIGNIFICANT CHANGE UP (ref 80–100)
MCV RBC AUTO: 89.4 FL — SIGNIFICANT CHANGE UP (ref 80–100)
MCV RBC AUTO: 89.4 FL — SIGNIFICANT CHANGE UP (ref 80–100)
PLATELET # BLD AUTO: 106 K/UL — LOW (ref 150–400)
PLATELET # BLD AUTO: 106 K/UL — LOW (ref 150–400)
PLATELET # BLD AUTO: 122 K/UL — LOW (ref 150–400)
PLATELET # BLD AUTO: 122 K/UL — LOW (ref 150–400)
POTASSIUM SERPL-MCNC: 5.1 MMOL/L — SIGNIFICANT CHANGE UP (ref 3.5–5.3)
POTASSIUM SERPL-MCNC: 5.1 MMOL/L — SIGNIFICANT CHANGE UP (ref 3.5–5.3)
POTASSIUM SERPL-SCNC: 5.1 MMOL/L — SIGNIFICANT CHANGE UP (ref 3.5–5.3)
POTASSIUM SERPL-SCNC: 5.1 MMOL/L — SIGNIFICANT CHANGE UP (ref 3.5–5.3)
PROT SERPL-MCNC: 5.3 GM/DL — LOW (ref 6–8.3)
PROT SERPL-MCNC: 5.3 GM/DL — LOW (ref 6–8.3)
PROTHROM AB SERPL-ACNC: 11.5 SEC — SIGNIFICANT CHANGE UP (ref 9.5–13)
PROTHROM AB SERPL-ACNC: 11.5 SEC — SIGNIFICANT CHANGE UP (ref 9.5–13)
RBC # BLD: 2.65 M/UL — LOW (ref 3.8–5.2)
RBC # BLD: 2.65 M/UL — LOW (ref 3.8–5.2)
RBC # BLD: 2.85 M/UL — LOW (ref 3.8–5.2)
RBC # BLD: 2.85 M/UL — LOW (ref 3.8–5.2)
RBC # FLD: 17.2 % — HIGH (ref 10.3–14.5)
RBC # FLD: 17.2 % — HIGH (ref 10.3–14.5)
RBC # FLD: 17.5 % — HIGH (ref 10.3–14.5)
RBC # FLD: 17.5 % — HIGH (ref 10.3–14.5)
SODIUM SERPL-SCNC: 138 MMOL/L — SIGNIFICANT CHANGE UP (ref 135–145)
SODIUM SERPL-SCNC: 138 MMOL/L — SIGNIFICANT CHANGE UP (ref 135–145)
WBC # BLD: 5.53 K/UL — SIGNIFICANT CHANGE UP (ref 3.8–10.5)
WBC # BLD: 5.53 K/UL — SIGNIFICANT CHANGE UP (ref 3.8–10.5)
WBC # BLD: 6.65 K/UL — SIGNIFICANT CHANGE UP (ref 3.8–10.5)
WBC # BLD: 6.65 K/UL — SIGNIFICANT CHANGE UP (ref 3.8–10.5)
WBC # FLD AUTO: 5.53 K/UL — SIGNIFICANT CHANGE UP (ref 3.8–10.5)
WBC # FLD AUTO: 5.53 K/UL — SIGNIFICANT CHANGE UP (ref 3.8–10.5)
WBC # FLD AUTO: 6.65 K/UL — SIGNIFICANT CHANGE UP (ref 3.8–10.5)
WBC # FLD AUTO: 6.65 K/UL — SIGNIFICANT CHANGE UP (ref 3.8–10.5)

## 2023-11-11 PROCEDURE — 99233 SBSQ HOSP IP/OBS HIGH 50: CPT

## 2023-11-11 PROCEDURE — 73706 CT ANGIO LWR EXTR W/O&W/DYE: CPT | Mod: 26,RT

## 2023-11-11 RX ORDER — ACETAMINOPHEN 500 MG
750 TABLET ORAL ONCE
Refills: 0 | Status: COMPLETED | OUTPATIENT
Start: 2023-11-11 | End: 2023-11-12

## 2023-11-11 RX ORDER — ENOXAPARIN SODIUM 100 MG/ML
40 INJECTION SUBCUTANEOUS ONCE
Refills: 0 | Status: COMPLETED | OUTPATIENT
Start: 2023-11-11 | End: 2023-11-11

## 2023-11-11 RX ORDER — ACETAMINOPHEN 500 MG
750 TABLET ORAL ONCE
Refills: 0 | Status: DISCONTINUED | OUTPATIENT
Start: 2023-11-11 | End: 2023-11-15

## 2023-11-11 RX ADMIN — ENOXAPARIN SODIUM 40 MILLIGRAM(S): 100 INJECTION SUBCUTANEOUS at 14:04

## 2023-11-11 NOTE — DIETITIAN INITIAL EVALUATION ADULT - SIGNS/SYMPTOMS
AEB severe muscle and fat wasting, limited ability to consume sufficient nutrients AEB severe muscle and fat wasting, limited ability to consume sufficient nutrients, Low BMI for age

## 2023-11-11 NOTE — CHART NOTE - NSCHARTNOTEFT_GEN_A_CORE
Dr. Sal called re hypothermia and 2 point Hgb drop since admission. Pt seen and examined at bedside. Gelacio hugger on and resting comfortably.    RLE:  skin intact, incision CDI  BJKI in place  TTP around distal femur  compartments soft and compressible  no pain with passive stretch  SILT adán/saph/sp/dp/tib  +EHL/FHL/TA/GSC  palpable DP/PT    low suspicion for active arterial bleed at this time based on exam  rpt Hgb 8.3/25.4 from 8.6/26.1  will obtain CTA RLE to rule out active arterial bleed and if present recommend vascular consult  hypothermia w/u per primary team

## 2023-11-11 NOTE — DIETITIAN INITIAL EVALUATION ADULT - PERTINENT MEDS FT
MEDICATIONS  (STANDING):  enoxaparin Injectable 40 milliGRAM(s) SubCutaneous once  sodium chloride 0.9%. 1000 milliLiter(s) (75 mL/Hr) IV Continuous <Continuous>  sodium chloride 0.9%. 1000 milliLiter(s) (100 mL/Hr) IV Continuous <Continuous>  tranexamic acid IVPB 1000 milliGRAM(s) IV Intermittent once  tranexamic acid IVPB 1000 milliGRAM(s) IV Intermittent once    MEDICATIONS  (PRN):  acetaminophen     Tablet .. 650 milliGRAM(s) Oral every 6 hours PRN Temp greater or equal to 38C (100.4F)  HYDROmorphone  Injectable 0.5 milliGRAM(s) IV Push every 4 hours PRN Severe Pain (7 - 10)  ondansetron Injectable 4 milliGRAM(s) IV Push every 6 hours PRN Nausea and/or Vomiting   MEDICATIONS  (STANDING):  enoxaparin Injectable 40 milliGRAM(s) SubCutaneous once  sodium chloride 0.9%. 1000 milliLiter(s) (75 mL/Hr) IV Continuous <Continuous>  sodium chloride 0.9%. 1000 milliLiter(s) (100 mL/Hr) IV Continuous <Continuous>  tranexamic acid IVPB 1000 milliGRAM(s) IV Intermittent once  tranexamic acid IVPB 1000 milliGRAM(s) IV Intermittent once    MEDICATIONS  (PRN):  acetaminophen     Tablet .. 650 milliGRAM(s) Oral every 6 hours PRN Temp greater or equal to 38C (100.4F)  HYDROmorphone  Injectable 0.5 milliGRAM(s) IV Push every 4 hours PRN Severe Pain (7 - 10)  ondansetron Injectable 4 milliGRAM(s) IV Push every 6 hours PRN Nausea and/or Vomiting    Home Medications:  donepezil 5 mg oral tablet: 1 tab(s) orally once a day (at bedtime) (10 Nov 2023 14:01)  dorzolamide-timolol 2.23%-0.68% (2%-0.5% base) ophthalmic solution: 1 drop(s) in the right eye 2 times a day (10 Nov 2023 14:01)

## 2023-11-11 NOTE — DIETITIAN INITIAL EVALUATION ADULT - NSFNSPHYEXAMSKINFT_GEN_A_CORE
Pressure Injury 1: second toe, Unstageable  Pressure Injury 2: third toe, Unstageable  Enrico Scale Score of 16.    Pressure Injury 1: second toe, Unstageable  Pressure Injury 2: third toe, Unstageable    Enrico Scale Score of 16.

## 2023-11-11 NOTE — DIETITIAN INITIAL EVALUATION ADULT - PERTINENT LABORATORY DATA
11-11    138  |  107  |  35<H>  ----------------------------<  100<H>  5.1   |  26  |  0.83    Ca    9.0      11 Nov 2023 07:40    TPro  5.3<L>  /  Alb  2.5<L>  /  TBili  0.3  /  DBili  <0.1  /  AST  52<H>  /  ALT  79<H>  /  AlkPhos  154<H>  11-11    11-10     Vit D   29.4 <L>

## 2023-11-11 NOTE — DIETITIAN INITIAL EVALUATION ADULT - ETIOLOGY
r/t dementia, advanced age r/t decreased ability to meet increased nutrient needs 2/2 dementia, advanced age, wound healing

## 2023-11-11 NOTE — DIETITIAN INITIAL EVALUATION ADULT - NAME AND PHONE
Marilin Mariano, MS, [phone] Marilin Mariano, MS, [phone]  Kelli Cardona MS, RDN, [phone]  Nutrition

## 2023-11-11 NOTE — DIETITIAN INITIAL EVALUATION ADULT - FACTORS AFF FOOD INTAKE
difficulty chewing/difficulty feeding self/persistent lack of appetite change in mental status/difficulty chewing/difficulty feeding self/persistent lack of appetite

## 2023-11-11 NOTE — DIETITIAN INITIAL EVALUATION ADULT - NSFNSGIIOFT_GEN_A_CORE
I&O's Detail    10 Nov 2023 07:01  -  11 Nov 2023 07:00  --------------------------------------------------------  IN:    sodium chloride 0.9%: 900 mL  Total IN: 900 mL    OUT:  Total OUT: 0 mL    Total NET: 900 mL

## 2023-11-11 NOTE — DIETITIAN NUTRITION RISK NOTIFICATION - ADDITIONAL COMMENTS/DIETITIAN RECOMMENDATIONS
1. C/w Regular, Soft & Bite-Sized diet   2. Magic cup BID (provides 290 kcal/ 9 g protein) to optimize nutritional needs and Rivera BID (provides 90 kcal, 2.5 g collagen, 7 g L-Arginine, 7 g L-Glutamine/ 1 packet) to promote wound healing.  3. Consider adding appetite stimulant such as Remeron or Marinol 2/2 chronically poor appetite/ PO intake   4. Monitor bowel movements, if no BM for >3 days, consider implementing bowel regimen.   5. Encourage protein-rich foods, maximize food preferences   6. Recommend to add MVI w/minerals, Vit C 500 mg BID, add Zinc Sulfate 220 mg x 10 days to promote wound healing.   7. Consider adding thiamine 100 mg daily 2/2 poor PO intake/ malnutrition   8. confirm goals of care regarding nutrition support - Nutrition support is not recommended due to overall declining medical status which evidenced based studies indicate EN is not effective in prolonging survival and improving quality of life. It can also increase risk of aspiration pneumonia as well as other related issues (infection, GI complications, and worsening/ non-healing PI's). However, will provide nutrition/ hydration within GOC.   10. Obtain weekly weights  RD will continue to monitor PO intake, labs, hydration, and wt prn.

## 2023-11-11 NOTE — DIETITIAN INITIAL EVALUATION ADULT - NS FNS DIET ORDER
Diet, NPO after Midnight:      NPO Start Date: 11-Nov-2023,   NPO Start Time: 23:59  Except Medications (11-11-23 @ 01:01)  Diet, Soft and Bite Sized:   Supplement Feeding Modality:  Oral  Ensure Plus High Protein Cans or Servings Per Day:  1       Frequency:  Two Times a day (11-10-23 @ 17:04)   Diet, NPO after Midnight:      NPO Start Date: 11-Nov-2023,   NPO Start Time: 23:59  Except Medications (11-11-23 @ 01:01)  Diet, Soft and Bite Sized:   Supplement Feeding Modality:  Oral  Ensure Plus High Protein Cans or Servings Per Day:  1       Frequency:  Two Times a day (11-10-23 @ 17:04)

## 2023-11-11 NOTE — DIETITIAN INITIAL EVALUATION ADULT - ORAL INTAKE PTA/DIET HISTORY
Dementia pt, barely verbal at time of RD visit, unable to obtain meaningful diet hx.  Unable to obtain intake/ diet hx pta 2/2 pt's mental status, hx of dementia

## 2023-11-11 NOTE — DIETITIAN INITIAL EVALUATION ADULT - ADD RECOMMEND
1. Magic cup BID (provides 290 kcal/ 9 g protein) to optimize nutritional needs   2. Rivera BID (provides 90 kcal, 2.5 g collagen, 7 g L-Arginine, 7 g L-Glutamine/ 1 packet) to promote wound healing. Rivera is intended to support tissue building and collagen formation in the dietary management of wounds, which has been clinically shown to support wound healing (including pressure injuries, diabetic foot ulcers, surgical incisions, burns, and other acute/chronic wounds) by enhancing collagen formation in as little as 2 weeks.   3. Consider adding appetite stimulant such as Remeron or Marinol 2/2 chronically poor appetite/ PO intake   4. Monitor bowel movements, if no BM for >3 days, consider implementing bowel regimen.   5. Encourage protein-rich foods, maximize food preferences   6. Recommend MVI w/minerals  7. Consider adding thiamine 100 mg daily 2/2 poor PO intake/ malnutrition   8. C/w Reg, Soft and Bite Sized diet  9. confirm goals of care regarding nutrition support  10. Obtain weekly weights  RD will continue to monitor PO intake, labs, hydration, and wt prn.  1. C/w Regular, Soft & Bite-Sized diet   2. Magic cup BID (provides 290 kcal/ 9 g protein) to optimize nutritional needs and Rivera BID (provides 90 kcal, 2.5 g collagen, 7 g L-Arginine, 7 g L-Glutamine/ 1 packet) to promote wound healing.  3. Consider adding appetite stimulant such as Remeron or Marinol 2/2 chronically poor appetite/ PO intake   4. Monitor bowel movements, if no BM for >3 days, consider implementing bowel regimen.   5. Encourage protein-rich foods, maximize food preferences   6. Recommend to add MVI w/minerals, Vit C 500 mg BID, add Zinc Sulfate 220 mg x 10 days to promote wound healing.   7. Consider adding thiamine 100 mg daily 2/2 poor PO intake/ malnutrition   8. confirm goals of care regarding nutrition support - Nutrition support is not recommended due to overall declining medical status which evidenced based studies indicate EN is not effective in prolonging survival and improving quality of life. It can also increase risk of aspiration pneumonia as well as other related issues (infection, GI complications, and worsening/ non-healing PI's). However, will provide nutrition/ hydration within GOC.   10. Obtain weekly weights  RD will continue to monitor PO intake, labs, hydration, and wt prn.

## 2023-11-11 NOTE — DIETITIAN NUTRITION RISK NOTIFICATION - TREATMENT: THE FOLLOWING DIET HAS BEEN RECOMMENDED
Diet, NPO after Midnight:      NPO Start Date: 11-Nov-2023,   NPO Start Time: 23:59  Except Medications (11-11-23 @ 01:01) [Active]  Diet, Soft and Bite Sized:   Supplement Feeding Modality:  Oral  Ensure Plus High Protein Cans or Servings Per Day:  1       Frequency:  Two Times a day (11-10-23 @ 17:04) [Active]

## 2023-11-11 NOTE — DIETITIAN INITIAL EVALUATION ADULT - OTHER INFO
88 F with Hx of dementia, was last admitted to  in March 2023 for pneumonia and left effusion for which she required chest tube drainage. Pt presented to ED s/p slip and fall. In the ED, she had imaging done which revealed an acute right distal femur fracture and a periprosthetic right knee fracture. She denied any dizziness, lightheadedness, chest pain, chest pressure, nausea, vomiting, fevers, chills prior to the event and reports that she otherwise had been feeling good.     Pt barely verbal at time of RD visit, unable to obtain meaningful diet hx. Limited NFPE 2/2 pt inability to sit upright and move limbs at time of visit, however fat and muscle wasting observed. Appeared frail, thin, and bony. Pt was admitted 3/2023 for PNA and dx'd with severe PCM, still meets criteria. Bed scale wt of 115 lbs obtained by RD 11/11/23. 1+ edema of R knee noted, may be skewing weight assessment. Last admission, 3/2023, pt reported body wt of 120 lbs, question accuracy 2/2 dementia. Reported not liking Ensure shakes at last admission RD visit, recommend Magic cup BID (provides 290 kcal/ 9 g protein) to optimize nutritional needs. Rivera BID (provides 90 kcal, 2.5 g collagen, 7 g L-Arginine, 7 g L-Glutamine/ 1 packet) to promote wound healing. Rivera is intended to support tissue building and collagen formation in the dietary management of wounds, which has been clinically shown to support wound healing (including pressure injuries, diabetic foot ulcers, surgical incisions, burns, and other acute/chronic wounds) by enhancing collagen formation in as little as 2 weeks. Vitamin D levels of 29.4 (11-10), K+ 5.7, RBC/Hgb/Hct (11/11). No bkfst tray observed, Ensure shake opened at bedside about half full. RN reports pt has not eaten much since admission, limited options 2/2 Soft-Bite Sized diet. Consider adding appetite stimulant such as Remeron or Marinol 2/2 chronically poor appetite/ PO intake. Consider adding thiamine 100 mg daily 2/2 poor PO intake/ malnutrition  87 y/o F with a PHx of dementia, was last admitted to  in March 2023 for pneumonia and left effusion for which she required chest tube drainage. Pt presented to ED s/p slip and fall. In the ED, she had imaging done which revealed an acute right distal femur fracture and a periprosthetic right knee fracture. Admitted for     Pt barely verbal at time of RD visit, unable to obtain meaningful diet hx. Limited NFPE 2/2 pt inability to sit upright and move limbs at time of visit, however fat and muscle wasting observed. Appeared frail, thin, and bony. Pt was admitted 3/2023 for PNA and dx'd with severe PCM, still meets criteria. Bed scale wt of 115 lbs obtained by RD 11/11/23. 1+ edema of R knee noted, may be skewing weight assessment. Last admission, 3/2023, pt reported body wt of 120 lbs, question accuracy 2/2 dementia. Reported not liking Ensure shakes at last admission RD visit, recommend Magic cup BID (provides 290 kcal/ 9 g protein) to optimize nutritional needs. Rivera BID (provides 90 kcal, 2.5 g collagen, 7 g L-Arginine, 7 g L-Glutamine/ 1 packet) to promote wound healing. Rivera is intended to support tissue building and collagen formation in the dietary management of wounds, which has been clinically shown to support wound healing (including pressure injuries, diabetic foot ulcers, surgical incisions, burns, and other acute/chronic wounds) by enhancing collagen formation in as little as 2 weeks. Vitamin D levels of 29.4 (11-10), K+ 5.7, RBC/Hgb/Hct (11/11). No bkfst tray observed, Ensure shake opened at bedside about half full. RN reports pt has not eaten much since admission, limited options 2/2 Soft-Bite Sized diet. Consider adding appetite stimulant such as Remeron or Marinol 2/2 chronically poor appetite/ PO intake. Consider adding thiamine 100 mg daily 2/2 poor PO intake/ malnutrition  89 y/o F with a PHMx of dementia, was last admitted to  in March 2023 for pneumonia and left effusion for which she required chest tube drainage. Pt presented to ED s/p slip and fall. In the ED, she had imaging done which revealed an acute right distal femur fracture and a periprosthetic right knee fracture. Admitted for acute right distal femur and right periprosthetic hip fracture. FULL CODE. Plan for OR tomorrow.    Pt barely verbal at time of RD visit, unable to obtain meaningful diet hx. Limited NFPE 2/2 pt inability to sit upright and move limbs at time of visit and appears to be in pain, however fat and muscle wasting observed. Appeared frail, thin, and bony. Pt was admitted 3/2023 for PNA and dx'd with severe PCM, still meets criteria. Bed scale wt of 115 lbs obtained by RD 11/11/23. 1+ edema of R knee noted, may be skewing weight assessment. Last admission, 3/2023, pt reported body wt of 120 lbs, question accuracy 2/2 dementia. Reported not liking Ensure shakes at last admission RD visit, recommend Magic cup BID (provides 290 kcal/ 9 g protein) to optimize nutritional needs. Will add Rivera BID (provides 90 kcal, 2.5 g collagen, 7 g L-Arginine, 7 g L-Glutamine/ 1 packet) to promote wound healing. Rivera is intended to support tissue building and collagen formation in the dietary management of wounds, which has been clinically shown to support wound healing (including pressure injuries, diabetic foot ulcers, surgical incisions, burns, and other acute/chronic wounds) by enhancing collagen formation in as little as 2 weeks. Vitamin D levels of 29.4 (11-10), K+ 5.7, RBC/Hgb/Hct (11/11). No bkfst tray observed, Ensure shake opened at bedside about half full. RN reports pt has not eaten much since admission, limited options 2/2 Soft-Bite Sized diet. Consider adding appetite stimulant such as Remeron or Marinol 2/2 chronically poor appetite/ PO intake. Consider adding thiamine 100 mg daily 2/2 poor PO intake/ malnutrition. Nutrition support is not recommended due to overall declining medical status which evidenced based studies indicate EN is not effective in prolonging survival and improving quality of life. It can also increase risk of aspiration pneumonia as well as other related issues (infection, GI complications, and worsening/ non-healing PI's). However, will provide nutrition/ hydration within GOC. Please see additional recommendations below.

## 2023-11-12 ENCOUNTER — TRANSCRIPTION ENCOUNTER (OUTPATIENT)
Age: 88
End: 2023-11-12

## 2023-11-12 LAB
ANION GAP SERPL CALC-SCNC: 2 MMOL/L — LOW (ref 5–17)
ANION GAP SERPL CALC-SCNC: 2 MMOL/L — LOW (ref 5–17)
ANION GAP SERPL CALC-SCNC: 5 MMOL/L — SIGNIFICANT CHANGE UP (ref 5–17)
ANION GAP SERPL CALC-SCNC: 5 MMOL/L — SIGNIFICANT CHANGE UP (ref 5–17)
APTT BLD: 41.9 SEC — HIGH (ref 24.5–35.6)
APTT BLD: 41.9 SEC — HIGH (ref 24.5–35.6)
BUN SERPL-MCNC: 24 MG/DL — HIGH (ref 7–23)
BUN SERPL-MCNC: 24 MG/DL — HIGH (ref 7–23)
BUN SERPL-MCNC: 33 MG/DL — HIGH (ref 7–23)
BUN SERPL-MCNC: 33 MG/DL — HIGH (ref 7–23)
CALCIUM SERPL-MCNC: 8.7 MG/DL — SIGNIFICANT CHANGE UP (ref 8.5–10.1)
CHLORIDE SERPL-SCNC: 112 MMOL/L — HIGH (ref 96–108)
CHLORIDE SERPL-SCNC: 112 MMOL/L — HIGH (ref 96–108)
CHLORIDE SERPL-SCNC: 113 MMOL/L — HIGH (ref 96–108)
CHLORIDE SERPL-SCNC: 113 MMOL/L — HIGH (ref 96–108)
CO2 SERPL-SCNC: 26 MMOL/L — SIGNIFICANT CHANGE UP (ref 22–31)
CO2 SERPL-SCNC: 26 MMOL/L — SIGNIFICANT CHANGE UP (ref 22–31)
CO2 SERPL-SCNC: 29 MMOL/L — SIGNIFICANT CHANGE UP (ref 22–31)
CO2 SERPL-SCNC: 29 MMOL/L — SIGNIFICANT CHANGE UP (ref 22–31)
CREAT SERPL-MCNC: 0.63 MG/DL — SIGNIFICANT CHANGE UP (ref 0.5–1.3)
CREAT SERPL-MCNC: 0.63 MG/DL — SIGNIFICANT CHANGE UP (ref 0.5–1.3)
CREAT SERPL-MCNC: 0.74 MG/DL — SIGNIFICANT CHANGE UP (ref 0.5–1.3)
CREAT SERPL-MCNC: 0.74 MG/DL — SIGNIFICANT CHANGE UP (ref 0.5–1.3)
CULTURE RESULTS: NO GROWTH — SIGNIFICANT CHANGE UP
CULTURE RESULTS: NO GROWTH — SIGNIFICANT CHANGE UP
EGFR: 78 ML/MIN/1.73M2 — SIGNIFICANT CHANGE UP
EGFR: 78 ML/MIN/1.73M2 — SIGNIFICANT CHANGE UP
EGFR: 85 ML/MIN/1.73M2 — SIGNIFICANT CHANGE UP
EGFR: 85 ML/MIN/1.73M2 — SIGNIFICANT CHANGE UP
GLUCOSE SERPL-MCNC: 104 MG/DL — HIGH (ref 70–99)
GLUCOSE SERPL-MCNC: 104 MG/DL — HIGH (ref 70–99)
GLUCOSE SERPL-MCNC: 115 MG/DL — HIGH (ref 70–99)
GLUCOSE SERPL-MCNC: 115 MG/DL — HIGH (ref 70–99)
HCT VFR BLD CALC: 24.2 % — LOW (ref 34.5–45)
HCT VFR BLD CALC: 24.2 % — LOW (ref 34.5–45)
HCT VFR BLD CALC: 27.1 % — LOW (ref 34.5–45)
HCT VFR BLD CALC: 27.1 % — LOW (ref 34.5–45)
HGB BLD-MCNC: 8.2 G/DL — LOW (ref 11.5–15.5)
HGB BLD-MCNC: 8.2 G/DL — LOW (ref 11.5–15.5)
HGB BLD-MCNC: 9.3 G/DL — LOW (ref 11.5–15.5)
HGB BLD-MCNC: 9.3 G/DL — LOW (ref 11.5–15.5)
INR BLD: 1.08 RATIO — SIGNIFICANT CHANGE UP (ref 0.85–1.18)
INR BLD: 1.08 RATIO — SIGNIFICANT CHANGE UP (ref 0.85–1.18)
MCHC RBC-ENTMCNC: 29.9 PG — SIGNIFICANT CHANGE UP (ref 27–34)
MCHC RBC-ENTMCNC: 29.9 PG — SIGNIFICANT CHANGE UP (ref 27–34)
MCHC RBC-ENTMCNC: 31.1 PG — SIGNIFICANT CHANGE UP (ref 27–34)
MCHC RBC-ENTMCNC: 31.1 PG — SIGNIFICANT CHANGE UP (ref 27–34)
MCHC RBC-ENTMCNC: 33.9 GM/DL — SIGNIFICANT CHANGE UP (ref 32–36)
MCHC RBC-ENTMCNC: 33.9 GM/DL — SIGNIFICANT CHANGE UP (ref 32–36)
MCHC RBC-ENTMCNC: 34.3 GM/DL — SIGNIFICANT CHANGE UP (ref 32–36)
MCHC RBC-ENTMCNC: 34.3 GM/DL — SIGNIFICANT CHANGE UP (ref 32–36)
MCV RBC AUTO: 88.3 FL — SIGNIFICANT CHANGE UP (ref 80–100)
MCV RBC AUTO: 88.3 FL — SIGNIFICANT CHANGE UP (ref 80–100)
MCV RBC AUTO: 90.6 FL — SIGNIFICANT CHANGE UP (ref 80–100)
MCV RBC AUTO: 90.6 FL — SIGNIFICANT CHANGE UP (ref 80–100)
PLATELET # BLD AUTO: 100 K/UL — LOW (ref 150–400)
PLATELET # BLD AUTO: 100 K/UL — LOW (ref 150–400)
PLATELET # BLD AUTO: 87 K/UL — LOW (ref 150–400)
PLATELET # BLD AUTO: 87 K/UL — LOW (ref 150–400)
POTASSIUM SERPL-MCNC: 4.1 MMOL/L — SIGNIFICANT CHANGE UP (ref 3.5–5.3)
POTASSIUM SERPL-MCNC: 4.1 MMOL/L — SIGNIFICANT CHANGE UP (ref 3.5–5.3)
POTASSIUM SERPL-MCNC: 4.3 MMOL/L — SIGNIFICANT CHANGE UP (ref 3.5–5.3)
POTASSIUM SERPL-MCNC: 4.3 MMOL/L — SIGNIFICANT CHANGE UP (ref 3.5–5.3)
POTASSIUM SERPL-SCNC: 4.1 MMOL/L — SIGNIFICANT CHANGE UP (ref 3.5–5.3)
POTASSIUM SERPL-SCNC: 4.1 MMOL/L — SIGNIFICANT CHANGE UP (ref 3.5–5.3)
POTASSIUM SERPL-SCNC: 4.3 MMOL/L — SIGNIFICANT CHANGE UP (ref 3.5–5.3)
POTASSIUM SERPL-SCNC: 4.3 MMOL/L — SIGNIFICANT CHANGE UP (ref 3.5–5.3)
PROTHROM AB SERPL-ACNC: 12.2 SEC — SIGNIFICANT CHANGE UP (ref 9.5–13)
PROTHROM AB SERPL-ACNC: 12.2 SEC — SIGNIFICANT CHANGE UP (ref 9.5–13)
RBC # BLD: 2.74 M/UL — LOW (ref 3.8–5.2)
RBC # BLD: 2.74 M/UL — LOW (ref 3.8–5.2)
RBC # BLD: 2.99 M/UL — LOW (ref 3.8–5.2)
RBC # BLD: 2.99 M/UL — LOW (ref 3.8–5.2)
RBC # FLD: 16.7 % — HIGH (ref 10.3–14.5)
RBC # FLD: 16.7 % — HIGH (ref 10.3–14.5)
RBC # FLD: 17 % — HIGH (ref 10.3–14.5)
RBC # FLD: 17 % — HIGH (ref 10.3–14.5)
SODIUM SERPL-SCNC: 143 MMOL/L — SIGNIFICANT CHANGE UP (ref 135–145)
SODIUM SERPL-SCNC: 143 MMOL/L — SIGNIFICANT CHANGE UP (ref 135–145)
SODIUM SERPL-SCNC: 144 MMOL/L — SIGNIFICANT CHANGE UP (ref 135–145)
SODIUM SERPL-SCNC: 144 MMOL/L — SIGNIFICANT CHANGE UP (ref 135–145)
SPECIMEN SOURCE: SIGNIFICANT CHANGE UP
SPECIMEN SOURCE: SIGNIFICANT CHANGE UP
WBC # BLD: 5.81 K/UL — SIGNIFICANT CHANGE UP (ref 3.8–10.5)
WBC # BLD: 5.81 K/UL — SIGNIFICANT CHANGE UP (ref 3.8–10.5)
WBC # BLD: 6.36 K/UL — SIGNIFICANT CHANGE UP (ref 3.8–10.5)
WBC # BLD: 6.36 K/UL — SIGNIFICANT CHANGE UP (ref 3.8–10.5)
WBC # FLD AUTO: 5.81 K/UL — SIGNIFICANT CHANGE UP (ref 3.8–10.5)
WBC # FLD AUTO: 5.81 K/UL — SIGNIFICANT CHANGE UP (ref 3.8–10.5)
WBC # FLD AUTO: 6.36 K/UL — SIGNIFICANT CHANGE UP (ref 3.8–10.5)
WBC # FLD AUTO: 6.36 K/UL — SIGNIFICANT CHANGE UP (ref 3.8–10.5)

## 2023-11-12 PROCEDURE — 73551 X-RAY EXAM OF FEMUR 1: CPT | Mod: 26,RT

## 2023-11-12 PROCEDURE — 99232 SBSQ HOSP IP/OBS MODERATE 35: CPT

## 2023-11-12 RX ORDER — TRAMADOL HYDROCHLORIDE 50 MG/1
25 TABLET ORAL EVERY 6 HOURS
Refills: 0 | Status: DISCONTINUED | OUTPATIENT
Start: 2023-11-12 | End: 2023-11-15

## 2023-11-12 RX ORDER — PANTOPRAZOLE SODIUM 20 MG/1
40 TABLET, DELAYED RELEASE ORAL
Refills: 0 | Status: DISCONTINUED | OUTPATIENT
Start: 2023-11-12 | End: 2023-11-15

## 2023-11-12 RX ORDER — ACETAMINOPHEN 500 MG
1000 TABLET ORAL ONCE
Refills: 0 | Status: COMPLETED | OUTPATIENT
Start: 2023-11-12 | End: 2023-11-12

## 2023-11-12 RX ORDER — CEFAZOLIN SODIUM 1 G
2000 VIAL (EA) INJECTION EVERY 8 HOURS
Refills: 0 | Status: DISCONTINUED | OUTPATIENT
Start: 2023-11-12 | End: 2023-11-12

## 2023-11-12 RX ORDER — HYDROMORPHONE HYDROCHLORIDE 2 MG/ML
0.5 INJECTION INTRAMUSCULAR; INTRAVENOUS; SUBCUTANEOUS ONCE
Refills: 0 | Status: DISCONTINUED | OUTPATIENT
Start: 2023-11-12 | End: 2023-11-15

## 2023-11-12 RX ORDER — SODIUM CHLORIDE 9 MG/ML
1000 INJECTION, SOLUTION INTRAVENOUS
Refills: 0 | Status: DISCONTINUED | OUTPATIENT
Start: 2023-11-12 | End: 2023-11-13

## 2023-11-12 RX ORDER — ENOXAPARIN SODIUM 100 MG/ML
40 INJECTION SUBCUTANEOUS EVERY 24 HOURS
Refills: 0 | Status: DISCONTINUED | OUTPATIENT
Start: 2023-11-13 | End: 2023-11-15

## 2023-11-12 RX ORDER — ACETAMINOPHEN 500 MG
1000 TABLET ORAL EVERY 8 HOURS
Refills: 0 | Status: DISCONTINUED | OUTPATIENT
Start: 2023-11-12 | End: 2023-11-15

## 2023-11-12 RX ORDER — MAGNESIUM HYDROXIDE 400 MG/1
30 TABLET, CHEWABLE ORAL DAILY
Refills: 0 | Status: DISCONTINUED | OUTPATIENT
Start: 2023-11-12 | End: 2023-11-15

## 2023-11-12 RX ORDER — ONDANSETRON 8 MG/1
4 TABLET, FILM COATED ORAL EVERY 6 HOURS
Refills: 0 | Status: DISCONTINUED | OUTPATIENT
Start: 2023-11-12 | End: 2023-11-15

## 2023-11-12 RX ORDER — SENNA PLUS 8.6 MG/1
2 TABLET ORAL AT BEDTIME
Refills: 0 | Status: DISCONTINUED | OUTPATIENT
Start: 2023-11-12 | End: 2023-11-15

## 2023-11-12 RX ORDER — SODIUM CHLORIDE 9 MG/ML
1000 INJECTION, SOLUTION INTRAVENOUS
Refills: 0 | Status: DISCONTINUED | OUTPATIENT
Start: 2023-11-12 | End: 2023-11-12

## 2023-11-12 RX ORDER — OXYCODONE HYDROCHLORIDE 5 MG/1
5 TABLET ORAL EVERY 4 HOURS
Refills: 0 | Status: DISCONTINUED | OUTPATIENT
Start: 2023-11-12 | End: 2023-11-15

## 2023-11-12 RX ORDER — OXYCODONE HYDROCHLORIDE 5 MG/1
2.5 TABLET ORAL EVERY 4 HOURS
Refills: 0 | Status: DISCONTINUED | OUTPATIENT
Start: 2023-11-12 | End: 2023-11-15

## 2023-11-12 RX ORDER — POLYETHYLENE GLYCOL 3350 17 G/17G
17 POWDER, FOR SOLUTION ORAL AT BEDTIME
Refills: 0 | Status: DISCONTINUED | OUTPATIENT
Start: 2023-11-12 | End: 2023-11-15

## 2023-11-12 RX ORDER — HYDROMORPHONE HYDROCHLORIDE 2 MG/ML
0.5 INJECTION INTRAMUSCULAR; INTRAVENOUS; SUBCUTANEOUS
Refills: 0 | Status: DISCONTINUED | OUTPATIENT
Start: 2023-11-12 | End: 2023-11-12

## 2023-11-12 RX ORDER — CEFAZOLIN SODIUM 1 G
2000 VIAL (EA) INJECTION EVERY 8 HOURS
Refills: 0 | Status: COMPLETED | OUTPATIENT
Start: 2023-11-12 | End: 2023-11-13

## 2023-11-12 RX ORDER — ONDANSETRON 8 MG/1
4 TABLET, FILM COATED ORAL ONCE
Refills: 0 | Status: DISCONTINUED | OUTPATIENT
Start: 2023-11-12 | End: 2023-11-12

## 2023-11-12 RX ADMIN — Medication 400 MILLIGRAM(S): at 21:26

## 2023-11-12 RX ADMIN — Medication 2000 MILLIGRAM(S): at 21:25

## 2023-11-12 RX ADMIN — Medication 1000 MILLIGRAM(S): at 21:56

## 2023-11-12 RX ADMIN — Medication 750 MILLIGRAM(S): at 05:50

## 2023-11-12 RX ADMIN — Medication 300 MILLIGRAM(S): at 05:25

## 2023-11-12 NOTE — DISCHARGE NOTE PROVIDER - DETAILS OF MALNUTRITION DIAGNOSIS/DIAGNOSES
This patient has been assessed with a concern for Malnutrition and was treated during this hospitalization for the following Nutrition diagnosis/diagnoses:     -  11/11/2023: Severe protein-calorie malnutrition

## 2023-11-12 NOTE — DISCHARGE NOTE PROVIDER - NSDCMRMEDTOKEN_GEN_ALL_CORE_FT
donepezil 5 mg oral tablet: 1 tab(s) orally once a day (at bedtime)  dorzolamide-timolol 2.23%-0.68% (2%-0.5% base) ophthalmic solution: 1 drop(s) in the right eye 2 times a day   dorzolamide-timolol 2.23%-0.68% (2%-0.5% base) ophthalmic solution: 1 drop(s) in the right eye 2 times a day  enoxaparin 40 mg/0.4 mL injectable solution: 40 milligram(s) injectable once a day  pantoprazole 40 mg oral delayed release tablet: 1 tab(s) orally once a day (before a meal)

## 2023-11-12 NOTE — DISCHARGE NOTE PROVIDER - NSDCFUADDAPPT_GEN_ALL_CORE_FT
Discharge Instructions for ORIF Right**/**Left ***:    1. Pain Control.  2. Non-Weight Bearing Right**/**Left Lower**/**Upper Extremity, with assistive device/rolling walker.  3. Elevate and ICE the extremity as much as possible  4. Keep bandage/Splint Clean and dry. Do not get it wet. Do not walk or put any body weight on splint because it will break.  5. Continue DVT/PE Prophylaxis as recommended by the Anticoagulation Team. See Med Rec.   6. Out of Bed Daily, try to keep moving.  7. Change dressing to new mepilex dressing on POD7 (**date). Can replaced sooner if dressing super saturated or leaking/falling off.  **VERSUS**  Dressing changed will be provided by Orthopedic Surgeon.  8. RN to Remove Staples POD14 (**insert date) only IF they are accessible ie. not covered by cast or splint.   **VERSUS**  MD will need to Remove staples/sutures in office POD14 (**date).  9. Follow up with Orthopedic Surgeon  *** in 10-14 Days. Call Office For Appointment.  *****For Dr. Sánchez-- Follow-up with Dr. Sánchez in 1 month. Call office for appointment. Please perform portable x-rays of right**/**left *** at Rehab POD 14 (***insert date) before follow up.          *****Disregard this if Dr. Sánchez was not the surgeon

## 2023-11-12 NOTE — DISCHARGE NOTE PROVIDER - NSDCFUADDINST_GEN_ALL_CORE_FT
Discharge Instructions    1. ACTIVITY: You may partial weight bear 50% on your lower extremity. Please use assistive devices (i.e. rolling walker). You should receive Physical Therapy daily. Walk plenty. You should wear your knee Immobilizer at all times.  2. CALL with fever over 101F, wound redness, drainage or open area, calf pain/calf swelling.  3. BANDAGE: On postoperative day 7, please place a new Mepilex Ag bandage over the incision. You may change sooner ONLY if leaks or falls off. DO NOT REMOVE BANDAGE TO CHECK WOUND ON INTAKE.  4. SHOWER:  You are allowed to shower. Do NOT submerge surgical site in water. No baths/pools/hot tubs.  5. STAPLES: RN Remove Staples POD14 (11/26)   6. DVT PE Prophylaxis: per primary team, see med rec  7.GI: Continue Protonix daily while on Anticoagulant. an eRx has been sent to your pharmacy.  8. LABS: INR if on Coumadin.  9. FOLLOW UP: Dr. Sánchez in 1 month. Please call office to schedule.   10. MEDICATIONS:  If going home, eRX sent to your pharmacy for .   11. Please call the office if medications are not covered under your insurance, especially BLOOD CLOT PREVENTION/anticoagulant medication.      Discharge Instructions    1. ACTIVITY: You may partial weight bear 50% on your lower extremity. Please use assistive devices (i.e. rolling walker). You should receive Physical Therapy daily. Walk plenty. You should wear your knee Immobilizer at all times.  2. CALL with fever over 101F, wound redness, drainage or open area, calf pain/calf swelling.  3. BANDAGE: On postoperative day 7 (11/19/23), please place a new Mepilex Ag bandage over the incision. You may change sooner ONLY if leaks or falls off. DO NOT REMOVE BANDAGE TO CHECK WOUND ON INTAKE.  4. SHOWER:  You are allowed to shower. Do NOT submerge surgical site in water. No baths/pools/hot tubs.  5. STAPLES: RN Remove Staples POD14 (11/26/23).  6. DVT PE Prophylaxis: per primary team, see med rec  7.GI: Continue Protonix daily while on Anticoagulant. an eRx has been sent to your pharmacy.  8. LABS: INR if on Coumadin.  9. FOLLOW UP: Dr. Sánchez in 1 month. Please call office to schedule.   Please perform portable x-rays of right knee and femur at Rehab, if applicable, POD 14 (11/26/23) before follow up.  10. MEDICATIONS:  If going home, eRX sent to your pharmacy for .   11. Please call the office if medications are not covered under your insurance, especially BLOOD CLOT PREVENTION/anticoagulant medication.

## 2023-11-12 NOTE — DISCHARGE NOTE PROVIDER - CARE PROVIDER_API CALL
MAYRA STARKS  [address] [place] [id number]  Phone: [phone]  Follow Up Time: 2 weeks   MAYRA STARKS  [address] [place] [id number]  Phone: [phone]  Follow Up Time: 2 weeks    Karlos Seth  Gastroenterology  [address] [place] [id number]  Phone: [phone]  Follow Up Time:

## 2023-11-12 NOTE — DISCHARGE NOTE PROVIDER - NSDCHC_MEDRECSTATUS_GEN_ALL_CORE
HR=72 bpm, JTJF=850/60 mmhg, SpO2=99.0 %, Resp=12 B/min, EtCO2=38 mmHg, Apnea=2 Seconds, Pain=0, Styles=2 Admission Reconciliation is Completed  Discharge Reconciliation is Not Complete Admission Reconciliation is Completed  Discharge Reconciliation is Completed

## 2023-11-12 NOTE — DISCHARGE NOTE PROVIDER - NSDCCPCAREPLAN_GEN_ALL_CORE_FT
PRINCIPAL DISCHARGE DIAGNOSIS  Diagnosis: Fracture of distal end of right femur  Assessment and Plan of Treatment:      PRINCIPAL DISCHARGE DIAGNOSIS  Diagnosis: Fracture of distal end of right femur  Assessment and Plan of Treatment: Fracture  A fracture is a break in one of your bones. This can occur from a variety of injuries, especially traumatic ones. Symptoms include pain, bruising, or swelling. Do not use the injured limb. If a fracture is in one of the bones below your waist, do not put weight on that limb unless instructed to do so by your healthcare provider. Crutches or a cane may have been provided. A splint or cast may have been applied by your health care provider. Make sure to keep it dry and follow up with an orthopedist as instructed.  SEEK IMMEDIATE MEDICAL CARE IF YOU HAVE ANY OF THE FOLLOWING SYMPTOMS: numbness, tingling, increasing pain, or weakness in any part of the injured limb.

## 2023-11-12 NOTE — BRIEF OPERATIVE NOTE - NSICDXBRIEFPOSTOP_GEN_ALL_CORE_FT
POST-OP DIAGNOSIS:  Other closed fracture of distal end of right femur 12-Nov-2023 17:59:35  Kain Ruffin

## 2023-11-12 NOTE — BRIEF OPERATIVE NOTE - NSICDXBRIEFPREOP_GEN_ALL_CORE_FT
PRE-OP DIAGNOSIS:  Other closed fracture of distal end of right femur 12-Nov-2023 17:59:29  Kain Ruffin

## 2023-11-12 NOTE — DISCHARGE NOTE PROVIDER - HOSPITAL COURSE
Orthopedic Summary  H&P:  Pt is a 88y Female   PAST MEDICAL & SURGICAL HISTORY:  Osteoarthritis      Osteopenia      Deep vein thrombosis (DVT)      S/P Hysterectomy  2007      Lumbar Laminectomy/ Spinal Fusion  1999      S/P Arthroscopy of Right Knee  1998      S/P Appendectomy  @ 12 years old      S/P Tonsillectomy  @ 2 years old          Now s/p ORIF Right Periprosthetic Distal Femur Fracture; pt also with left distal clavicle fracture which was managed conservatively in a sling.    Hospital Course:     The patient is a 88y Female status post above. Pt sustained injuries from a fall and was admitted through the ED to medical service. Prior to surgery patient was medically optimized and cleared. Prophylactic antibiotics were started within 30 minutes before the procedure and continued for 24 hours.  There were no complications during the procedure.  The patient was transferred to recovery room in stable condition and subsequently to the orthopedic floor.  Patient was placed on anticoagulation for DVT/PE prophylaxis per primary team.  All pertinent home medications were continued.  Physical therapy daily and daily labs were followed.  The Sling and Bulky Eric Knee Immobilizer were kept clean, dry, intact. The rest of the hospital stay was unremarkable. Pt received PT daily and was Discharged once cleared per Medicine and PT.  The orthopedic Attending is aware and agrees. See addendum to DC summary per medical team below for any additional info or if any changes. Orthopedic Summary  H&P:  Pt is a 88y Female   PAST MEDICAL & SURGICAL HISTORY:  Osteoarthritis      Osteopenia      Deep vein thrombosis (DVT)      S/P Hysterectomy  2007      Lumbar Laminectomy/ Spinal Fusion  1999      S/P Arthroscopy of Right Knee  1998      S/P Appendectomy  @ 12 years old      S/P Tonsillectomy  @ 2 years old          Now s/p ORIF Right Periprosthetic Distal Femur Fracture; pt also with left distal clavicle fracture which was managed conservatively in a sling.    Hospital Course:     The patient is a 88y Female status post above. Pt sustained injuries from a fall and was admitted through the ED to medical service. Prior to surgery patient was medically optimized and cleared. Prophylactic antibiotics were started within 30 minutes before the procedure and continued for 24 hours.  There were no complications during the procedure.  The patient was transferred to recovery room in stable condition and subsequently to the orthopedic floor.  Patient was placed on anticoagulation for DVT/PE prophylaxis per primary team.  All pertinent home medications were continued.  Physical therapy daily and daily labs were followed.  The Sling and Bulky Eric Knee Immobilizer were kept clean, dry, intact. The rest of the hospital stay was unremarkable. Pt received PT daily and was Discharged once cleared per Medicine and PT.  The orthopedic Attending is aware and agrees. See addendum to DC summary per medical team below for any         Pt admitted for acute right distal femur periprosthetic fracture. was taken to OR for ORIF. pt with post op anemia requiring PRBC infusion x 3. tolerated well. no further episodes of bleeding. course was complicated by bradycardia - was found to be Bradycardiac - aricpet was dc'd and bradycardia resolved. pt w/ hx of Achalasia which she had an esophograml she has been doing well on puree diet. she can follow up with dr. Stevens as discussed with pt& .       Assessment/Plan  #S/p mechanical fall with RT distal femur periprosthetic fracture  -s/p ORIF POD#3  -pain control  -physical therapy  -incentive spirometry  -bowel regimen.     #Acute blood loss anemia: --- stable   -d/t fracture and now d/t surgery  -s/p 2 units preop.   -s/p 1 unit of PRBC on 11/13     #Sinus Bradycardia and Chronic LBBB on EKG  -no events on remote tele  -Off aricept- bradycardia improved   -can dc tele    #Thrombocytopenia  -presumed to be related to  consumption  -if no improvement despite stable h/h, will need outpt heme eval.     #Urinary retention  S/p Rasmussen 11/11---- dc'd rasmussen     #Mild Hyperkalemia  - resolved    #severe protein calorie malnutrition:  -diet liberalized to regular  -encouraged oral intake  -oral vitamins recommended upon discharge  -nutrition eval appreciated    #Dementia  Supportive. Reorient    #Achalasia  s/p esophogram today. can follow up with Kianna collins pt. discussed with him and  at bedside       dispo - dc to [facility] today. follow up with ortho and Gi as discussed with family.   additional info or if any changes. 88 F with Hx of dementia, DVT 12/2022 s/p eliquis, Osteopenia, achalasia, was last admitted to  in March 2023 for pneumonia and left effusion for which she required chest tube drainage.  She presented to the ED 11/10/23 with a slip and fall while walking on the steps at home which resulted in her landing on her right side.  She reports that she slipped with her shoes on the steps, lost her footing and went down.  She denied any dizziness, lightheadedness, chest pain, chest pressure, nausea, vomiting, fevers, chills prior to the event and reports that she otherwise had been feeling good.    In the ED, she had imaging done which revealed an acute right distal femur fracture and a periprosthetic right knee fracture.  She underwent ORIF 11/12 .   pt also with left distal clavicle fracture which was managed conservatively in a sling.    Hospital Course:   The patient is a 88y Female status post above. Pt sustained injuries from a fall and was admitted through the ED to medical service. Prior to surgery patient was medically optimized and cleared. Prophylactic antibiotics were started within 30 minutes before the procedure and continued for 24 hours.  There were no complications during the procedure.  The patient was transferred to recovery room in stable condition and subsequently to the orthopedic floor.  Patient was placed on anticoagulation for DVT/PE prophylaxis per primary team.  All pertinent home medications were continued.  Physical therapy daily and daily labs were followed.  The Sling and Bulky Eric Knee Immobilizer were kept clean, dry, intact. The rest of the hospital stay was unremarkable. Pt received PT daily and was Discharged once cleared per Medicine and PT.  The orthopedic Attending is aware and agrees. See addendum to DC summary per medical team below for any     Pt admitted for acute right distal femur periprosthetic fracture. was taken to OR for ORIF. pt with post op anemia requiring PRBC infusion x 3. tolerated well. no further episodes of bleeding. course was complicated by bradycardia - was found to be Bradycardiac - aricpet was dc'd and bradycardia resolved. pt w/ hx of Achalasia which she had an esophograml she has been doing well on puree diet. she can follow up with dr. Stevens as discussed with pt& .     Assessment/Plan  #S/p mechanical fall with RT distal femur periprosthetic fracture  -s/p ORIF POD#3  -pain control  -physical therapy  -incentive spirometry  -bowel regimen.     #Acute blood loss anemia: --- stable   -d/t fracture and now d/t surgery  -s/p total 3U PRBC with good HH response    #Sinus Bradycardia and Chronic LBBB on EKG  -no events on remote tele  -Off aricept- bradycardia improved   -can dc tele    #Thrombocytopenia  -presumed to be related to  consumption  -if no improvement despite stable h/h, will need outpt heme eval.     #Urinary retention  S/p Lenz- voiding    #Mild Hyperkalemia  - resolved    #severe protein calorie malnutrition:  -diet liberalized to regular  -encouraged oral intake  -oral vitamins recommended upon discharge  -nutrition eval appreciated    #Dementia  Supportive. Reorient    #Achalasia  s/p esophogram today. can follow up with Kianna Stevens out pt. discussed with him and  at bedside     dispo - dc to [facility] today. follow up with ortho and Gi as discussed with family.   additional info or if any changes.

## 2023-11-12 NOTE — DISCHARGE NOTE PROVIDER - NSDCFUSCHEDAPPT_GEN_ALL_CORE_FT
Unknown, Doctor  [facility]  HNT PreAdmits  Scheduled Appointment: 11/14/2023    Karlos Wilson  [facility]  OPHTHALM [address]  Scheduled Appointment: 12/19/2023     Karlos Wilson  [facility]  OPHTHALM [address]  Scheduled Appointment: 12/19/2023

## 2023-11-12 NOTE — DISCHARGE NOTE PROVIDER - ATTENDING DISCHARGE PHYSICAL EXAMINATION:
Patient is seen and examined at bedside today. She is POD#3 today after RT distal femur ORIF. S/p total 3U PRBC transfusion with good HH response  Stable for discharge to [facility] today

## 2023-11-12 NOTE — DISCHARGE NOTE PROVIDER - NSDCCPTREATMENT_GEN_ALL_CORE_FT
PRINCIPAL PROCEDURE  Procedure: ORIF, fracture, femur, distal, periprosthetic  Findings and Treatment: right

## 2023-11-12 NOTE — DISCHARGE NOTE PROVIDER - PROVIDER TOKENS
PROVIDER:[TOKEN:[[id number]],FOLLOWUP:[2 weeks]] PROVIDER:[TOKEN:[[id number]:[id number]],FOLLOWUP:[2 weeks]],PROVIDER:[TOKEN:[[id number]]]

## 2023-11-13 LAB
ANION GAP SERPL CALC-SCNC: 6 MMOL/L — SIGNIFICANT CHANGE UP (ref 5–17)
ANION GAP SERPL CALC-SCNC: 6 MMOL/L — SIGNIFICANT CHANGE UP (ref 5–17)
BUN SERPL-MCNC: 23 MG/DL — SIGNIFICANT CHANGE UP (ref 7–23)
BUN SERPL-MCNC: 23 MG/DL — SIGNIFICANT CHANGE UP (ref 7–23)
CALCIUM SERPL-MCNC: 8.7 MG/DL — SIGNIFICANT CHANGE UP (ref 8.5–10.1)
CALCIUM SERPL-MCNC: 8.7 MG/DL — SIGNIFICANT CHANGE UP (ref 8.5–10.1)
CHLORIDE SERPL-SCNC: 112 MMOL/L — HIGH (ref 96–108)
CHLORIDE SERPL-SCNC: 112 MMOL/L — HIGH (ref 96–108)
CO2 SERPL-SCNC: 26 MMOL/L — SIGNIFICANT CHANGE UP (ref 22–31)
CO2 SERPL-SCNC: 26 MMOL/L — SIGNIFICANT CHANGE UP (ref 22–31)
CREAT SERPL-MCNC: 0.87 MG/DL — SIGNIFICANT CHANGE UP (ref 0.5–1.3)
CREAT SERPL-MCNC: 0.87 MG/DL — SIGNIFICANT CHANGE UP (ref 0.5–1.3)
EGFR: 64 ML/MIN/1.73M2 — SIGNIFICANT CHANGE UP
EGFR: 64 ML/MIN/1.73M2 — SIGNIFICANT CHANGE UP
GLUCOSE SERPL-MCNC: 102 MG/DL — HIGH (ref 70–99)
GLUCOSE SERPL-MCNC: 102 MG/DL — HIGH (ref 70–99)
HCT VFR BLD CALC: 24.7 % — LOW (ref 34.5–45)
HCT VFR BLD CALC: 24.7 % — LOW (ref 34.5–45)
HGB BLD-MCNC: 8.2 G/DL — LOW (ref 11.5–15.5)
HGB BLD-MCNC: 8.2 G/DL — LOW (ref 11.5–15.5)
MCHC RBC-ENTMCNC: 30.7 PG — SIGNIFICANT CHANGE UP (ref 27–34)
MCHC RBC-ENTMCNC: 30.7 PG — SIGNIFICANT CHANGE UP (ref 27–34)
MCHC RBC-ENTMCNC: 33.2 GM/DL — SIGNIFICANT CHANGE UP (ref 32–36)
MCHC RBC-ENTMCNC: 33.2 GM/DL — SIGNIFICANT CHANGE UP (ref 32–36)
MCV RBC AUTO: 92.5 FL — SIGNIFICANT CHANGE UP (ref 80–100)
MCV RBC AUTO: 92.5 FL — SIGNIFICANT CHANGE UP (ref 80–100)
PLATELET # BLD AUTO: 101 K/UL — LOW (ref 150–400)
PLATELET # BLD AUTO: 101 K/UL — LOW (ref 150–400)
POTASSIUM SERPL-MCNC: 4.4 MMOL/L — SIGNIFICANT CHANGE UP (ref 3.5–5.3)
POTASSIUM SERPL-MCNC: 4.4 MMOL/L — SIGNIFICANT CHANGE UP (ref 3.5–5.3)
POTASSIUM SERPL-SCNC: 4.4 MMOL/L — SIGNIFICANT CHANGE UP (ref 3.5–5.3)
POTASSIUM SERPL-SCNC: 4.4 MMOL/L — SIGNIFICANT CHANGE UP (ref 3.5–5.3)
RBC # BLD: 2.67 M/UL — LOW (ref 3.8–5.2)
RBC # BLD: 2.67 M/UL — LOW (ref 3.8–5.2)
RBC # FLD: 16.9 % — HIGH (ref 10.3–14.5)
RBC # FLD: 16.9 % — HIGH (ref 10.3–14.5)
SODIUM SERPL-SCNC: 144 MMOL/L — SIGNIFICANT CHANGE UP (ref 135–145)
SODIUM SERPL-SCNC: 144 MMOL/L — SIGNIFICANT CHANGE UP (ref 135–145)
WBC # BLD: 5.94 K/UL — SIGNIFICANT CHANGE UP (ref 3.8–10.5)
WBC # BLD: 5.94 K/UL — SIGNIFICANT CHANGE UP (ref 3.8–10.5)
WBC # FLD AUTO: 5.94 K/UL — SIGNIFICANT CHANGE UP (ref 3.8–10.5)
WBC # FLD AUTO: 5.94 K/UL — SIGNIFICANT CHANGE UP (ref 3.8–10.5)

## 2023-11-13 PROCEDURE — 99232 SBSQ HOSP IP/OBS MODERATE 35: CPT

## 2023-11-13 RX ADMIN — SENNA PLUS 2 TABLET(S): 8.6 TABLET ORAL at 22:32

## 2023-11-13 RX ADMIN — Medication 1000 MILLIGRAM(S): at 05:01

## 2023-11-13 RX ADMIN — Medication 1000 MILLIGRAM(S): at 13:32

## 2023-11-13 RX ADMIN — ENOXAPARIN SODIUM 40 MILLIGRAM(S): 100 INJECTION SUBCUTANEOUS at 05:01

## 2023-11-13 RX ADMIN — Medication 1000 MILLIGRAM(S): at 22:58

## 2023-11-13 RX ADMIN — Medication 1000 MILLIGRAM(S): at 22:32

## 2023-11-13 RX ADMIN — Medication 1000 MILLIGRAM(S): at 05:39

## 2023-11-13 RX ADMIN — POLYETHYLENE GLYCOL 3350 17 GRAM(S): 17 POWDER, FOR SOLUTION ORAL at 22:32

## 2023-11-13 RX ADMIN — Medication 2000 MILLIGRAM(S): at 05:01

## 2023-11-13 NOTE — SWALLOW BEDSIDE ASSESSMENT ADULT - COMMENTS
8 F with Hx of dementia, was last admitted to  in March 2023 for pneumonia and left effusion for which she required chest tube drainage.  She presents to the ED today with a slip and fall while walking on the steps at home which resulted in her landing on her right side.  She reports that she slipped with her shoes on the steps, lost her footing and went down.  She denied any dizziness, lightheadedness, chest pain, chest pressure, nausea, vomiting, fevers, chills prior to the event and reports that she otherwise had been feeling good.   Pt's diet texture has been altered more than once over the weekend. 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  Service is consulted for po intake and for the appropriate testure of diet.  Note Pt has achalasia.

## 2023-11-13 NOTE — SWALLOW BEDSIDE ASSESSMENT ADULT - SLP GENERAL OBSERVATIONS
pt seated in bed: awake and alert, able to state part of her difficulties:  verbally interactive but memory deficits obvious.

## 2023-11-13 NOTE — PHYSICAL THERAPY INITIAL EVALUATION ADULT - PATIENT/FAMILY/SIGNIFICANT OTHER GOALS STATEMENT, PT EVAL
The pt's dtr hopes for a safe discharge to [facility] once the pt is medically stable for discharge

## 2023-11-13 NOTE — PHYSICAL THERAPY INITIAL EVALUATION ADULT - PERTINENT HX OF CURRENT PROBLEM, REHAB EVAL
s/p mechanical fall,   as per Adult Hospitalist not from 11/12- 88 F with Hx of dementia, was last admitted to  in March 2023 for pneumonia and left effusion for which she required chest tube drainage.  She presents to the ED today with a slip and fall while walking on the steps at home which resulted in her landing on her right side.  She reports that she slipped with her shoes on the steps, lost her footing and went down.    As per Brief Operative report:   right periprosthetic distal femur fx  Synthes distal femur plate

## 2023-11-13 NOTE — SWALLOW BEDSIDE ASSESSMENT ADULT - NS SPL SWALLOW CLINIC TRIAL FT
Given potential difficulty with esophageal function, suggest puree for the time being. GI consult if not already ordered (Note: Pt has a GI doctor). .   Disc with Pt and with NSg. Call to Attending. Request for diet change.  Service will not follow. Please re-consult prn.

## 2023-11-13 NOTE — SWALLOW BEDSIDE ASSESSMENT ADULT - SWALLOW EVAL: DIAGNOSIS
oral-pharyngeal swallow skills within age-appropriate limits for regular diet texture, but achalasia compromises her po intake at that level. Suggest puree and thinliquids. s

## 2023-11-13 NOTE — SWALLOW BEDSIDE ASSESSMENT ADULT - SWALLOW EVAL: RECOMMENDED FEEDING/EATING TECHNIQUES
allow for swallow between intakes/alternate food with liquid/check mouth frequently for oral residue/pocketing/crush medication (when feasible)/maintain upright posture during/after eating for 30 mins/position upright (90 degrees)

## 2023-11-13 NOTE — SWALLOW BEDSIDE ASSESSMENT ADULT - PHARYNGEAL PHASE
Mild age-appropriate latency in onset of pharyngeal swallow. NO vert s/s aspiration across bolus textures consumed.

## 2023-11-13 NOTE — SWALLOW BEDSIDE ASSESSMENT ADULT - ORAL PHASE
slow mastication of dry chewable, but with collection and clearance vias lingual sweep./Within functional limits

## 2023-11-13 NOTE — PHYSICAL THERAPY INITIAL EVALUATION ADULT - BED MOBILITY TRAINING, PT EVAL
The pt will perform all bed mobility activities independently by time of discharge from Acute Care PT program Albendazole Pregnancy And Lactation Text: This medication is Pregnancy Category C and it isn't known if it is safe during pregnancy. It is also excreted in breast milk.

## 2023-11-13 NOTE — PHYSICAL THERAPY INITIAL EVALUATION ADULT - GENERAL OBSERVATIONS, REHAB EVAL
The pt was pleasant and cooperative, received on 2N, supine and eager to participate in PT evaluation. 1 IV, and bilateral SCDs in place. The patient was anticipating a blood transfusion this AM, RN tending to the patient at end of tx. The pt's dtr from [place] and the pt's  were present bedside t/o tx.

## 2023-11-14 LAB
ANION GAP SERPL CALC-SCNC: 4 MMOL/L — LOW (ref 5–17)
ANION GAP SERPL CALC-SCNC: 4 MMOL/L — LOW (ref 5–17)
BUN SERPL-MCNC: 28 MG/DL — HIGH (ref 7–23)
BUN SERPL-MCNC: 28 MG/DL — HIGH (ref 7–23)
CALCIUM SERPL-MCNC: 8.7 MG/DL — SIGNIFICANT CHANGE UP (ref 8.5–10.1)
CALCIUM SERPL-MCNC: 8.7 MG/DL — SIGNIFICANT CHANGE UP (ref 8.5–10.1)
CHLORIDE SERPL-SCNC: 111 MMOL/L — HIGH (ref 96–108)
CHLORIDE SERPL-SCNC: 111 MMOL/L — HIGH (ref 96–108)
CO2 SERPL-SCNC: 27 MMOL/L — SIGNIFICANT CHANGE UP (ref 22–31)
CO2 SERPL-SCNC: 27 MMOL/L — SIGNIFICANT CHANGE UP (ref 22–31)
CREAT SERPL-MCNC: 0.62 MG/DL — SIGNIFICANT CHANGE UP (ref 0.5–1.3)
CREAT SERPL-MCNC: 0.62 MG/DL — SIGNIFICANT CHANGE UP (ref 0.5–1.3)
EGFR: 86 ML/MIN/1.73M2 — SIGNIFICANT CHANGE UP
EGFR: 86 ML/MIN/1.73M2 — SIGNIFICANT CHANGE UP
GLUCOSE SERPL-MCNC: 93 MG/DL — SIGNIFICANT CHANGE UP (ref 70–99)
GLUCOSE SERPL-MCNC: 93 MG/DL — SIGNIFICANT CHANGE UP (ref 70–99)
HCT VFR BLD CALC: 26.8 % — LOW (ref 34.5–45)
HCT VFR BLD CALC: 26.8 % — LOW (ref 34.5–45)
HGB BLD-MCNC: 8.9 G/DL — LOW (ref 11.5–15.5)
HGB BLD-MCNC: 8.9 G/DL — LOW (ref 11.5–15.5)
MCHC RBC-ENTMCNC: 29.8 PG — SIGNIFICANT CHANGE UP (ref 27–34)
MCHC RBC-ENTMCNC: 29.8 PG — SIGNIFICANT CHANGE UP (ref 27–34)
MCHC RBC-ENTMCNC: 33.2 GM/DL — SIGNIFICANT CHANGE UP (ref 32–36)
MCHC RBC-ENTMCNC: 33.2 GM/DL — SIGNIFICANT CHANGE UP (ref 32–36)
MCV RBC AUTO: 89.6 FL — SIGNIFICANT CHANGE UP (ref 80–100)
MCV RBC AUTO: 89.6 FL — SIGNIFICANT CHANGE UP (ref 80–100)
PLATELET # BLD AUTO: 109 K/UL — LOW (ref 150–400)
PLATELET # BLD AUTO: 109 K/UL — LOW (ref 150–400)
POTASSIUM SERPL-MCNC: 3.9 MMOL/L — SIGNIFICANT CHANGE UP (ref 3.5–5.3)
POTASSIUM SERPL-MCNC: 3.9 MMOL/L — SIGNIFICANT CHANGE UP (ref 3.5–5.3)
POTASSIUM SERPL-SCNC: 3.9 MMOL/L — SIGNIFICANT CHANGE UP (ref 3.5–5.3)
POTASSIUM SERPL-SCNC: 3.9 MMOL/L — SIGNIFICANT CHANGE UP (ref 3.5–5.3)
RBC # BLD: 2.99 M/UL — LOW (ref 3.8–5.2)
RBC # BLD: 2.99 M/UL — LOW (ref 3.8–5.2)
RBC # FLD: 18.6 % — HIGH (ref 10.3–14.5)
RBC # FLD: 18.6 % — HIGH (ref 10.3–14.5)
SODIUM SERPL-SCNC: 142 MMOL/L — SIGNIFICANT CHANGE UP (ref 135–145)
SODIUM SERPL-SCNC: 142 MMOL/L — SIGNIFICANT CHANGE UP (ref 135–145)
WBC # BLD: 5.33 K/UL — SIGNIFICANT CHANGE UP (ref 3.8–10.5)
WBC # BLD: 5.33 K/UL — SIGNIFICANT CHANGE UP (ref 3.8–10.5)
WBC # FLD AUTO: 5.33 K/UL — SIGNIFICANT CHANGE UP (ref 3.8–10.5)
WBC # FLD AUTO: 5.33 K/UL — SIGNIFICANT CHANGE UP (ref 3.8–10.5)

## 2023-11-14 PROCEDURE — 99232 SBSQ HOSP IP/OBS MODERATE 35: CPT

## 2023-11-14 RX ADMIN — Medication 1000 MILLIGRAM(S): at 04:20

## 2023-11-14 RX ADMIN — Medication 1000 MILLIGRAM(S): at 21:38

## 2023-11-14 RX ADMIN — ENOXAPARIN SODIUM 40 MILLIGRAM(S): 100 INJECTION SUBCUTANEOUS at 04:21

## 2023-11-14 RX ADMIN — SENNA PLUS 2 TABLET(S): 8.6 TABLET ORAL at 21:34

## 2023-11-14 RX ADMIN — Medication 1000 MILLIGRAM(S): at 04:29

## 2023-11-14 RX ADMIN — PANTOPRAZOLE SODIUM 40 MILLIGRAM(S): 20 TABLET, DELAYED RELEASE ORAL at 04:21

## 2023-11-14 RX ADMIN — Medication 1000 MILLIGRAM(S): at 21:34

## 2023-11-14 NOTE — OCCUPATIONAL THERAPY INITIAL EVALUATION ADULT - ADL RETRAINING, OT EVAL
Patient will participate in lower body dressing with MIN A    in 2 weeks  Patient will participate in toilet transfer with SUP   in 2 weeks  Patient will participate in toileting with  MOD A    in 2 weeks  Patient will participate in grooming standing at the sink with SUP   in 2 weeks

## 2023-11-14 NOTE — OCCUPATIONAL THERAPY INITIAL EVALUATION ADULT - NSACTIVITYREC_GEN_A_OT
Pt presents with premorbid impaired memory, impaired balance, endurance and muscle strength that will benefit from skilled OT to improve independence in ADLs, reduce fall risk and chance for readmission

## 2023-11-14 NOTE — CONSULT NOTE ADULT - ASSESSMENT
1. Intermittent dysphagia.     Recommendation  1. Aspiration precautions  2. Obtain Esophagram   3. Diet as tolerated but would keep soft consistency

## 2023-11-14 NOTE — OCCUPATIONAL THERAPY INITIAL EVALUATION ADULT - GENERAL OBSERVATIONS, REHAB EVAL
Patient received semi-supine in bed, NAD,vss, famiyl at bedside  . Patient left seated in chair with chair alarm on    , VSS, NAD, all lines intact and all items within reach.

## 2023-11-14 NOTE — OCCUPATIONAL THERAPY INITIAL EVALUATION ADULT - LIVES WITH, PROFILE
Pt reports living with  with 1 FOS, walk in shower with SC, standard toilet, GB's, IND ADLs/amb with RW except for bathing with sup/spouse

## 2023-11-14 NOTE — OCCUPATIONAL THERAPY INITIAL EVALUATION ADULT - PERSONAL SAFETY AND JUDGMENT, REHAB EVAL
will benefit from further education on PWB for carryover- handout provided and family educated/impaired

## 2023-11-14 NOTE — CONSULT NOTE ADULT - SUBJECTIVE AND OBJECTIVE BOX
HPI:  88 F with Hx of dementia, was last admitted to  in March 2023 for pneumonia and left effusion for which she required chest tube drainage.  She presents to the ED today with a slip and fall while walking on the steps at home which resulted in her landing on her right side.  She reports that she slipped with her shoes on the steps, lost her footing and went down with findings of acute right distal femur fracture and a periprosthetic right knee fracture s/p R distal femur ORIF.     GI consulted for known dysphagia. She was hospitalized from 3/2-3/11 with dyspnea, cough and left sided chest discomfort for loculated left pleural effusion. She had continued dysphagia and underwent EGD with no resistance at LES or signs of stasis esophagitis. Biopsies were unremarkable. She underwent chest tube with drainage of the pleural effusion. Continues to have intermittent dysphagia to solids and liquids. Seen in office on 11/7 with plans for outpatient esophagram, however, had fall requiring hospitalization.         PAST MEDICAL & SURGICAL HISTORY:  Osteoarthritis      Osteopenia      Deep vein thrombosis (DVT)      S/P Hysterectomy  2007      Lumbar Laminectomy/ Spinal Fusion  1999      S/P Arthroscopy of Right Knee  1998      S/P Appendectomy  @ 12 years old      S/P Tonsillectomy  @ 2 years old          Home Medications:  donepezil 5 mg oral tablet: 1 tab(s) orally once a day (at bedtime) (10 Nov 2023 14:01)  dorzolamide-timolol 2.23%-0.68% (2%-0.5% base) ophthalmic solution: 1 drop(s) in the right eye 2 times a day (10 Nov 2023 14:01)      MEDICATIONS  (STANDING):  acetaminophen     Tablet .. 1000 milliGRAM(s) Oral every 8 hours  acetaminophen   IVPB .. 750 milliGRAM(s) IV Intermittent once  enoxaparin Injectable 40 milliGRAM(s) SubCutaneous every 24 hours  HYDROmorphone  Injectable 0.5 milliGRAM(s) IV Push once  pantoprazole    Tablet 40 milliGRAM(s) Oral before breakfast  polyethylene glycol 3350 17 Gram(s) Oral at bedtime  senna 2 Tablet(s) Oral at bedtime    MEDICATIONS  (PRN):  aluminum hydroxide/magnesium hydroxide/simethicone Suspension 30 milliLiter(s) Oral four times a day PRN Indigestion  HYDROmorphone  Injectable 0.5 milliGRAM(s) IV Push every 4 hours PRN Severe Pain (7 - 10)  magnesium hydroxide Suspension 30 milliLiter(s) Oral daily PRN Constipation  ondansetron Injectable 4 milliGRAM(s) IV Push every 6 hours PRN Nausea and/or Vomiting  ondansetron Injectable 4 milliGRAM(s) IV Push every 6 hours PRN Nausea and/or Vomiting  oxyCODONE    IR 2.5 milliGRAM(s) Oral every 4 hours PRN Moderate Pain (4 - 6)  oxyCODONE    IR 5 milliGRAM(s) Oral every 4 hours PRN Severe Pain (7 - 10)  traMADol 25 milliGRAM(s) Oral every 6 hours PRN Mild Pain (1 - 3)      Allergies    [This allergen will not trigger allergy alert] hazelnuts (Swelling)  Bananas (Unknown)  Orange (Unknown)  adhesives (Rash)  [This allergen will not trigger allergy alert] horse serum (Unknown)  [This allergen will not trigger allergy alert] Originally Entered as [SEE COMMENT] reaction to [HORSE SERUM] (Unknown)  No Known Drug Allergies  latex (Rash)    Intolerances        SOCIAL HISTORY:    FAMILY HISTORY:  FH: heart disease        ROS  As above  Otherwise unremarkable    Vital Signs Last 24 Hrs  T(C): 36.3 (14 Nov 2023 07:36), Max: 36.7 (13 Nov 2023 11:30)  T(F): 97.4 (14 Nov 2023 07:36), Max: 98.1 (13 Nov 2023 11:30)  HR: 69 (14 Nov 2023 07:36) (65 - 90)  BP: 147/82 (14 Nov 2023 07:36) (111/47 - 150/65)  BP(mean): --  RR: 18 (14 Nov 2023 07:36) (17 - 18)  SpO2: 99% (14 Nov 2023 07:36) (93% - 100%)    Parameters below as of 14 Nov 2023 07:36  Patient On (Oxygen Delivery Method): room air        Constitutional: NAD, well-developed  Respiratory: CTAB  Cardiovascular: S1 and S2, RRR  Gastrointestinal: BS+, soft, NT/ND  Extremities: No peripheral edema  Psychiatric: Normal mood, normal affect  Skin: No rashes    LABS:                        8.9    5.33  )-----------( 109      ( 14 Nov 2023 07:29 )             26.8     11-14    142  |  111<H>  |  28<H>  ----------------------------<  93  3.9   |  27  |  0.62    Ca    8.7      14 Nov 2023 07:29            RADIOLOGY & ADDITIONAL STUDIES:
[place]  [id number]    ORTHOPEDIC CONSULT:    Orthopedic diagnosis: R TKA distal femur periprosthetic fx    HPI:    88 F with Hx of dementia presents to  w/ R knee pain s/p MF. She slipped down the last steps of the stairs. No HS/LOC. No numbness/tingling. No other injuries reported. Ambulated w/ walker at baseline.    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 s/p chest tube drainage  Achalasia    PAST SURGICAL HISTORY:  s/p chest tube  s/p hysterectomy  s/p right total knee replacement - Dr. He  s/p appendectomy  s/p tonsillectomy  s/p spine surgery    FAMILY HISTORY:   Hx of lung cancer (10 Nov 2023 14:41)    [This allergen will not trigger allergy alert] hazelnuts (Swelling)  Bananas (Unknown)  Orange (Unknown)  adhesives (Rash)  [This allergen will not trigger allergy alert] horse serum (Unknown)  [This allergen will not trigger allergy alert] Originally Entered as [SEE COMMENT] reaction to [HORSE SERUM] (Unknown)  No Known Drug Allergies  latex (Rash)      PAST MEDICAL & SURGICAL HISTORY:  Osteoarthritis      Osteopenia      Deep vein thrombosis (DVT)      S/P Hysterectomy  2007      Lumbar Laminectomy/ Spinal Fusion  1999      S/P Arthroscopy of Right Knee  1998      S/P Appendectomy  @ 12 years old      S/P Tonsillectomy  @ 2 years old          Vital Signs Last 24 Hrs  T(C): 36.8 (10 Nov 2023 14:02), Max: 36.8 (10 Nov 2023 14:02)  T(F): 98.2 (10 Nov 2023 14:02), Max: 98.2 (10 Nov 2023 14:02)  HR: 54 (10 Nov 2023 14:02) (54 - 56)  BP: 122/68 (10 Nov 2023 14:02) (122/68 - 175/93)  BP(mean): 83 (10 Nov 2023 14:02) (83 - 83)  RR: 16 (10 Nov 2023 14:02) (16 - 16)  SpO2: 97% (10 Nov 2023 14:02) (97% - 100%)    Parameters below as of 10 Nov 2023 14:02  Patient On (Oxygen Delivery Method): room air        PHYSICAL EXAM:    General: NAD, awake and alert    RLE:  Closed skin  +Deformity of knee  +TTP proximal to knee joint line  Compartments soft and noncompressible, no calf pain  Motor: Fires HF/KE/Gsc/TA/EHL/FHL  Sensory: SILT SPN/DPN/Saph/Kirsten/Tib  + DP and PT pulses    RADIOLOGY:    XR/CT RLE: TKA periprosthetic fx of distal femur extending proximally to shaft    IMPRESSION: Pt is 88y w/ TKA distal femur PPFx    PLAN:   - Plan for OR w/ Dr. Sánchez on 11/12  - Appreciate medicine primary management  - Please document preop optimization and medical clearance for OR  - NWB RLE on Bulky Eric knee immobilizer  - PT/OT daily while inpatient  - DVT ppx: per primary  - Incentive spirometry  - Ice as needed  - Discussed w/ Dr. Sánchez who agrees w/ plan, will update as needed

## 2023-11-15 ENCOUNTER — TRANSCRIPTION ENCOUNTER (OUTPATIENT)
Age: 88
End: 2023-11-15

## 2023-11-15 VITALS
OXYGEN SATURATION: 100 % | DIASTOLIC BLOOD PRESSURE: 49 MMHG | TEMPERATURE: 98 F | HEART RATE: 66 BPM | RESPIRATION RATE: 18 BRPM | SYSTOLIC BLOOD PRESSURE: 141 MMHG

## 2023-11-15 LAB
ANION GAP SERPL CALC-SCNC: 4 MMOL/L — LOW (ref 5–17)
ANION GAP SERPL CALC-SCNC: 4 MMOL/L — LOW (ref 5–17)
BUN SERPL-MCNC: 18 MG/DL — SIGNIFICANT CHANGE UP (ref 7–23)
BUN SERPL-MCNC: 18 MG/DL — SIGNIFICANT CHANGE UP (ref 7–23)
CALCIUM SERPL-MCNC: 8.5 MG/DL — SIGNIFICANT CHANGE UP (ref 8.5–10.1)
CALCIUM SERPL-MCNC: 8.5 MG/DL — SIGNIFICANT CHANGE UP (ref 8.5–10.1)
CHLORIDE SERPL-SCNC: 110 MMOL/L — HIGH (ref 96–108)
CHLORIDE SERPL-SCNC: 110 MMOL/L — HIGH (ref 96–108)
CO2 SERPL-SCNC: 28 MMOL/L — SIGNIFICANT CHANGE UP (ref 22–31)
CO2 SERPL-SCNC: 28 MMOL/L — SIGNIFICANT CHANGE UP (ref 22–31)
CREAT SERPL-MCNC: 0.53 MG/DL — SIGNIFICANT CHANGE UP (ref 0.5–1.3)
CREAT SERPL-MCNC: 0.53 MG/DL — SIGNIFICANT CHANGE UP (ref 0.5–1.3)
EGFR: 89 ML/MIN/1.73M2 — SIGNIFICANT CHANGE UP
EGFR: 89 ML/MIN/1.73M2 — SIGNIFICANT CHANGE UP
GLUCOSE SERPL-MCNC: 91 MG/DL — SIGNIFICANT CHANGE UP (ref 70–99)
GLUCOSE SERPL-MCNC: 91 MG/DL — SIGNIFICANT CHANGE UP (ref 70–99)
HCT VFR BLD CALC: 26 % — LOW (ref 34.5–45)
HCT VFR BLD CALC: 26 % — LOW (ref 34.5–45)
HGB BLD-MCNC: 8.5 G/DL — LOW (ref 11.5–15.5)
HGB BLD-MCNC: 8.5 G/DL — LOW (ref 11.5–15.5)
MCHC RBC-ENTMCNC: 29.5 PG — SIGNIFICANT CHANGE UP (ref 27–34)
MCHC RBC-ENTMCNC: 29.5 PG — SIGNIFICANT CHANGE UP (ref 27–34)
MCHC RBC-ENTMCNC: 32.7 GM/DL — SIGNIFICANT CHANGE UP (ref 32–36)
MCHC RBC-ENTMCNC: 32.7 GM/DL — SIGNIFICANT CHANGE UP (ref 32–36)
MCV RBC AUTO: 90.3 FL — SIGNIFICANT CHANGE UP (ref 80–100)
MCV RBC AUTO: 90.3 FL — SIGNIFICANT CHANGE UP (ref 80–100)
PLATELET # BLD AUTO: 138 K/UL — LOW (ref 150–400)
PLATELET # BLD AUTO: 138 K/UL — LOW (ref 150–400)
POTASSIUM SERPL-MCNC: 3.7 MMOL/L — SIGNIFICANT CHANGE UP (ref 3.5–5.3)
POTASSIUM SERPL-MCNC: 3.7 MMOL/L — SIGNIFICANT CHANGE UP (ref 3.5–5.3)
POTASSIUM SERPL-SCNC: 3.7 MMOL/L — SIGNIFICANT CHANGE UP (ref 3.5–5.3)
POTASSIUM SERPL-SCNC: 3.7 MMOL/L — SIGNIFICANT CHANGE UP (ref 3.5–5.3)
RBC # BLD: 2.88 M/UL — LOW (ref 3.8–5.2)
RBC # BLD: 2.88 M/UL — LOW (ref 3.8–5.2)
RBC # FLD: 18.4 % — HIGH (ref 10.3–14.5)
RBC # FLD: 18.4 % — HIGH (ref 10.3–14.5)
SODIUM SERPL-SCNC: 142 MMOL/L — SIGNIFICANT CHANGE UP (ref 135–145)
SODIUM SERPL-SCNC: 142 MMOL/L — SIGNIFICANT CHANGE UP (ref 135–145)
WBC # BLD: 5.48 K/UL — SIGNIFICANT CHANGE UP (ref 3.8–10.5)
WBC # BLD: 5.48 K/UL — SIGNIFICANT CHANGE UP (ref 3.8–10.5)
WBC # FLD AUTO: 5.48 K/UL — SIGNIFICANT CHANGE UP (ref 3.8–10.5)
WBC # FLD AUTO: 5.48 K/UL — SIGNIFICANT CHANGE UP (ref 3.8–10.5)

## 2023-11-15 PROCEDURE — 74220 X-RAY XM ESOPHAGUS 1CNTRST: CPT | Mod: 26

## 2023-11-15 PROCEDURE — 99239 HOSP IP/OBS DSCHRG MGMT >30: CPT

## 2023-11-15 RX ORDER — ENOXAPARIN SODIUM 100 MG/ML
40 INJECTION SUBCUTANEOUS
Qty: 28 | Refills: 0
Start: 2023-11-15 | End: 2023-12-12

## 2023-11-15 RX ORDER — PANTOPRAZOLE SODIUM 20 MG/1
1 TABLET, DELAYED RELEASE ORAL
Qty: 0 | Refills: 0 | DISCHARGE
Start: 2023-11-15

## 2023-11-15 RX ORDER — DONEPEZIL HYDROCHLORIDE 10 MG/1
1 TABLET, FILM COATED ORAL
Qty: 0 | Refills: 0 | DISCHARGE

## 2023-11-15 RX ADMIN — ENOXAPARIN SODIUM 40 MILLIGRAM(S): 100 INJECTION SUBCUTANEOUS at 05:26

## 2023-11-15 RX ADMIN — Medication 1000 MILLIGRAM(S): at 13:25

## 2023-11-15 RX ADMIN — ONDANSETRON 4 MILLIGRAM(S): 8 TABLET, FILM COATED ORAL at 12:49

## 2023-11-15 RX ADMIN — PANTOPRAZOLE SODIUM 40 MILLIGRAM(S): 20 TABLET, DELAYED RELEASE ORAL at 05:25

## 2023-11-15 RX ADMIN — Medication 1000 MILLIGRAM(S): at 05:24

## 2023-11-15 RX ADMIN — Medication 30 MILLILITER(S): at 13:24

## 2023-11-15 NOTE — PROGRESS NOTE ADULT - REASON FOR ADMISSION
fall and right leg pain

## 2023-11-15 NOTE — PROGRESS NOTE ADULT - PROVIDER SPECIALTY LIST ADULT
Hospitalist
Hospitalist
Orthopedics
Orthopedics
Hospitalist
Orthopedics

## 2023-11-15 NOTE — PROGRESS NOTE ADULT - SUBJECTIVE AND OBJECTIVE BOX
CC- s/p mechanical fall    HPI:  88 F with Hx of dementia, was last admitted to  in March 2023 for pneumonia and left effusion for which she required chest tube drainage.  She presents to the ED today with a slip and fall while walking on the steps at home which resulted in her landing on her right side.  She reports that she slipped with her shoes on the steps, lost her footing and went down.  She denied any dizziness, lightheadedness, chest pain, chest pressure, nausea, vomiting, fevers, chills prior to the event and reports that she otherwise had been feeling good.    In the ED, she had imaging done which revealed an acute right distal femur fracture and a periprosthetic right knee fracture.      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 s/p chest tube drainage  Achalasia    PAST SURGICAL HISTORY:  s/p chest tube  s/p hysterectomy  s/p right total knee replacement - Dr. He  s/p appendectomy  s/p tonsillectomy  s/p spine surgery    FAMILY HISTORY:   Hx of lung cancer (10 Nov 2023 14:41)    11/11/23- appears comfortable  11/12/23- NPO for OR today    Review of system- All 10 systems reviewed and is as per HPI otherwise negative.     Vital Signs Last 24 Hrs  T(C): 36.4 (12 Nov 2023 09:28), Max: 36.7 (12 Nov 2023 00:00)  T(F): 97.5 (12 Nov 2023 09:28), Max: 98.1 (12 Nov 2023 00:00)  HR: 71 (12 Nov 2023 09:28) (71 - 96)  BP: 143/74 (12 Nov 2023 09:28) (115/50 - 158/76)  BP(mean): --  RR: 17 (12 Nov 2023 09:28) (17 - 18)  SpO2: 98% (12 Nov 2023 09:28) (90% - 99%)    Parameters below as of 12 Nov 2023 09:28  Patient On (Oxygen Delivery Method): nasal cannula  O2 Flow (L/min): 2    LABS:                                   8.2    5.81  )-----------( 100      ( 12 Nov 2023 04:29 )             24.2     12 Nov 2023 04:29    144    |  113    |  33     ----------------------------<  104    4.3     |  29     |  0.74     Ca    8.7        12 Nov 2023 04:29    TPro  5.3    /  Alb  2.5    /  TBili  0.3    /  DBili  <0.1   /  AST  52     /  ALT  79     /  AlkPhos  154    11 Nov 2023 07:40    LIVER FUNCTIONS - ( 11 Nov 2023 07:40 )  Alb: 2.5 g/dL / Pro: 5.3 gm/dL / ALK PHOS: 154 U/L / ALT: 79 U/L / AST: 52 U/L / GGT: x           PT/INR - ( 12 Nov 2023 04:29 )   PT: 12.2 sec;   INR: 1.08 ratio       PTT - ( 12 Nov 2023 04:29 )  PTT:41.9 sec    Urinalysis Basic - ( 12 Nov 2023 04:29 )  Color: x / Appearance: x / SG: x / pH: x  Gluc: 104 mg/dL / Ketone: x  / Bili: x / Urobili: x   Blood: x / Protein: x / Nitrite: x   Leuk Esterase: x / RBC: x / WBC x   Sq Epi: x / Non Sq Epi: x / Bacteria: x      RADIOLOGY & ADDITIONAL TESTS:  ACC: [id number] [facility]   ORDERED BY: FLORI ALVAREZ   ACC: [id number] EXAM:  CT FEMUR ONLY RT   ORDERED BY: FLORI ALVAREZ   PROCEDURE DATE:  11/10/2023      INTERPRETATION:  CT OF THE RIGHT FEMUR  CLINICAL INFORMATION: Fracture evaluation.    COMPARISON: Radiographs performed on same date. No prior CT available.    TECHNIQUE: Axial CT images were obtained of the right femur with coronal   and sagittal reconstructions. 3-D volume rendered reformats were also   provided. No intravenous contrast was administered.    FINDINGS:    Osseous: Status post remote right knee cemented total arthroplasty and   posterior patellar remodeling. Acute mildly comminuted oblique transverse   fracture through the intercondylar distal femoral metaphysis and   supracondylar lateral diaphysis with mild posterolateral displacement and   apex anteromedial angulation of the dominant distal fragments. Posterior   patellar polyethylene liner appears well-positioned. No definite   osteolysis or subsidence. Femoral head is well located. Mild to moderate   right hip arthrosis without effusion. Mineralization within normal   limits. No aggressive osseous neoplasm.    Soft tissues: Large volume right knee lipohemarthrosis. Inflammatory   stranding surrounds an ill-defined moderate volume intramuscular hematoma   involving the proximal to mid diaphysis intermedialis. No distended   Baker's popliteal cyst or drainable encapsulated fluid collection. No   tracking emphysema.    IMPRESSION:    Acute mildly comminuted and mildly displaced/angulated oblique transverse   right distal femoral periprosthetic fracture. Status post remote right   knee total arthroplasty with large volume lipohemarthrosis. No convincing   CT evidence forhardware loosening.    ACC: [id number] EXAM:  XR CHEST PORTABLE URGENT 1V   ORDERED BY: MIGUELITO LUONG     PROCEDURE DATE:  03/10/2023      INTERPRETATION:  Clinical history: 87-year-old female, chest tube removed.    Portable view of the chest is compared to 4 hours prior.    FINDINGS: Left chest tube removed, no pneumothorax. Improvement in   atelectasis at the left base, moderate pleural effusion, grossly   unchanged.    Small right pleural effusion, unchanged.    Cardiac silhouette and pulmonary vasculaturewithin normal limits with no   right pneumothorax or acute osseous finding.    IMPRESSION:  Left chest tube removed with improvement in atelectasis and no   pneumothorax.    Moderate left and small right pleural effusions, grossly unchanged    PHYSICAL EXAM:  GENERAL: NAD  HEAD:  Atraumatic, Normocephalic  EYES: EOMI, PERRLA, conjunctiva and sclera clear  HEENT: Moist mucous membranes  NECK: Supple, No JVD  NERVOUS SYSTEM:  Awake, follows commands, confused  CHEST/LUNG: Poor resp effort but grossly clear  HEART: Regular rate and rhythm; No murmurs, rubs, or gallops  ABDOMEN: Soft, Nontender, Nondistended; Bowel sounds present  GENITOURINARY- Lenz+  EXTREMITIES:  2+ Peripheral Pulses, No clubbing, cyanosis, or edema  MUSCULOSKELTAL- RLE in immobilizer, +RT foot dorsiflexion  SKIN-no rash, no lesion    MEDICATIONS  (STANDING):  acetaminophen   IVPB .. 750 milliGRAM(s) IV Intermittent once  sodium chloride 0.9%. 1000 milliLiter(s) (100 mL/Hr) IV Continuous <Continuous>  sodium chloride 0.9%. 1000 milliLiter(s) (75 mL/Hr) IV Continuous <Continuous>  tranexamic acid IVPB 1000 milliGRAM(s) IV Intermittent once  tranexamic acid IVPB 1000 milliGRAM(s) IV Intermittent once    MEDICATIONS  (PRN):  HYDROmorphone  Injectable 0.5 milliGRAM(s) IV Push every 4 hours PRN Severe Pain (7 - 10)  ondansetron Injectable 4 milliGRAM(s) IV Push every 6 hours PRN Nausea and/or Vomiting      Assessment/Plan  #S/p mechanical fall with RT distal femur periprosthetic fracture  #Anemia acute blood loss on chronic  Ortho following  Bedrest  Keep RLE in immobilizer  S/p 1U PRBC transfusion 11/11 and 1U on 11/12 preop  Pain meds prn  OR today  NPO for OR  Lovenox on hold  Patient is medically optimized for OR    #Sinus Bradycardia and Chronic LBBB on EKG  On remote tele  Off aricept- bradycardia improving    #Thrombocytopenia like due to fracture and consumption  Stable, monitor    #Urinary retention  S/p Lenz 11/11  Voiding trial postop when more mobile    #Mild Hyperkalemia- resolved    #Dementia  Supportive. Reorient    #Advance Directives - discussed with the patients daughter Miryam who is the HCP.  Her cell is [phone].  No limitations on care.  She is Full Code    #Achalasia  patient is supposed to have an esophagram study next week that was ordered by Dr. Seth,  Spoke to Dr. Seth, he will see patient next week and we will attempt to do study while patient is admitted    #Dispo- OR today    
Patient seen and examined at bedside.  No acute complaints at this time. Pain well controlled. Denies chest pain, shortness of breath, nausea or vomiting.       LABS:                        9.3    6.36  )-----------( 87       ( 12 Nov 2023 18:40 )             27.1     11-12    143  |  112<H>  |  24<H>  ----------------------------<  115<H>  4.1   |  26  |  0.63    Ca    8.7      12 Nov 2023 18:40    TPro  5.3<L>  /  Alb  2.5<L>  /  TBili  0.3  /  DBili  <0.1  /  AST  52<H>  /  ALT  79<H>  /  AlkPhos  154<H>  11-11    PT/INR - ( 12 Nov 2023 04:29 )   PT: 12.2 sec;   INR: 1.08 ratio         PTT - ( 12 Nov 2023 04:29 )  PTT:41.9 sec  Urinalysis Basic - ( 12 Nov 2023 18:40 )    Color: x / Appearance: x / SG: x / pH: x  Gluc: 115 mg/dL / Ketone: x  / Bili: x / Urobili: x   Blood: x / Protein: x / Nitrite: x   Leuk Esterase: x / RBC: x / WBC x   Sq Epi: x / Non Sq Epi: x / Bacteria: x        VITAL SIGNS:  T(C): 36.6 (11-13-23 @ 04:00), Max: 36.6 (11-13-23 @ 04:00)  HR: 74 (11-13-23 @ 04:00) (69 - 88)  BP: 143/53 (11-13-23 @ 04:00) (127/66 - 158/76)  RR: 18 (11-13-23 @ 04:00) (12 - 18)  SpO2: 100% (11-13-23 @ 04:00) (94% - 100%)        PE:    General: NAD, resting comfortably in bed  RLE:   Dressing C/D/I  Knee immobilizer in place  SCDs present bilaterally  Compartments soft and compressible  No calf tenderness bilaterally  +TA/EHL/FHL/GSC  SILT adán/saph/sp/dp/tib  palpable DP/PT      A/P:  88y F s/p R distal femur ORIF POD 1  -PT/OT   -PWB 50% RLE with knee immobilizer on at all times (NO right knee ROM)  -Pain Control  -DVT ppx per primary team lovenox on POD 1  -Continue perioperative abx x 24 hours  -FU AM Labs  -Rest, ice, compress and elevate the extremity as needed  -Incentive Spirometry  -Medical comanagement appreciated  -dispo pending PT eval
Patient seen and examined at bedside.  No acute complaints at this time. Pain well controlled. Denies chest pain, shortness of breath, nausea or vomiting.     PE:  T(C): 36.4 (11-11-23 @ 08:00), Max: 36.8 (11-10-23 @ 14:02)  HR: 64 (11-11-23 @ 08:00) (51 - 79)  BP: 102/47 (11-11-23 @ 08:00) (102/47 - 175/93)  RR: 17 (11-11-23 @ 08:00) (16 - 17)  SpO2: 92% (11-11-23 @ 08:00) (92% - 100%)    General: NAD, resting comfortably in bed  RLE:   incision CDI  BJKI in place  SCDs present bilaterally  Compartments soft and compressible  No calf tenderness bilaterally  +TA/EHL/FHL/GSC  SILT adán/saph/sp/dp/tib  palpable DP/PT    LABS:                        7.6    5.53  )-----------( 106      ( 11 Nov 2023 07:40 )             23.7     11-11    138  |  107  |  35<H>  ----------------------------<  100<H>  5.1   |  26  |  0.83    Ca    9.0      11 Nov 2023 07:40    TPro  5.3<L>  /  Alb  2.5<L>  /  TBili  0.3  /  DBili  <0.1  /  AST  52<H>  /  ALT  79<H>  /  AlkPhos  154<H>  11-11    PT/INR - ( 11 Nov 2023 07:40 )   PT: 11.5 sec;   INR: 1.02 ratio         PTT - ( 11 Nov 2023 07:40 )  PTT:38.4 sec    A/P:   87yo F w/ R periprosthetic distal femur fx s/p MF    PLAN:   - Plan for OR w/ Dr. Sánchez on 11/12  - NPO and hold DVT ppx at midnight  - IVF while NPO  - Appreciate medicine primary management  - medically optimized  - NWB RLE on Bulky Eric knee immobilizer  - DVT ppx: per primary  - Incentive spirometry  - Ice as needed  - Discussed w/ Dr. Sánchez who agrees w/ plan, will update as needed
CC- s/p mechanical fall    HPI:  88 F with Hx of dementia, DVT 12/2022 s/p eliquis, Osteopenia, achalasia, was last admitted to  in March 2023 for pneumonia and left effusion for which she required chest tube drainage.  She presented to the ED 11/10/23 with a slip and fall while walking on the steps at home which resulted in her landing on her right side.  She reports that she slipped with her shoes on the steps, lost her footing and went down.  She denied any dizziness, lightheadedness, chest pain, chest pressure, nausea, vomiting, fevers, chills prior to the event and reports that she otherwise had been feeling good.    In the ED, she had imaging done which revealed an acute right distal femur fracture and a periprosthetic right knee fracture.  She underwent ORIF 11/12 .     seen and examined sitting up in chair today. feeling well overall. awaiting esophogram. no issues overall.     Review of system- All 10 systems reviewed and is as per HPI otherwise negative.     Vital Signs Last 24 Hrs  T(C): 36.3 (14 Nov 2023 07:36), Max: 36.7 (13 Nov 2023 14:25)  T(F): 97.4 (14 Nov 2023 07:36), Max: 98 (13 Nov 2023 14:25)  HR: 69 (14 Nov 2023 07:36) (69 - 90)  BP: 147/82 (14 Nov 2023 07:36) (129/56 - 150/65)  BP(mean): --  RR: 18 (14 Nov 2023 07:36) (17 - 18)  SpO2: 99% (14 Nov 2023 07:36) (93% - 100%)    Parameters below as of 14 Nov 2023 07:36  Patient On (Oxygen Delivery Method): room air      PHYSICAL EXAM:    GENERAL: Comfortable, no acute distress   HEAD:  Normocephalic, atraumatic  EYES: EOMI, PERRLA  HEENT: Moist mucous membranes  NECK: Supple, No JVD  NERVOUS SYSTEM:  confused, grossly non focal.   CHEST/LUNG: Clear to auscultation bilaterally  HEART: Regular rate and rhythm  ABDOMEN: Soft, Nontender, Nondistended, Bowel sounds present  GENITOURINARY: Voiding, no palpable bladder  EXTREMITIES:   No clubbing, cyanosis, or edema  MUSCULOSKELTAL- RLE dressing intact. in immobilizer   SKIN-no rash          LABS:                                 8.9    5.33  )-----------( 109      ( 14 Nov 2023 07:29 )             26.8     11-14    142  |  111<H>  |  28<H>  ----------------------------<  93  3.9   |  27  |  0.62    Ca    8.7      14 Nov 2023 07:29    Urinalysis Basic - ( 14 Nov 2023 07:29 )    Color: x / Appearance: x / SG: x / pH: x  Gluc: 93 mg/dL / Ketone: x  / Bili: x / Urobili: x   Blood: x / Protein: x / Nitrite: x   Leuk Esterase: x / RBC: x / WBC x   Sq Epi: x / Non Sq Epi: x / Bacteria: x        RADIOLOGY & ADDITIONAL TESTS:  ACC: [id number] EXAM:  CT 3D RECONSTRUCT WO [place]   ORDERED BY: FLORI ALVAREZ   ACC: [id number] EXAM:  CT FEMUR ONLY RT   ORDERED BY: FLORI ALVAREZ   PROCEDURE DATE:  11/10/2023    IMPRESSION:  Acute mildly comminuted and mildly displaced/angulated oblique transverse   right distal femoral periprosthetic fracture. Status post remote right   knee total arthroplasty with large volume lipohemarthrosis. No convincing   CT evidence for hardware loosening.    MEDICATIONS  (STANDING):  acetaminophen     Tablet .. 1000 milliGRAM(s) Oral every 8 hours  acetaminophen   IVPB .. 750 milliGRAM(s) IV Intermittent once  enoxaparin Injectable 40 milliGRAM(s) SubCutaneous every 24 hours  HYDROmorphone  Injectable 0.5 milliGRAM(s) IV Push once  lactated ringers. 1000 milliLiter(s) (75 mL/Hr) IV Continuous <Continuous>  pantoprazole    Tablet 40 milliGRAM(s) Oral before breakfast  polyethylene glycol 3350 17 Gram(s) Oral at bedtime  senna 2 Tablet(s) Oral at bedtime  sodium chloride 0.9%. 1000 milliLiter(s) (75 mL/Hr) IV Continuous <Continuous>  tranexamic acid IVPB 1000 milliGRAM(s) IV Intermittent once  tranexamic acid IVPB 1000 milliGRAM(s) IV Intermittent once    MEDICATIONS  (PRN):  aluminum hydroxide/magnesium hydroxide/simethicone Suspension 30 milliLiter(s) Oral four times a day PRN Indigestion  HYDROmorphone  Injectable 0.5 milliGRAM(s) IV Push every 4 hours PRN Severe Pain (7 - 10)  magnesium hydroxide Suspension 30 milliLiter(s) Oral daily PRN Constipation  ondansetron Injectable 4 milliGRAM(s) IV Push every 6 hours PRN Nausea and/or Vomiting  ondansetron Injectable 4 milliGRAM(s) IV Push every 6 hours PRN Nausea and/or Vomiting  oxyCODONE    IR 5 milliGRAM(s) Oral every 4 hours PRN Severe Pain (7 - 10)  oxyCODONE    IR 2.5 milliGRAM(s) Oral every 4 hours PRN Moderate Pain (4 - 6)  traMADol 25 milliGRAM(s) Oral every 6 hours PRN Mild Pain (1 - 3)    Assessment/Plan  #S/p mechanical fall with RT distal femur periprosthetic fracture  -s/p ORIF POD#2  -pain control  -physical therapy  -incentive spirometry  -bowel regimen.     #Acute blood loss anemia:  -d/t fracture and now d/t surgery  -s/p 2 units preop.   -s/p 1 unit of PRBC on 11/13     #Sinus Bradycardia and Chronic LBBB on EKG  -On remote tele  -Off aricept- bradycardia improved  -can dc tele    #Thrombocytopenia  -presumed to be related to  consumption  -if no improvement despite stable h/h, will need outpt heme eval.     #Urinary retention  S/p Lenz 11/11  tov today.    #Mild Hyperkalemia  - resolved    #severe protein calorie malnutrition:  -diet liberalized to regular  -encouraged oral intake  -oral vitamins recommended upon discharge  -nutrition eval appreciated    #Dementia  Supportive. Reorient    #Achalasia  awaiting esophagram with Dr. Stevens today.       dispo - likely dc to [facility] tomorrow if H&H stable and esophogram results do not require acute interventions   
Patient seen and examined at bedside this AM.  No acute complaints at this time. Pain well controlled. Denies chest pain, shortness of breath, nausea or vomiting. S/p 1U 11/11.     PE:      LABS:                        8.2    5.81  )-----------( 100      ( 12 Nov 2023 04:29 )             24.2     11-12    144  |  113<H>  |  33<H>  ----------------------------<  104<H>  4.3   |  29  |  0.74    Ca    8.7      12 Nov 2023 04:29    TPro  5.3<L>  /  Alb  2.5<L>  /  TBili  0.3  /  DBili  <0.1  /  AST  52<H>  /  ALT  79<H>  /  AlkPhos  154<H>  11-11    PT/INR - ( 12 Nov 2023 04:29 )   PT: 12.2 sec;   INR: 1.08 ratio         PTT - ( 12 Nov 2023 04:29 )  PTT:41.9 sec  Urinalysis Basic - ( 12 Nov 2023 04:29 )    Color: x / Appearance: x / SG: x / pH: x  Gluc: 104 mg/dL / Ketone: x  / Bili: x / Urobili: x   Blood: x / Protein: x / Nitrite: x   Leuk Esterase: x / RBC: x / WBC x   Sq Epi: x / Non Sq Epi: x / Bacteria: x        VITAL SIGNS:  T(C): 36.7 (11-12-23 @ 00:00), Max: 36.7 (11-12-23 @ 00:00)  HR: 92 (11-12-23 @ 00:00) (64 - 96)  BP: 131/58 (11-12-23 @ 00:00) (102/47 - 131/58)  RR: 18 (11-12-23 @ 00:00) (17 - 18)  SpO2: 90% (11-12-23 @ 00:00) (90% - 99%)    General: NAD, resting comfortably in bed  RLE:   BJKI in place  SCDs present bilaterally  Compartments soft and compressible  No calf tenderness bilaterally  +TA/EHL/FHL/GSC  SILT adán/saph/sp/dp/tib  +DP/PT        A/P:   87yo F w/ R periprosthetic distal femur fx s/p MF    PLAN:   - Plan for OR w/ Dr. Sánchez on 11/12 for ORIF of R PP DF Fx  - NPO for OR  - hold chemical VTE ppx for OR  - FU Preop labs  - IVF while NPO  - Appreciate medicine primary management  - appreciate medical risk stratification, documented in chart  - S/p 1U 11/11  - NWB RLE on Bulky Eric knee immobilizer  - Incentive spirometry  - Ice as needed  - Discussed w/ Dr. Sánchez who agrees w/ plan, will update as needed
Patient seen and examined at bedside this AM.  No acute complaints at this time. Pain well controlled. Denies chest pain, shortness of breath, nausea or vomiting.       LABS:                        8.9    5.33  )-----------( 109      ( 14 Nov 2023 07:29 )             26.8     11-14    142  |  111<H>  |  28<H>  ----------------------------<  93  3.9   |  27  |  0.62    Ca    8.7      14 Nov 2023 07:29        Urinalysis Basic - ( 14 Nov 2023 07:29 )    Color: x / Appearance: x / SG: x / pH: x  Gluc: 93 mg/dL / Ketone: x  / Bili: x / Urobili: x   Blood: x / Protein: x / Nitrite: x   Leuk Esterase: x / RBC: x / WBC x   Sq Epi: x / Non Sq Epi: x / Bacteria: x        VITAL SIGNS:  T(C): 36.2 (11-15-23 @ 00:18), Max: 36.4 (11-14-23 @ 05:55)  HR: 85 (11-15-23 @ 00:18) (69 - 85)  BP: 154/69 (11-15-23 @ 00:18) (142/46 - 154/69)  RR: 18 (11-15-23 @ 00:18) (18 - 18)  SpO2: 97% (11-15-23 @ 00:18) (97% - 99%)      PE:        General: NAD, resting comfortably in bed  RLE:   Dressing C/D/I  Knee immobilizer in place  SCDs present bilaterally  Compartments soft and compressible  No calf tenderness bilaterally  +TA/EHL/FHL/GSC  SILT adán/saph/sp/dp/tib  palpable DP/PT                A/P:  88y F s/p R distal femur ORIF for PP Fx 11/12    -PT/OT   -PWB 50% RLE with knee immobilizer on at all times (NO right knee ROM)  -Pain Control  -DVT ppx per primary team, currently lovenox  -FU AM Labs  -Rest, ice, compress and elevate the extremity as needed  -Incentive Spirometry  -Medical comanagement appreciated  -dispo: [facility], ortho stable for DC planning  - no further acute orthopaedic surgical intervention indicated 
Postop Check    Patient tolerated the procedure well. Patient seen and examined at bedside.  No acute complaints at this time. Pain well controlled. Denies chest pain, shortness of breath, nausea or vomiting.     PE:  T(C): 36.1 (11-12-23 @ 18:45), Max: 36.7 (11-12-23 @ 00:00)  HR: 88 (11-12-23 @ 19:03) (69 - 96)  BP: 152/66 (11-12-23 @ 19:03) (122/55 - 158/76)  RR: 17 (11-12-23 @ 19:03) (12 - 18)  SpO2: 94% (11-12-23 @ 19:03) (90% - 99%)    General: NAD, resting comfortably in bed  RLE:   Dressing C/D/I  Knee immobilizer in place  SCDs present bilaterally  Compartments soft and compressible  No calf tenderness bilaterally  +TA/EHL/FHL/GSC  SILT adán/saph/sp/dp/tib  palpable DP/PT    LABS:                        9.3    6.36  )-----------( 87       ( 12 Nov 2023 18:40 )             27.1     11-12    143  |  112<H>  |  24<H>  ----------------------------<  115<H>  4.1   |  26  |  0.63    Ca    8.7      12 Nov 2023 18:40    TPro  5.3<L>  /  Alb  2.5<L>  /  TBili  0.3  /  DBili  <0.1  /  AST  52<H>  /  ALT  79<H>  /  AlkPhos  154<H>  11-11    PT/INR - ( 12 Nov 2023 04:29 )   PT: 12.2 sec;   INR: 1.08 ratio         PTT - ( 12 Nov 2023 04:29 )  PTT:41.9 sec    A/P:  88y F s/p R distal femur ORIF POD 0  -PT/OT   -PWB 50% RLE with knee immobilizer on at all times (NO right knee ROM)  -Pain Control  -DVT ppx per primary team lovenox on POD 1  -Continue perioperative abx x 24 hours  -FU AM Labs  -Rest, ice, compress and elevate the extremity as needed  -Incentive Spirometry  -Medical comanagement appreciated  -dispo pending PT eval
CC- s/p mechanical fall    HPI:  88 F with Hx of dementia, DVT 12/2022 s/p eliquis, Osteopenia, achalasia, was last admitted to  in March 2023 for pneumonia and left effusion for which she required chest tube drainage.  She presented to the ED 11/10/23 with a slip and fall while walking on the steps at home which resulted in her landing on her right side.  She reports that she slipped with her shoes on the steps, lost her footing and went down.  She denied any dizziness, lightheadedness, chest pain, chest pressure, nausea, vomiting, fevers, chills prior to the event and reports that she otherwise had been feeling good.    In the ED, she had imaging done which revealed an acute right distal femur fracture and a periprosthetic right knee fracture.  She underwent ORIF 11/12 .     11/15: seen and examined sitting up in bed on 2N. no pain or discomfort. s/p esophagram.  at bedside and updated on plan of care.      Review of system- All 10 systems reviewed and is as per HPI otherwise negative.     Vital Signs Last 24 Hrs  T(C): 36.2 (15 Nov 2023 07:48), Max: 36.4 (14 Nov 2023 16:47)  T(F): 97.2 (15 Nov 2023 07:48), Max: 97.6 (14 Nov 2023 16:47)  HR: 66 (15 Nov 2023 07:48) (66 - 85)  BP: 141/49 (15 Nov 2023 07:48) (141/49 - 154/69)  BP(mean): --  RR: 18 (15 Nov 2023 07:48) (18 - 18)  SpO2: 100% (15 Nov 2023 07:48) (97% - 100%)    Parameters below as of 15 Nov 2023 07:48  Patient On (Oxygen Delivery Method): nasal cannula  O2 Flow (L/min): 2    PHYSICAL EXAM:    GENERAL: Comfortable, no acute distress   HEAD:  Normocephalic, atraumatic  EYES: EOMI, PERRLA  HEENT: Moist mucous membranes  NECK: Supple, No JVD  NERVOUS SYSTEM:  confused, grossly non focal.   CHEST/LUNG: Clear to auscultation bilaterally  HEART: Regular rate and rhythm  ABDOMEN: Soft, Nontender, Nondistended, Bowel sounds present  GENITOURINARY: Voiding, no palpable bladder  EXTREMITIES:   No clubbing, cyanosis, or edema  MUSCULOSKELTAL- RLE dressing intact. in immobilizer   SKIN-no rash          LABS:                            8.5    5.48  )-----------( 138      ( 15 Nov 2023 09:11 )             26.0     11-15    142  |  110<H>  |  18  ----------------------------<  91  3.7   |  28  |  0.53    Ca    8.5      15 Nov 2023 09:11        RADIOLOGY & ADDITIONAL TESTS:  ACC: [id number] EXAM:  CT 3D RECONSTRUCT WO [place]ON   ORDERED BY: FLORI ALVAREZ   ACC: [id number] EXAM:  CT FEMUR ONLY RT   ORDERED BY: FLORI ALVAREZ   PROCEDURE DATE:  11/10/2023    IMPRESSION:  Acute mildly comminuted and mildly displaced/angulated oblique transverse   right distal femoral periprosthetic fracture. Status post remote right   knee total arthroplasty with large volume lipohemarthrosis. No convincing   CT evidence for hardware loosening.    MEDICATIONS  (STANDING):  acetaminophen     Tablet .. 1000 milliGRAM(s) Oral every 8 hours  acetaminophen   IVPB .. 750 milliGRAM(s) IV Intermittent once  enoxaparin Injectable 40 milliGRAM(s) SubCutaneous every 24 hours  HYDROmorphone  Injectable 0.5 milliGRAM(s) IV Push once  lactated ringers. 1000 milliLiter(s) (75 mL/Hr) IV Continuous <Continuous>  pantoprazole    Tablet 40 milliGRAM(s) Oral before breakfast  polyethylene glycol 3350 17 Gram(s) Oral at bedtime  senna 2 Tablet(s) Oral at bedtime  sodium chloride 0.9%. 1000 milliLiter(s) (75 mL/Hr) IV Continuous <Continuous>  tranexamic acid IVPB 1000 milliGRAM(s) IV Intermittent once  tranexamic acid IVPB 1000 milliGRAM(s) IV Intermittent once    MEDICATIONS  (PRN):  aluminum hydroxide/magnesium hydroxide/simethicone Suspension 30 milliLiter(s) Oral four times a day PRN Indigestion  HYDROmorphone  Injectable 0.5 milliGRAM(s) IV Push every 4 hours PRN Severe Pain (7 - 10)  magnesium hydroxide Suspension 30 milliLiter(s) Oral daily PRN Constipation  ondansetron Injectable 4 milliGRAM(s) IV Push every 6 hours PRN Nausea and/or Vomiting  ondansetron Injectable 4 milliGRAM(s) IV Push every 6 hours PRN Nausea and/or Vomiting  oxyCODONE    IR 5 milliGRAM(s) Oral every 4 hours PRN Severe Pain (7 - 10)  oxyCODONE    IR 2.5 milliGRAM(s) Oral every 4 hours PRN Moderate Pain (4 - 6)  traMADol 25 milliGRAM(s) Oral every 6 hours PRN Mild Pain (1 - 3)    Assessment/Plan  #S/p mechanical fall with RT distal femur periprosthetic fracture  -s/p ORIF POD#3  -pain control  -physical therapy  -incentive spirometry  -bowel regimen.     #Acute blood loss anemia: --- stable   -d/t fracture and now d/t surgery  -s/p 2 units preop.   -s/p 1 unit of PRBC on 11/13     #Sinus Bradycardia and Chronic LBBB on EKG  -no events on remote tele  -Off aricept- bradycardia improved   -can dc tele    #Thrombocytopenia  -presumed to be related to  consumption  -if no improvement despite stable h/h, will need outpt heme eval.     #Urinary retention  S/p Lenz 11/11  tov today.    #Mild Hyperkalemia  - resolved    #severe protein calorie malnutrition:  -diet liberalized to regular  -encouraged oral intake  -oral vitamins recommended upon discharge  -nutrition eval appreciated    #Dementia  Supportive. Reorient    #Achalasia  s/p esophogram today. [facility]       [facility] - [facility]. follow up with ortho and Gi as discussed with family.   
CC- s/p mechanical fall    HPI:  88 F with Hx of dementia, DVT 12/2022 s/p eliquis, Osteopenia, achalasia, was last admitted to  in March 2023 for pneumonia and left effusion for which she required chest tube drainage.  She presented to the ED 11/10/23 with a slip and fall while walking on the steps at home which resulted in her landing on her right side.  She reports that she slipped with her shoes on the steps, lost her footing and went down.  She denied any dizziness, lightheadedness, chest pain, chest pressure, nausea, vomiting, fevers, chills prior to the event and reports that she otherwise had been feeling good.    In the ED, she had imaging done which revealed an acute right distal femur fracture and a periprosthetic right knee fracture.  She underwent ORIF 11/12 .     Pt seen and examined this am. Felt ok. Pain controlled. no sob/chest pain.  Does not recall having surgery yesterday. Was awaiting physical therapy.    Review of system- All 10 systems reviewed and is as per HPI otherwise negative.     Vital Signs Last 24 Hrs  T(C): 36.7 (13 Nov 2023 11:30), Max: 36.7 (13 Nov 2023 11:30)  T(F): 98.1 (13 Nov 2023 11:30), Max: 98.1 (13 Nov 2023 11:30)  HR: 65 (13 Nov 2023 11:30) (65 - 88)  BP: 131/41 (13 Nov 2023 11:30) (111/47 - 157/82)  RR: 18 (13 Nov 2023 11:30) (12 - 18)  SpO2: 100% (13 Nov 2023 11:30) (94% - 100%)    Parameters below as of 13 Nov 2023 11:30  Patient On (Oxygen Delivery Method): nasal cannula  O2 Flow (L/min): 2    PHYSICAL EXAM:    GENERAL: Comfortable, no acute distress   HEAD:  Normocephalic, atraumatic  EYES: EOMI, PERRLA  HEENT: Moist mucous membranes  NECK: Supple, No JVD  NERVOUS SYSTEM:  confused, grossly non focal.   CHEST/LUNG: Clear to auscultation bilaterally  HEART: Regular rate and rhythm  ABDOMEN: Soft, Nontender, Nondistended, Bowel sounds present  GENITOURINARY: Voiding, no palpable bladder  EXTREMITIES:   No clubbing, cyanosis, or edema  MUSCULOSKELTAL- RLE dressing intact. in immobiler.   SKIN-no rash          LABS:                        8.2    5.94  )-----------( 101      ( 13 Nov 2023 06:22 )             24.7     11-13    144  |  112<H>  |  23  ----------------------------<  102<H>  4.4   |  26  |  0.87    Ca    8.7      13 Nov 2023 06:22      PT/INR - ( 12 Nov 2023 04:29 )   PT: 12.2 sec;   INR: 1.08 ratio         PTT - ( 12 Nov 2023 04:29 )  PTT:41.9 sec  Urinalysis Basic - ( 13 Nov 2023 06:22 )    Color: x / Appearance: x / SG: x / pH: x  Gluc: 102 mg/dL / Ketone: x  / Bili: x / Urobili: x   Blood: x / Protein: x / Nitrite: x   Leuk Esterase: x / RBC: x / WBC x   Sq Epi: x / Non Sq Epi: x / Bacteria: x      RADIOLOGY & ADDITIONAL TESTS:  ACC: [id number] EXAM:  CT 3D RECONSTRUCT WO WRKSTATON   ORDERED BY: FLORI ALVAREZ   ACC: [id number] EXAM:  CT FEMUR ONLY RT   ORDERED BY: FLORI ALAVREZ   PROCEDURE DATE:  11/10/2023    IMPRESSION:  Acute mildly comminuted and mildly displaced/angulated oblique transverse   right distal femoral periprosthetic fracture. Status post remote right   knee total arthroplasty with large volume lipohemarthrosis. No convincing   CT evidence for hardware loosening.    MEDICATIONS  (STANDING):  acetaminophen     Tablet .. 1000 milliGRAM(s) Oral every 8 hours  acetaminophen   IVPB .. 750 milliGRAM(s) IV Intermittent once  enoxaparin Injectable 40 milliGRAM(s) SubCutaneous every 24 hours  HYDROmorphone  Injectable 0.5 milliGRAM(s) IV Push once  lactated ringers. 1000 milliLiter(s) (75 mL/Hr) IV Continuous <Continuous>  pantoprazole    Tablet 40 milliGRAM(s) Oral before breakfast  polyethylene glycol 3350 17 Gram(s) Oral at bedtime  senna 2 Tablet(s) Oral at bedtime  sodium chloride 0.9%. 1000 milliLiter(s) (75 mL/Hr) IV Continuous <Continuous>  tranexamic acid IVPB 1000 milliGRAM(s) IV Intermittent once  tranexamic acid IVPB 1000 milliGRAM(s) IV Intermittent once    MEDICATIONS  (PRN):  aluminum hydroxide/magnesium hydroxide/simethicone Suspension 30 milliLiter(s) Oral four times a day PRN Indigestion  HYDROmorphone  Injectable 0.5 milliGRAM(s) IV Push every 4 hours PRN Severe Pain (7 - 10)  magnesium hydroxide Suspension 30 milliLiter(s) Oral daily PRN Constipation  ondansetron Injectable 4 milliGRAM(s) IV Push every 6 hours PRN Nausea and/or Vomiting  ondansetron Injectable 4 milliGRAM(s) IV Push every 6 hours PRN Nausea and/or Vomiting  oxyCODONE    IR 5 milliGRAM(s) Oral every 4 hours PRN Severe Pain (7 - 10)  oxyCODONE    IR 2.5 milliGRAM(s) Oral every 4 hours PRN Moderate Pain (4 - 6)  traMADol 25 milliGRAM(s) Oral every 6 hours PRN Mild Pain (1 - 3)    Assessment/Plan  #S/p mechanical fall with RT distal femur periprosthetic fracture  -s/p ORIF POD#1  -pain control  -physical therapy  -incentive spirometry  -bowel regimen.     #Acute blood loss anemia:  -d/t fracture and now d/t surgery  -s/p 2 units preop.   -getting additional prbcs per ortho today    #Sinus Bradycardia and Chronic LBBB on EKG  -On remote tele  -Off aricept- bradycardia improved  -can dc tele    #Thrombocytopenia  -presumed to be related to  consumption  -if no improvement despite stable h/h, will need outpt heme eval.     #Urinary retention  S/p Lenz 11/11  tov today.    #Mild Hyperkalemia  - resolved    #Dementia  Supportive. Reorient    #Achalasia  patient is supposed to have an esophagram study this week that was ordered by Dr. Seth,    per d/w GI, will schedule while inpt.    
CC- s/p mechanical fall    HPI:  88 F with Hx of dementia, was last admitted to  in March 2023 for pneumonia and left effusion for which she required chest tube drainage.  She presents to the ED today with a slip and fall while walking on the steps at home which resulted in her landing on her right side.  She reports that she slipped with her shoes on the steps, lost her footing and went down.  She denied any dizziness, lightheadedness, chest pain, chest pressure, nausea, vomiting, fevers, chills prior to the event and reports that she otherwise had been feeling good.    In the ED, she had imaging done which revealed an acute right distal femur fracture and a periprosthetic right knee fracture.      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 s/p chest tube drainage  Achalasia    PAST SURGICAL HISTORY:  s/p chest tube  s/p hysterectomy  s/p right total knee replacement - Dr. He  s/p appendectomy  s/p tonsillectomy  s/p spine surgery    FAMILY HISTORY:   Hx of lung cancer (10 Nov 2023 14:41)    11/11/23- appears comfortable    Review of system- All 10 systems reviewed and is as per HPI otherwise negative.     T(C): 36.4 (11-11-23 @ 11:57), Max: 36.8 (11-10-23 @ 14:02)  HR: 75 (11-11-23 @ 11:57) (51 - 79)  BP: 110/42 (11-11-23 @ 11:57) (102/47 - 131/66)  RR: 17 (11-11-23 @ 11:57) (16 - 17)  SpO2: 99% (11-11-23 @ 11:57) (92% - 99%)  Wt(kg): --    LABS:                        7.6    5.53  )-----------( 106      ( 11 Nov 2023 07:40 )             23.7     11-11    138  |  107  |  35<H>  ----------------------------<  100<H>  5.1   |  26  |  0.83    Ca    9.0      11 Nov 2023 07:40    TPro  5.3<L>  /  Alb  2.5<L>  /  TBili  0.3  /  DBili  <0.1  /  AST  52<H>  /  ALT  79<H>  /  AlkPhos  154<H>  11-11    PT/INR - ( 11 Nov 2023 07:40 )   PT: 11.5 sec;   INR: 1.02 ratio      PTT - ( 11 Nov 2023 07:40 )  PTT:38.4 sec    Urinalysis Basic - ( 11 Nov 2023 07:40 )  Color: x / Appearance: x / SG: x / pH: x  Gluc: 100 mg/dL / Ketone: x  / Bili: x / Urobili: x   Blood: x / Protein: x / Nitrite: x   Leuk Esterase: x / RBC: x / WBC x   Sq Epi: x / Non Sq Epi: x / Bacteria: x      RADIOLOGY & ADDITIONAL TESTS:  ACC: [id number] EXAM:  CT 3D RECONSTRUCT WO WRKSTATON   ORDERED BY: FLORI ALVAREZ   ACC: [id number] EXAM:  CT FEMUR ONLY RT   ORDERED BY: FLORI ALVAREZ   PROCEDURE DATE:  11/10/2023      INTERPRETATION:  CT OF THE RIGHT FEMUR  CLINICAL INFORMATION: Fracture evaluation.    COMPARISON: Radiographs performed on same date. No prior CT available.    TECHNIQUE: Axial CT images were obtained of the right femur with coronal   and sagittal reconstructions. 3-D volume rendered reformats were also   provided. No intravenous contrast was administered.    FINDINGS:    Osseous: Status post remote right knee cemented total arthroplasty and   posterior patellar remodeling. Acute mildly comminuted oblique transverse   fracture through the intercondylar distal femoral metaphysis and   supracondylar lateral diaphysis with mild posterolateral displacement and   apex anteromedial angulation of the dominant distal fragments. Posterior   patellar polyethylene liner appears well-positioned. No definite   osteolysis or subsidence. Femoral head is well located. Mild to moderate   right hip arthrosis without effusion. Mineralization within normal   limits. No aggressive osseous neoplasm.    Soft tissues: Large volume right knee lipohemarthrosis. Inflammatory   stranding surrounds an ill-defined moderate volume intramuscular hematoma   involving the proximal to mid diaphysis intermedialis. No distended   Baker's popliteal cyst or drainable encapsulated fluid collection. No   tracking emphysema.    IMPRESSION:    Acute mildly comminuted and mildly displaced/angulated oblique transverse   right distal femoral periprosthetic fracture. Status post remote right   knee total arthroplasty with large volume lipohemarthrosis. No convincing   CT evidence forhardware loosening.    ACC: [id number] EXAM:  XR CHEST PORTABLE URGENT 1V   ORDERED BY: MIGUELITO LUONG     PROCEDURE DATE:  03/10/2023      INTERPRETATION:  Clinical history: 87-year-old female, chest tube removed.    Portable view of the chest is compared to 4 hours prior.    FINDINGS: Left chest tube removed, no pneumothorax. Improvement in   atelectasis at the left base, moderate pleural effusion, grossly   unchanged.    Small right pleural effusion, unchanged.    Cardiac silhouette and pulmonary vasculaturewithin normal limits with no   right pneumothorax or acute osseous finding.    IMPRESSION:  Left chest tube removed with improvement in atelectasis and no   pneumothorax.    Moderate left and small right pleural effusions, grossly unchanged    PHYSICAL EXAM:  GENERAL: NAD  HEAD:  Atraumatic, Normocephalic  EYES: EOMI, PERRLA, conjunctiva and sclera clear  HEENT: Moist mucous membranes  NECK: Supple, No JVD  NERVOUS SYSTEM:  Awake, follows commands, confused  CHEST/LUNG: Poor resp effort but grossly clear  HEART: Regular rate and rhythm; No murmurs, rubs, or gallops  ABDOMEN: Soft, Nontender, Nondistended; Bowel sounds present  GENITOURINARY- Voiding, no palpable bladder  EXTREMITIES:  2+ Peripheral Pulses, No clubbing, cyanosis, or edema  MUSCULOSKELTAL- RLE in immobilizer, +RT foot dorsiflexion  SKIN-no rash, no lesion    MEDICATIONS  (STANDING):  enoxaparin Injectable 40 milliGRAM(s) SubCutaneous once  sodium chloride 0.9%. 1000 milliLiter(s) (100 mL/Hr) IV Continuous <Continuous>  sodium chloride 0.9%. 1000 milliLiter(s) (75 mL/Hr) IV Continuous <Continuous>  tranexamic acid IVPB 1000 milliGRAM(s) IV Intermittent once  tranexamic acid IVPB 1000 milliGRAM(s) IV Intermittent once    MEDICATIONS  (PRN):  acetaminophen     Tablet .. 650 milliGRAM(s) Oral every 6 hours PRN Temp greater or equal to 38C (100.4F)  HYDROmorphone  Injectable 0.5 milliGRAM(s) IV Push every 4 hours PRN Severe Pain (7 - 10)  ondansetron Injectable 4 milliGRAM(s) IV Push every 6 hours PRN Nausea and/or Vomiting    Assessment/Plan  #S/p mechanical fall with RT distal femur periprosthetic fracture  #Anemia acute blood loss on chronic  Ortho following  Bedrest  Keep RLE in immobilizer  Agree with 1U PRBC transfusion today  Monitor post-transfusion HH to keep >8.0  Pain meds prn  OR planned tomorrow  Patient is medically optimized for OR    #Sinus Bradycardia and Chronic LBBB on EKG  On remote tele  Off aricept- still bradycardic at upper 50's    #Thrombocytopenia like due to fracture and consumption  Stable, monitor    #Urinary retention  Will place Lenz    #Mild Hyperkalemia- resolved    #Dementia  Supportive. Reorient    #Advance Directives - discussed with the patients daughter Miryam who is the HCP.  Her cell is [phone].  No limitations on care.  She is Full Code    #Achalasia  patient is supposed to have an esophagram study next week that was ordered by Dr. Seth,  Spoke to Dr. Seth, he will see patient next week and we will attempt to do study while patient is admitted    #Dispo- transfuse today and monitor HH, OR tomorrow    
Patient seen and examined at bedside this AM.  No acute complaints at this time. Pain well controlled. Denies chest pain, shortness of breath, nausea or vomiting.             PE:  VITAL SIGNS:  T(C): 36.3 (11-14-23 @ 04:30), Max: 36.7 (11-13-23 @ 11:30)  HR: 90 (11-14-23 @ 04:09) (65 - 90)  BP: 150/65 (11-14-23 @ 04:09) (111/47 - 150/65)  RR: 18 (11-14-23 @ 04:09) (17 - 18)  SpO2: 99% (11-14-23 @ 04:09) (93% - 100%)        General: NAD, resting comfortably in bed  RLE:   Dressing C/D/I  Knee immobilizer in place  SCDs present bilaterally  Compartments soft and compressible  No calf tenderness bilaterally  +TA/EHL/FHL/GSC  SILT adán/saph/sp/dp/tib  palpable DP/PT        LABS:                        8.2    5.94  )-----------( 101      ( 13 Nov 2023 06:22 )             24.7     11-13    144  |  112<H>  |  23  ----------------------------<  102<H>  4.4   |  26  |  0.87    Ca    8.7      13 Nov 2023 06:22        Urinalysis Basic - ( 13 Nov 2023 06:22 )    Color: x / Appearance: x / SG: x / pH: x  Gluc: 102 mg/dL / Ketone: x  / Bili: x / Urobili: x   Blood: x / Protein: x / Nitrite: x   Leuk Esterase: x / RBC: x / WBC x   Sq Epi: x / Non Sq Epi: x / Bacteria: x            A/P:  88y F s/p R distal femur ORIF for PP Fx 11/12    -PT/OT   -PWB 50% RLE with knee immobilizer on at all times (NO right knee ROM)  -Pain Control  -DVT ppx per primary team lovenox on POD 1  -Continue perioperative abx x 24 hours  -FU AM Labs  -Rest, ice, compress and elevate the extremity as needed  -Incentive Spirometry  -Medical comanagement appreciated  -dispo: [facility]  - no further acute orthopaedic surgical intervention indicated

## 2023-11-15 NOTE — DISCHARGE NOTE NURSING/CASE MANAGEMENT/SOCIAL WORK - NSDCPEFALRISK_GEN_ALL_CORE
For information on Fall & Injury Prevention, visit: [email or web address] OR  [email or web address] OR  https://www.cdc.gov/steadi/patient.html

## 2023-11-15 NOTE — DISCHARGE NOTE NURSING/CASE MANAGEMENT/SOCIAL WORK - PATIENT PORTAL LINK FT
You can access the FollowMyHealth Patient Portal offered by [facility] by registering at the following website: [email or web address]. By joining [employer]’s FollowMyHealth portal, you will also be able to view your health information using other applications (apps) compatible with our system.

## 2023-11-15 NOTE — PROGRESS NOTE ADULT - NUTRITIONAL ASSESSMENT
This patient has been assessed with a concern for Malnutrition and has been determined to have a diagnosis/diagnoses of Severe protein-calorie malnutrition.    This patient is being managed with:   Diet Clear Liquid-  Entered: Nov 12 2023  7:13PM  
This patient has been assessed with a concern for Malnutrition and has been determined to have a diagnosis/diagnoses of Severe protein-calorie malnutrition.    This patient is being managed with:   Diet NPO after Midnight-     NPO Start Date: 11-Nov-2023   NPO Start Time: 23:59  Except Medications  Entered: Nov 11 2023  1:01AM    Diet Soft and Bite Sized-  Supplement Feeding Modality:  Oral  Ensure Plus High Protein Cans or Servings Per Day:  1       Frequency:  Two Times a day  Entered: Nov 10 2023  5:04PM  
This patient has been assessed with a concern for Malnutrition and has been determined to have a diagnosis/diagnoses of Severe protein-calorie malnutrition.    This patient is being managed with:   Diet Pureed-  Entered: Nov 13 2023  2:56PM  
This patient has been assessed with a concern for Malnutrition and has been determined to have a diagnosis/diagnoses of Severe protein-calorie malnutrition.    This patient is being managed with:   Diet Pureed-  Supplement Feeding Modality:  Oral  Ensure Plus High Protein Cans or Servings Per Day:  2       Frequency:  Two Times a day  Entered: Nov 11 2023  4:12PM    Diet NPO after Midnight-     NPO Start Date: 11-Nov-2023   NPO Start Time: 23:59  Except Medications  Entered: Nov 11 2023  1:01AM  
This patient has been assessed with a concern for Malnutrition and has been determined to have a diagnosis/diagnoses of Severe protein-calorie malnutrition.    This patient is being managed with:   Diet NPO after Midnight-     NPO Start Date: 11-Nov-2023   NPO Start Time: 23:59  Except Medications  Entered: Nov 11 2023  1:01AM

## 2023-11-16 LAB
CULTURE RESULTS: SIGNIFICANT CHANGE UP
CULTURE RESULTS: SIGNIFICANT CHANGE UP
SPECIMEN SOURCE: SIGNIFICANT CHANGE UP
SPECIMEN SOURCE: SIGNIFICANT CHANGE UP

## 2023-11-28 DIAGNOSIS — M97.11XA PERIPROSTHETIC FRACTURE AROUND INTERNAL PROSTHETIC RIGHT KNEE JOINT, INITIAL ENCOUNTER: ICD-10-CM

## 2023-11-28 DIAGNOSIS — R91.8 OTHER NONSPECIFIC ABNORMAL FINDING OF LUNG FIELD: ICD-10-CM

## 2023-11-28 DIAGNOSIS — S72.401A UNSPECIFIED FRACTURE OF LOWER END OF RIGHT FEMUR, INITIAL ENCOUNTER FOR CLOSED FRACTURE: ICD-10-CM

## 2023-11-28 DIAGNOSIS — R33.9 RETENTION OF URINE, UNSPECIFIED: ICD-10-CM
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
## 2023-11-28 DIAGNOSIS — M85.80 OTHER SPECIFIED DISORDERS OF BONE DENSITY AND STRUCTURE, UNSPECIFIED SITE: ICD-10-CM

## 2023-11-28 DIAGNOSIS — H40.9 UNSPECIFIED GLAUCOMA: ICD-10-CM

## 2023-11-28 DIAGNOSIS — I44.7 LEFT BUNDLE-BRANCH BLOCK, UNSPECIFIED: ICD-10-CM

## 2023-11-28 DIAGNOSIS — R13.10 DYSPHAGIA, UNSPECIFIED: ICD-10-CM

## 2023-11-28 DIAGNOSIS — Z91.040 LATEX ALLERGY STATUS: ICD-10-CM

## 2023-11-28 DIAGNOSIS — Z86.718 PERSONAL HISTORY OF OTHER VENOUS THROMBOSIS AND EMBOLISM: ICD-10-CM

## 2023-11-28 DIAGNOSIS — R00.1 BRADYCARDIA, UNSPECIFIED: ICD-10-CM

## 2023-11-28 DIAGNOSIS — K22.0 ACHALASIA OF CARDIA: ICD-10-CM

## 2023-11-28 DIAGNOSIS — Z91.018 ALLERGY TO OTHER FOODS: ICD-10-CM

## 2023-11-28 DIAGNOSIS — D69.6 THROMBOCYTOPENIA, UNSPECIFIED: ICD-10-CM

## 2023-11-28 DIAGNOSIS — E87.5 HYPERKALEMIA: ICD-10-CM

## 2023-11-28 DIAGNOSIS — E43 UNSPECIFIED SEVERE PROTEIN-CALORIE MALNUTRITION: ICD-10-CM

## 2023-11-28 DIAGNOSIS — F03.90 UNSPECIFIED DEMENTIA WITHOUT BEHAVIORAL DISTURBANCE: ICD-10-CM
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
## 2023-11-28 DIAGNOSIS — D62 ACUTE POSTHEMORRHAGIC ANEMIA: ICD-10-CM

## 2023-11-28 DIAGNOSIS — E88.09 OTHER DISORDERS OF PLASMA-PROTEIN METABOLISM, NOT ELSEWHERE CLASSIFIED: ICD-10-CM

## 2023-11-28 DIAGNOSIS — D64.9 ANEMIA, UNSPECIFIED: ICD-10-CM

## 2023-11-29 ENCOUNTER — APPOINTMENT (OUTPATIENT)
Dept: ORTHOPEDIC SURGERY | Facility: CLINIC | Age: 88
End: 2023-11-29
Payer: MEDICARE

## 2023-11-29 VITALS — HEIGHT: 64 IN | BODY MASS INDEX: 19.97 KG/M2 | WEIGHT: 117 LBS

## 2023-11-29 PROCEDURE — 99024 POSTOP FOLLOW-UP VISIT: CPT

## 2023-11-29 PROCEDURE — 73552 X-RAY EXAM OF FEMUR 2/>: CPT | Mod: 26,RT

## 2023-12-06 ENCOUNTER — EMERGENCY (EMERGENCY)
Facility: HOSPITAL | Age: 88
LOS: 1 days | Discharge: HOME | End: 2023-12-06
Attending: STUDENT IN AN ORGANIZED HEALTH CARE EDUCATION/TRAINING PROGRAM
Payer: MEDICARE

## 2023-12-06 VITALS
HEART RATE: 73 BPM | SYSTOLIC BLOOD PRESSURE: 171 MMHG | TEMPERATURE: 96 F | OXYGEN SATURATION: 97 % | RESPIRATION RATE: 18 BRPM | DIASTOLIC BLOOD PRESSURE: 77 MMHG

## 2023-12-06 VITALS
WEIGHT: 149.91 LBS | HEART RATE: 65 BPM | RESPIRATION RATE: 20 BRPM | DIASTOLIC BLOOD PRESSURE: 77 MMHG | SYSTOLIC BLOOD PRESSURE: 163 MMHG | OXYGEN SATURATION: 94 % | TEMPERATURE: 96 F | HEIGHT: 62 IN

## 2023-12-06 PROCEDURE — 99284 EMERGENCY DEPT VISIT MOD MDM: CPT | Mod: 25

## 2023-12-06 PROCEDURE — 70450 CT HEAD/BRAIN W/O DYE: CPT | Mod: MA

## 2023-12-06 PROCEDURE — 12001 RPR S/N/AX/GEN/TRNK 2.5CM/<: CPT

## 2023-12-06 PROCEDURE — 99053 MED SERV 10PM-8AM 24 HR FAC: CPT

## 2023-12-06 PROCEDURE — 70450 CT HEAD/BRAIN W/O DYE: CPT | Mod: 26,MA

## 2023-12-06 NOTE — ED PROVIDER NOTE - NEUROLOGICAL, MLM
no discharge, no irritation, no pain, no redness, and no visual changes.
Alert and oriented, no focal deficits, no motor or sensory deficits.

## 2023-12-06 NOTE — ED PROVIDER NOTE - MUSCULOSKELETAL, MLM
Spine appears normal, range of motion is not limited, no muscle or joint tenderness. Right leg in a splint. Spine appears normal, range of motion is not limited, no muscle or joint tenderness. Right leg in a splint but normal rom with mild tenderness, sutures are intact

## 2023-12-06 NOTE — ED ADULT NURSE NOTE - ALCOHOL PRE SCREEN (AUDIT - C)
Venipuncture Blood Specimen Collection  Venipuncture performed in left AC by Silvio Gonzalez RN with good hemostasis. Patient tolerated the procedure well without complications.   09/14/20   Silvio Gonzalez RN  
Statement Selected

## 2023-12-06 NOTE — ED ADULT NURSE REASSESSMENT NOTE - NS ED NURSE REASSESS COMMENT FT1
Pt cleaned and changed. Temp noted to be low axillary. Per pt and family this is pt baseline. MD Estinfguerline aware. Resp. equal and unlabored b/l. VSS. NAD. Comfort measures provided, safety measures implemented, call bell in reach. MD at bedside.

## 2023-12-06 NOTE — ED PROVIDER NOTE - CARE PLAN
Principal Discharge DX:	Closed head injury   1 Principal Discharge DX:	Closed head injury  Secondary Diagnosis:	Laceration of scalp

## 2023-12-06 NOTE — ED ADULT NURSE NOTE - NSFALLHARMRISKINTERV_ED_ALL_ED
Assistance OOB with selected safe patient handling equipment if applicable/Assistance with ambulation/Communicate risk of Fall with Harm to all staff, patient, and family/Monitor gait and stability/Provide patient with walking aids/Provide visual cue: red socks, yellow wristband, yellow gown, etc/Reinforce activity limits and safety measures with patient and family/Bed in lowest position, wheels locked, appropriate side rails in place/Call bell, personal items and telephone in reach/Instruct patient to call for assistance before getting out of bed/chair/stretcher/Non-slip footwear applied when patient is off stretcher/[place] to call system/Physically safe environment - no spills, clutter or unnecessary equipment/Purposeful Proactive Rounding/Room/bathroom lighting operational, light cord in reach Assistance OOB with selected safe patient handling equipment if applicable/Assistance with ambulation/Communicate risk of Fall with Harm to all staff, patient, and family/Monitor gait and stability/Provide patient with walking aids/Provide visual cue: red socks, yellow wristband, yellow gown, etc/Reinforce activity limits and safety measures with patient and family/Bed in lowest position, wheels locked, appropriate side rails in place/Call bell, personal items and telephone in reach/Instruct patient to call for assistance before getting out of bed/chair/stretcher/Non-slip footwear applied when patient is off stretcher/[place] to call system/Physically safe environment - no spills, clutter or unnecessary equipment/Purposeful Proactive Rounding/Room/bathroom lighting operational, light cord in reach

## 2023-12-06 NOTE — ED PROVIDER NOTE - PATIENT PORTAL LINK FT
You can access the FollowMyHealth Patient Portal offered by [facility] by registering at the following website: [email or web address]. By joining [employer]’s FollowMyHealth portal, you will also be able to view your health information using other applications (apps) compatible with our system. You can access the FollowMyHealth Patient Portal offered by [facility] by registering at the following website: [email or web address]. By joining [employer]’s FollowMyHealth portal, you will also be able to view your health information using other applications (apps) compatible with our system.

## 2023-12-06 NOTE — ED PROVIDER NOTE - CLINICAL SUMMARY MEDICAL DECISION MAKING FREE TEXT BOX
89 yo female present for evaluation of slip and fall with head trauma. Patient is a recent right femur fracture repair whom presents for after she slipped getting into her bed and fell on the floor. Patient is on Lovenox daily. Patient stable to return to rehab facility. 87 yo female present for evaluation of slip and fall with head trauma. Patient is a recent right femur fracture repair whom presents for after she slipped getting into her bed and fell on the floor. Patient is on Lovenox daily. Patient stable to return to rehab facility.

## 2023-12-06 NOTE — ED PROVIDER NOTE - OBJECTIVE STATEMENT
87 yo female present for evaluation of slip and fall with head trauma. Patient is a recent right femur fracture repair whom presents for after she slipped getting into her bed and fell on the floor. Patient is on Lovenox daily. She denies focal neurologic symptoms, nausea, vomiting, confusion, new neck/back/extremity pain. 89 yo female present for evaluation of slip and fall with head trauma. Patient is a recent right femur fracture repair whom presents for after she slipped getting into her bed and fell on the floor. Patient is on Lovenox daily. She denies focal neurologic symptoms, nausea, vomiting, confusion, new neck/back/extremity pain.

## 2023-12-06 NOTE — ED ADULT TRIAGE NOTE - CHIEF COMPLAINT QUOTE
pt BIBEMS from rehab center s/p fall while attempting to stand w/o assist. pt endorses hitting head, dry blood noted on top of head MD called to bedside priority CT called

## 2023-12-06 NOTE — ED ADULT NURSE NOTE - OBJECTIVE STATEMENT
87 yo F BIBEMS from nursing home after sustaining a fall. Pt. was attempting to ambulate w/o necessary assistance and fell. +head strike. +blood thinners. Small amount of dried blood noted to top of head. Bleeding stopped on arrival. Priority CT completed. Pt daughter also states that pt had leg surgery w/ hardware placement 3 weeks ago and would like hardware assessed. MD Prince made aware of pt and pt family concern. No neuro deficits noted. Resp. equal and unlabored b/l. VSS. NAD. Comfort measures provided, safety measures implemented, call bell in reach. 89 yo F BIBEMS from nursing home after sustaining a fall. Pt. was attempting to ambulate w/o necessary assistance and fell. +head strike. +blood thinners. Small amount of dried blood noted to top of head. Bleeding stopped on arrival. Priority CT completed. Pt daughter also states that pt had leg surgery w/ hardware placement 3 weeks ago and would like hardware assessed. MD Prince made aware of pt and pt family concern. No neuro deficits noted. Resp. equal and unlabored b/l. VSS. NAD. Comfort measures provided, safety measures implemented, call bell in reach.

## 2023-12-06 NOTE — ED PROVIDER NOTE - SKIN, MLM
Skin normal color for race, warm, dry and intact. No evidence of rash. Abrasion to the right parietal scalp Skin normal color for race, warm, dry and intact. No evidence of rash. 0.5 cm laceration to the right parietal scalp

## 2023-12-06 NOTE — ED PROVIDER NOTE - NSFOLLOWUPINSTRUCTIONS_ED_ALL_ED_FT
1) Follow up with your doctor in 1-2 days  2) Return to the ER for worsening or concerning symptoms    Head Injury, Adult       There are many types of head injuries. Head injuries can be as minor as a small bump, or they can be a serious medical issue. More severe head injuries include:    A jarring injury to the brain (concussion).  A bruise (contusion) of the brain. This means there is bleeding in the brain that can cause swelling.  A cracked skull (skull fracture).  Bleeding in the brain that collects, clots, and forms a bump (hematoma).    After a head injury, most problems occur within the first 24 hours, but side effects may occur up to 7–10 days after the injury. It is important to watch your condition for any changes. You may need to be observed in the emergency department or urgent care, or you may be admitted to the hospital.    What are the causes?  There are many possible causes of a head injury. Serious head injuries may be caused by car accidents, bicycle or motorcycle accidents, sports injuries, falls, or being struck by an object.    What are the symptoms?  Symptoms of a head injury include a contusion, bump, or bleeding at the site of the injury. Other physical symptoms may include:    Headache.  Nausea or vomiting.  Dizziness.  Blurred or double vision.  Being uncomfortable around bright lights or loud noises.  Seizures.  Feeling tired.  Trouble being awakened.  Loss of consciousness.    Mental or emotional symptoms may include:    Irritability.  Confusion and memory problems.  Poor attention and concentration.  Changes in eating or sleeping habits.  Anxiety or depression.    How is this diagnosed?  This condition can usually be diagnosed based on your symptoms, a description of the injury, and a physical exam. You may also have imaging tests done, such as a CT scan or an MRI.    How is this treated?  Treatment for this condition depends on the severity and type of injury you have. The main goal of treatment is to prevent complications and allow the brain time to heal.        Mild head injury    If you have a mild head injury, you may be sent home, and treatment may include:    Observation. A responsible adult should stay with you for 24 hours after your injury and check on you often.  Physical rest.  Brain rest.  Pain medicines.        Severe head injury    If you have a severe head injury, treatment may include:    Close observation. This includes hospitalization with the following care:    Frequent physical exams.  Frequent checks of how your brain and nervous system are working (neurological status).  Checking your blood pressure and oxygen levels.  Medicines to relieve pain, prevent seizures, and decrease brain swelling.  Airway protection and breathing support. This may include using a ventilator.  Treatments that monitor and manage swelling inside the brain.  Brain surgery. This may be needed to:    Remove a collection of blood or blood clots.  Stop the bleeding.  Remove a part of the skull to allow room for the brain to swell.    Follow these instructions at home:      Activity    Rest and avoid activities that are physically hard or tiring.  Make sure you get enough sleep.  Let your brain rest by limiting activities that require a lot of thought or attention, such as:    Watching TV.  Playing memory games and puzzles.  Job-related work or homework.  Working on the computer, using social media, and texting.  Avoid activities that could cause another head injury, such as playing sports, until your health care provider approves. Having another head injury, especially before the first one has healed, can be dangerous.  Ask your health care provider when it is safe for you to return to your regular activities, including work or school. Ask your health care provider for a step-by-step plan for gradually returning to activities.  Ask your health care provider when you can drive, ride a bicycle, or use heavy machinery. Your ability to react may be slower after a brain injury. Do not do these activities if you are dizzy.        Lifestyle     Do not drink alcohol until your health care provider approves. Do not use drugs. Alcohol and certain drugs may slow your recovery and can put you at risk of further injury.  If it is harder than usual to remember things, write them down.  If you are easily distracted, try to do one thing at a time.  Talk with family members or close friends when making important decisions.  Tell your friends, family, a trusted colleague, and  about your injury, symptoms, and restrictions. Have them watch for any new or worsening problems.        General instructions    Take over-the-counter and prescription medicines only as told by your health care provider.  Have someone stay with you for 24 hours after your head injury. This person should watch you for any changes in your symptoms and be ready to seek medical help.  Keep all follow-up visits as told by your health care provider. This is important.    How is this prevented?  Work on improving your balance and strength to avoid falls.  Wear a seat belt when you are in a moving vehicle.  Wear a helmet when riding a bicycle, skiing, or doing any other sport or activity that has a risk of injury.  If you drink alcohol:    Limit how much you use to:    0–1 drink a day for nonpregnant women.  0–2 drinks a day for men.  Be aware of how much alcohol is in your drink. In the [place] one drink equals one 12 oz bottle of beer (355 mL), one 5 oz glass of wine (148 mL), or one 1½ oz glass of hard liquor (44 mL).  Take safety measures in your home, such as:    Removing clutter and tripping hazards from floors and stairways.  Using grab bars in bathrooms and handrails by stairs.  Placing non-slip mats on floors and in bathtubs.  Improving lighting in dim areas.    Where to find more information  Centers for Disease Control and Prevention: www.cdc.gov    Get help right away if:  You have:    A severe headache that is not helped by medicine.  Trouble walking or weakness in your arms and legs.  Clear or bloody fluid coming from your nose or ears.  Changes in your vision.  A seizure.  Increased confusion or irritability.  Your symptoms get worse.  You are sleepier than normal and have trouble staying awake.  You lose your balance.  Your pupils change size.  Your speech is slurred.  Your dizziness gets worse.  You vomit.    These symptoms may represent a serious problem that is an emergency. Do not wait to see if the symptoms will go away. Get medical help right away. Call your local emergency services (911 in the [place] Do not drive yourself to the hospital.    Summary  Head injuries can be minor, or they can be a serious medical issue requiring immediate attention.  Treatment for this condition depends on the severity and type of injury you have.  Have someone stay with you for 24 hours after your injury and check on you often.  Ask your health care provider when it is safe for you to return to your regular activities, including work or school.  Head injury prevention includes wearing a seat belt in a motor vehicle, using a helmet on a bicycle, limiting alcohol use, and taking safety measures in your home.    Laceration Care, Adult    A laceration is a cut that may go through all layers of the skin and into the tissue that is right under the skin. Some lacerations heal on their own. Others need to be closed with stitches (sutures), staples, skin adhesive strips, or skin glue. Proper care of a laceration reduces the risk for infection, helps the laceration heal better, and may prevent scarring.    How to care for your laceration  Wash your hands with soap and water before touching your wound or changing your bandage (dressing). If soap and water are not available, use hand .    Keep the wound clean and dry.    If you were given a dressing, you should change it at least once a day, or as told by your health care provider. You should also change it if it becomes wet or dirty.        If sutures or staples were used:    Keep the wound completely dry for the first 24 hours, or as told by your health care provider. After that time, you may shower or bathe. However, make sure that the wound is not soaked in water until after the sutures or staples have been removed.  Clean the wound once each day, or as told by your health care provider:    Wash the wound with soap and water.  Rinse the wound with water to remove all soap.  Pat the wound dry with a clean towel. Do not rub the wound.  After cleaning the wound, apply a thin layer of antibiotic ointment as told by your health care provider. This will help prevent infection and keep the dressing from sticking to the wound.  Have the sutures or staples removed as told by your health care provider.        If skin adhesive strips were used:    Do not get the skin adhesive strips wet. You may shower or bathe, but be careful to keep the wound dry.  If the wound gets wet, pat it dry with a clean towel. Do not rub the wound.  Skin adhesive strips fall off on their own. You may trim the strips as the wound heals. Do not remove skin adhesive strips that are still stuck to the wound. They will fall off in time.        If skin glue was used:    Try to keep the wound dry, but you may briefly wet it in the shower or bath. Do not soak the wound in water, such as by swimming.  After you have showered or bathed, gently pat the wound dry with a clean towel. Do not rub the wound.  Do not do any activities that will make you sweat heavily until the skin glue has fallen off on its own.  Do not apply liquid, cream, or ointment medicine to the wound while the skin glue is in place. Using those may loosen the film before the wound has healed.  If a dressing is placed over the wound, be careful not to apply tape directly over the skin glue. Doing that may cause the glue to be pulled off before the wound has healed.  Do not pick at the glue. Skin glue usually remains in place for 5–10 days and then falls off the skin.        General instructions     Take over-the-counter and prescription medicines only as told by your health care provider.  If you were prescribed an antibiotic medicine or ointment, take or apply it as told by your health care provider. Do not stop using it even if your condition improves.  Do not scratch or pick at the wound.  Check your wound every day for signs of infection. Watch for:    Redness, swelling, or pain.  Fluid, blood, or pus.  Raise (elevate) the injured area above the level of your heart while you are sitting or lying down for the first 24–48 hours after the laceration is repaired.  If directed, put ice on the affected area:    Put ice in a plastic bag.  Place a towel between your skin and the bag.  Leave the ice on for 20 minutes, 2–3 times a day.  Keep all follow-up visits as told by your health care provider. This is important.    Contact a health care provider if:  You received a tetanus shot and you have swelling, severe pain, redness, or bleeding at the injection site.  You have a fever.  A wound that was closed breaks open.  You notice a bad smell coming from your wound or your dressing.  You notice something coming out of the wound, such as wood or glass.  Your pain is not controlled with medicine.  You have increased redness, swelling, or pain at the site of your wound.  You have fluid, blood, or pus coming from your wound.  You need to change the dressing often due to fluid, blood, or pus that is draining from the wound.  You develop a new rash.  You develop numbness around the wound.    Get help right away if:  You develop severe swelling around the wound.  Your pain suddenly increases and is severe.  You develop painful lumps near the wound or on skin anywhere else on your body.  You have a red streak going away from your wound.  The wound is on your hand or foot and you cannot properly move a finger or toe.  The wound is on your hand or foot, and you notice that your fingers or toes look pale or bluish.    Summary  A laceration is a cut that may go through all layers of the skin and into the tissue that is right under the skin.  Some lacerations heal on their own. Others need to be closed with stitches (sutures), staples, skin adhesive strips, or skin glue.  Proper care of a laceration reduces the risk of infection, helps the laceration heal better, and prevents scarring.    ADDITIONAL NOTES AND INSTRUCTIONS    Please follow up with your Primary MD in 24-48 hr.  Seek immediate medical care for any new/worsening signs or symptoms.

## 2023-12-19 ENCOUNTER — APPOINTMENT (OUTPATIENT)
Dept: OPHTHALMOLOGY | Facility: CLINIC | Age: 88
End: 2023-12-19

## 2024-01-15 ENCOUNTER — APPOINTMENT (OUTPATIENT)
Dept: ORTHOPEDIC SURGERY | Facility: CLINIC | Age: 89
End: 2024-01-15
Payer: MEDICARE

## 2024-01-15 VITALS — HEIGHT: 64 IN | BODY MASS INDEX: 19.97 KG/M2 | WEIGHT: 117 LBS

## 2024-01-15 PROCEDURE — 99024 POSTOP FOLLOW-UP VISIT: CPT

## 2024-01-15 PROCEDURE — 73552 X-RAY EXAM OF FEMUR 2/>: CPT | Mod: RT

## 2024-01-20 NOTE — ASSESSMENT
[FreeTextEntry1] : Patient comes in today for follow-up on her right knee.  She is now 2 months out from her open reduction internal fixation of a right distal femoral periprosthetic fracture.  Overall she is doing extremely well.  She has minimal pain.  She has not yet started ambulating full weightbearing.  She has a knee immobilizer.  Examination of the right knee reveals a well-healed midline scar.  Range of motion 0 to 90 degrees with normal patellar tracking.  There is no instability or effusion.  There is no redness or rashes.  There is no signs of infection or DVT.  There is no instability to the knee.  X-rays done in the office today 4 views of the right knee shows the fracture to be healing in anatomic position with appropriate position of hardware.  The total knee replacement is also in excellent alignment with no signs of loosening or wear.  There are no obvious tumors, masses or calcifications seen.  Plan at this time is to discontinue the knee immobilizer.  She can be full weightbearing as tolerated through the right leg.  He can continue range of motion exercises without restrictions.  Will see her back in the office in 2 months with x-rays 4 views of the right knee.

## 2024-01-20 NOTE — HISTORY OF PRESENT ILLNESS
[Rest] : rest [Meds] : meds [] : Post Surgical Visit: yes [FreeTextEntry5] : 87 y/o F presents for PO #2 eval of her Rt. femur today. s/p ORIF Rt femur on DOS noted above. Pt reports pain levels remain the same. She notes of increased discomfort while in the knee immobilizer. Tylenol as needed.  [de-identified] : 11/12/23 [de-identified] : ORIF Rt. Femur

## 2024-01-31 ENCOUNTER — APPOINTMENT (OUTPATIENT)
Dept: OPHTHALMOLOGY | Facility: CLINIC | Age: 89
End: 2024-01-31

## 2024-02-03 ENCOUNTER — INPATIENT (INPATIENT)
Facility: HOSPITAL | Age: 89
LOS: 2 days | Discharge: HOME HEALTH | DRG: 690 | End: 2024-02-06
Attending: INTERNAL MEDICINE | Admitting: FAMILY MEDICINE
Payer: MEDICARE

## 2024-02-03 VITALS
WEIGHT: 115.08 LBS | HEIGHT: 62 IN | OXYGEN SATURATION: 96 % | RESPIRATION RATE: 16 BRPM | SYSTOLIC BLOOD PRESSURE: 160 MMHG | DIASTOLIC BLOOD PRESSURE: 57 MMHG | HEART RATE: 63 BPM

## 2024-02-03 DIAGNOSIS — R53.1 WEAKNESS: ICD-10-CM

## 2024-02-03 LAB
ADD ON TEST-SPECIMEN IN LAB: SIGNIFICANT CHANGE UP
ALBUMIN SERPL ELPH-MCNC: 3.3 G/DL — SIGNIFICANT CHANGE UP (ref 3.3–5)
ALP SERPL-CCNC: 250 U/L — HIGH (ref 40–120)
ALT FLD-CCNC: 45 U/L — SIGNIFICANT CHANGE UP (ref 12–78)
APPEARANCE UR: CLEAR — SIGNIFICANT CHANGE UP
APTT BLD: 56.7 SEC — HIGH (ref 24.5–35.6)
AST SERPL-CCNC: 28 U/L — SIGNIFICANT CHANGE UP (ref 15–37)
BACTERIA # UR AUTO: NEGATIVE /HPF — SIGNIFICANT CHANGE UP
BASOPHILS # BLD AUTO: 0.03 K/UL — SIGNIFICANT CHANGE UP (ref 0–0.2)
BASOPHILS NFR BLD AUTO: 0.7 % — SIGNIFICANT CHANGE UP (ref 0–2)
BILIRUB SERPL-MCNC: 0.4 MG/DL — SIGNIFICANT CHANGE UP (ref 0.2–1.2)
BILIRUB UR-MCNC: NEGATIVE — SIGNIFICANT CHANGE UP
BUN SERPL-MCNC: 33 MG/DL — HIGH (ref 7–23)
CALCIUM SERPL-MCNC: 9.8 MG/DL — SIGNIFICANT CHANGE UP (ref 8.5–10.1)
CAST: 0 /LPF — SIGNIFICANT CHANGE UP (ref 0–4)
CHLORIDE SERPL-SCNC: 109 MMOL/L — HIGH (ref 96–108)
CO2 SERPL-SCNC: 30 MMOL/L — SIGNIFICANT CHANGE UP (ref 22–31)
COLOR SPEC: YELLOW — SIGNIFICANT CHANGE UP
CREAT SERPL-MCNC: 0.76 MG/DL — SIGNIFICANT CHANGE UP (ref 0.5–1.3)
DIFF PNL FLD: NEGATIVE — SIGNIFICANT CHANGE UP
EGFR: 75 ML/MIN/1.73M2 — SIGNIFICANT CHANGE UP
EOSINOPHIL # BLD AUTO: 0.04 K/UL — SIGNIFICANT CHANGE UP (ref 0–0.5)
EOSINOPHIL NFR BLD AUTO: 0.9 % — SIGNIFICANT CHANGE UP (ref 0–6)
FLUAV AG NPH QL: SIGNIFICANT CHANGE UP
FLUBV AG NPH QL: SIGNIFICANT CHANGE UP
GLUCOSE SERPL-MCNC: 84 MG/DL — SIGNIFICANT CHANGE UP (ref 70–99)
GLUCOSE UR QL: NEGATIVE MG/DL — SIGNIFICANT CHANGE UP
HCT VFR BLD CALC: 33.8 % — LOW (ref 34.5–45)
HGB BLD-MCNC: 11.1 G/DL — LOW (ref 11.5–15.5)
IMM GRANULOCYTES NFR BLD AUTO: 0.5 % — SIGNIFICANT CHANGE UP (ref 0–0.9)
INR BLD: 1.01 RATIO — SIGNIFICANT CHANGE UP (ref 0.85–1.18)
KETONES UR-MCNC: NEGATIVE MG/DL — SIGNIFICANT CHANGE UP
LACTATE SERPL-SCNC: 0.6 MMOL/L — LOW (ref 0.7–2)
LEUKOCYTE ESTERASE UR-ACNC: ABNORMAL
LYMPHOCYTES # BLD AUTO: 1.42 K/UL — SIGNIFICANT CHANGE UP (ref 1–3.3)
LYMPHOCYTES # BLD AUTO: 32.4 % — SIGNIFICANT CHANGE UP (ref 13–44)
MCHC RBC-ENTMCNC: 30.9 PG — SIGNIFICANT CHANGE UP (ref 27–34)
MCHC RBC-ENTMCNC: 32.8 GM/DL — SIGNIFICANT CHANGE UP (ref 32–36)
MCV RBC AUTO: 94.2 FL — SIGNIFICANT CHANGE UP (ref 80–100)
MONOCYTES # BLD AUTO: 0.39 K/UL — SIGNIFICANT CHANGE UP (ref 0–0.9)
MONOCYTES NFR BLD AUTO: 8.9 % — SIGNIFICANT CHANGE UP (ref 2–14)
NEUTROPHILS # BLD AUTO: 2.48 K/UL — SIGNIFICANT CHANGE UP (ref 1.8–7.4)
NEUTROPHILS NFR BLD AUTO: 56.6 % — SIGNIFICANT CHANGE UP (ref 43–77)
NITRITE UR-MCNC: NEGATIVE — SIGNIFICANT CHANGE UP
PH UR: 7 — SIGNIFICANT CHANGE UP (ref 5–8)
PLATELET # BLD AUTO: 109 K/UL — LOW (ref 150–400)
POTASSIUM SERPL-MCNC: 4.9 MMOL/L — SIGNIFICANT CHANGE UP (ref 3.5–5.3)
POTASSIUM SERPL-SCNC: 4.9 MMOL/L — SIGNIFICANT CHANGE UP (ref 3.5–5.3)
PROT SERPL-MCNC: 7 GM/DL — SIGNIFICANT CHANGE UP (ref 6–8.3)
PROT UR-MCNC: NEGATIVE MG/DL — SIGNIFICANT CHANGE UP
PROTHROM AB SERPL-ACNC: 11.4 SEC — SIGNIFICANT CHANGE UP (ref 9.5–13)
RAPID RVP RESULT: SIGNIFICANT CHANGE UP
RBC # BLD: 3.59 M/UL — LOW (ref 3.8–5.2)
RBC # FLD: 15.3 % — HIGH (ref 10.3–14.5)
RBC CASTS # UR COMP ASSIST: 0 /HPF — SIGNIFICANT CHANGE UP (ref 0–4)
RSV RNA NPH QL NAA+NON-PROBE: SIGNIFICANT CHANGE UP
SARS-COV-2 RNA SPEC QL NAA+PROBE: SIGNIFICANT CHANGE UP
SARS-COV-2 RNA SPEC QL NAA+PROBE: SIGNIFICANT CHANGE UP
SODIUM SERPL-SCNC: 138 MMOL/L — SIGNIFICANT CHANGE UP (ref 135–145)
SP GR SPEC: 1.01 — SIGNIFICANT CHANGE UP (ref 1–1.03)
SQUAMOUS # UR AUTO: 1 /HPF — SIGNIFICANT CHANGE UP (ref 0–5)
TROPONIN I, HIGH SENSITIVITY RESULT: 10.52 NG/L — SIGNIFICANT CHANGE UP
TSH SERPL-MCNC: 4 UU/ML — SIGNIFICANT CHANGE UP (ref 0.34–4.82)
UROBILINOGEN FLD QL: 0.2 MG/DL — SIGNIFICANT CHANGE UP (ref 0.2–1)
WBC # BLD: 4.38 K/UL — SIGNIFICANT CHANGE UP (ref 3.8–10.5)
WBC # FLD AUTO: 4.38 K/UL — SIGNIFICANT CHANGE UP (ref 3.8–10.5)
WBC UR QL: 24 /HPF — HIGH (ref 0–5)

## 2024-02-03 PROCEDURE — 71045 X-RAY EXAM CHEST 1 VIEW: CPT | Mod: 26

## 2024-02-03 PROCEDURE — 97530 THERAPEUTIC ACTIVITIES: CPT | Mod: GP

## 2024-02-03 PROCEDURE — 70450 CT HEAD/BRAIN W/O DYE: CPT | Mod: 26,MG

## 2024-02-03 PROCEDURE — 85027 COMPLETE CBC AUTOMATED: CPT

## 2024-02-03 PROCEDURE — 99285 EMERGENCY DEPT VISIT HI MDM: CPT

## 2024-02-03 PROCEDURE — 99222 1ST HOSP IP/OBS MODERATE 55: CPT

## 2024-02-03 PROCEDURE — 36415 COLL VENOUS BLD VENIPUNCTURE: CPT

## 2024-02-03 PROCEDURE — 97116 GAIT TRAINING THERAPY: CPT | Mod: GP

## 2024-02-03 PROCEDURE — 80048 BASIC METABOLIC PNL TOTAL CA: CPT

## 2024-02-03 PROCEDURE — 93010 ELECTROCARDIOGRAM REPORT: CPT

## 2024-02-03 PROCEDURE — 93970 EXTREMITY STUDY: CPT

## 2024-02-03 PROCEDURE — G1004: CPT

## 2024-02-03 PROCEDURE — 97162 PT EVAL MOD COMPLEX 30 MIN: CPT | Mod: GP

## 2024-02-03 RX ORDER — ONDANSETRON 8 MG/1
4 TABLET, FILM COATED ORAL EVERY 8 HOURS
Refills: 0 | Status: DISCONTINUED | OUTPATIENT
Start: 2024-02-03 | End: 2024-02-06

## 2024-02-03 RX ORDER — LANOLIN ALCOHOL/MO/W.PET/CERES
3 CREAM (GRAM) TOPICAL AT BEDTIME
Refills: 0 | Status: DISCONTINUED | OUTPATIENT
Start: 2024-02-03 | End: 2024-02-06

## 2024-02-03 RX ORDER — LATANOPROST 0.05 MG/ML
1 SOLUTION/ DROPS OPHTHALMIC; TOPICAL AT BEDTIME
Refills: 0 | Status: DISCONTINUED | OUTPATIENT
Start: 2024-02-03 | End: 2024-02-06

## 2024-02-03 RX ORDER — SODIUM CHLORIDE 9 MG/ML
1000 INJECTION INTRAMUSCULAR; INTRAVENOUS; SUBCUTANEOUS ONCE
Refills: 0 | Status: COMPLETED | OUTPATIENT
Start: 2024-02-03 | End: 2024-02-03

## 2024-02-03 RX ORDER — CEFTRIAXONE 500 MG/1
1000 INJECTION, POWDER, FOR SOLUTION INTRAMUSCULAR; INTRAVENOUS ONCE
Refills: 0 | Status: COMPLETED | OUTPATIENT
Start: 2024-02-03 | End: 2024-02-03

## 2024-02-03 RX ORDER — ACETAMINOPHEN 500 MG
650 TABLET ORAL EVERY 6 HOURS
Refills: 0 | Status: DISCONTINUED | OUTPATIENT
Start: 2024-02-03 | End: 2024-02-06

## 2024-02-03 RX ORDER — DORZOLAMIDE HYDROCHLORIDE TIMOLOL MALEATE 20; 5 MG/ML; MG/ML
1 SOLUTION/ DROPS OPHTHALMIC
Refills: 0 | Status: DISCONTINUED | OUTPATIENT
Start: 2024-02-03 | End: 2024-02-06

## 2024-02-03 RX ORDER — CEFTRIAXONE 500 MG/1
1000 INJECTION, POWDER, FOR SOLUTION INTRAMUSCULAR; INTRAVENOUS EVERY 24 HOURS
Refills: 0 | Status: COMPLETED | OUTPATIENT
Start: 2024-02-03 | End: 2024-02-05

## 2024-02-03 RX ADMIN — DORZOLAMIDE HYDROCHLORIDE TIMOLOL MALEATE 1 DROP(S): 20; 5 SOLUTION/ DROPS OPHTHALMIC at 21:05

## 2024-02-03 RX ADMIN — CEFTRIAXONE 1000 MILLIGRAM(S): 500 INJECTION, POWDER, FOR SOLUTION INTRAMUSCULAR; INTRAVENOUS at 13:30

## 2024-02-03 RX ADMIN — SODIUM CHLORIDE 1000 MILLILITER(S): 9 INJECTION INTRAMUSCULAR; INTRAVENOUS; SUBCUTANEOUS at 13:46

## 2024-02-03 RX ADMIN — LATANOPROST 1 DROP(S): 0.05 SOLUTION/ DROPS OPHTHALMIC; TOPICAL at 21:11

## 2024-02-03 NOTE — PHARMACOTHERAPY INTERVENTION NOTE - COMMENTS
Medication history complete. Medications and allergies reviewed with patient and confirmed with .  Medication history complete. Medications and allergies reviewed with patient's daughter, Miryam at bedside and confirmed with .

## 2024-02-03 NOTE — H&P ADULT - ASSESSMENT
Generalized weakness, likely related to uti failed outpatient antibiotics.  cth- no acute changes.  ecg- nsr  -admit to med/surg   -cont rocephin  -f/u am labs, ucx, bcx     Anemia, appears chronic.  better today.  no active bleeding   -monitor     Thrombocytopenia, -stable   -monitor     Dementia with gait instability, not on meds as was having SE on aricept.  follows with neurologist outpatient   -reorientate as needed   -fall/aspiration precautions  -PT consult     DVT prophylaxis   -ipc bilaterally     Advance directives   -HCP- Miryam reveles

## 2024-02-03 NOTE — H&P ADULT - NSHPPHYSICALEXAM_GEN_ALL_CORE
PHYSICAL EXAM:    General: NAD, Alert, awake.  oriented to self.  answers simple questions   HEENT: NC/AT  Respiratory: Clear breath sounds bilaterally.  No wheezing, rales or rhonchi  Cardiovascular: S1 and S2, regular rate and rhythm.  No murmurs, gallops or rubs  Gastrointestinal: Soft, non-tender, non-distended. No guarding, rebound tenderness, +BS  Genitourinary: No palpable bladder  Extremities: +2 peripheral pulses bilaterally.  No clubbing, cyanosis, or edema.    Neurological: No focal deficits  Musculoskeletal: moving upper and lower extremities freely   Skin: No rashes

## 2024-02-03 NOTE — ED PROVIDER NOTE - PHYSICAL EXAMINATION
GENERAL: A&Ox4, non-toxic appearing, no acute distress  HEENT: NCAT, EOMI, oral mucosa moist, normal conjunctiva, mild redness of R sclera without discharge  RESP: CTAB, no respiratory distress, no wheezes/rhonchi/rales, speaking in full sentences  CV: RRR, no murmurs/rubs/gallops  ABDOMEN: soft, non-tender, non-distended, no guarding, no rebound tenderness  MSK: no visible deformities  NEURO: no focal sensory or motor deficits, CN 2-12 grossly intact, 5/5 strength in all extremities  SKIN: warm, normal color, well perfused, no rash  PSYCH: normal affect 113

## 2024-02-03 NOTE — ED ADULT TRIAGE NOTE - AS HEIGHT TYPE
I called pt and lvm that orders are entered and she does not need an annual appointment this year.   
stated

## 2024-02-03 NOTE — ED PROVIDER NOTE - CARE PLAN
Principal Discharge DX:	Generalized weakness  Secondary Diagnosis:	UTI (urinary tract infection)  Secondary Diagnosis:	Hypothermia   1

## 2024-02-03 NOTE — H&P ADULT - HISTORY OF PRESENT ILLNESS
88-year-old female PMH dementia, DVT, osteopenia, achalasia presents to the emergency department for generalized weakness.  Daughter at bedside relating history, states patient has been unable to walk for the past few days and appears more confused than normal.  Patient is currently on Augmentin for UTI, pending sensitivities.  Patient currently calm and cooperative, denies any personal complaints.    In ED, feels okay.  s/p rocephin.  cth-negative.  ecg- nsr.  cxr- no gross infiltrate on wet read.  labs-stable.  noted to be hypothermic, bear hugger in place

## 2024-02-03 NOTE — ED PROVIDER NOTE - PROGRESS NOTE DETAILS
All labs personally reviewed, patient has a normal WBC count, baseline anemia improved from prior, CMP nonactionable, lactate normal, TSH normal, troponin normal, urinalysis with small leuks and 24 WBCs may be partially treated UTI, will broaden antibiotics including ceftriaxone.  Flu/COVID-negative.  CT without any acute findings.  Spoke with hospitalist Dr. Rojas, advises patient to be admitted to [facility] and all questions answered.

## 2024-02-03 NOTE — ED PROVIDER NOTE - OBJECTIVE STATEMENT
88-year-old female PMH dementia, DVT, osteopenia, achalasia, presents to the emergency department for generalized weakness.  Daughter at bedside relating history, states patient has been unable to walk for the past few days and appears more confused than normal.  Patient is currently on Augmentin for UTI, pending sensitivities.  Daughter states patient had a fall few months ago requiring glue for laceration on the right parietal scalp, no known intracranial bleed at the time.  Patient currently calm and cooperative, denies any personal complaints.

## 2024-02-03 NOTE — ED ADULT NURSE NOTE - OBJECTIVE STATEMENT
bib ems, pt c/o weakness getting worse , unable to walk last few days, left knee pain. hx of dementia as per daughter at bedside confusion worsening. pt currently being treated for uti. pt lives with  at home.

## 2024-02-03 NOTE — ED PROVIDER NOTE - CADM POA PRESS ULCER
5 Detail Level: Detailed Detail Level: Generalized Patient Specific Counseling (Will Not Stick From Patient To Patient): - Noted in HPI Detail Level: Zone No

## 2024-02-03 NOTE — ED PROVIDER NOTE - CLINICAL SUMMARY MEDICAL DECISION MAKING FREE TEXT BOX
88-year-old female presenting for generalized weakness and unsteady gait over the past few days, currently being treated for UTI.  Will evaluate for dehydration, HARSHAD, electrolyte abnormality, cardiac, ICH, infectious etiology.  Plan for blood work, CT head, chest x-ray, UA, reassess for dispo.

## 2024-02-03 NOTE — ED ADULT NURSE NOTE - NSSEPSISSUSPECTED_ED_A_ED
Spoke with Jamshid Yi [facility] RN, and notified her that Warfarin needs to be held x 5 days starting today, INR and platelet count drawn on Thursday, and schedule for paracentesis on Friday, 8/17/2018. Please schedule para and call spouse with time. [facility] to draw labs. Thank You.    Electronically signed by ADELAIDE Horn CNP on 8/13/18 at 11:36 AM
pls call the  regarding pt.    saw the pt today and had a few questions
No

## 2024-02-03 NOTE — ED ADULT TRIAGE NOTE - CHIEF COMPLAINT QUOTE
Pt BIBEMS c/o weakness and unable to walk for the last couple of days. Pt c/o L knee pain. Pt with hx of alzeimers/dementia but appears more confused than usual as per family. Pt lives at home with her . Allergies to latex.

## 2024-02-03 NOTE — ED ADULT NURSE NOTE - DRUG PRE-SCREENING (DAST -1)
"  History     Chief Complaint   Patient presents with     Back Injury     HPI  Darren Magallon is a 38 year old male who presents today for right sacroiliac back pain.  He states that on Saturday he was walking through the woods slipped on a rock and subsequently fell and struck his back on the ground.  Since then, he has had 5 out of 10, \"pulsing/shooting\" back pain with no radiation.  He denies any numbness or tingling to his lower extremities no saddle paresthesias or loss of bowel or bladder continence.  Positional changes especially sitting to standing exacerbate the pain.  He has been taking ibuprofen 800 mg every 6 hours with no relief of his symptoms.    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    Allergies:  No Known Allergies    Problem List:    Patient Active Problem List    Diagnosis Date Noted     Diabetes mellitus, type 2 (H) 03/25/2022     Priority: Medium        Past Medical History:    History reviewed. No pertinent past medical history.    Past Surgical History:    History reviewed. No pertinent surgical history.    Family History:    Family History   Problem Relation Age of Onset     Substance Abuse Mother      No Known Problems Father      Diabetes Sister         During pregnancy     Unexplained death Brother         unknown cause in infancy     No Known Problems Maternal Grandmother      No Known Problems Maternal Grandfather      No Known Problems Paternal Grandmother      No Known Problems Paternal Grandfather        Social History:  Marital Status:  Single [1]  Social History     Tobacco Use     Smoking status: Every Day     Packs/day: 0.50     Types: Cigarettes     Start date: 1999     Smokeless tobacco: Never     Tobacco comments:     pt denies quit plan 8/17/2022   Vaping Use     Vaping Use: Never used   Substance Use Topics     Alcohol use: Yes     Comment: rare     Drug use: No        Medications:    acetaminophen (TYLENOL) 325 MG tablet  aspirin (ASA) 81 MG chewable " tablet  ibuprofen (ADVIL/MOTRIN) 800 MG tablet  imiquimod (ALDARA) 5 % external cream  metFORMIN (GLUCOPHAGE) 500 MG tablet  rosuvastatin (CRESTOR) 5 MG tablet          Review of Systems   Constitutional: Negative for chills, fatigue and fever.   Respiratory: Negative for cough and shortness of breath.    Cardiovascular: Negative for chest pain.   Musculoskeletal: Positive for arthralgias, back pain and myalgias. Negative for gait problem and neck pain.       Physical Exam   BP: 139/92  Pulse: 87  Temp: 97.5  F (36.4  C)  Resp: 16      Physical Exam  Vitals and nursing note reviewed.   Constitutional:       General: He is not in acute distress.     Appearance: Normal appearance. He is not ill-appearing, toxic-appearing or diaphoretic.   Cardiovascular:      Rate and Rhythm: Normal rate and regular rhythm.      Pulses: Normal pulses.      Heart sounds: Normal heart sounds.   Pulmonary:      Effort: Pulmonary effort is normal.      Breath sounds: Normal breath sounds.   Abdominal:      General: Bowel sounds are normal.      Palpations: Abdomen is soft.   Musculoskeletal:      Cervical back: Normal range of motion and neck supple.      Comments: Skin overlying spinal column without erythema, ecchymosis or warmth.  There are no palpable bony abnormalities nor step-offs of spinal column to palpation of the spine.  Patient has tenderness overlying right sacroiliac region as well as a small 2 cm oval-shaped purple ecchymotic area.  There is no underlying induration.  Patient ambulating without difficulty or assistive device.      Color, motion, sensation equal to bilateral lower extremities.  +2 posterior tibialis and pedal pulses bilaterally.  Plus patellar reflexes intact and equal bilaterally.  Normal strength to bilateral lower extremities and equal bilaterally.   Neurological:      Mental Status: He is alert.         ED Course              ED Course as of 10/18/22 1012   Tue Oct 18, 2022   1010 Lumbar spine XR, 2-3  views     Procedures             Critical Care time:               No results found for this or any previous visit (from the past 24 hour(s)).    Medications - No data to display    Assessments & Plan (with Medical Decision Making)     #1 right-sided low back pain without sciatica: X-ray demonstrates no acute fracture or subluxation.  At this point recommend rest, ice and gentle stretching and follow-up if pain worsens or persists.  Physical exam negative for any red flag symptoms.  Educated patient on follow-up parameters.  Tylenol and ibuprofen as needed for pain as well as Flexeril given today.  Patient to be off work until Monday.  Patient verbalizes agreement understanding this plan.    I have reviewed the nursing notes.    I have reviewed the findings, diagnosis, plan and need for follow up with the patient.      New Prescriptions    No medications on file       Final diagnoses:   Right-sided low back pain without sciatica       10/18/2022   [facility]     Haylie Souza NP  10/18/22 1016     Statement Selected

## 2024-02-04 LAB
ANION GAP SERPL CALC-SCNC: 2 MMOL/L — LOW (ref 5–17)
BUN SERPL-MCNC: 27 MG/DL — HIGH (ref 7–23)
CALCIUM SERPL-MCNC: 9.5 MG/DL — SIGNIFICANT CHANGE UP (ref 8.5–10.1)
CHLORIDE SERPL-SCNC: 110 MMOL/L — HIGH (ref 96–108)
CO2 SERPL-SCNC: 28 MMOL/L — SIGNIFICANT CHANGE UP (ref 22–31)
CREAT SERPL-MCNC: 0.8 MG/DL — SIGNIFICANT CHANGE UP (ref 0.5–1.3)
CULTURE RESULTS: NO GROWTH — SIGNIFICANT CHANGE UP
EGFR: 71 ML/MIN/1.73M2 — SIGNIFICANT CHANGE UP
GLUCOSE SERPL-MCNC: 90 MG/DL — SIGNIFICANT CHANGE UP (ref 70–99)
HCT VFR BLD CALC: 28.8 % — LOW (ref 34.5–45)
HGB BLD-MCNC: 9.2 G/DL — LOW (ref 11.5–15.5)
MCHC RBC-ENTMCNC: 30.1 PG — SIGNIFICANT CHANGE UP (ref 27–34)
MCHC RBC-ENTMCNC: 31.9 GM/DL — LOW (ref 32–36)
MCV RBC AUTO: 94.1 FL — SIGNIFICANT CHANGE UP (ref 80–100)
PLATELET # BLD AUTO: 111 K/UL — LOW (ref 150–400)
POTASSIUM SERPL-MCNC: 4.2 MMOL/L — SIGNIFICANT CHANGE UP (ref 3.5–5.3)
POTASSIUM SERPL-SCNC: 4.2 MMOL/L — SIGNIFICANT CHANGE UP (ref 3.5–5.3)
RBC # BLD: 3.06 M/UL — LOW (ref 3.8–5.2)
RBC # FLD: 15.5 % — HIGH (ref 10.3–14.5)
SODIUM SERPL-SCNC: 140 MMOL/L — SIGNIFICANT CHANGE UP (ref 135–145)
SPECIMEN SOURCE: SIGNIFICANT CHANGE UP
WBC # BLD: 4.18 K/UL — SIGNIFICANT CHANGE UP (ref 3.8–10.5)
WBC # FLD AUTO: 4.18 K/UL — SIGNIFICANT CHANGE UP (ref 3.8–10.5)

## 2024-02-04 PROCEDURE — 99232 SBSQ HOSP IP/OBS MODERATE 35: CPT

## 2024-02-04 RX ADMIN — CEFTRIAXONE 1000 MILLIGRAM(S): 500 INJECTION, POWDER, FOR SOLUTION INTRAMUSCULAR; INTRAVENOUS at 13:35

## 2024-02-04 RX ADMIN — LATANOPROST 1 DROP(S): 0.05 SOLUTION/ DROPS OPHTHALMIC; TOPICAL at 21:25

## 2024-02-04 RX ADMIN — DORZOLAMIDE HYDROCHLORIDE TIMOLOL MALEATE 1 DROP(S): 20; 5 SOLUTION/ DROPS OPHTHALMIC at 21:25

## 2024-02-04 RX ADMIN — DORZOLAMIDE HYDROCHLORIDE TIMOLOL MALEATE 1 DROP(S): 20; 5 SOLUTION/ DROPS OPHTHALMIC at 08:35

## 2024-02-05 ENCOUNTER — TRANSCRIPTION ENCOUNTER (OUTPATIENT)
Age: 89
End: 2024-02-05

## 2024-02-05 PROCEDURE — 99232 SBSQ HOSP IP/OBS MODERATE 35: CPT

## 2024-02-05 PROCEDURE — 93970 EXTREMITY STUDY: CPT | Mod: 26

## 2024-02-05 RX ORDER — ENOXAPARIN SODIUM 100 MG/ML
40 INJECTION SUBCUTANEOUS EVERY 24 HOURS
Refills: 0 | Status: DISCONTINUED | OUTPATIENT
Start: 2024-02-05 | End: 2024-02-05

## 2024-02-05 RX ORDER — POLYETHYLENE GLYCOL 3350 17 G/17G
17 POWDER, FOR SOLUTION ORAL DAILY
Refills: 0 | Status: DISCONTINUED | OUTPATIENT
Start: 2024-02-05 | End: 2024-02-06

## 2024-02-05 RX ORDER — SENNA PLUS 8.6 MG/1
2 TABLET ORAL AT BEDTIME
Refills: 0 | Status: DISCONTINUED | OUTPATIENT
Start: 2024-02-05 | End: 2024-02-06

## 2024-02-05 RX ORDER — APIXABAN 2.5 MG/1
10 TABLET, FILM COATED ORAL EVERY 12 HOURS
Refills: 0 | Status: DISCONTINUED | OUTPATIENT
Start: 2024-02-05 | End: 2024-02-06

## 2024-02-05 RX ADMIN — APIXABAN 10 MILLIGRAM(S): 2.5 TABLET, FILM COATED ORAL at 20:13

## 2024-02-05 RX ADMIN — SENNA PLUS 2 TABLET(S): 8.6 TABLET ORAL at 20:13

## 2024-02-05 RX ADMIN — DORZOLAMIDE HYDROCHLORIDE TIMOLOL MALEATE 1 DROP(S): 20; 5 SOLUTION/ DROPS OPHTHALMIC at 20:12

## 2024-02-05 RX ADMIN — POLYETHYLENE GLYCOL 3350 17 GRAM(S): 17 POWDER, FOR SOLUTION ORAL at 20:13

## 2024-02-05 RX ADMIN — DORZOLAMIDE HYDROCHLORIDE TIMOLOL MALEATE 1 DROP(S): 20; 5 SOLUTION/ DROPS OPHTHALMIC at 09:13

## 2024-02-05 RX ADMIN — CEFTRIAXONE 1000 MILLIGRAM(S): 500 INJECTION, POWDER, FOR SOLUTION INTRAMUSCULAR; INTRAVENOUS at 20:12

## 2024-02-05 RX ADMIN — LATANOPROST 1 DROP(S): 0.05 SOLUTION/ DROPS OPHTHALMIC; TOPICAL at 20:12

## 2024-02-05 NOTE — PHYSICAL THERAPY INITIAL EVALUATION ADULT - GENERAL OBSERVATIONS, REHAB EVAL
Pt found supine in bed on 5E in NAD,  at bedside, agreeable to participate in PT Evaluation. Dr. Cerrato also present. Pt is able to perform bed mobility with Elsa and sit<>stand with Elsa to RW. Pt is able to ambulate 100 feet with RW and CGA. Pt returned to bed with call bell and phone in reach, bed alarm on, RN Ondina is aware.

## 2024-02-05 NOTE — PROGRESS NOTE ADULT - SUBJECTIVE AND OBJECTIVE BOX
History of Present Illness:   88-year-old female PMH dementia, DVT, osteopenia, achalasia presents to the emergency department for generalized weakness.  Daughter at bedside relating history, states patient has been unable to walk for the past few days and appears more confused than normal.  Patient is currently on Augmentin for UTI, pending sensitivities.  Patient currently calm and cooperative, denies any personal complaints.    In ED, feels okay.  s/p rocephin.    noted to be hypothermic, bear hugger in place    2.4: no cp, no sob, no n/v/d  pleasantly confused            REVIEW OF SYSTEMS:    CONSTITUTIONAL: No weakness, No fevers or chills  ENT: No ear ache, No sorethroat  NECK: No pain, No stiffness  RESPIRATORY: No cough, No wheezing, No hemoptysis; No dyspnea  CARDIOVASCULAR: No chest pain, No palpitations  GASTROINTESTINAL: No abd pain, No nausea, No vomiting, No hematemesis, No diarrhea or constipation. No melena, No hematochezia.  GENITOURINARY: No dysuria, No  hematuria  NEUROLOGICAL: No diplopia, No paresthesia, No motor dysfunction  MUSCULOSKELETAL: No arthralgia, No myalgia  SKIN: No rashes, or lesions   PSYCH: no anxiety, no suicidal ideation    All other review of systems is negative unless indicated above    Vital Signs Last 24 Hrs  T(C): 37.3 (04 Feb 2024 15:25), Max: 37.8 (04 Feb 2024 13:48)  T(F): 99.2 (04 Feb 2024 15:25), Max: 100.1 (04 Feb 2024 13:48)  HR: 75 (04 Feb 2024 15:25) (71 - 90)  BP: 117/44 (04 Feb 2024 15:25) (117/44 - 145/68)  BP(mean): --  RR: 18 (04 Feb 2024 15:25) (17 - 18)  SpO2: 91% (04 Feb 2024 15:25) (91% - 94%)    Parameters below as of 04 Feb 2024 15:25  Patient On (Oxygen Delivery Method): room air        PHYSICAL EXAM:    GENERAL: NAD  HEENT:  NC/AT, EOMI, PERRLA, No scleral icterus, Moist mucous membranes  NECK: Supple, No JVD  CNS:  Alert & Oriented X3, Motor Strength 5/5 B/L upper and lower extremities; DTRs 2+ intact   LUNG: Normal Breath sounds, Clear to auscultation bilaterally, No rales, No rhonchi, No wheezing  HEART: RRR; No murmurs, No rubs  ABDOMEN: +BS, ST/ND/NT  GENITOURINARY: Voiding, Bladder not distended  EXTREMITIES:  2+ Peripheral Pulses, No clubbing, No cyanosis, No tibial edema  MUSCULOSKELTAL: Joints normal ROM, No TTP, No effusion  VAGINAL: deferred  SKIN: no rashes  RECTAL: deferred, not indicated  BREAST: deferred                          9.2    4.18  )-----------( 111      ( 04 Feb 2024 08:05 )             28.8     02-04    140  |  110<H>  |  27<H>  ----------------------------<  90  4.2   |  28  |  0.80    Ca    9.5      04 Feb 2024 08:05    TPro  7.0  /  Alb  3.3  /  TBili  0.4  /  DBili  x   /  AST  28  /  ALT  45  /  AlkPhos  250<H>  02-03    Vancomycin levels:   Cultures:     MEDICATIONS  (STANDING):  cefTRIAXone Injectable. 1000 milliGRAM(s) IV Push every 24 hours  dorzolamide 2%/timolol 0.5% Ophthalmic Solution 1 Drop(s) Both EYES two times a day  latanoprost 0.005% Ophthalmic Solution 1 Drop(s) Both EYES at bedtime    MEDICATIONS  (PRN):  acetaminophen     Tablet .. 650 milliGRAM(s) Oral every 6 hours PRN Temp greater or equal to 38C (100.4F), Mild Pain (1 - 3)  aluminum hydroxide/magnesium hydroxide/simethicone Suspension 30 milliLiter(s) Oral every 4 hours PRN Dyspepsia  melatonin 3 milliGRAM(s) Oral at bedtime PRN Insomnia  ondansetron Injectable 4 milliGRAM(s) IV Push every 8 hours PRN Nausea and/or Vomiting      all labs reviewed  all imaging reviewed          Assessment:  	    1. Partially treated Uti:  f/u UCx done in office  f/u Blood Cx  Ceftriaxone IV day#2  Bear hugger in place    2. Anemia, appears chronic.  better today.  no active bleeding   -monitor     3. Thrombocytopenia, -stable   -monitor     4. Dementia with gait instability,   -reorientate as needed   -didnt tolerate Aricept   -fall/aspiration precautions  -PT consult     DVT prophylaxis   -ipc bilaterally     Advance directives   -HCP- Miryam reveles
History of Present Illness:   88-year-old female PMH dementia, DVT, osteopenia, achalasia presents to the emergency department for generalized weakness.  Daughter at bedside relating history, states patient has been unable to walk for the past few days and appears more confused than normal.  Patient is currently on Augmentin for UTI, pending sensitivities.  Patient currently calm and cooperative, denies any personal complaints.    In ED, feels okay.  s/p rocephin.    noted to be hypothermic, bear hugger in place    2.4: no cp, no sob, no n/v/d  pleasantly confused  2.5: +mild confusion, able to ambulate w walker and assistance from PT            REVIEW OF SYSTEMS:    CONSTITUTIONAL: No weakness, No fevers or chills  ENT: No ear ache, No sorethroat  NECK: No pain, No stiffness  RESPIRATORY: No cough, No wheezing, No hemoptysis; No dyspnea  CARDIOVASCULAR: No chest pain, No palpitations  GASTROINTESTINAL: No abd pain, No nausea, No vomiting, No hematemesis, No diarrhea or constipation. No melena, No hematochezia.  GENITOURINARY: No dysuria, No  hematuria  NEUROLOGICAL: No diplopia, No paresthesia, No motor dysfunction  MUSCULOSKELETAL: No arthralgia, No myalgia  SKIN: No rashes, or lesions   PSYCH: no anxiety, no suicidal ideation    All other review of systems is negative unless indicated above    Vital Signs Last 24 Hrs  T(C): 36.3 (05 Feb 2024 07:45), Max: 37.3 (04 Feb 2024 15:25)  T(F): 97.3 (05 Feb 2024 07:45), Max: 99.2 (04 Feb 2024 15:25)  HR: 60 (05 Feb 2024 07:45) (60 - 75)  BP: 122/71 (05 Feb 2024 07:45) (117/44 - 140/54)  BP(mean): --  RR: 18 (05 Feb 2024 07:45) (17 - 18)  SpO2: 92% (05 Feb 2024 07:45) (91% - 92%)    Parameters below as of 05 Feb 2024 07:45  Patient On (Oxygen Delivery Method): room air            PHYSICAL EXAM:    GENERAL: NAD  HEENT:  NC/AT, EOMI, PERRLA, No scleral icterus, Moist mucous membranes  NECK: Supple, No JVD  CNS:  Alert & Oriented X3, Motor Strength 5/5 B/L upper and lower extremities; DTRs 2+ intact   LUNG: Normal Breath sounds, Clear to auscultation bilaterally, No rales, No rhonchi, No wheezing  HEART: RRR; No murmurs, No rubs  ABDOMEN: +BS, ST/ND/NT  GENITOURINARY: Voiding, Bladder not distended  EXTREMITIES:  2+ Peripheral Pulses, No clubbing, No cyanosis, No tibial edema  MUSCULOSKELTAL: Joints normal ROM, No TTP, No effusion  VAGINAL: deferred  SKIN: no rashes  RECTAL: deferred, not indicated  BREAST: deferred                          9.2    4.18  )-----------( 111      ( 04 Feb 2024 08:05 )             28.8     02-04    140  |  110<H>  |  27<H>  ----------------------------<  90  4.2   |  28  |  0.80    Ca    9.5      04 Feb 2024 08:05    TPro  7.0  /  Alb  3.3  /  TBili  0.4  /  DBili  x   /  AST  28  /  ALT  45  /  AlkPhos  250<H>  02-03    Vancomycin levels:   Cultures:     MEDICATIONS  (STANDING):  cefTRIAXone Injectable. 1000 milliGRAM(s) IV Push every 24 hours  dorzolamide 2%/timolol 0.5% Ophthalmic Solution 1 Drop(s) Both EYES two times a day  latanoprost 0.005% Ophthalmic Solution 1 Drop(s) Both EYES at bedtime    MEDICATIONS  (PRN):  acetaminophen     Tablet .. 650 milliGRAM(s) Oral every 6 hours PRN Temp greater or equal to 38C (100.4F), Mild Pain (1 - 3)  aluminum hydroxide/magnesium hydroxide/simethicone Suspension 30 milliLiter(s) Oral every 4 hours PRN Dyspepsia  melatonin 3 milliGRAM(s) Oral at bedtime PRN Insomnia  ondansetron Injectable 4 milliGRAM(s) IV Push every 8 hours PRN Nausea and/or Vomiting      all labs reviewed  all imaging reviewed          Assessment:  	    1. Partially treated Uti:  f/u UCx done in office  f/u Blood Cx: ngtd  Ceftriaxone IV day#3      2. Anemia, appears chronic.  better today.  no active bleeding   -monitor     3. Thrombocytopenia, -stable   -monitor     4. Dementia with gait instability,   -reorientate as needed   -aricept on hold due to bradycardia   -fall/aspiration precautions  -PT consult     5. h/o DVT:  UFH  Sonogram bilateral LE for surveillance     Advance directives   -HCP- Miryam reveles

## 2024-02-05 NOTE — DISCHARGE NOTE NURSING/CASE MANAGEMENT/SOCIAL WORK - PATIENT PORTAL LINK FT
You can access the FollowMyHealth Patient Portal offered by [facility] by registering at the following website: [email or web address]. By joining [employer]’s FollowMyHealth portal, you will also be able to view your health information using other applications (apps) compatible with our system.

## 2024-02-05 NOTE — DISCHARGE NOTE NURSING/CASE MANAGEMENT/SOCIAL WORK - NSPROEXTENSIONSOFSELF_GEN_A_NUR
none What Type Of Note Output Would You Prefer (Optional)?: Bullet Format How Severe Is Your Skin Lesion?: mild Is This A New Presentation, Or A Follow-Up?: Skin Lesion

## 2024-02-05 NOTE — DISCHARGE NOTE NURSING/CASE MANAGEMENT/SOCIAL WORK - NSDCPEFALRISK_GEN_ALL_CORE
For information on Fall & Injury Prevention, visit: [email or web address] OR  [email or web address] OR  https://www.cdc.gov/steadi/patient.html

## 2024-02-05 NOTE — PHYSICAL THERAPY INITIAL EVALUATION ADULT - CRITERIA FOR SKILLED THERAPEUTIC INTERVENTIONS
Annual exam ages 21-44    Sam Hidalgo is a ,  25 y.o. female [facility] No LMP recorded. Patient is not currently having periods (Reason: Chemically Induced). .    She presents for her annual checkup. She is having no significant problems. With regard to the Gardasil vaccine, she has received all 3 injections. Menstrual status:    Her periods are nonexistent in flow due to the Depo-Provera. Contraception:    The current method of family planning is Depo-Provera injections. Sexual history:     She  reports that she currently engages in sexual activity and has had male partners. .    Medical conditions:    Since her last annual GYN exam about one year ago, she has not the following changes in her health history: none. Pap and Mammogram History:    Her most recent Pap smear was normal, obtained 2 year(s) ago. The patient has never had a mammogram.    Breast Cancer History/Substance Abuse: negative    Past Medical History:   Diagnosis Date    Depression     Hx of chlamydia infection      History reviewed. No pertinent surgical history. Current Outpatient Prescriptions   Medication Sig Dispense Refill    medroxyPROGESTERone (DEPO-PROVERA) 150 mg/mL syrg INJECT 1 ML (150MG) INTRAMUSCULARLY EVERY 3 MONTHS 1 Syringe 0     Allergies: Codeine     Tobacco History:  reports that she has been smoking. She does not have any smokeless tobacco history on file. Alcohol Abuse:  reports that she does not drink alcohol. Drug Abuse:  has no drug history on file. Family Medical/Cancer History: History reviewed. No pertinent family history.      Review of Systems - History obtained from the patient  Constitutional: negative for weight loss, fever, night sweats  HEENT: negative for hearing loss, earache, congestion, snoring, sorethroat  CV: negative for chest pain, palpitations, edema  Resp: negative for cough, shortness of breath, wheezing  GI: negative for change in bowel habits, abdominal pain, black or bloody stools  : negative for frequency, dysuria, hematuria, vaginal discharge  MSK: negative for back pain, joint pain, muscle pain  Breast: negative for breast lumps, nipple discharge, galactorrhea  Skin :negative for itching, rash, hives  Neuro: negative for dizziness, headache, confusion, weakness  Psych: negative for anxiety, depression, change in mood  Heme/lymph: negative for bleeding, bruising, pallor    Physical Exam    Visit Vitals    /73 (BP 1 Location: Left arm, BP Patient Position: Sitting)    Pulse 92    Resp 18    Ht 5' 3\" (1.6 m)    Wt 121 lb (54.9 kg)    BMI 21.43 kg/m2       Constitutional  · Appearance: well-nourished, well developed, alert, in no acute distress    HENT  · Head and Face: appears normal    Neck  · Inspection/Palpation: normal appearance, no masses or tenderness  · Lymph Nodes: no lymphadenopathy present  · Thyroid: gland size normal, nontender, no nodules or masses present on palpation    Chest  · Respiratory Effort: breathing unlabored  · Auscultation:     Cardiovascular  · Heart:  · Auscultation:     Breasts  · Inspection of Breasts: breasts symmetrical, no skin changes, no discharge present, nipple appearance normal, no skin retraction present  · Palpation of Breasts and Axillae: no masses present on palpation, no breast tenderness  · Axillary Lymph Nodes: no lymphadenopathy present    Gastrointestinal  · Abdominal Examination: abdomen non-tender to palpation, normal bowel sounds, no masses present  · Liver and spleen: no hepatomegaly present, spleen not palpable  · Hernias: no hernias identified    Genitourinary  · External Genitalia: normal appearance for age, no discharge present, no tenderness present, no inflammatory lesions present, no masses present, no atrophy present  · Vagina: normal vaginal vault without central or paravaginal defects, no discharge present, no inflammatory lesions present, no masses present  · Bladder: non-tender to palpation  · Urethra: appears normal  · Cervix: normal   · Uterus: normal size, shape and consistency  · Adnexa: no adnexal tenderness present, no adnexal masses present  · Perineum: perineum within normal limits, no evidence of trauma, no rashes or skin lesions present  · Anus: anus within normal limits, no hemorrhoids present  · Inguinal Lymph Nodes: no lymphadenopathy present    Skin  · General Inspection: no rash, no lesions identified    Neurologic/Psychiatric  · Mental Status:  · Orientation: grossly oriented to person, place and time  · Mood and Affect: mood normal, affect appropriate    . Assessment:  Routine gynecologic examination  Her current medical status is satisfactory with no evidence of significant gynecologic issues.     Plan:  Counseled re: diet, exercise, healthy lifestyle  Return for yearly wellness visits impairments found/functional limitations in following categories/risk reduction/prevention independent

## 2024-02-05 NOTE — PHYSICAL THERAPY INITIAL EVALUATION ADULT - PERTINENT HX OF CURRENT PROBLEM, REHAB EVAL
88-year-old female PMH dementia, DVT, osteopenia, achalasia presents to the emergency department for generalized weakness.  Daughter at bedside relating history, states patient has been unable to walk for the past few days and appears more confused than normal.  Patient is currently on Augmentin for UTI, pending sensitivities.  Patient currently calm and cooperative, denies any personal complaints.    In ED, feels okay.  s/p rocephin.  cth-negative.  ecg- nsr.  cxr- no gross infiltrate on wet read.  labs-stable.  noted to be hypothermic, bear hugger in

## 2024-02-06 ENCOUNTER — TRANSCRIPTION ENCOUNTER (OUTPATIENT)
Age: 89
End: 2024-02-06

## 2024-02-06 VITALS
RESPIRATION RATE: 18 BRPM | OXYGEN SATURATION: 95 % | HEART RATE: 61 BPM | TEMPERATURE: 98 F | DIASTOLIC BLOOD PRESSURE: 62 MMHG | SYSTOLIC BLOOD PRESSURE: 101 MMHG

## 2024-02-06 PROCEDURE — 99239 HOSP IP/OBS DSCHRG MGMT >30: CPT

## 2024-02-06 RX ORDER — CEFTRIAXONE 500 MG/1
1000 INJECTION, POWDER, FOR SOLUTION INTRAMUSCULAR; INTRAVENOUS EVERY 24 HOURS
Refills: 0 | Status: DISCONTINUED | OUTPATIENT
Start: 2024-02-06 | End: 2024-02-06

## 2024-02-06 RX ORDER — SENNA PLUS 8.6 MG/1
2 TABLET ORAL
Qty: 0 | Refills: 0 | DISCHARGE
Start: 2024-02-06

## 2024-02-06 RX ORDER — POLYETHYLENE GLYCOL 3350 17 G/17G
17 POWDER, FOR SOLUTION ORAL
Refills: 0 | DISCHARGE
Start: 2024-02-06

## 2024-02-06 RX ORDER — APIXABAN 2.5 MG/1
2 TABLET, FILM COATED ORAL
Qty: 84 | Refills: 0
Start: 2024-02-06

## 2024-02-06 RX ORDER — APIXABAN 2.5 MG/1
2 TABLET, FILM COATED ORAL
Qty: 84 | Refills: 0 | DISCHARGE
Start: 2024-02-06 | End: 2024-02-12

## 2024-02-06 RX ADMIN — POLYETHYLENE GLYCOL 3350 17 GRAM(S): 17 POWDER, FOR SOLUTION ORAL at 09:26

## 2024-02-06 RX ADMIN — DORZOLAMIDE HYDROCHLORIDE TIMOLOL MALEATE 1 DROP(S): 20; 5 SOLUTION/ DROPS OPHTHALMIC at 09:27

## 2024-02-06 RX ADMIN — APIXABAN 10 MILLIGRAM(S): 2.5 TABLET, FILM COATED ORAL at 09:26

## 2024-02-06 RX ADMIN — CEFTRIAXONE 1000 MILLIGRAM(S): 500 INJECTION, POWDER, FOR SOLUTION INTRAMUSCULAR; INTRAVENOUS at 12:55

## 2024-02-06 NOTE — DISCHARGE NOTE PROVIDER - NSDCHHASSISTOTHER_GEN_ALL_CORE_FT
Chief Complaint   Patient presents with   • Office Visit     c/o bleeding after intercourse, pt would like to go off birth control,        HPI: Cuba Maloney is a 19 year old female here w/ c/o postcoital bleeding that may last up to 4 days - has occurred 3 times, Reports bleeding is thick and dark in color - \"almost black.\" Is sexually active with females only. Was placed on Progestin OCs for hx of prolonged menses - wants to stop to see how her body is now. Does not want to be on anything.   Hx of irreg menses on progestin OCs which she is aware of that being normal on that pill- see visit history.     ROS  SEE HPI    PHYSICAL EXAM  Visit Vitals  /50   Pulse 85   Temp 98.2 °F (36.8 °C) (Temporal)   Resp 16   LMP 04/18/2021 (Exact Date) Comment: pt stated irregular     Vital signs were reviewed during today's visit.   GENERAL APEARANCE: The patient is a pleasant, normal appearing female with normal affect and in no distress.  VULVA: Inspection of her external genitalia reveals normal mons pubis, labia minora and labia majora.  Normal appearing clitoris.  VAGINA:  Speculum exam reveals pink and moist vaginal mucosa, small amount of a creamy white discharge.    CERVIX: Normal, no CMT.  UTERUS: Normal size, shape and consistency.   ADNEXA: No masses or tenderness bilaterally.   PERINEUM:  Perineum appears normal. No lesions or masses.  PSYCHIATRIC: Appropriate mood and affect,   NEUROLOGIC: Normal recall, alert and oriented x 3.    ASSESSMENT  Diagnoses and all orders for this visit:  Postcoital bleeding  -     CHLAMYDIA/GONORRHEA BY NUCLEIC ACID AMPLIFICATION  -     SWABONE VAGINITIS PANEL      PLAN  Extended panel and GC sent  Exam in office appears normal except discharge  Pt absolutely does not want to be on any BC at this time & wants to monitor cycles. Explained that if menses becomes prolonged again she needs to contact office which she is in agreement.    Check CBC in one week

## 2024-02-06 NOTE — DISCHARGE NOTE PROVIDER - NSDCCPCAREPLAN_GEN_ALL_CORE_FT
PRINCIPAL DISCHARGE DIAGNOSIS  Diagnosis: UTI (urinary tract infection)  Assessment and Plan of Treatment:       SECONDARY DISCHARGE DIAGNOSES  Diagnosis: DVT, lower extremity  Assessment and Plan of Treatment:     Diagnosis: Anemia of chronic disease  Assessment and Plan of Treatment: Check CBC in one week

## 2024-02-06 NOTE — DIETITIAN INITIAL EVALUATION ADULT - ORAL INTAKE PTA/DIET HISTORY
Diet/wt hx obtained from  who was present at bed side. States pt consumes at least 3 meals per day. Watches sodium intake, stating she consumes "500-700 mg Na per day." Also includes red meat to increase iron levels as well as omega-3 fatty acids for brain health. Takes multiple vitamins at home: vit B6, B12, C, D, and MVI. Able to feed herself per  and denies issues w/ chewing and swallowing. Allergies noted: bananas (latex), oranges.

## 2024-02-06 NOTE — DIETITIAN INITIAL EVALUATION ADULT - PERTINENT MEDS FT
MEDICATIONS  (STANDING):  apixaban 10 milliGRAM(s) Oral every 12 hours  cefTRIAXone Injectable. 1000 milliGRAM(s) IV Push every 24 hours  dorzolamide 2%/timolol 0.5% Ophthalmic Solution 1 Drop(s) Both EYES two times a day  latanoprost 0.005% Ophthalmic Solution 1 Drop(s) Both EYES at bedtime  polyethylene glycol 3350 17 Gram(s) Oral daily  senna 2 Tablet(s) Oral at bedtime    MEDICATIONS  (PRN):  acetaminophen     Tablet .. 650 milliGRAM(s) Oral every 6 hours PRN Temp greater or equal to 38C (100.4F), Mild Pain (1 - 3)  aluminum hydroxide/magnesium hydroxide/simethicone Suspension 30 milliLiter(s) Oral every 4 hours PRN Dyspepsia  melatonin 3 milliGRAM(s) Oral at bedtime PRN Insomnia  ondansetron Injectable 4 milliGRAM(s) IV Push every 8 hours PRN Nausea and/or Vomiting

## 2024-02-06 NOTE — DISCHARGE NOTE PROVIDER - HOSPITAL COURSE
History of Present Illness:   88-year-old female PMH dementia, DVT, osteopenia, achalasia presents to the emergency department for generalized weakness.  Daughter at bedside relating history, states patient has been unable to walk for the past few days and appears more confused than normal.  Patient is currently on Augmentin for UTI, pending sensitivities.  Patient currently calm and cooperative, denies any personal complaints.    In ED, feels okay.  s/p rocephin.    noted to be hypothermic, bear hugger in place    2.4: no cp, no sob, no n/v/d  pleasantly confused  2.5: +mild confusion, able to ambulate w walker and assistance from PT            REVIEW OF SYSTEMS:    CONSTITUTIONAL: No weakness, No fevers or chills  ENT: No ear ache, No sorethroat  NECK: No pain, No stiffness  RESPIRATORY: No cough, No wheezing, No hemoptysis; No dyspnea  CARDIOVASCULAR: No chest pain, No palpitations  GASTROINTESTINAL: No abd pain, No nausea, No vomiting, No hematemesis, No diarrhea or constipation. No melena, No hematochezia.  GENITOURINARY: No dysuria, No  hematuria  NEUROLOGICAL: No diplopia, No paresthesia, No motor dysfunction  MUSCULOSKELETAL: No arthralgia, No myalgia  SKIN: No rashes, or lesions   PSYCH: no anxiety, no suicidal ideation    All other review of systems is negative unless indicated above    Vital Signs Last 24 Hrs  T(C): 36.3 (05 Feb 2024 07:45), Max: 37.3 (04 Feb 2024 15:25)  T(F): 97.3 (05 Feb 2024 07:45), Max: 99.2 (04 Feb 2024 15:25)  HR: 60 (05 Feb 2024 07:45) (60 - 75)  BP: 122/71 (05 Feb 2024 07:45) (117/44 - 140/54)  BP(mean): --  RR: 18 (05 Feb 2024 07:45) (17 - 18)  SpO2: 92% (05 Feb 2024 07:45) (91% - 92%)    Parameters below as of 05 Feb 2024 07:45  Patient On (Oxygen Delivery Method): room air            PHYSICAL EXAM:    GENERAL: NAD  HEENT:  NC/AT, EOMI, PERRLA, No scleral icterus, Moist mucous membranes  NECK: Supple, No JVD  CNS:  Alert & Oriented X3, Motor Strength 5/5 B/L upper and lower extremities; DTRs 2+ intact   LUNG: Normal Breath sounds, Clear to auscultation bilaterally, No rales, No rhonchi, No wheezing  HEART: RRR; No murmurs, No rubs  ABDOMEN: +BS, ST/ND/NT  GENITOURINARY: Voiding, Bladder not distended  EXTREMITIES:  2+ Peripheral Pulses, No clubbing, No cyanosis, No tibial edema  MUSCULOSKELTAL: Joints normal ROM, No TTP, No effusion  VAGINAL: deferred  SKIN: no rashes  RECTAL: deferred, not indicated  BREAST: deferred             Assessment:        1. Partially treated Uti:  f/u UCx done in office  f/u Blood Cx: ngtd  Ceftriaxone IV day#3      2. Anemia, appears chronic.  better today.  no active bleeding   -monitor , check CBC in one week outpatient    3. Thrombocytopenia, -stable   -monitor     4. Dementia with gait instability,   -reorientate as needed   -aricept on hold due to bradycardia   -fall/aspiration precautions  -PT consult     5. DVT:  Sonogram noted  will start Apixaban

## 2024-02-06 NOTE — DIETITIAN INITIAL EVALUATION ADULT - ADD RECOMMEND
1) Liberalize diet to regular to maximize caloric and nutrient intake. Encourage protein-rich foods, maximize food preferences   2) Monitor bowel movements, c/w current bowel regimens.  3) MVI w/ minerals daily to ensure 100% RDA met   4) Consider adding thiamine 100 mg daily 2/2 poor PO intake/ malnutrition   5) Obtain weekly wt to track/trend changes   6) Monitor daily lytes/min and replete prn   7) Confirm goals of care regarding nutrition support. Nutrition support is not recommended due to dementia/ overall declining medical status which evidenced based studies indicate EN is not effective in prolonging survival and improving quality of life. It can also increase risk of aspiration pneumonia as well as other related issues (infection, GI complications, and worsening/ non-healing PI's). However, will provide nutrition/ hydration within GOC.    RD will continue to monitor PO intake, labs, hydration, and wt prn.

## 2024-02-06 NOTE — DIETITIAN INITIAL EVALUATION ADULT - OTHER INFO
88-year-old female PMH dementia, DVT, osteopenia, achalasia presents to the emergency department for generalized weakness.  Daughter at bedside relating history, states patient has been unable to walk for the past few days and appears more confused than normal.  Patient is currently on Augmentin for UTI, pending sensitivities.      Known to nutr services, met criteria for PCM on previous admissions.  present at bed side, able to provide info to RD. Requesting information on what specific diet to follow w/ Alzheimer's/dementia. RD discussed there is not a specific diet to follow w/ Alzheimer's, RD mentioned that there is research that has been conducted in regards to omega-3 FA MNT however further research is needed. Per request, RD provided written education on omega-3 FA content and iron content in foods. Wt hx reviewed: 115# on 11/1/23; 120# UBW stated on 3/9/23. Per , UBW stated ~110-112#, denies recent wt changes. Bed scale wt of 106# taken by RD on 2/6; 9#/ 7.82% x 3 months - severe and clinically significant; moderate edema doc'd which may be skewing current wt appearance. Appeared very thin, frail - NFPE reveals severe muscle/fat wasting. Pt to be discharged today per RN, will not provide ONS at this time. See recommendations below.

## 2024-02-06 NOTE — DISCHARGE NOTE PROVIDER - NSDCFUSCHEDAPPT_GEN_ALL_CORE_FT
[facility]  [facility] [address] [facility]  Scheduled Appointment: 03/18/2024

## 2024-02-06 NOTE — DISCHARGE NOTE PROVIDER - NSDCMRMEDTOKEN_GEN_ALL_CORE_FT
apixaban 5 mg oral tablet: 2 tab(s) orally every 12 hours 2tb po  Bid x 6days, 1tb po BID x 30days  cholecalciferol 25 mcg (1000 intl units) oral tablet: 1 tab(s) orally once a day  dorzolamide-timolol 2.23%-0.68% (2%-0.5% base) ophthalmic solution: 1 drop(s) in the right eye 2 times a day  latanoprost 0.005% ophthalmic solution: 1 drop(s) in each eye once a day (at bedtime)  polyethylene glycol 3350 oral powder for reconstitution: 17 gram(s) orally once a day  senna leaf extract oral tablet: 2 tab(s) orally once a day (at bedtime)

## 2024-02-08 NOTE — SWALLOW VFSS/MBS ASSESSMENT ADULT - SLP PATIENT PROFILE REVIEW
Intensive Care Follow-up     Hospital:  LOS: 22 days   Mr. Roger Bhat, 61 y.o. male is followed for:   Colonic mass            History of present illness:   Roger is a 61 y.o. 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 He has required Parenteral Nutrition. He also developed lung infiltrates. He was on antibiotics but because his leukemoid reaction, antibiotic was changed to BRODERICK and Micafungin. He continues to improve, weaning his FiO2.          Subjective   Interval History:  Patient doing well this morning.  Starting some clear liquid diet.  Pain well-controlled.             The patient's past medical, surgical and social history were reviewed and updated in Epic as appropriate.       Objective     Infusions:  Adult [facility] CLINIMIX-E TPN, , Last Rate: 70 mL/hr at 02/07/24 1752  Adult Central CLINIMIX-E TPN,   Pharmacy to Dose TPN,       Medications:  bisacodyl, 10 mg, Oral, Daily  budesonide-formoterol, 2 puff, Inhalation, BID - RT  buPROPion, 50 mg, Oral, Q8H  Fat Emul Fish Oil/Plant Based, 100 mL, Intravenous, Q24H  [START ON 2/9/2024] folic acid, 1 mg, Oral, Daily  heparin (porcine), 5,000 Units, Subcutaneous, Q8H  insulin regular, 2-7 Units, Subcutaneous, Q6H  meropenem, 2,000 mg, Intravenous, Q8H  methylnaltrexone, 8 mg, Subcutaneous, Every Other Day  metoclopramide, 10 mg, Intravenous, Q6H  micafungin (MYCAMINE) IV, 100 mg, Intravenous, Q24H  pantoprazole, 40 mg, Intravenous, QAM AC  revefenacin, 175 mcg, Nebulization, Daily - RT  rosuvastatin, 10 mg, Oral, Nightly  senna, 1 tablet, Oral, Nightly  sodium chloride, 10 mL, Intravenous, Q12H  [START ON 2/9/2024] thiamine, 100 mg, Oral, Daily      I reviewed the patient's medications.    Vital Sign Min/Max for last 24 hours  Temp  Min: 97.2 °F (36.2 °C)  Max: 98.1 °F  (36.7 °C)   BP  Min: 110/96  Max: 158/101   Pulse  Min: 79  Max: 105   Resp  Min: 16  Max: 30   SpO2  Min: 92 %  Max: 98 %   Flow (L/min)  Min: 1  Max: 2       Input/Output for last 24 hour shift  02/07 0701 - 02/08 0700  In: 2614.6 [P.O.:472; I.V.:425.9]  Out: 3255 [Urine:2400; Drains:5]      GENERAL : NAD, conversant  RESPIRATORY/THORAX : normal respiratory effort and no intercostal retractions, CTAB  CARDIOVASCULAR : Normal S1/S2, RRR.  No lower ext edema.  GASTROINTESTINAL : Soft, NT/ND. BS x 4 normoactive. No hepatosplenomegaly.  MUSCULOSKELETAL : No cyanosis, clubbing, or ischemia  NEUROLOGICAL: alert and oriented to person, place and time  PSYCHOLOGICAL : Appropriate affect    Results from last 7 days   Lab Units 02/08/24  0302 02/07/24  0332 02/06/24  0354   WBC 10*3/mm3 10.58 9.40 8.94   HEMOGLOBIN g/dL 10.8* 10.6* 10.4*   PLATELETS 10*3/mm3 490* 481* 441     Results from last 7 days   Lab Units 02/08/24  0302 02/07/24  0332 02/06/24  0354   SODIUM mmol/L 134* 135* 135*   POTASSIUM mmol/L 4.0 4.3 3.9   CO2 mmol/L 23.0 25.0 26.0   BUN mg/dL 18 20 22   CREATININE mg/dL 0.46* 0.46* 0.36*   MAGNESIUM mg/dL 2.3 2.1 2.7*   PHOSPHORUS mg/dL 2.7 2.8 2.9   GLUCOSE mg/dL 105* 109* 123*     Estimated Creatinine Clearance: 142.9 mL/min (A) (by C-G formula based on SCr of 0.46 mg/dL (L)).          I reviewed the patient's new clinical results.  I reviewed the patient's new imaging results/reports including actual images and agree with reports.         Assessment & Plan   Impression        Colonic mass    Current smoker    Gastroesophageal reflux disease without esophagitis    Severe malnutrition    Respiratory distress    Centrilobular emphysema       Plan        Roger is a 61 y.o. 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 He has  required Parenteral Nutrition. He also developed lung infiltrates. He was on antibiotics but because his leukemoid reaction, antibiotic was changed to meropenem and Micafungin.  He was weaned off high flow nasal cannula on 2/6/2024.     -Continue postop orders per surgery  -Ensure adequate pain control  -Continue to wean oxygen to saturation greater than 88%  -Continue Symbicort and Yupelri for COPD  -Continue statin for hyperlipidemia  -Continue TPN for now, gradually advancing diet per general surgery recommendations  -Mobilize patient  -A.m. labs  -Medically okay to be transferred to the floor    I conducted multidisciplinary rounds in the plan of care was discussed with the multidisciplinary team at that time. In attendance at multidisciplinary rounds was clinical pharmacist, dietitian, nursing staff, and case management.    I discussed the patient's findings and my recommendations with patient and nursing staff       Yen Thomson, DO  Pulmonary, Critical care and Sleep Medicine        yes

## 2024-02-12 DIAGNOSIS — M19.90 UNSPECIFIED OSTEOARTHRITIS, UNSPECIFIED SITE: ICD-10-CM

## 2024-02-12 DIAGNOSIS — Z90.710 ACQUIRED ABSENCE OF BOTH CERVIX AND UTERUS: ICD-10-CM

## 2024-02-12 DIAGNOSIS — F03.90 UNSPECIFIED DEMENTIA WITHOUT BEHAVIORAL DISTURBANCE: ICD-10-CM

## 2024-02-12 DIAGNOSIS — R26.9 UNSPECIFIED ABNORMALITIES OF GAIT AND MOBILITY: ICD-10-CM

## 2024-02-12 DIAGNOSIS — Z86.718 PERSONAL HISTORY OF OTHER VENOUS THROMBOSIS AND EMBOLISM: ICD-10-CM

## 2024-02-12 DIAGNOSIS — D69.6 THROMBOCYTOPENIA, UNSPECIFIED: ICD-10-CM

## 2024-02-12 DIAGNOSIS — Z91.018 ALLERGY TO OTHER FOODS: ICD-10-CM

## 2024-02-12 DIAGNOSIS — N39.0 URINARY TRACT INFECTION, SITE NOT SPECIFIED: ICD-10-CM

## 2024-02-12 DIAGNOSIS — Z90.49 ACQUIRED ABSENCE OF OTHER SPECIFIED PARTS OF DIGESTIVE TRACT: ICD-10-CM

## 2024-02-12 DIAGNOSIS — Z91.040 LATEX ALLERGY STATUS: ICD-10-CM

## 2024-02-12 DIAGNOSIS — D63.8 ANEMIA IN OTHER CHRONIC DISEASES CLASSIFIED ELSEWHERE: ICD-10-CM

## 2024-02-12 DIAGNOSIS — Z79.899 OTHER LONG TERM (CURRENT) DRUG THERAPY: ICD-10-CM

## 2024-02-12 DIAGNOSIS — Z11.52 ENCOUNTER FOR SCREENING FOR COVID-19: ICD-10-CM

## 2024-02-13 ENCOUNTER — INPATIENT (INPATIENT)
Facility: HOSPITAL | Age: 89
LOS: 6 days | Discharge: HOME | DRG: 871 | End: 2024-02-20
Attending: INTERNAL MEDICINE | Admitting: INTERNAL MEDICINE
Payer: MEDICARE

## 2024-02-13 VITALS
WEIGHT: 110.01 LBS | DIASTOLIC BLOOD PRESSURE: 76 MMHG | RESPIRATION RATE: 17 BRPM | OXYGEN SATURATION: 97 % | HEART RATE: 60 BPM | SYSTOLIC BLOOD PRESSURE: 161 MMHG | HEIGHT: 62 IN

## 2024-02-13 LAB
ANION GAP SERPL CALC-SCNC: 1 MMOL/L — LOW (ref 5–17)
APPEARANCE UR: CLEAR — SIGNIFICANT CHANGE UP
BASOPHILS # BLD AUTO: 0.03 K/UL — SIGNIFICANT CHANGE UP (ref 0–0.2)
BASOPHILS NFR BLD AUTO: 0.8 % — SIGNIFICANT CHANGE UP (ref 0–2)
BILIRUB UR-MCNC: NEGATIVE — SIGNIFICANT CHANGE UP
BUN SERPL-MCNC: 31 MG/DL — HIGH (ref 7–23)
CALCIUM SERPL-MCNC: 9.7 MG/DL — SIGNIFICANT CHANGE UP (ref 8.5–10.1)
CHLORIDE SERPL-SCNC: 108 MMOL/L — SIGNIFICANT CHANGE UP (ref 96–108)
CO2 SERPL-SCNC: 31 MMOL/L — SIGNIFICANT CHANGE UP (ref 22–31)
COLOR SPEC: YELLOW — SIGNIFICANT CHANGE UP
CREAT SERPL-MCNC: 0.66 MG/DL — SIGNIFICANT CHANGE UP (ref 0.5–1.3)
DIFF PNL FLD: NEGATIVE — SIGNIFICANT CHANGE UP
EGFR: 84 ML/MIN/1.73M2 — SIGNIFICANT CHANGE UP
EOSINOPHIL # BLD AUTO: 0.05 K/UL — SIGNIFICANT CHANGE UP (ref 0–0.5)
EOSINOPHIL NFR BLD AUTO: 1.3 % — SIGNIFICANT CHANGE UP (ref 0–6)
FLUAV AG NPH QL: SIGNIFICANT CHANGE UP
FLUBV AG NPH QL: SIGNIFICANT CHANGE UP
GLUCOSE SERPL-MCNC: 98 MG/DL — SIGNIFICANT CHANGE UP (ref 70–99)
GLUCOSE UR QL: NEGATIVE MG/DL — SIGNIFICANT CHANGE UP
HCT VFR BLD CALC: 30.7 % — LOW (ref 34.5–45)
HGB BLD-MCNC: 9.8 G/DL — LOW (ref 11.5–15.5)
IMM GRANULOCYTES NFR BLD AUTO: 0.3 % — SIGNIFICANT CHANGE UP (ref 0–0.9)
KETONES UR-MCNC: NEGATIVE MG/DL — SIGNIFICANT CHANGE UP
LEUKOCYTE ESTERASE UR-ACNC: NEGATIVE — SIGNIFICANT CHANGE UP
LYMPHOCYTES # BLD AUTO: 1.11 K/UL — SIGNIFICANT CHANGE UP (ref 1–3.3)
LYMPHOCYTES # BLD AUTO: 29.4 % — SIGNIFICANT CHANGE UP (ref 13–44)
MCHC RBC-ENTMCNC: 30.4 PG — SIGNIFICANT CHANGE UP (ref 27–34)
MCHC RBC-ENTMCNC: 31.9 GM/DL — LOW (ref 32–36)
MCV RBC AUTO: 95.3 FL — SIGNIFICANT CHANGE UP (ref 80–100)
MONOCYTES # BLD AUTO: 0.25 K/UL — SIGNIFICANT CHANGE UP (ref 0–0.9)
MONOCYTES NFR BLD AUTO: 6.6 % — SIGNIFICANT CHANGE UP (ref 2–14)
NEUTROPHILS # BLD AUTO: 2.32 K/UL — SIGNIFICANT CHANGE UP (ref 1.8–7.4)
NEUTROPHILS NFR BLD AUTO: 61.6 % — SIGNIFICANT CHANGE UP (ref 43–77)
NITRITE UR-MCNC: NEGATIVE — SIGNIFICANT CHANGE UP
PH UR: 6 — SIGNIFICANT CHANGE UP (ref 5–8)
PLATELET # BLD AUTO: 172 K/UL — SIGNIFICANT CHANGE UP (ref 150–400)
POTASSIUM SERPL-MCNC: 5 MMOL/L — SIGNIFICANT CHANGE UP (ref 3.5–5.3)
POTASSIUM SERPL-SCNC: 5 MMOL/L — SIGNIFICANT CHANGE UP (ref 3.5–5.3)
PROT UR-MCNC: NEGATIVE MG/DL — SIGNIFICANT CHANGE UP
RBC # BLD: 3.22 M/UL — LOW (ref 3.8–5.2)
RBC # FLD: 14.6 % — HIGH (ref 10.3–14.5)
RSV RNA NPH QL NAA+NON-PROBE: SIGNIFICANT CHANGE UP
SARS-COV-2 RNA SPEC QL NAA+PROBE: SIGNIFICANT CHANGE UP
SODIUM SERPL-SCNC: 140 MMOL/L — SIGNIFICANT CHANGE UP (ref 135–145)
SP GR SPEC: 1.01 — SIGNIFICANT CHANGE UP (ref 1–1.03)
TSH SERPL-MCNC: 2.02 UU/ML — SIGNIFICANT CHANGE UP (ref 0.34–4.82)
UROBILINOGEN FLD QL: 0.2 MG/DL — SIGNIFICANT CHANGE UP (ref 0.2–1)
WBC # BLD: 3.77 K/UL — LOW (ref 3.8–10.5)
WBC # FLD AUTO: 3.77 K/UL — LOW (ref 3.8–10.5)

## 2024-02-13 PROCEDURE — 71045 X-RAY EXAM CHEST 1 VIEW: CPT | Mod: 26

## 2024-02-13 RX ORDER — CHOLECALCIFEROL (VITAMIN D3) 125 MCG
1000 CAPSULE ORAL DAILY
Refills: 0 | Status: DISCONTINUED | OUTPATIENT
Start: 2024-02-13 | End: 2024-02-20

## 2024-02-13 RX ORDER — DORZOLAMIDE HYDROCHLORIDE TIMOLOL MALEATE 20; 5 MG/ML; MG/ML
1 SOLUTION/ DROPS OPHTHALMIC
Refills: 0 | DISCHARGE

## 2024-02-13 RX ORDER — ACETAMINOPHEN 500 MG
650 TABLET ORAL EVERY 6 HOURS
Refills: 0 | Status: DISCONTINUED | OUTPATIENT
Start: 2024-02-13 | End: 2024-02-13

## 2024-02-13 RX ORDER — CHOLECALCIFEROL (VITAMIN D3) 125 MCG
1 CAPSULE ORAL
Refills: 0 | DISCHARGE

## 2024-02-13 RX ORDER — LATANOPROST 0.05 MG/ML
1 SOLUTION/ DROPS OPHTHALMIC; TOPICAL AT BEDTIME
Refills: 0 | Status: DISCONTINUED | OUTPATIENT
Start: 2024-02-13 | End: 2024-02-20

## 2024-02-13 RX ORDER — APIXABAN 2.5 MG/1
1 TABLET, FILM COATED ORAL
Refills: 0 | DISCHARGE
Start: 2024-02-13

## 2024-02-13 RX ORDER — PIPERACILLIN AND TAZOBACTAM 4; .5 G/20ML; G/20ML
3.38 INJECTION, POWDER, LYOPHILIZED, FOR SOLUTION INTRAVENOUS EVERY 8 HOURS
Refills: 0 | Status: DISCONTINUED | OUTPATIENT
Start: 2024-02-13 | End: 2024-02-14

## 2024-02-13 RX ORDER — ONDANSETRON 8 MG/1
4 TABLET, FILM COATED ORAL EVERY 8 HOURS
Refills: 0 | Status: DISCONTINUED | OUTPATIENT
Start: 2024-02-13 | End: 2024-02-20

## 2024-02-13 RX ORDER — LATANOPROST 0.05 MG/ML
1 SOLUTION/ DROPS OPHTHALMIC; TOPICAL
Refills: 0 | DISCHARGE

## 2024-02-13 RX ORDER — VANCOMYCIN HCL 1 G
750 VIAL (EA) INTRAVENOUS EVERY 24 HOURS
Refills: 0 | Status: DISCONTINUED | OUTPATIENT
Start: 2024-02-13 | End: 2024-02-14

## 2024-02-13 RX ORDER — POLYETHYLENE GLYCOL 3350 17 G/17G
17 POWDER, FOR SOLUTION ORAL DAILY
Refills: 0 | Status: DISCONTINUED | OUTPATIENT
Start: 2024-02-13 | End: 2024-02-20

## 2024-02-13 RX ORDER — APIXABAN 2.5 MG/1
5 TABLET, FILM COATED ORAL EVERY 12 HOURS
Refills: 0 | Status: DISCONTINUED | OUTPATIENT
Start: 2024-02-13 | End: 2024-02-20

## 2024-02-13 RX ORDER — PIPERACILLIN AND TAZOBACTAM 4; .5 G/20ML; G/20ML
3.38 INJECTION, POWDER, LYOPHILIZED, FOR SOLUTION INTRAVENOUS ONCE
Refills: 0 | Status: COMPLETED | OUTPATIENT
Start: 2024-02-13 | End: 2024-02-13

## 2024-02-13 RX ORDER — LANOLIN ALCOHOL/MO/W.PET/CERES
3 CREAM (GRAM) TOPICAL AT BEDTIME
Refills: 0 | Status: DISCONTINUED | OUTPATIENT
Start: 2024-02-13 | End: 2024-02-20

## 2024-02-13 RX ORDER — ACETAMINOPHEN 500 MG
650 TABLET ORAL EVERY 6 HOURS
Refills: 0 | Status: DISCONTINUED | OUTPATIENT
Start: 2024-02-13 | End: 2024-02-20

## 2024-02-13 RX ORDER — SODIUM CHLORIDE 9 MG/ML
1000 INJECTION, SOLUTION INTRAVENOUS
Refills: 0 | Status: DISCONTINUED | OUTPATIENT
Start: 2024-02-13 | End: 2024-02-14

## 2024-02-13 RX ORDER — DORZOLAMIDE HYDROCHLORIDE TIMOLOL MALEATE 20; 5 MG/ML; MG/ML
1 SOLUTION/ DROPS OPHTHALMIC EVERY 12 HOURS
Refills: 0 | Status: DISCONTINUED | OUTPATIENT
Start: 2024-02-13 | End: 2024-02-20

## 2024-02-13 RX ORDER — SODIUM CHLORIDE 9 MG/ML
1000 INJECTION INTRAMUSCULAR; INTRAVENOUS; SUBCUTANEOUS ONCE
Refills: 0 | Status: COMPLETED | OUTPATIENT
Start: 2024-02-13 | End: 2024-02-13

## 2024-02-13 RX ORDER — VANCOMYCIN HCL 1 G
750 VIAL (EA) INTRAVENOUS ONCE
Refills: 0 | Status: COMPLETED | OUTPATIENT
Start: 2024-02-13 | End: 2024-02-13

## 2024-02-13 RX ADMIN — SODIUM CHLORIDE 1000 MILLILITER(S): 9 INJECTION INTRAMUSCULAR; INTRAVENOUS; SUBCUTANEOUS at 17:28

## 2024-02-13 RX ADMIN — LATANOPROST 1 DROP(S): 0.05 SOLUTION/ DROPS OPHTHALMIC; TOPICAL at 22:33

## 2024-02-13 RX ADMIN — Medication 3 MILLIGRAM(S): at 22:33

## 2024-02-13 RX ADMIN — APIXABAN 5 MILLIGRAM(S): 2.5 TABLET, FILM COATED ORAL at 22:33

## 2024-02-13 RX ADMIN — PIPERACILLIN AND TAZOBACTAM 200 GRAM(S): 4; .5 INJECTION, POWDER, LYOPHILIZED, FOR SOLUTION INTRAVENOUS at 17:43

## 2024-02-13 RX ADMIN — Medication 250 MILLIGRAM(S): at 18:24

## 2024-02-13 RX ADMIN — PIPERACILLIN AND TAZOBACTAM 25 GRAM(S): 4; .5 INJECTION, POWDER, LYOPHILIZED, FOR SOLUTION INTRAVENOUS at 22:33

## 2024-02-13 RX ADMIN — DORZOLAMIDE HYDROCHLORIDE TIMOLOL MALEATE 1 DROP(S): 20; 5 SOLUTION/ DROPS OPHTHALMIC at 22:33

## 2024-02-13 NOTE — H&P ADULT - NSHPLABSRESULTS_GEN_ALL_CORE
LABS:  katie                        9.8    3.77  )-----------( 172      ( 13 Feb 2024 16:27 )             30.7     02-13    140  |  108  |  31<H>  ----------------------------<  98  5.0   |  31  |  0.66    Ca    9.7      13 Feb 2024 16:27

## 2024-02-13 NOTE — ED PROVIDER NOTE - OBJECTIVE STATEMENT
88-year-old female with history of dementia, DVT on Eliquis presenting with generalized weakness and burning with urination over the past week.   at the bedside reports that patient was recently hospitalized and discharged on February 6 for UTI with completion of antibiotics prior to discharge. Denies fevers, chills, headache, chest pain, palpitations, shortness of breath, cough, nausea, vomiting, focal neurologic symptoms.

## 2024-02-13 NOTE — ED PROVIDER NOTE - CARE PLAN
1 Principal Discharge DX:	UTI (urinary tract infection)   Principal Discharge DX:	Right lower lobe pneumonia

## 2024-02-13 NOTE — PHARMACOTHERAPY INTERVENTION NOTE - COMMENTS
Medication reconciliation completed.  Patient was unable to provide medication information, spoke to  at bedside and they provided current medication list; confirmed with Dr. First Lizarraga.   confirms that pt completed the 6 days loading dose of Eliquis 10mg 2x a day yesterday 2/12/24 and had 1st day of Eliquis 5mg 2x a day today AM.
Vancomycin changed to 750mg ivpb as per ED first dose weight based guidelines.

## 2024-02-13 NOTE — ED ADULT TRIAGE NOTE - CHIEF COMPLAINT QUOTE
Pt presents to ER c/o burning urination and malodorous urine. Pt reports she was diagnosed with a UTI one week ago and sent home on PO abx. Pt reports symptoms have been continuous sonce diagnosis

## 2024-02-13 NOTE — ED ADULT NURSE NOTE - OBJECTIVE STATEMENT
Pt presents to ER c/o burning urination and malodorous urine since yesterday afternoon. Pt reports she was diagnosed with a UTI one week ago and sent home on PO abx. Pt  reports she could not stand and walk yesterday afternoon continuing until today. Pt  also reports she was confused. Pt is on eliquis. Pt has an occasional cough for 2-3 days and reports she is slightly short of breath at the moment. Pt denies chest pain.  Safety and comfort measures maintained.  at bedside.

## 2024-02-13 NOTE — ED ADULT NURSE NOTE - NSFALLHARMRISKINTERV_ED_ALL_ED

## 2024-02-13 NOTE — ED ADULT NURSE REASSESSMENT NOTE - NS ED NURSE REASSESS COMMENT FT1
received pt from Regina Rn, pt awake alert eating dinner, denies pain or discomfort. rectal probe placed for temperature monitoring.

## 2024-02-13 NOTE — H&P ADULT - NSICDXPASTSURGICALHX_GEN_ALL_CORE_FT
PAST SURGICAL HISTORY:  Lumbar Laminectomy/ Spinal Fusion 1999    S/P Appendectomy @ 12 years old    S/P Arthroscopy of Right Knee 1998    S/P Hysterectomy 2007    S/P Tonsillectomy @ 2 years old     no

## 2024-02-13 NOTE — ED PROVIDER NOTE - CLINICAL SUMMARY MEDICAL DECISION MAKING FREE TEXT BOX
88-year-old female with generalized weakness and dysuria with recent hospitalization for UTI.  Afebrile, hemodynamically stable, abdomen soft and nontender, no CVA tenderness.  Differential includes UTI, electrolyte derangement, anemia. 88-year-old female with generalized weakness and dysuria with recent hospitalization for UTI.  Hypothermia, hemodynamically stable, abdomen soft and nontender, no CVA tenderness.  Differential includes UTI, electrolyte derangement, anemia. Warm fluids and warm blankets provided. Labs and imaging independently reviewed and interpreted antibiotic started for right lower lobe pneumonia.   IV fluids initiated with gentle hydration due to age and unknown EF. Observation admission under medicine.

## 2024-02-13 NOTE — H&P ADULT - ASSESSMENT
88F presented to [facility] with complaints of weakness, admitted for Sepsis.    Sepsis of unknown origin  -Hypothermic, bradycardic, leukopenic on presentation.  -S/p Vanc/Zosyn in ED, will c/w empiric coverage.  -UA negative, CXR pending read.   -F/u cultures.  -ID consult placed.    DVT  -On eliquis  -Monitor Hgb    Chronic Anemia  -No active signs of bleeding.   -Hgb stable from prior admission ~9 on 2/4/23  -Trend Hgb    Dementia  -Fall and aspiration precautions.

## 2024-02-13 NOTE — H&P ADULT - NSHPPHYSICALEXAM_GEN_ALL_CORE
T(C): 33.5 (02-13-24 @ 18:36), Max: 33.6 (02-13-24 @ 16:33)  HR: 54 (02-13-24 @ 19:20) (51 - 60)  BP: 183/79 (02-13-24 @ 19:20) (154/69 - 183/79)  RR: 18 (02-13-24 @ 19:20) (16 - 18)  SpO2: 93% (02-13-24 @ 19:20) (93% - 97%)    General: Well nourished, non-toxic  HEENT: non-traumatic, perrla, eomi  Cardio: s1s2 regular rate and rhythm  Lungs: comfortable breathing, clear to auscultation  Abdomen: Soft, non-tender, non-distended  Neuro: AOx4  Ext: Pulses +2

## 2024-02-13 NOTE — ED PROVIDER NOTE - PHYSICAL EXAMINATION
General: Well appearing in no acute distress, alert and cooperative  Head: Normocephalic, atraumatic  Eyes: PERRLA, no conjunctival injection, no scleral icterus, EOMI  ENMT: Atraumatic external nose and ears  Neck: Soft and supple, full ROM without pain  Cardiac: Regular rate and regular rhythm, no murmurs  Resp: Unlabored respiratory effort, lungs CTAB  Abd: Soft, non-tender, non-distended  MSK: Spine midline and non-tender, no CVA tenderness   Skin: Warm and dry  Neuro: moves all extremities symmetrically, Motor strength and sensation grossly intact

## 2024-02-13 NOTE — ED ADULT NURSE REASSESSMENT NOTE - NS ED NURSE REASSESS COMMENT FT1
Spoke with pts daughter to update on POC. Pt here for observation. Pt requesting to speak with admitting Dr. Dr Moreau notified. No acute distress noted at this time. Comfort and safety measures maintained.

## 2024-02-14 LAB
ALBUMIN SERPL ELPH-MCNC: 2.9 G/DL — LOW (ref 3.3–5)
ALP SERPL-CCNC: 201 U/L — HIGH (ref 40–120)
ALT FLD-CCNC: 43 U/L — SIGNIFICANT CHANGE UP (ref 12–78)
ANION GAP SERPL CALC-SCNC: 2 MMOL/L — LOW (ref 5–17)
AST SERPL-CCNC: 27 U/L — SIGNIFICANT CHANGE UP (ref 15–37)
BASOPHILS # BLD AUTO: 0.02 K/UL — SIGNIFICANT CHANGE UP (ref 0–0.2)
BASOPHILS NFR BLD AUTO: 0.4 % — SIGNIFICANT CHANGE UP (ref 0–2)
BILIRUB SERPL-MCNC: 0.4 MG/DL — SIGNIFICANT CHANGE UP (ref 0.2–1.2)
BUN SERPL-MCNC: 28 MG/DL — HIGH (ref 7–23)
CALCIUM SERPL-MCNC: 9.2 MG/DL — SIGNIFICANT CHANGE UP (ref 8.5–10.1)
CHLORIDE SERPL-SCNC: 111 MMOL/L — HIGH (ref 96–108)
CO2 SERPL-SCNC: 28 MMOL/L — SIGNIFICANT CHANGE UP (ref 22–31)
CREAT SERPL-MCNC: 0.82 MG/DL — SIGNIFICANT CHANGE UP (ref 0.5–1.3)
CULTURE RESULTS: NO GROWTH — SIGNIFICANT CHANGE UP
EGFR: 69 ML/MIN/1.73M2 — SIGNIFICANT CHANGE UP
EOSINOPHIL # BLD AUTO: 0.06 K/UL — SIGNIFICANT CHANGE UP (ref 0–0.5)
EOSINOPHIL NFR BLD AUTO: 1.3 % — SIGNIFICANT CHANGE UP (ref 0–6)
FERRITIN SERPL-MCNC: 483 NG/ML — HIGH (ref 13–330)
FOLATE SERPL-MCNC: 14.4 NG/ML — SIGNIFICANT CHANGE UP
GLUCOSE SERPL-MCNC: 95 MG/DL — SIGNIFICANT CHANGE UP (ref 70–99)
HCT VFR BLD CALC: 29.8 % — LOW (ref 34.5–45)
HGB BLD-MCNC: 9.6 G/DL — LOW (ref 11.5–15.5)
IMM GRANULOCYTES NFR BLD AUTO: 0.2 % — SIGNIFICANT CHANGE UP (ref 0–0.9)
IRON SATN MFR SERPL: 30 % — SIGNIFICANT CHANGE UP (ref 14–50)
IRON SATN MFR SERPL: 84 UG/DL — SIGNIFICANT CHANGE UP (ref 30–160)
LYMPHOCYTES # BLD AUTO: 1.21 K/UL — SIGNIFICANT CHANGE UP (ref 1–3.3)
LYMPHOCYTES # BLD AUTO: 27 % — SIGNIFICANT CHANGE UP (ref 13–44)
MAGNESIUM SERPL-MCNC: 1.7 MG/DL — SIGNIFICANT CHANGE UP (ref 1.6–2.6)
MCHC RBC-ENTMCNC: 30.8 PG — SIGNIFICANT CHANGE UP (ref 27–34)
MCHC RBC-ENTMCNC: 32.2 GM/DL — SIGNIFICANT CHANGE UP (ref 32–36)
MCV RBC AUTO: 95.5 FL — SIGNIFICANT CHANGE UP (ref 80–100)
MONOCYTES # BLD AUTO: 0.33 K/UL — SIGNIFICANT CHANGE UP (ref 0–0.9)
MONOCYTES NFR BLD AUTO: 7.4 % — SIGNIFICANT CHANGE UP (ref 2–14)
NEUTROPHILS # BLD AUTO: 2.85 K/UL — SIGNIFICANT CHANGE UP (ref 1.8–7.4)
NEUTROPHILS NFR BLD AUTO: 63.7 % — SIGNIFICANT CHANGE UP (ref 43–77)
PHOSPHATE SERPL-MCNC: 2.9 MG/DL — SIGNIFICANT CHANGE UP (ref 2.5–4.5)
PLATELET # BLD AUTO: 164 K/UL — SIGNIFICANT CHANGE UP (ref 150–400)
POTASSIUM SERPL-MCNC: 4.8 MMOL/L — SIGNIFICANT CHANGE UP (ref 3.5–5.3)
POTASSIUM SERPL-SCNC: 4.8 MMOL/L — SIGNIFICANT CHANGE UP (ref 3.5–5.3)
PROT SERPL-MCNC: 6 GM/DL — SIGNIFICANT CHANGE UP (ref 6–8.3)
RBC # BLD: 3.12 M/UL — LOW (ref 3.8–5.2)
RBC # BLD: 3.12 M/UL — LOW (ref 3.8–5.2)
RBC # FLD: 14.7 % — HIGH (ref 10.3–14.5)
RETICS #: 45.6 K/UL — SIGNIFICANT CHANGE UP (ref 25–125)
RETICS/RBC NFR: 1.5 % — SIGNIFICANT CHANGE UP (ref 0.5–2.5)
SODIUM SERPL-SCNC: 141 MMOL/L — SIGNIFICANT CHANGE UP (ref 135–145)
SPECIMEN SOURCE: SIGNIFICANT CHANGE UP
TIBC SERPL-MCNC: 276 UG/DL — SIGNIFICANT CHANGE UP (ref 220–430)
UIBC SERPL-MCNC: 193 UG/DL — SIGNIFICANT CHANGE UP (ref 110–370)
VIT B12 SERPL-MCNC: 1364 PG/ML — HIGH (ref 232–1245)
WBC # BLD: 4.48 K/UL — SIGNIFICANT CHANGE UP (ref 3.8–10.5)
WBC # FLD AUTO: 4.48 K/UL — SIGNIFICANT CHANGE UP (ref 3.8–10.5)

## 2024-02-14 PROCEDURE — 70450 CT HEAD/BRAIN W/O DYE: CPT | Mod: 26,QQ

## 2024-02-14 RX ORDER — SODIUM CHLORIDE 9 MG/ML
1000 INJECTION INTRAMUSCULAR; INTRAVENOUS; SUBCUTANEOUS
Refills: 0 | Status: DISCONTINUED | OUTPATIENT
Start: 2024-02-14 | End: 2024-02-15

## 2024-02-14 RX ORDER — AZITHROMYCIN 500 MG/1
500 TABLET, FILM COATED ORAL EVERY 24 HOURS
Refills: 0 | Status: COMPLETED | OUTPATIENT
Start: 2024-02-14 | End: 2024-02-16

## 2024-02-14 RX ORDER — CEFTRIAXONE 500 MG/1
1000 INJECTION, POWDER, FOR SOLUTION INTRAMUSCULAR; INTRAVENOUS EVERY 24 HOURS
Refills: 0 | Status: COMPLETED | OUTPATIENT
Start: 2024-02-14 | End: 2024-02-18

## 2024-02-14 RX ADMIN — DORZOLAMIDE HYDROCHLORIDE TIMOLOL MALEATE 1 DROP(S): 20; 5 SOLUTION/ DROPS OPHTHALMIC at 20:09

## 2024-02-14 RX ADMIN — PIPERACILLIN AND TAZOBACTAM 25 GRAM(S): 4; .5 INJECTION, POWDER, LYOPHILIZED, FOR SOLUTION INTRAVENOUS at 06:01

## 2024-02-14 RX ADMIN — DORZOLAMIDE HYDROCHLORIDE TIMOLOL MALEATE 1 DROP(S): 20; 5 SOLUTION/ DROPS OPHTHALMIC at 10:37

## 2024-02-14 RX ADMIN — APIXABAN 5 MILLIGRAM(S): 2.5 TABLET, FILM COATED ORAL at 10:34

## 2024-02-14 RX ADMIN — SODIUM CHLORIDE 100 MILLILITER(S): 9 INJECTION, SOLUTION INTRAVENOUS at 04:02

## 2024-02-14 RX ADMIN — SODIUM CHLORIDE 100 MILLILITER(S): 9 INJECTION INTRAMUSCULAR; INTRAVENOUS; SUBCUTANEOUS at 20:20

## 2024-02-14 RX ADMIN — CEFTRIAXONE 1000 MILLIGRAM(S): 500 INJECTION, POWDER, FOR SOLUTION INTRAMUSCULAR; INTRAVENOUS at 12:03

## 2024-02-14 RX ADMIN — Medication 3 MILLIGRAM(S): at 20:08

## 2024-02-14 RX ADMIN — AZITHROMYCIN 255 MILLIGRAM(S): 500 TABLET, FILM COATED ORAL at 12:58

## 2024-02-14 RX ADMIN — SODIUM CHLORIDE 100 MILLILITER(S): 9 INJECTION INTRAMUSCULAR; INTRAVENOUS; SUBCUTANEOUS at 12:03

## 2024-02-14 RX ADMIN — Medication 1000 UNIT(S): at 10:34

## 2024-02-14 RX ADMIN — APIXABAN 5 MILLIGRAM(S): 2.5 TABLET, FILM COATED ORAL at 20:09

## 2024-02-14 RX ADMIN — LATANOPROST 1 DROP(S): 0.05 SOLUTION/ DROPS OPHTHALMIC; TOPICAL at 20:09

## 2024-02-14 NOTE — PHYSICAL THERAPY INITIAL EVALUATION ADULT - SHORT TERM MEMORY, REHAB EVAL
Assessment/Plan:    Luana Lowe intact

## 2024-02-14 NOTE — PHYSICAL THERAPY INITIAL EVALUATION ADULT - MANUAL MUSCLE TESTING RESULTS, REHAB EVAL
4/5 throughout B UE and LE/no strength deficits were identified 3/5 throughout B UE and LE/no strength deficits were identified

## 2024-02-14 NOTE — PHYSICAL THERAPY INITIAL EVALUATION ADULT - GENERAL OBSERVATIONS, REHAB EVAL
Pt found supine in bed on 5E, weak, lethargic appearing confused,  at bedside, not following commands, but is receptive to Physical Therapy Evaluation. Pt is able to actively move all four extremities, but not following commands for active ROM and MMT screen. Pt is able to perfrom Bed Mobility with MaxA and is able to maintain upright posture at edge of Bed with CGA. Unable to attempt sit<>stand transfers, secondary to patient's inability to follow commands. Patient transport arrives to bring patient to CT Scan. Pt returned to bed with call bell and phone in reach, bed alarm on, RN Pat is present and aware.

## 2024-02-14 NOTE — DIETITIAN INITIAL EVALUATION ADULT - NUTRITIONGOAL OUTCOME1
[facility] - Sushila Dhaliwal, Doug, Morenita, Ayo, Colleen Williamson  Office Number:  [phone]    Patient resting comfortably in bed in NAD.  Laying flat with no respiratory distress.  No complaints of chest pain, dyspnea, palpitations, PND, or orthopnea.  Low grade fever overnight    F/U for:  Edema    MEDICATIONS  (STANDING):  dextrose 5%. 1000 milliLiter(s) (50 mL/Hr) IV Continuous <Continuous>  dextrose 50% Injectable 12.5 Gram(s) IV Push once  dextrose 50% Injectable 25 Gram(s) IV Push once  dextrose 50% Injectable 25 Gram(s) IV Push once  ferrous    sulfate 325 milliGRAM(s) Oral daily  folic acid 1 milliGRAM(s) Oral daily  furosemide    Tablet 20 milliGRAM(s) Oral daily  insulin glargine Injectable (LANTUS) 24 Unit(s) SubCutaneous at bedtime  insulin lispro (HumaLOG) corrective regimen sliding scale   SubCutaneous three times a day before meals  insulin lispro (HumaLOG) corrective regimen sliding scale   SubCutaneous at bedtime  insulin lispro Injectable (HumaLOG) 8 Unit(s) SubCutaneous three times a day before meals  lactulose Syrup 20 Gram(s) Oral three times a day  midodrine. 20 milliGRAM(s) Oral three times a day  pantoprazole    Tablet 40 milliGRAM(s) Oral two times a day  rifAXIMin 550 milliGRAM(s) Oral two times a day  spironolactone 50 milliGRAM(s) Oral daily  tamsulosin 0.4 milliGRAM(s) Oral at bedtime    MEDICATIONS  (PRN):  dextrose 40% Gel 15 Gram(s) Oral once PRN Blood Glucose LESS THAN 70 milliGRAM(s)/deciliter  glucagon  Injectable 1 milliGRAM(s) IntraMuscular once PRN Glucose LESS THAN 70 milligrams/deciliter      Allergies    codeine (Anaphylaxis)    Intolerances    NO  RED MEAT (Unknown)      Vital Signs Last 24 Hrs  T(C): 37.6 (05 Jan 2020 04:46), Max: 38.2 (04 Jan 2020 20:59)  T(F): 99.7 (05 Jan 2020 04:46), Max: 100.8 (04 Jan 2020 20:59)  HR: 84 (05 Jan 2020 04:46) (84 - 96)  BP: 113/63 (05 Jan 2020 04:46) (113/63 - 138/76)  BP(mean): --  RR: 17 (05 Jan 2020 04:46) (17 - 18)  SpO2: 97% (05 Jan 2020 04:46) (93% - 97%)    I&O's Summary    04 Jan 2020 07:01  -  05 Jan 2020 07:00  --------------------------------------------------------  IN: 840 mL / OUT: 2600 mL / NET: -1760 mL        ON EXAM:    Constitutional: NAD, awake    HEENT: Moist Mucous Membranes, Anicteric  Pulmonary: Decreased breath sounds b/l. No rales, crackles or wheeze appreciated.   Cardiovascular: Regular, S1 and S2, No murmurs, rubs, gallops or clicks  Gastrointestinal: Bowel Sounds present, soft, nontender.   Lymph: L>R edema in lower ext  Skin: No visible rashes or ulcers.  Psych:  Mood & affect appropriate for situation    LABS: All Labs Reviewed:                        7.8    12.25 )-----------( 79       ( 05 Jan 2020 07:26 )             24.1                         7.5    6.70  )-----------( 73       ( 04 Jan 2020 06:41 )             23.4                         7.5    6.64  )-----------( 73       ( 03 Jan 2020 08:35 )             22.7     04 Jan 2020 06:41    136    |  103    |  15     ----------------------------<  96     4.0     |  22     |  1.07   03 Jan 2020 06:30    136    |  103    |  14     ----------------------------<  91     4.0     |  25     |  1.17     Ca    8.4        04 Jan 2020 06:41  Ca    8.3        03 Jan 2020 06:30    TPro  5.0    /  Alb  2.3    /  TBili  9.2    /  DBili  x      /  AST  26     /  ALT  17     /  AlkPhos  114    04 Jan 2020 06:41  TPro  5.1    /  Alb  2.3    /  TBili  8.8    /  DBili  x      /  AST  24     /  ALT  17     /  AlkPhos  126    03 Jan 2020 06:30    PT/INR - ( 05 Jan 2020 07:26 )   PT: 24.4 sec;   INR: 2.07 ratio Pt will receive >/= 80% of ENN via tolerated route

## 2024-02-14 NOTE — DIETITIAN INITIAL EVALUATION ADULT - OTHER INFO
88-year-old female with history of dementia, DVT on Eliquis presenting with generalized weakness and burning with urination over the past week.   at the bedside reports that patient was recently hospitalized and discharged on February 6 for UTI with completion of antibiotics prior to discharge.     Known to RD service, met criteria for PCM on previous admissions. At time of RD visit pt was NPO however diet just advanced to regular. Unable to obtain info during assessment 2/2 dementia. Recently seen on 2/6/24 -  requested diet rx for Alzheimer's which per last RD note "RD discussed there is not a specific diet to follow w/ Alzheimer's, RD mentioned that there is research that has been conducted in regards to omega-3 FA MNT however further research is needed. Per request, RD provided written education on omega-3 FA content and iron content in foods." Wt hx reviewed: 106# on 2/6/24 (mod edema doc'd which may be skewing wt appearance); 115# on 11/11/23 (mild edema doc'd which may be skewing wt appearance); 120# UBW stated on 3/9/23. UBW noted in chart was stated ~110-112#. Bed scale wt of 114# taken by RD on 2/14/24 - ?accuracy of wt gain x 8 days. Appeared very thin, frail, bony. NFPE reveals severe muscle/fat wasting. Per previous RD notes, pt willing to trial magic cup, so will add Magic cup BID (provides 290 kcal/ 9 g protein) to optimize nutritional needs. See recommendations below.

## 2024-02-14 NOTE — DIETITIAN INITIAL EVALUATION ADULT - ADD RECOMMEND
1) C/w regular diet. Encourage protein-rich foods, maximize food preferences. Will add Magic cup BID (provides 290 kcal/ 9 g protein) to optimize nutritional needs   2) Monitor bowel movements, c/w current bowel regimen.  3) MVI w/ minerals daily to ensure 100% RDA met   4) Consider adding thiamine 100 mg daily 2/2 poor PO intake/ malnutrition   5) Obtain weekly wt to track/trend changes   6) Monitor daily lytes/min and replete prn   7) Confirm goals of care regarding nutrition support. Nutrition support is not recommended due to dementia/ overall declining medical status which evidenced based studies indicate EN is not effective in prolonging survival and improving quality of life. It can also increase risk of aspiration pneumonia as well as other related issues (infection, GI complications, and worsening/ non-healing PI's). However, will provide nutrition/ hydration within GOC.    RD will continue to monitor PO intake, labs, hydration, and wt prn.

## 2024-02-14 NOTE — PATIENT PROFILE ADULT - FUNCTIONAL ASSESSMENT - BASIC MOBILITY 6.
1-calculated by average/Not able to assess (calculate score using [facility] averaging method)

## 2024-02-14 NOTE — CONSULT NOTE ADULT - ASSESSMENT
88-year-old female with history of dementia, DVT on Eliquis presenting with generalized weakness and burning with urination over the past week.   at the bedside reports that patient was recently hospitalized and discharged on February 6 for UTI with completion of antibiotics prior to discharge. Denies fevers, chills, headache, chest pain, palpitations, shortness of breath, cough, nausea, vomiting, focal neurologic symptoms. Here hypothermic, xray shows RLL pneumonia, started on IV abx.     1. Hypothermia. RLL pneumonia. Metabolic encephalopathy. Alzheimers dementia  - imaging reviewed  - agree with rocephin 1gm daily/azithromycin 500mg daily  - continue with abx coverage  - aspiration precautions  - fu cultures  - tolerating abx well so far; no side effects noted  - reason for abx use and side effects reviewed with patient  - supportive care  - fu cbc    Clinical team may change from intravenous to oral antibiotics when the following criteria are met:   1. Patient is clinically improving/stable       a)	Improved signs and symptoms of infection from initial presentation       b)	Afebrile for 24 hours       c)	Leukocytosis trending towards normal range   2. Patient is tolerating oral intake   3. Initial blood cultures are negative     When above criteria met - may change iv antibiotics to: po azithromycin 500mg daily x 3 days, ceftin 500mg BID x 7 day course total for both

## 2024-02-14 NOTE — PROGRESS NOTE ADULT - SUBJECTIVE AND OBJECTIVE BOX
HPI:  88-year-old female with history of dementia, DVT on Eliquis presenting with generalized weakness and burning with urination over the past week.   at the bedside reports that patient was recently hospitalized and discharged on February 6 for UTI with completion of antibiotics prior to discharge. Denies fevers, chills, headache, chest pain, palpitations, shortness of breath, cough, nausea, vomiting, focal neurologic symptoms. (13 Feb 2024 20:08)      SUBJECTIVE & OBJECTIVE: Pt seen and examined at bedside.     ICU Vital Signs Last 24 Hrs  T(C): 35.7 (14 Feb 2024 05:47), Max: 36.4 (14 Feb 2024 01:13)  T(F): 96.2 (14 Feb 2024 05:47), Max: 97.5 (14 Feb 2024 01:13)  HR: 73 (14 Feb 2024 07:28) (51 - 77)  BP: 143/56 (14 Feb 2024 07:28) (135/55 - 183/79)  BP(mean): 98 (14 Feb 2024 02:13) (89 - 110)  ABP: --  ABP(mean): --  RR: 16 (14 Feb 2024 07:28) (14 - 20)  SpO2: 97% (14 Feb 2024 07:28) (93% - 99%)    O2 Parameters below as of 14 Feb 2024 07:28  Patient On (Oxygen Delivery Method): nasal cannula              MEDICATIONS  (STANDING):  apixaban 5 milliGRAM(s) Oral every 12 hours  cholecalciferol 1000 Unit(s) Oral daily  dorzolamide 2%/timolol 0.5% Ophthalmic Solution 1 Drop(s) Right EYE every 12 hours  lactated ringers. 1000 milliLiter(s) (100 mL/Hr) IV Continuous <Continuous>  latanoprost 0.005% Ophthalmic Solution 1 Drop(s) Both EYES at bedtime  piperacillin/tazobactam IVPB.. 3.375 Gram(s) IV Intermittent every 8 hours  vancomycin  IVPB 750 milliGRAM(s) IV Intermittent every 24 hours    MEDICATIONS  (PRN):  acetaminophen     Tablet .. 650 milliGRAM(s) Oral every 6 hours PRN Temp greater or equal to 38C (100.4F), Mild Pain (1 - 3)  aluminum hydroxide/magnesium hydroxide/simethicone Suspension 30 milliLiter(s) Oral every 4 hours PRN Dyspepsia  melatonin 3 milliGRAM(s) Oral at bedtime PRN Insomnia  ondansetron Injectable 4 milliGRAM(s) IV Push every 8 hours PRN Nausea and/or Vomiting  polyethylene glycol 3350 17 Gram(s) Oral daily PRN for constipation        PHYSICAL EXAM:      NERVOUS SYSTEM:  Alert & Oriented X3, Motor Strength 5/5 B/L upper and lower extremities; DTRs 2+ intact and symmetric  CHEST/LUNG: Clear to auscultation bilaterally; No rales, rhonchi, wheezing, or rubs  HEART: Regular rate and rhythm; No murmurs, rubs, or gallops  ABDOMEN: Soft, Nontender, Nondistended; Bowel sounds present  EXTREMITIES:  2+ Peripheral Pulses, No clubbing, cyanosis, or edema    LABS:                        9.6    4.48  )-----------( 164      ( 14 Feb 2024 07:42 )             29.8     02-14    141  |  111<H>  |  28<H>  ----------------------------<  95  4.8   |  28  |  0.82    Ca    9.2      14 Feb 2024 07:42  Phos  2.9     02-14  Mg     1.7     02-14    TPro  6.0  /  Alb  2.9<L>  /  TBili  0.4  /  DBili  x   /  AST  27  /  ALT  43  /  AlkPhos  201<H>  02-14      Urinalysis Basic - ( 14 Feb 2024 07:42 )    Color: x / Appearance: x / SG: x / pH: x  Gluc: 95 mg/dL / Ketone: x  / Bili: x / Urobili: x   Blood: x / Protein: x / Nitrite: x   Leuk Esterase: x / RBC: x / WBC x   Sq Epi: x / Non Sq Epi: x / Bacteria: x            RADIOLOGY & ADDITIONAL TESTS: personally reviewed    Sepsis of unknown origin  -Hypothermic, bradycardic, leukopenic on presentation.  dc abx    DVT  -On eliquis  -Monitor Hgb    Chronic Anemia  -No active signs of bleeding.   -Hgb stable from prior admission ~9 on 2/4/23  -Trend Hgb    Dementia  -Fall and aspiration precautions.                 time spent:          HPI:  88-year-old female with history of dementia, DVT on Eliquis presenting with generalized weakness and burning with urination over the past week.   at the bedside reports that patient was recently hospitalized and discharged on February 6 for UTI with completion of antibiotics prior to discharge. Denies fevers, chills, headache, chest pain, palpitations, shortness of breath, cough, nausea, vomiting, focal neurologic symptoms. (13 Feb 2024 20:08)      Lethargic, case d/w     ICU Vital Signs Last 24 Hrs  T(C): 35.7 (14 Feb 2024 05:47), Max: 36.4 (14 Feb 2024 01:13)  T(F): 96.2 (14 Feb 2024 05:47), Max: 97.5 (14 Feb 2024 01:13)  HR: 73 (14 Feb 2024 07:28) (51 - 77)  BP: 143/56 (14 Feb 2024 07:28) (135/55 - 183/79)  BP(mean): 98 (14 Feb 2024 02:13) (89 - 110)  ABP: --  ABP(mean): --  RR: 16 (14 Feb 2024 07:28) (14 - 20)  SpO2: 97% (14 Feb 2024 07:28) (93% - 99%)    O2 Parameters below as of 14 Feb 2024 07:28  Patient On (Oxygen Delivery Method): nasal cannula              MEDICATIONS  (STANDING):  apixaban 5 milliGRAM(s) Oral every 12 hours  cholecalciferol 1000 Unit(s) Oral daily  dorzolamide 2%/timolol 0.5% Ophthalmic Solution 1 Drop(s) Right EYE every 12 hours  lactated ringers. 1000 milliLiter(s) (100 mL/Hr) IV Continuous <Continuous>  latanoprost 0.005% Ophthalmic Solution 1 Drop(s) Both EYES at bedtime  piperacillin/tazobactam IVPB.. 3.375 Gram(s) IV Intermittent every 8 hours  vancomycin  IVPB 750 milliGRAM(s) IV Intermittent every 24 hours    MEDICATIONS  (PRN):  acetaminophen     Tablet .. 650 milliGRAM(s) Oral every 6 hours PRN Temp greater or equal to 38C (100.4F), Mild Pain (1 - 3)  aluminum hydroxide/magnesium hydroxide/simethicone Suspension 30 milliLiter(s) Oral every 4 hours PRN Dyspepsia  melatonin 3 milliGRAM(s) Oral at bedtime PRN Insomnia  ondansetron Injectable 4 milliGRAM(s) IV Push every 8 hours PRN Nausea and/or Vomiting  polyethylene glycol 3350 17 Gram(s) Oral daily PRN for constipation        PHYSICAL EXAM:      NERVOUS SYSTEM:  Alert & Oriented X3, Motor Strength 5/5 B/L upper and lower extremities; DTRs 2+ intact and symmetric  CHEST/LUNG: Clear to auscultation bilaterally; No rales, rhonchi, wheezing, or rubs  HEART: Regular rate and rhythm; No murmurs, rubs, or gallops  ABDOMEN: Soft, Nontender, Nondistended; Bowel sounds present  EXTREMITIES:  2+ Peripheral Pulses, No clubbing, cyanosis, or edema    LABS:                        9.6    4.48  )-----------( 164      ( 14 Feb 2024 07:42 )             29.8     02-14    141  |  111<H>  |  28<H>  ----------------------------<  95  4.8   |  28  |  0.82    Ca    9.2      14 Feb 2024 07:42  Phos  2.9     02-14  Mg     1.7     02-14    TPro  6.0  /  Alb  2.9<L>  /  TBili  0.4  /  DBili  x   /  AST  27  /  ALT  43  /  AlkPhos  201<H>  02-14      Urinalysis Basic - ( 14 Feb 2024 07:42 )    Color: x / Appearance: x / SG: x / pH: x  Gluc: 95 mg/dL / Ketone: x  / Bili: x / Urobili: x   Blood: x / Protein: x / Nitrite: x   Leuk Esterase: x / RBC: x / WBC x   Sq Epi: x / Non Sq Epi: x / Bacteria: x            RADIOLOGY & ADDITIONAL TESTS: personally reviewed    Sepsis of unknown origin  -Hypothermic, bradycardic, leukopenic on presentation.  cxr poss PNA- Ceftriaxone, Zithro    Toxic metab encephalopathy  CT brain r/o bleed    DVT  -On eliquis  -Monitor Hgb    Chronic Anemia  -No active signs of bleeding.   -Hgb stable from prior admission ~9 on 2/4/23  -Trend Hgb    Dementia  -Fall and aspiration precautions.                 time spent:      N/A

## 2024-02-14 NOTE — DIETITIAN INITIAL EVALUATION ADULT - ORAL INTAKE PTA/DIET HISTORY
Unable to obtain diet/wt hx from pt 2/2 dementia. Per previous RD note: "Diet/wt hx obtained from  who was present at bed side. States pt consumes at least 3 meals per day. Watches sodium intake, stating she consumes "500-700 mg Na per day." Also includes red meat to increase iron levels as well as omega-3 fatty acids for brain health. Takes multiple vitamins at home: vit B6, B12, C, D, and MVI. Able to feed herself per  and denies issues w/ chewing and swallowing. Allergies noted: bananas (latex), oranges."

## 2024-02-14 NOTE — DIETITIAN INITIAL EVALUATION ADULT - PERTINENT LABORATORY DATA
02-14    141  |  111<H>  |  28<H>  ----------------------------<  95  4.8   |  28  |  0.82    Ca    9.2      14 Feb 2024 07:42  Phos  2.9     02-14  Mg     1.7     02-14    TPro  6.0  /  Alb  2.9<L>  /  TBili  0.4  /  DBili  x   /  AST  27  /  ALT  43  /  AlkPhos  201<H>  02-14

## 2024-02-14 NOTE — PHYSICAL THERAPY INITIAL EVALUATION ADULT - PERTINENT HX OF CURRENT PROBLEM, REHAB EVAL
88-year-old female with history of dementia, DVT on Eliquis presenting with generalized weakness and burning with urination over the past week.   at the bedside reports that patient was recently hospitalized and discharged on February 6 for UTI with completion of antibiotics prior to discharge. Denies fevers, chills, headache, chest pain, palpitations, shortness of breath, cough, nausea, vomiting, focal neurologic symptoms. (13 Feb 2024 20:08)

## 2024-02-14 NOTE — PHYSICAL THERAPY INITIAL EVALUATION ADULT - NSACTIVITYREC_GEN_A_PT
Transfer and Gait assessment to be completed at later date/ time when patient is able to follow commands and participate in Evaluation.

## 2024-02-14 NOTE — DIETITIAN INITIAL EVALUATION ADULT - PERTINENT MEDS FT
MEDICATIONS  (STANDING):  apixaban 5 milliGRAM(s) Oral every 12 hours  azithromycin  IVPB 500 milliGRAM(s) IV Intermittent every 24 hours  cefTRIAXone Injectable. 1000 milliGRAM(s) IV Push every 24 hours  cholecalciferol 1000 Unit(s) Oral daily  dorzolamide 2%/timolol 0.5% Ophthalmic Solution 1 Drop(s) Right EYE every 12 hours  latanoprost 0.005% Ophthalmic Solution 1 Drop(s) Both EYES at bedtime    MEDICATIONS  (PRN):  acetaminophen     Tablet .. 650 milliGRAM(s) Oral every 6 hours PRN Temp greater or equal to 38C (100.4F), Mild Pain (1 - 3)  aluminum hydroxide/magnesium hydroxide/simethicone Suspension 30 milliLiter(s) Oral every 4 hours PRN Dyspepsia  melatonin 3 milliGRAM(s) Oral at bedtime PRN Insomnia  ondansetron Injectable 4 milliGRAM(s) IV Push every 8 hours PRN Nausea and/or Vomiting  polyethylene glycol 3350 17 Gram(s) Oral daily PRN for constipation

## 2024-02-15 DIAGNOSIS — J18.9 PNEUMONIA, UNSPECIFIED ORGANISM: ICD-10-CM

## 2024-02-15 PROCEDURE — 82533 TOTAL CORTISOL: CPT

## 2024-02-15 PROCEDURE — 36415 COLL VENOUS BLD VENIPUNCTURE: CPT

## 2024-02-15 PROCEDURE — 80053 COMPREHEN METABOLIC PANEL: CPT

## 2024-02-15 PROCEDURE — 97116 GAIT TRAINING THERAPY: CPT | Mod: GP

## 2024-02-15 PROCEDURE — 71250 CT THORAX DX C-: CPT

## 2024-02-15 PROCEDURE — 83880 ASSAY OF NATRIURETIC PEPTIDE: CPT

## 2024-02-15 PROCEDURE — 80048 BASIC METABOLIC PNL TOTAL CA: CPT

## 2024-02-15 PROCEDURE — 97530 THERAPEUTIC ACTIVITIES: CPT | Mod: GP

## 2024-02-15 PROCEDURE — 99232 SBSQ HOSP IP/OBS MODERATE 35: CPT

## 2024-02-15 PROCEDURE — 84443 ASSAY THYROID STIM HORMONE: CPT

## 2024-02-15 PROCEDURE — 85027 COMPLETE CBC AUTOMATED: CPT

## 2024-02-15 PROCEDURE — 84132 ASSAY OF SERUM POTASSIUM: CPT

## 2024-02-15 PROCEDURE — 94640 AIRWAY INHALATION TREATMENT: CPT

## 2024-02-15 RX ORDER — CHOLECALCIFEROL (VITAMIN D3) 125 MCG
2 CAPSULE ORAL
Refills: 0 | DISCHARGE

## 2024-02-15 RX ADMIN — LATANOPROST 1 DROP(S): 0.05 SOLUTION/ DROPS OPHTHALMIC; TOPICAL at 20:16

## 2024-02-15 RX ADMIN — Medication 3 MILLIGRAM(S): at 20:16

## 2024-02-15 RX ADMIN — Medication 1000 UNIT(S): at 10:49

## 2024-02-15 RX ADMIN — DORZOLAMIDE HYDROCHLORIDE TIMOLOL MALEATE 1 DROP(S): 20; 5 SOLUTION/ DROPS OPHTHALMIC at 10:49

## 2024-02-15 RX ADMIN — AZITHROMYCIN 255 MILLIGRAM(S): 500 TABLET, FILM COATED ORAL at 16:28

## 2024-02-15 RX ADMIN — SODIUM CHLORIDE 100 MILLILITER(S): 9 INJECTION INTRAMUSCULAR; INTRAVENOUS; SUBCUTANEOUS at 06:15

## 2024-02-15 RX ADMIN — APIXABAN 5 MILLIGRAM(S): 2.5 TABLET, FILM COATED ORAL at 20:16

## 2024-02-15 RX ADMIN — APIXABAN 5 MILLIGRAM(S): 2.5 TABLET, FILM COATED ORAL at 10:49

## 2024-02-15 RX ADMIN — CEFTRIAXONE 1000 MILLIGRAM(S): 500 INJECTION, POWDER, FOR SOLUTION INTRAMUSCULAR; INTRAVENOUS at 10:50

## 2024-02-15 RX ADMIN — DORZOLAMIDE HYDROCHLORIDE TIMOLOL MALEATE 1 DROP(S): 20; 5 SOLUTION/ DROPS OPHTHALMIC at 20:16

## 2024-02-15 NOTE — PROGRESS NOTE ADULT - SUBJECTIVE AND OBJECTIVE BOX
HPI:  88-year-old female with history of dementia, DVT on Eliquis presenting with generalized weakness and burning with urination over the past week.   at the bedside reports that patient was recently hospitalized and discharged on February 6 for UTI with completion of antibiotics prior to discharge. Denies fevers, chills, headache, chest pain, palpitations, shortness of breath, cough, nausea, vomiting, focal neurologic symptoms. (13 Feb 2024 20:08)      Vital Signs Last 24 Hrs  T(C): 36.9 (15 Feb 2024 07:50), Max: 36.9 (15 Feb 2024 07:50)  T(F): 98.4 (15 Feb 2024 07:50), Max: 98.4 (15 Feb 2024 07:50)  HR: 75 (15 Feb 2024 07:50) (69 - 93)  BP: 142/64 (15 Feb 2024 07:50) (139/59 - 152/75)  BP(mean): 75 (14 Feb 2024 15:49) (75 - 75)  RR: 19 (15 Feb 2024 07:50) (18 - 19)  SpO2: 98% (15 Feb 2024 07:50) (97% - 98%)    Parameters below as of 15 Feb 2024 07:50  Patient On (Oxygen Delivery Method): nasal cannula          MEDICATIONS  (STANDING):  apixaban 5 milliGRAM(s) Oral every 12 hours  cholecalciferol 1000 Unit(s) Oral daily  dorzolamide 2%/timolol 0.5% Ophthalmic Solution 1 Drop(s) Right EYE every 12 hours  lactated ringers. 1000 milliLiter(s) (100 mL/Hr) IV Continuous <Continuous>  latanoprost 0.005% Ophthalmic Solution 1 Drop(s) Both EYES at bedtime  piperacillin/tazobactam IVPB.. 3.375 Gram(s) IV Intermittent every 8 hours  vancomycin  IVPB 750 milliGRAM(s) IV Intermittent every 24 hours    MEDICATIONS  (PRN):  acetaminophen     Tablet .. 650 milliGRAM(s) Oral every 6 hours PRN Temp greater or equal to 38C (100.4F), Mild Pain (1 - 3)  aluminum hydroxide/magnesium hydroxide/simethicone Suspension 30 milliLiter(s) Oral every 4 hours PRN Dyspepsia  melatonin 3 milliGRAM(s) Oral at bedtime PRN Insomnia  ondansetron Injectable 4 milliGRAM(s) IV Push every 8 hours PRN Nausea and/or Vomiting  polyethylene glycol 3350 17 Gram(s) Oral daily PRN for constipation        PHYSICAL EXAM:      NERVOUS SYSTEM:    CHEST/LUNG: Clear to auscultation bilaterally; No rales, rhonchi, wheezing, or rubs  HEART: Regular rate and rhythm; No murmurs, rubs, or gallops  ABDOMEN: Soft, Nontender, Nondistended; Bowel sounds present  EXTREMITIES:  2+ Peripheral Pulses, No clubbing, cyanosis, or edema    LABS:                        9.6    4.48  )-----------( 164      ( 14 Feb 2024 07:42 )             29.8     02-14    141  |  111<H>  |  28<H>  ----------------------------<  95  4.8   |  28  |  0.82    Ca    9.2      14 Feb 2024 07:42  Phos  2.9     02-14  Mg     1.7     02-14    TPro  6.0  /  Alb  2.9<L>  /  TBili  0.4  /  DBili  x   /  AST  27  /  ALT  43  /  AlkPhos  201<H>  02-14      Urinalysis Basic - ( 14 Feb 2024 07:42 )    Color: x / Appearance: x / SG: x / pH: x  Gluc: 95 mg/dL / Ketone: x  / Bili: x / Urobili: x   Blood: x / Protein: x / Nitrite: x   Leuk Esterase: x / RBC: x / WBC x   Sq Epi: x / Non Sq Epi: x / Bacteria: x            RADIOLOGY & ADDITIONAL TESTS: personally reviewed    Sepsis of unknown origin  -Hypothermic, bradycardic, leukopenic on presentation.  cxr poss PNA- Ceftriaxone, Zithro    Toxic metab encephalopathy  CT brain r/o bleed- neg, mental ststus improved    DVT  -On eliquis  -Monitor Hgb    Chronic Anemia  -No active signs of bleeding.   -Hgb stable from prior admission ~9 on 2/4/23  -Trend Hgb    Dementia  -Fall and aspiration precautions.                 time spent:          HPI:  88-year-old female with history of dementia, DVT on Eliquis presenting with generalized weakness and burning with urination over the past week.   at the bedside reports that patient was recently hospitalized and discharged on February 6 for UTI with completion of antibiotics prior to discharge. Denies fevers, chills, headache, chest pain, palpitations, shortness of breath, cough, nausea, vomiting, focal neurologic symptoms. (13 Feb 2024 20:08)      Vital Signs Last 24 Hrs  T(C): 36.9 (15 Feb 2024 07:50), Max: 36.9 (15 Feb 2024 07:50)  T(F): 98.4 (15 Feb 2024 07:50), Max: 98.4 (15 Feb 2024 07:50)  HR: 75 (15 Feb 2024 07:50) (69 - 93)  BP: 142/64 (15 Feb 2024 07:50) (139/59 - 152/75)  BP(mean): 75 (14 Feb 2024 15:49) (75 - 75)  RR: 19 (15 Feb 2024 07:50) (18 - 19)  SpO2: 98% (15 Feb 2024 07:50) (97% - 98%)    Parameters below as of 15 Feb 2024 07:50  Patient On (Oxygen Delivery Method): nasal cannula          MEDICATIONS  (STANDING):  apixaban 5 milliGRAM(s) Oral every 12 hours  cholecalciferol 1000 Unit(s) Oral daily  dorzolamide 2%/timolol 0.5% Ophthalmic Solution 1 Drop(s) Right EYE every 12 hours  lactated ringers. 1000 milliLiter(s) (100 mL/Hr) IV Continuous <Continuous>  latanoprost 0.005% Ophthalmic Solution 1 Drop(s) Both EYES at bedtime  piperacillin/tazobactam IVPB.. 3.375 Gram(s) IV Intermittent every 8 hours  vancomycin  IVPB 750 milliGRAM(s) IV Intermittent every 24 hours    MEDICATIONS  (PRN):  acetaminophen     Tablet .. 650 milliGRAM(s) Oral every 6 hours PRN Temp greater or equal to 38C (100.4F), Mild Pain (1 - 3)  aluminum hydroxide/magnesium hydroxide/simethicone Suspension 30 milliLiter(s) Oral every 4 hours PRN Dyspepsia  melatonin 3 milliGRAM(s) Oral at bedtime PRN Insomnia  ondansetron Injectable 4 milliGRAM(s) IV Push every 8 hours PRN Nausea and/or Vomiting  polyethylene glycol 3350 17 Gram(s) Oral daily PRN for constipation        PHYSICAL EXAM:      NERVOUS SYSTEM:    CHEST/LUNG: Clear to auscultation bilaterally; No rales, rhonchi, wheezing, or rubs  HEART: Regular rate and rhythm; No murmurs, rubs, or gallops  ABDOMEN: Soft, Nontender, Nondistended; Bowel sounds present  EXTREMITIES:  2+ Peripheral Pulses, No clubbing, cyanosis, or edema    LABS:                        9.6    4.48  )-----------( 164      ( 14 Feb 2024 07:42 )             29.8     02-14    141  |  111<H>  |  28<H>  ----------------------------<  95  4.8   |  28  |  0.82    Ca    9.2      14 Feb 2024 07:42  Phos  2.9     02-14  Mg     1.7     02-14    TPro  6.0  /  Alb  2.9<L>  /  TBili  0.4  /  DBili  x   /  AST  27  /  ALT  43  /  AlkPhos  201<H>  02-14      Urinalysis Basic - ( 14 Feb 2024 07:42 )    Color: x / Appearance: x / SG: x / pH: x  Gluc: 95 mg/dL / Ketone: x  / Bili: x / Urobili: x   Blood: x / Protein: x / Nitrite: x   Leuk Esterase: x / RBC: x / WBC x   Sq Epi: x / Non Sq Epi: x / Bacteria: x            RADIOLOGY & ADDITIONAL TESTS: personally reviewed    Sepsis of unknown origin  -Hypothermic, bradycardic, leukopenic on presentation.  cxr poss PNA- Ceftriaxone, Zithro    Acute hypoxic resp failure  most likely atelectasis, STEFANI did not  large effusion  chest PT and nebs    Toxic metab encephalopathy  CT brain r/o bleed- neg, mental ststus improved    DVT  -On eliquis  -Monitor Hgb    Chronic Anemia  -No active signs of bleeding.   -Hgb stable from prior admission ~9 on 2/4/23  -Trend Hgb    Dementia  -Fall and aspiration precautions.                 time spent:

## 2024-02-15 NOTE — PROGRESS NOTE ADULT - SUBJECTIVE AND OBJECTIVE BOX
Date of service: 02-15-24 @ 10:21      pt seen and examined   sitting up in bed  doing much better  no resp distress  eating breakfast     ROS: no fever or chills; denies dizziness, no HA, no abdominal pain, no diarrhea or constipation; no dysuria, no urinary frequency, no legs pain, no rashes    MEDICATIONS  (STANDING):  apixaban 5 milliGRAM(s) Oral every 12 hours  azithromycin  IVPB 500 milliGRAM(s) IV Intermittent every 24 hours  cefTRIAXone Injectable. 1000 milliGRAM(s) IV Push every 24 hours  cholecalciferol 1000 Unit(s) Oral daily  dorzolamide 2%/timolol 0.5% Ophthalmic Solution 1 Drop(s) Right EYE every 12 hours  latanoprost 0.005% Ophthalmic Solution 1 Drop(s) Both EYES at bedtime  sodium chloride 0.9%. 1000 milliLiter(s) (100 mL/Hr) IV Continuous <Continuous>    Vital Signs Last 24 Hrs  T(C): 36.9 (15 Feb 2024 07:50), Max: 36.9 (15 Feb 2024 07:50)  T(F): 98.4 (15 Feb 2024 07:50), Max: 98.4 (15 Feb 2024 07:50)  HR: 75 (15 Feb 2024 07:50) (69 - 93)  BP: 142/64 (15 Feb 2024 07:50) (139/59 - 152/75)  BP(mean): 75 (14 Feb 2024 15:49) (75 - 75)  RR: 19 (15 Feb 2024 07:50) (18 - 19)  SpO2: 98% (15 Feb 2024 07:50) (97% - 98%)    Parameters below as of 15 Feb 2024 07:50  Patient On (Oxygen Delivery Method): nasal cannula      PE:  Constitutional: frail looking  HEENT: NC/AT, EOMI, PERRLA, conjunctivae clear; ears and nose atraumatic; pharynx benign  Neck: supple; thyroid not palpable  Back: no tenderness  Respiratory: decreased breath sounds   Cardiovascular: S1S2 regular, no murmurs  Abdomen: soft, not tender, not distended, positive BS; liver and spleen WNL  Genitourinary: no suprapubic tenderness  Lymphatic: no LN palpable  Musculoskeletal: no muscle tenderness, no joint swelling or tenderness  Extremities: no pedal edema  Neurological/ Psychiatric:  moving all extremities  Skin: no rashes; no palpable lesions    Labs: all available labs reviewed                                   9.6    4.48  )-----------( 164      ( 14 Feb 2024 07:42 )             29.8     02-14    141  |  111<H>  |  28<H>  ----------------------------<  95  4.8   |  28  |  0.82    Ca    9.2      14 Feb 2024 07:42  Phos  2.9     02-14  Mg     1.7     02-14    TPro  6.0  /  Alb  2.9<L>  /  TBili  0.4  /  DBili  x   /  AST  27  /  ALT  43  /  AlkPhos  201<H>  02-14      Urinalysis Basic - ( 14 Feb 2024 07:42 )    Color: x / Appearance: x / SG: x / pH: x  Gluc: 95 mg/dL / Ketone: x  / Bili: x / Urobili: x   Blood: x / Protein: x / Nitrite: x   Leuk Esterase: x / RBC: x / WBC x   Sq Epi: x / Non Sq Epi: x / Bacteria: x    Culture - Urine (02.13.24 @ 16:27)   Specimen Source: Clean Catch None  Culture Results:   No growth  Culture - Blood (02.13.24 @ 16:27)   Specimen Source: .Blood None  Culture Results:   No growth at 24 hours  Radiology: all available radiological tests reviewed      ACC: [id number] EXAM:  XR CHEST PORTABLE URGENT 1V   ORDERED BY: POPPY ANNA     PROCEDURE DATE:  02/13/2024          INTERPRETATION:  Chest one view    HISTORY: Sepsis    COMPARISON STUDY: 2/3/2024    Frontal expiratory view of the chest shows the heart to be similarly   enlarged in size. The lungs show right lower lobe infiltrate and there is   no evidence of pneumothorax nor pleural effusion. 6.3 mm nodule projected   over the right lung base appears to be calcified.    IMPRESSION:  Right lower lobe infiltrate. Right nodule may be calcified.    The above findings were conveyed to the emergency department staff via   the hospital's image transfer system at the time of report dictation.        ACC: [id number] EXAM:  CT BRAIN   ORDERED BY: STACI OQUENDO     PROCEDURE DATE:  02/03/2024          INTERPRETATION:  History: Weakness. Change in gait.    Description: A noncontrast head CT was performed. Axial images were   performed from the skull base to the vertex with coronal/sagittal   reconstructions.    Comparison is made to CT study from 12/06/2023, 11/10/2023    There is no evidence for acute infarct, acute hemorrhage, mass effect,   calvarial fracture, or hydrocephalus. A right tentorial dural   calcification is unchanged compared to the previous exams.    Mild patchy hypodensity without mass effect is noted in the   periventricular white matter which most likely represents chronic   microvascular ischemic changes given the patient's age.    Cerebral volume loss is present with secondary proportional prominence of   the sulci and ventricles.    No lytic or blastic calvarial lesions are noted. The visualized portions   of the paranasal sinuses and mastoid air cells are clear. A right   parietal scalp nodule is unchanged which may reflect a sebaceous cyst.    IMPRESSION:    No acute intracranial pathology is noted. If the patient has new and   persistent symptoms, consider short interval follow-up head CT or brain   MRI follow-up if there are no MRI contraindications.      Advanced directives addressed: full resuscitation

## 2024-02-16 ENCOUNTER — TRANSCRIPTION ENCOUNTER (OUTPATIENT)
Age: 89
End: 2024-02-16

## 2024-02-16 PROCEDURE — 71250 CT THORAX DX C-: CPT | Mod: 26

## 2024-02-16 PROCEDURE — 99233 SBSQ HOSP IP/OBS HIGH 50: CPT

## 2024-02-16 RX ORDER — ALBUTEROL 90 UG/1
2.5 AEROSOL, METERED ORAL EVERY 6 HOURS
Refills: 0 | Status: DISCONTINUED | OUTPATIENT
Start: 2024-02-16 | End: 2024-02-20

## 2024-02-16 RX ADMIN — AZITHROMYCIN 255 MILLIGRAM(S): 500 TABLET, FILM COATED ORAL at 10:56

## 2024-02-16 RX ADMIN — Medication 3 MILLIGRAM(S): at 20:12

## 2024-02-16 RX ADMIN — APIXABAN 5 MILLIGRAM(S): 2.5 TABLET, FILM COATED ORAL at 20:11

## 2024-02-16 RX ADMIN — APIXABAN 5 MILLIGRAM(S): 2.5 TABLET, FILM COATED ORAL at 10:52

## 2024-02-16 RX ADMIN — DORZOLAMIDE HYDROCHLORIDE TIMOLOL MALEATE 1 DROP(S): 20; 5 SOLUTION/ DROPS OPHTHALMIC at 10:53

## 2024-02-16 RX ADMIN — DORZOLAMIDE HYDROCHLORIDE TIMOLOL MALEATE 1 DROP(S): 20; 5 SOLUTION/ DROPS OPHTHALMIC at 20:11

## 2024-02-16 RX ADMIN — LATANOPROST 1 DROP(S): 0.05 SOLUTION/ DROPS OPHTHALMIC; TOPICAL at 20:11

## 2024-02-16 RX ADMIN — CEFTRIAXONE 1000 MILLIGRAM(S): 500 INJECTION, POWDER, FOR SOLUTION INTRAMUSCULAR; INTRAVENOUS at 10:55

## 2024-02-16 RX ADMIN — Medication 1000 UNIT(S): at 10:52

## 2024-02-16 NOTE — DISCHARGE NOTE NURSING/CASE MANAGEMENT/SOCIAL WORK - NSDCFUADDAPPT_GEN_ALL_CORE_FT
Dr Bg Villasenor  [facility] - [address], [address]  [phone]  Friday March 1, 2024 @1:45pm

## 2024-02-16 NOTE — PROGRESS NOTE ADULT - SUBJECTIVE AND OBJECTIVE BOX
Date of service: 02-16-24 @ 13:11    pt seen and examined   no overnight events  no resp distress    ROS: no fever or chills; denies dizziness, no HA, no abdominal pain, no diarrhea or constipation; no dysuria, no urinary frequency, no legs pain, no rashes    MEDICATIONS  (STANDING):  apixaban 5 milliGRAM(s) Oral every 12 hours  cefTRIAXone Injectable. 1000 milliGRAM(s) IV Push every 24 hours  cholecalciferol 1000 Unit(s) Oral daily  dorzolamide 2%/timolol 0.5% Ophthalmic Solution 1 Drop(s) Right EYE every 12 hours  latanoprost 0.005% Ophthalmic Solution 1 Drop(s) Both EYES at bedtime    Vital Signs Last 24 Hrs  T(C): 36.7 (16 Feb 2024 07:36), Max: 37.3 (16 Feb 2024 04:40)  T(F): 98.1 (16 Feb 2024 07:36), Max: 99.1 (16 Feb 2024 04:40)  HR: 93 (16 Feb 2024 07:36) (72 - 93)  BP: 150/75 (16 Feb 2024 07:36) (135/65 - 166/74)  BP(mean): --  RR: 19 (16 Feb 2024 07:36) (17 - 19)  SpO2: 94% (16 Feb 2024 07:36) (94% - 99%)    Parameters below as of 16 Feb 2024 07:36  Patient On (Oxygen Delivery Method): nasal cannula    PE:  Constitutional: frail looking  HEENT: NC/AT, EOMI, PERRLA, conjunctivae clear; ears and nose atraumatic; pharynx benign  Neck: supple; thyroid not palpable  Back: no tenderness  Respiratory: decreased breath sounds   Cardiovascular: S1S2 regular, no murmurs  Abdomen: soft, not tender, not distended, positive BS; liver and spleen WNL  Genitourinary: no suprapubic tenderness  Lymphatic: no LN palpable  Musculoskeletal: no muscle tenderness, no joint swelling or tenderness  Extremities: no pedal edema  Neurological/ Psychiatric:  moving all extremities  Skin: no rashes; no palpable lesions    Labs: all available labs reviewed                          Urinalysis Basic - ( 14 Feb 2024 07:42 )    Color: x / Appearance: x / SG: x / pH: x  Gluc: 95 mg/dL / Ketone: x  / Bili: x / Urobili: x   Blood: x / Protein: x / Nitrite: x   Leuk Esterase: x / RBC: x / WBC x   Sq Epi: x / Non Sq Epi: x / Bacteria: x    Culture - Urine (02.13.24 @ 16:27)   Specimen Source: Clean Catch None  Culture Results:   No growth  Culture - Blood (02.13.24 @ 16:27)   Specimen Source: .Blood None  Culture Results:   No growth at 24 hours  Radiology: all available radiological tests reviewed      ACC: [id number] EXAM:  XR CHEST PORTABLE URGENT 1V   ORDERED BY: POPPY ANNA     PROCEDURE DATE:  02/13/2024          INTERPRETATION:  Chest one view    HISTORY: Sepsis    COMPARISON STUDY: 2/3/2024    Frontal expiratory view of the chest shows the heart to be similarly   enlarged in size. The lungs show right lower lobe infiltrate and there is   no evidence of pneumothorax nor pleural effusion. 6.3 mm nodule projected   over the right lung base appears to be calcified.    IMPRESSION:  Right lower lobe infiltrate. Right nodule may be calcified.    The above findings were conveyed to the emergency department staff via   the hospital's image transfer system at the time of report dictation.        ACC: [id number] EXAM:  CT BRAIN   ORDERED BY: STACI OQUENDO     PROCEDURE DATE:  02/03/2024          INTERPRETATION:  History: Weakness. Change in gait.    Description: A noncontrast head CT was performed. Axial images were   performed from the skull base to the vertex with coronal/sagittal   reconstructions.    Comparison is made to CT study from 12/06/2023, 11/10/2023    There is no evidence for acute infarct, acute hemorrhage, mass effect,   calvarial fracture, or hydrocephalus. A right tentorial dural   calcification is unchanged compared to the previous exams.    Mild patchy hypodensity without mass effect is noted in the   periventricular white matter which most likely represents chronic   microvascular ischemic changes given the patient's age.    Cerebral volume loss is present with secondary proportional prominence of   the sulci and ventricles.    No lytic or blastic calvarial lesions are noted. The visualized portions   of the paranasal sinuses and mastoid air cells are clear. A right   parietal scalp nodule is unchanged which may reflect a sebaceous cyst.    IMPRESSION:    No acute intracranial pathology is noted. If the patient has new and   persistent symptoms, consider short interval follow-up head CT or brain   MRI follow-up if there are no MRI contraindications.      Advanced directives addressed: full resuscitation

## 2024-02-16 NOTE — PROGRESS NOTE ADULT - ASSESSMENT
88-year-old female with history of dementia, DVT on Eliquis presenting with generalized weakness and burning with urination over the past week.   at the bedside reports that patient was recently hospitalized and discharged on February 6 for UTI with completion of antibiotics prior to discharge. Denies fevers, chills, headache, chest pain, palpitations, shortness of breath, cough, nausea, vomiting, focal neurologic symptoms. Here hypothermic, xray shows RLL pneumonia, started on IV abx.     1. Hypothermia. RLL pneumonia. Metabolic encephalopathy. Alzheimers dementia  - imaging reviewed  - on rocephin 1gm daily/azithromycin 500mg daily #3  - continue with abx coverage   - aspiration precautions  - fu cultures  - no growth   - tolerating abx well so far; no side effects noted  - reason for abx use and side effects reviewed with patient  - supportive care  - fu cbc    Clinical team may change from intravenous to oral antibiotics when the following criteria are met:   1. Patient is clinically improving/stable       a)	Improved signs and symptoms of infection from initial presentation       b)	Afebrile for 24 hours       c)	Leukocytosis trending towards normal range   2. Patient is tolerating oral intake   3. Initial blood cultures are negative     When above criteria met - may change iv antibiotics to: po azithromycin 500mg daily x 3 days, ceftin 500mg BID x 7 day course total     
88-year-old female with history of dementia, DVT on Eliquis presenting with generalized weakness and burning with urination over the past week.   at the bedside reports that patient was recently hospitalized and discharged on February 6 for UTI with completion of antibiotics prior to discharge. Denies fevers, chills, headache, chest pain, palpitations, shortness of breath, cough, nausea, vomiting, focal neurologic symptoms. Here hypothermic, xray shows RLL pneumonia, started on IV abx.     1. Hypothermia. RLL pneumonia. Metabolic encephalopathy. Alzheimers dementia  - imaging reviewed  - on rocephin 1gm daily/azithromycin 500mg daily #2   - continue with abx coverage   - aspiration precautions  - fu cultures  - no growth   - tolerating abx well so far; no side effects noted  - reason for abx use and side effects reviewed with patient  - supportive care  - fu cbc    Clinical team may change from intravenous to oral antibiotics when the following criteria are met:   1. Patient is clinically improving/stable       a)	Improved signs and symptoms of infection from initial presentation       b)	Afebrile for 24 hours       c)	Leukocytosis trending towards normal range   2. Patient is tolerating oral intake   3. Initial blood cultures are negative     When above criteria met - may change iv antibiotics to: po azithromycin 500mg daily x 3 days, ceftin 500mg BID x 7 day course total

## 2024-02-16 NOTE — DISCHARGE NOTE NURSING/CASE MANAGEMENT/SOCIAL WORK - PATIENT PORTAL LINK FT
You can access the FollowMyHealth Patient Portal offered by [facility] by registering at the following website: [email or web address]. By joining [employer]’s FollowMyHealth portal, you will also be able to view your health information using other applications (apps) compatible with our system.

## 2024-02-17 LAB
ALBUMIN SERPL ELPH-MCNC: 2.6 G/DL — LOW (ref 3.3–5)
ALP SERPL-CCNC: 203 U/L — HIGH (ref 40–120)
ALT FLD-CCNC: 43 U/L — SIGNIFICANT CHANGE UP (ref 12–78)
ANION GAP SERPL CALC-SCNC: 4 MMOL/L — LOW (ref 5–17)
AST SERPL-CCNC: 15 U/L — SIGNIFICANT CHANGE UP (ref 15–37)
BILIRUB SERPL-MCNC: 0.6 MG/DL — SIGNIFICANT CHANGE UP (ref 0.2–1.2)
BUN SERPL-MCNC: 17 MG/DL — SIGNIFICANT CHANGE UP (ref 7–23)
CALCIUM SERPL-MCNC: 9.4 MG/DL — SIGNIFICANT CHANGE UP (ref 8.5–10.1)
CHLORIDE SERPL-SCNC: 107 MMOL/L — SIGNIFICANT CHANGE UP (ref 96–108)
CO2 SERPL-SCNC: 31 MMOL/L — SIGNIFICANT CHANGE UP (ref 22–31)
CREAT SERPL-MCNC: 0.66 MG/DL — SIGNIFICANT CHANGE UP (ref 0.5–1.3)
EGFR: 84 ML/MIN/1.73M2 — SIGNIFICANT CHANGE UP
GLUCOSE SERPL-MCNC: 80 MG/DL — SIGNIFICANT CHANGE UP (ref 70–99)
NT-PROBNP SERPL-SCNC: 3012 PG/ML — HIGH (ref 0–450)
POTASSIUM SERPL-MCNC: 3.7 MMOL/L — SIGNIFICANT CHANGE UP (ref 3.5–5.3)
POTASSIUM SERPL-SCNC: 3.7 MMOL/L — SIGNIFICANT CHANGE UP (ref 3.5–5.3)
PROT SERPL-MCNC: 5.9 GM/DL — LOW (ref 6–8.3)
SODIUM SERPL-SCNC: 142 MMOL/L — SIGNIFICANT CHANGE UP (ref 135–145)

## 2024-02-17 PROCEDURE — 99232 SBSQ HOSP IP/OBS MODERATE 35: CPT

## 2024-02-17 RX ADMIN — APIXABAN 5 MILLIGRAM(S): 2.5 TABLET, FILM COATED ORAL at 10:12

## 2024-02-17 RX ADMIN — Medication 1000 UNIT(S): at 10:13

## 2024-02-17 RX ADMIN — ALBUTEROL 2.5 MILLIGRAM(S): 90 AEROSOL, METERED ORAL at 21:44

## 2024-02-17 RX ADMIN — Medication 650 MILLIGRAM(S): at 22:03

## 2024-02-17 RX ADMIN — CEFTRIAXONE 1000 MILLIGRAM(S): 500 INJECTION, POWDER, FOR SOLUTION INTRAMUSCULAR; INTRAVENOUS at 11:25

## 2024-02-17 RX ADMIN — DORZOLAMIDE HYDROCHLORIDE TIMOLOL MALEATE 1 DROP(S): 20; 5 SOLUTION/ DROPS OPHTHALMIC at 10:13

## 2024-02-17 RX ADMIN — LATANOPROST 1 DROP(S): 0.05 SOLUTION/ DROPS OPHTHALMIC; TOPICAL at 21:15

## 2024-02-17 RX ADMIN — ALBUTEROL 2.5 MILLIGRAM(S): 90 AEROSOL, METERED ORAL at 13:00

## 2024-02-17 RX ADMIN — Medication 650 MILLIGRAM(S): at 22:48

## 2024-02-17 RX ADMIN — DORZOLAMIDE HYDROCHLORIDE TIMOLOL MALEATE 1 DROP(S): 20; 5 SOLUTION/ DROPS OPHTHALMIC at 21:16

## 2024-02-17 RX ADMIN — ALBUTEROL 2.5 MILLIGRAM(S): 90 AEROSOL, METERED ORAL at 08:08

## 2024-02-17 RX ADMIN — APIXABAN 5 MILLIGRAM(S): 2.5 TABLET, FILM COATED ORAL at 21:15

## 2024-02-17 RX ADMIN — Medication 3 MILLIGRAM(S): at 22:04

## 2024-02-17 NOTE — CONSULT NOTE ADULT - SUBJECTIVE AND OBJECTIVE BOX
Reason for consult: Decreased O2 saturation.       HPI: 88-year-old female with history of dementia, DVT on Eliquis presenting with generalized weakness and burning with urination over the past week.   at the bedside reports that patient was recently hospitalized and discharged on February 6 for UTI with completion of antibiotics prior to discharge. Denies fevers, chills, headache, chest pain, palpitations, shortness of breath, cough, nausea, vomiting, focal neurologic symptoms. (13 Feb 2024 20:08)    2/17/2024:    Pulmonary consult called for evaluation of decreased O2 saturation (87% on RA). Chart reviewed.     Patient is being treated for pneumonia - possible recurrent aspiration. Admitted with hypothermia. Blood cultures have been negative. At the time of my evaluation the patient is awake and alert. She is breathing comfortably while laying in bed with the head of the bed elevated to about 30 degrees. She denies feeling short of breath. She has not been couching. Her  is at the bedside.       PAST MEDICAL & SURGICAL HISTORY:    History of pneumonia with complicated left parapneumonic effusion March 2023.    Osteoarthritis    Osteopenia    Deep vein thrombosis (DVT)    S/P Hysterectomy  2007    Lumbar Laminectomy/ Spinal Fusion  1999    S/P Arthroscopy of Right Knee  1998    S/P Appendectomy  @ 12 years old    S/P Tonsillectomy  @ 2 years old      REVIEW OF SYSTEMS:    As in HPI   All other review of systems is negative unless indicated above    Vital Signs Last 24 Hrs  T(C): 36.2 (17 Feb 2024 09:28), Max: 36.3 (16 Feb 2024 15:56)  T(F): 97.2 (17 Feb 2024 09:28), Max: 97.4 (16 Feb 2024 15:56)  HR: 76 (17 Feb 2024 09:28) (72 - 76)  BP: 157/73 (17 Feb 2024 09:28) (150/63 - 159/74)  BP(mean): 87 (16 Feb 2024 15:56) (87 - 87)  RR: 18 (17 Feb 2024 09:53) (17 - 19)  SpO2: 87% (17 Feb 2024 09:53) (87% - 97%)    Parameters below as of 17 Feb 2024 09:53  Patient On (Oxygen Delivery Method): room air        I&O's Summary      PHYSICAL EXAM:    Constitutional: NAD, awake and alert, weak/frail  Neck: No JVD  Respiratory: Breath sounds are clear bilaterally; decreased at both bases. No wheezing, rales or rhonchi  Cardiovascular: S1 and S2, regular rate and rhythm, no Murmurs, gallops or rubs  Gastrointestinal: Bowel Sounds present, soft, nontender, nondistended, no guarding, no rebound  Extremities: No peripheral edema  Neurological: A/O x 3, no focal deficits  Skin: No rashes    Medications:  MEDICATIONS  (STANDING):  albuterol    0.083% 2.5 milliGRAM(s) Nebulizer every 6 hours  apixaban 5 milliGRAM(s) Oral every 12 hours  cefTRIAXone Injectable. 1000 milliGRAM(s) IV Push every 24 hours  cholecalciferol 1000 Unit(s) Oral daily  dorzolamide 2%/timolol 0.5% Ophthalmic Solution 1 Drop(s) Right EYE every 12 hours  latanoprost 0.005% Ophthalmic Solution 1 Drop(s) Both EYES at bedtime      Labs: All Labs Reviewed:  Complete Blood Count + Automated Diff (02.14.24 @ 07:42)   WBC Count: 4.48 K/uL  RBC Count: 3.12 M/uL  Hemoglobin: 9.6 g/dL  Hematocrit: 29.8 %  Mean Cell Volume: 95.5 fl  Mean Cell Hemoglobin: 30.8 pg  Mean Cell Hemoglobin Conc: 32.2 gm/dL  Red Cell Distrib Width: 14.7 %  Platelet Count - Automated: 164 K/uL      Comprehensive Metabolic Panel (02.14.24 @ 07:42)   Sodium: 141 mmol/L  Potassium: 4.8 mmol/L  Chloride: 111 mmol/L  Carbon Dioxide: 28 mmol/L  Anion Gap: 2 mmol/L  Blood Urea Nitrogen: 28 mg/dL  Creatinine: 0.82 mg/dL  Glucose: 95 mg/dL  Calcium: 9.2 mg/dL  Protein Total: 6.0 gm/dL  Albumin: 2.9 g/dL  Bilirubin Total: 0.4 mg/dL  Alkaline Phosphatase: 201 U/L  Aspartate Aminotransferase (AST/SGOT): 27 U/L  Alanine Aminotransferase (ALT/SGPT): 43 U/L  eGFR: 69: The estimated glomerular filtration rate (eGFR) is calculated using the     Flu With COVID-19 By RICARDO (02.13.24 @ 17:38)   SARS-CoV-2 Result: NotDetec.  Influenza A Result: NotDetec  Influenza B Result: NotDetec  Resp Syn Virus Result: NotDetec  Urinalysis (02.13.24 @ 16:27)   pH Urine: 6.0  Glucose Qualitative, Urine: Negative mg/dL  Blood, Urine: Negative  Color: Yellow  Urine Appearance: Clear  Bilirubin: Negative  Ketone - Urine: Negative mg/dL  Specific Gravity: 1.010  Protein, Urine: Negative mg/dL  Urobilinogen: 0.2 mg/dL  Nitrite: Negative  Leukocyte Esterase Concentration: Negative  Culture - Urine (02.13.24 @ 16:27)   Specimen Source: Clean Catch None  Culture Results:   No growth  Culture - Blood (02.13.24 @ 16:27)   Specimen Source: .Blood None  Culture Results:   No growth at 72 Hours    Culture - Blood (02.13.24 @ 16:27)   Specimen Source: .Blood None  Culture Results:   No growth at 72 Hours      RADIOLOGY:      EXAM:  CT CHEST   ORDERED BY: RAJEEV AGUILAR   PROCEDURE DATE:  02/16/2024      INTERPRETATION:  INDICATION: Hypoxia    TECHNIQUE: Volumetric images of the chest without intravenous contrast.   Maximum intensity projection images were generated.    COMPARISON: CT chest November 10, 2023.    FINDINGS:    CARDIOVASCULAR, MEDIASTINUM:    Vasculature:  Thoracic aorta: Normal in caliber and course with atherosclerotic   calcifications. Mild aortic valve calcifications.    Heart and pericardium: Cardiomegaly. Coronary artery calcifications.  Intracardiac blood is less dense than myocardium, suggestive of anemia.    No pericardial effusion.    Esophagus: Normally decompressed.    LYMPH NODES: No mediastinal or axillarylymphadenopathy. No gross hilar   adenopathy.    LUNGS/AIRWAYS/PLEURA: Patent central airways. Small bilateral pleural   effusions with subjacent compressive atelectasis. There are patchy   bilateral central groundglass opacities. Mild biapical fibrotic changes.    UPPER ABDOMEN: Evaluation of the partially-imaged upper abdomen   demonstrates no acute abnormality.    BONES: No acute abnormality.    IMPRESSION:    CT findings are suggestive of pulmonary edema. Small bilateral pleural   effusions.    BEATRIZ LORA DO; Attending Radiologist            EXAM:  XR CHEST PORTABLE URGENT 1V   ORDERED BY: POPPY ANNA     PROCEDURE DATE:  02/13/2024          INTERPRETATION:  Chest one view    HISTORY: Sepsis    COMPARISON STUDY: 2/3/2024    Frontal expiratory view of the chest shows the heart to be similarly   enlarged in size. The lungs show right lower lobe infiltrate and there is   no evidence of pneumothorax nor pleural effusion. 6.3 mm nodule projected   over the right lung base appears to be calcified.    IMPRESSION:  Right lower lobe infiltrate. Right nodule may be calcified.    The above findings were conveyed to the emergency department staff via   the hospital's image transfer system at the time of report dictation.        Thank you for the courtesy of this referral.      OSCAR VILLANUEVA MD; Attending Interventional Radiologist      Assessment/Plan: 1. Decreased O2 sat on RA; O2 sat 97% on 3 LPM nasal cannula O2.     2. Pneumonia - concern re: recurrent aspiration.     3. ? Sepsis - presented with hypothermia, leukopenia, bradycardia on 2/13; blood cultures negative x 2; urine culture negative.     4. Small bilateral pleural effusions; vascular congestion; compressive atelectasis on chest CT done 2/16.     5. Hx of DVT on Eliquis.     6. Chronic anemia.     PLAN: Low O2 sat likely secondary to multiple etiologies including basilar atelectasis contributed to by small pleural effusions, pneumonia, mild pulmonary edema in patient with low albumen/sepsis syndrome, and probable recurrent aspiration. Would continue antibiotics per ID. Will repeat CBC, CMP. Check albumen and BNP. Pleural effusions are too small to safely perform thoracentesis at this time. Continue nutritional support. OOB to chair; incentive spirometry. Continue aspiration precautions. Continue O2 supplement as needed to maintain O2 sat. Continue Eliquis.     Reviewed the patient's condition with her  at the bedside.     Time Span: 60 minutes
  HPI:  88-year-old female with history of dementia, DVT on Eliquis presenting with generalized weakness and burning with urination over the past week.   at the bedside reports that patient was recently hospitalized and discharged on February 6 for UTI with completion of antibiotics prior to discharge. Denies fevers, chills, headache, chest pain, palpitations, shortness of breath, cough, nausea, vomiting, focal neurologic symptoms. Here hypothermic, xray shows RLL pneumonia, started on IV abx.       PMH: as above  PSH: as above  Meds: per reconciliation sheet, noted below  MEDICATIONS  (STANDING):  apixaban 5 milliGRAM(s) Oral every 12 hours  azithromycin  IVPB 500 milliGRAM(s) IV Intermittent every 24 hours  cefTRIAXone Injectable. 1000 milliGRAM(s) IV Push every 24 hours  cholecalciferol 1000 Unit(s) Oral daily  dorzolamide 2%/timolol 0.5% Ophthalmic Solution 1 Drop(s) Right EYE every 12 hours  latanoprost 0.005% Ophthalmic Solution 1 Drop(s) Both EYES at bedtime      Allergies  Orange (Unknown)  No Known Drug Allergies  adhesives (Rash)  latex (Rash)  Bananas (Unknown)    Intolerances      Social: no smoking, no alcohol, no illegal drugs; no recent travel, no exposure to TB  FAMILY HISTORY:  FH: heart disease       no history of premature cardiovascular disease in first degree relatives    ROS: unable to obtain d/t medical condition    All other systems reviewed and are negative    Vital Signs Last 24 Hrs  T(C): 35.7 (14 Feb 2024 05:47), Max: 36.4 (14 Feb 2024 01:13)  T(F): 96.2 (14 Feb 2024 05:47), Max: 97.5 (14 Feb 2024 01:13)  HR: 73 (14 Feb 2024 07:28) (51 - 77)  BP: 143/56 (14 Feb 2024 07:28) (135/55 - 183/79)  BP(mean): 98 (14 Feb 2024 02:13) (89 - 110)  RR: 16 (14 Feb 2024 07:28) (14 - 20)  SpO2: 97% (14 Feb 2024 07:28) (93% - 99%)    Parameters below as of 14 Feb 2024 07:28  Patient On (Oxygen Delivery Method): nasal cannula      Daily Height in cm: 157.48 (13 Feb 2024 15:39)    Daily     PE:  Constitutional: frail looking  HEENT: NC/AT, EOMI, PERRLA, conjunctivae clear; ears and nose atraumatic; pharynx benign  Neck: supple; thyroid not palpable  Back: no tenderness  Respiratory: decreased breath sounds   Cardiovascular: S1S2 regular, no murmurs  Abdomen: soft, not tender, not distended, positive BS; liver and spleen WNL  Genitourinary: no suprapubic tenderness  Lymphatic: no LN palpable  Musculoskeletal: no muscle tenderness, no joint swelling or tenderness  Extremities: no pedal edema  Neurological/ Psychiatric:  moving all extremities  Skin: no rashes; no palpable lesions    Labs: all available labs reviewed                        9.6    4.48  )-----------( 164      ( 14 Feb 2024 07:42 )             29.8     02-14    141  |  111<H>  |  28<H>  ----------------------------<  95  4.8   |  28  |  0.82    Ca    9.2      14 Feb 2024 07:42  Phos  2.9     02-14  Mg     1.7     02-14    TPro  6.0  /  Alb  2.9<L>  /  TBili  0.4  /  DBili  x   /  AST  27  /  ALT  43  /  AlkPhos  201<H>  02-14     LIVER FUNCTIONS - ( 14 Feb 2024 07:42 )  Alb: 2.9 g/dL / Pro: 6.0 gm/dL / ALK PHOS: 201 U/L / ALT: 43 U/L / AST: 27 U/L / GGT: x           Urinalysis Basic - ( 14 Feb 2024 07:42 )    Color: x / Appearance: x / SG: x / pH: x  Gluc: 95 mg/dL / Ketone: x  / Bili: x / Urobili: x   Blood: x / Protein: x / Nitrite: x   Leuk Esterase: x / RBC: x / WBC x   Sq Epi: x / Non Sq Epi: x / Bacteria: x      Radiology: all available radiological tests reviewed      ACC: [id number] EXAM:  XR CHEST PORTABLE URGENT 1V   ORDERED BY: POPPY ANNA     PROCEDURE DATE:  02/13/2024          INTERPRETATION:  Chest one view    HISTORY: Sepsis    COMPARISON STUDY: 2/3/2024    Frontal expiratory view of the chest shows the heart to be similarly   enlarged in size. The lungs show right lower lobe infiltrate and there is   no evidence of pneumothorax nor pleural effusion. 6.3 mm nodule projected   over the right lung base appears to be calcified.    IMPRESSION:  Right lower lobe infiltrate. Right nodule may be calcified.    The above findings were conveyed to the emergency department staff via   the hospital's image transfer system at the time of report dictation.        ACC: [id number] EXAM:  CT BRAIN   ORDERED BY: STACI OQUENDO     PROCEDURE DATE:  02/03/2024          INTERPRETATION:  History: Weakness. Change in gait.    Description: A noncontrast head CT was performed. Axial images were   performed from the skull base to the vertex with coronal/sagittal   reconstructions.    Comparison is made to CT study from 12/06/2023, 11/10/2023    There is no evidence for acute infarct, acute hemorrhage, mass effect,   calvarial fracture, or hydrocephalus. A right tentorial dural   calcification is unchanged compared to the previous exams.    Mild patchy hypodensity without mass effect is noted in the   periventricular white matter which most likely represents chronic   microvascular ischemic changes given the patient's age.    Cerebral volume loss is present with secondary proportional prominence of   the sulci and ventricles.    No lytic or blastic calvarial lesions are noted. The visualized portions   of the paranasal sinuses and mastoid air cells are clear. A right   parietal scalp nodule is unchanged which may reflect a sebaceous cyst.    IMPRESSION:    No acute intracranial pathology is noted. If the patient has new and   persistent symptoms, consider short interval follow-up head CT or brain   MRI follow-up if there are no MRI contraindications.      Advanced directives addressed: full resuscitation

## 2024-02-17 NOTE — PROGRESS NOTE ADULT - SUBJECTIVE AND OBJECTIVE BOX
HPI:  88-year-old female with history of dementia, DVT on Eliquis presenting with generalized weakness and burning with urination over the past week.   at the bedside reports that patient was recently hospitalized and discharged on February 6 for UTI with completion of antibiotics prior to discharge. Denies fevers, chills, headache, chest pain, palpitations, shortness of breath, cough, nausea, vomiting, focal neurologic symptoms. (13 Feb 2024 20:08)      Vital Signs Last 24 Hrs  T(C): 36.9 (15 Feb 2024 07:50), Max: 36.9 (15 Feb 2024 07:50)  T(F): 98.4 (15 Feb 2024 07:50), Max: 98.4 (15 Feb 2024 07:50)  HR: 75 (15 Feb 2024 07:50) (69 - 93)  BP: 142/64 (15 Feb 2024 07:50) (139/59 - 152/75)  BP(mean): 75 (14 Feb 2024 15:49) (75 - 75)  RR: 19 (15 Feb 2024 07:50) (18 - 19)  SpO2: 98% (15 Feb 2024 07:50) (97% - 98%)    Parameters below as of 15 Feb 2024 07:50  Patient On (Oxygen Delivery Method): nasal cannula          MEDICATIONS  (STANDING):  apixaban 5 milliGRAM(s) Oral every 12 hours  cholecalciferol 1000 Unit(s) Oral daily  dorzolamide 2%/timolol 0.5% Ophthalmic Solution 1 Drop(s) Right EYE every 12 hours  lactated ringers. 1000 milliLiter(s) (100 mL/Hr) IV Continuous <Continuous>  latanoprost 0.005% Ophthalmic Solution 1 Drop(s) Both EYES at bedtime  piperacillin/tazobactam IVPB.. 3.375 Gram(s) IV Intermittent every 8 hours  vancomycin  IVPB 750 milliGRAM(s) IV Intermittent every 24 hours    MEDICATIONS  (PRN):  acetaminophen     Tablet .. 650 milliGRAM(s) Oral every 6 hours PRN Temp greater or equal to 38C (100.4F), Mild Pain (1 - 3)  aluminum hydroxide/magnesium hydroxide/simethicone Suspension 30 milliLiter(s) Oral every 4 hours PRN Dyspepsia  melatonin 3 milliGRAM(s) Oral at bedtime PRN Insomnia  ondansetron Injectable 4 milliGRAM(s) IV Push every 8 hours PRN Nausea and/or Vomiting  polyethylene glycol 3350 17 Gram(s) Oral daily PRN for constipation        PHYSICAL EXAM:      NERVOUS SYSTEM:    CHEST/LUNG: Clear to auscultation bilaterally; No rales, rhonchi, wheezing, or rubs  HEART: Regular rate and rhythm; No murmurs, rubs, or gallops  ABDOMEN: Soft, Nontender, Nondistended; Bowel sounds present  EXTREMITIES:  2+ Peripheral Pulses, No clubbing, cyanosis, or edema    LABS:                        9.6    4.48  )-----------( 164      ( 14 Feb 2024 07:42 )             29.8     02-14    141  |  111<H>  |  28<H>  ----------------------------<  95  4.8   |  28  |  0.82    Ca    9.2      14 Feb 2024 07:42  Phos  2.9     02-14  Mg     1.7     02-14    TPro  6.0  /  Alb  2.9<L>  /  TBili  0.4  /  DBili  x   /  AST  27  /  ALT  43  /  AlkPhos  201<H>  02-14      Urinalysis Basic - ( 14 Feb 2024 07:42 )    Color: x / Appearance: x / SG: x / pH: x  Gluc: 95 mg/dL / Ketone: x  / Bili: x / Urobili: x   Blood: x / Protein: x / Nitrite: x   Leuk Esterase: x / RBC: x / WBC x   Sq Epi: x / Non Sq Epi: x / Bacteria: x            RADIOLOGY & ADDITIONAL TESTS: personally reviewed    Sepsis of unknown origin  -Hypothermic, bradycardic, leukopenic on presentation.  cxr poss PNA- Ceftriaxone, Zithro    Acute hypoxic resp failure  most likely atelectasis, STEFANI did not  large effusion  chest PT and nebs    Toxic metab encephalopathy  CT brain r/o bleed- neg, mental ststus improved    DVT  -On eliquis  -Monitor Hgb    Chronic Anemia  -No active signs of bleeding.   -Hgb stable from prior admission ~9 on 2/4/23  -Trend Hgb    Dementia  -Fall and aspiration precautions.                 time spent:

## 2024-02-18 LAB
HCT VFR BLD CALC: 26.4 % — LOW (ref 34.5–45)
HGB BLD-MCNC: 8.5 G/DL — LOW (ref 11.5–15.5)
MCHC RBC-ENTMCNC: 30.4 PG — SIGNIFICANT CHANGE UP (ref 27–34)
MCHC RBC-ENTMCNC: 32.2 GM/DL — SIGNIFICANT CHANGE UP (ref 32–36)
MCV RBC AUTO: 94.3 FL — SIGNIFICANT CHANGE UP (ref 80–100)
PLATELET # BLD AUTO: 144 K/UL — LOW (ref 150–400)
RBC # BLD: 2.8 M/UL — LOW (ref 3.8–5.2)
RBC # FLD: 14.6 % — HIGH (ref 10.3–14.5)
WBC # BLD: 4.5 K/UL — SIGNIFICANT CHANGE UP (ref 3.8–10.5)
WBC # FLD AUTO: 4.5 K/UL — SIGNIFICANT CHANGE UP (ref 3.8–10.5)

## 2024-02-18 PROCEDURE — 99232 SBSQ HOSP IP/OBS MODERATE 35: CPT

## 2024-02-18 RX ORDER — FUROSEMIDE 40 MG
20 TABLET ORAL DAILY
Refills: 0 | Status: DISCONTINUED | OUTPATIENT
Start: 2024-02-18 | End: 2024-02-19

## 2024-02-18 RX ORDER — FUROSEMIDE 40 MG
20 TABLET ORAL ONCE
Refills: 0 | Status: COMPLETED | OUTPATIENT
Start: 2024-02-18 | End: 2024-02-18

## 2024-02-18 RX ADMIN — Medication 3 MILLIGRAM(S): at 21:58

## 2024-02-18 RX ADMIN — CEFTRIAXONE 1000 MILLIGRAM(S): 500 INJECTION, POWDER, FOR SOLUTION INTRAMUSCULAR; INTRAVENOUS at 11:16

## 2024-02-18 RX ADMIN — DORZOLAMIDE HYDROCHLORIDE TIMOLOL MALEATE 1 DROP(S): 20; 5 SOLUTION/ DROPS OPHTHALMIC at 08:48

## 2024-02-18 RX ADMIN — Medication 650 MILLIGRAM(S): at 21:58

## 2024-02-18 RX ADMIN — ALBUTEROL 2.5 MILLIGRAM(S): 90 AEROSOL, METERED ORAL at 13:49

## 2024-02-18 RX ADMIN — DORZOLAMIDE HYDROCHLORIDE TIMOLOL MALEATE 1 DROP(S): 20; 5 SOLUTION/ DROPS OPHTHALMIC at 21:58

## 2024-02-18 RX ADMIN — Medication 1000 UNIT(S): at 08:48

## 2024-02-18 RX ADMIN — Medication 650 MILLIGRAM(S): at 22:45

## 2024-02-18 RX ADMIN — APIXABAN 5 MILLIGRAM(S): 2.5 TABLET, FILM COATED ORAL at 08:48

## 2024-02-18 RX ADMIN — ALBUTEROL 2.5 MILLIGRAM(S): 90 AEROSOL, METERED ORAL at 09:00

## 2024-02-18 RX ADMIN — LATANOPROST 1 DROP(S): 0.05 SOLUTION/ DROPS OPHTHALMIC; TOPICAL at 21:58

## 2024-02-18 RX ADMIN — Medication 20 MILLIGRAM(S): at 08:48

## 2024-02-18 RX ADMIN — APIXABAN 5 MILLIGRAM(S): 2.5 TABLET, FILM COATED ORAL at 21:58

## 2024-02-18 RX ADMIN — ALBUTEROL 2.5 MILLIGRAM(S): 90 AEROSOL, METERED ORAL at 21:21

## 2024-02-18 NOTE — PROGRESS NOTE ADULT - SUBJECTIVE AND OBJECTIVE BOX
[facility] [facility]  MRN: [id number]    S: HPI: 88-year-old female with history of dementia, DVT on Eliquis presenting with generalized weakness and burning with urination over the past week.   at the bedside reports that patient was recently hospitalized and discharged on February 6 for UTI with completion of antibiotics prior to discharge. Denies fevers, chills, headache, chest pain, palpitations, shortness of breath, cough, nausea, vomiting, focal neurologic symptoms. (13 Feb 2024 20:08)    2/17/2024:    Pulmonary consult called for evaluation of decreased O2 saturation (87% on RA). Chart reviewed.     Patient is being treated for pneumonia - possible recurrent aspiration. Admitted with hypothermia. Blood cultures have been negative. At the time of my evaluation the patient is awake and alert. She is breathing comfortably while laying in bed with the head of the bed elevated to about 30 degrees. She denies feeling short of breath. She has not been couching. Her  is at the bedside.     2/18/2024:    Breathing comfortably laying in bed with the head of the bed elevated to 30 degrees. O2 sat 98% on 3 LPM nasal cannula O2; RA O2 sat 87%. Temperature 96.3 - 97.3. Patient offers no complaints.     PAST MEDICAL & SURGICAL HISTORY:  Osteoarthritis      Osteopenia      Deep vein thrombosis (DVT)      S/P Hysterectomy  2007      Lumbar Laminectomy/ Spinal Fusion  1999      S/P Arthroscopy of Right Knee  1998      S/P Appendectomy  @ 12 years old      S/P Tonsillectomy  @ 2 years old          O: T(C): 35.7 (02-18-24 @ 08:07), Max: 36.3 (02-17-24 @ 15:45)  HR: 58 (02-18-24 @ 06:26) (58 - 76)  BP: 155/56 (02-18-24 @ 06:26) (149/67 - 157/73)  RR: 17 (02-18-24 @ 06:26) (17 - 18)  SpO2: 98% (02-18-24 @ 06:26) (87% - 98%)  Wt(kg): --    PHYSICAL EXAM:      GENERAL: comfortable; weak; frail    NEURO: sleeping on my arrival but easily arousable; oriented to person and place - recognizes me by name.     NECK: no JVD    CHEST: diminished at bases; no wheezing    CARDIAC: RR    EXT: no edema.       LABS:    Complete Blood Count in AM (02.18.24 @ 07:35)   WBC Count: 4.50 K/uL  RBC Count: 2.80 M/uL  Hemoglobin: 8.5 g/dL  Hematocrit: 26.4 %  Mean Cell Volume: 94.3 fl  Mean Cell Hemoglobin: 30.4 pg  Mean Cell Hemoglobin Conc: 32.2 gm/dL  Red Cell Distrib Width: 14.6 %  Platelet Count - Automated: 144: Specimen integrity verified. K/uL          02-17    142  |  107  |  17  ----------------------------<  80  3.7   |  31  |  0.66    Ca    9.4      17 Feb 2024 17:08    TPro  5.9<L>  /  Alb  2.6<L>  /  TBili  0.6  /  DBili  x   /  AST  15  /  ALT  43  /  AlkPhos  203<H>  02-17      Pro-Brain Natriuretic Peptide (02.17.24 @ 17:08)   Pro-Brain Natriuretic Peptide: 3012 pg/mL  Thyroid Stimulating Hormone, Serum (02.13.24 @ 16:27)   Thyroid Stimulating Hormone, Serum: 2.02 uU/mL  Flu With COVID-19 By RICARDO (02.13.24 @ 17:38)   SARS-CoV-2 Result: NotDetec.  Influenza A Result: NotDete  Influenza B Result: NotDetec  Resp Syn Virus Result: NotDetec  Urinalysis (02.13.24 @ 16:27)   pH Urine: 6.0  Glucose Qualitative, Urine: Negative mg/dL  Blood, Urine: Negative  Color: Yellow  Urine Appearance: Clear  Bilirubin: Negative  Ketone - Urine: Negative mg/dL  Specific Gravity: 1.010  Protein, Urine: Negative mg/dL  Urobilinogen: 0.2 mg/dL  Nitrite: Negative  Leukocyte Esterase Concentration: Negative  Culture - Urine (02.13.24 @ 16:27)   Specimen Source: Clean Catch None  Culture Results:   No growth  Culture - Blood (02.13.24 @ 16:27)   Specimen Source: .Blood None  Culture Results:   No growth at 72 Hours    Culture - Blood (02.13.24 @ 16:27)   Specimen Source: .Blood None  Culture Results:   No growth at 72 Hours      RADIOLOGY:      EXAM:  CT CHEST   ORDERED BY: RAJEEV AGUILAR   PROCEDURE DATE:  02/16/2024      INTERPRETATION:  INDICATION: Hypoxia    TECHNIQUE: Volumetric images of the chest without intravenous contrast.   Maximum intensity projection images were generated.    COMPARISON: CT chest November 10, 2023.    FINDINGS:    CARDIOVASCULAR, MEDIASTINUM:    Vasculature:  Thoracic aorta: Normal in caliber and course with atherosclerotic   calcifications. Mild aortic valve calcifications.    Heart and pericardium: Cardiomegaly. Coronary artery calcifications.  Intracardiac blood is less dense than myocardium, suggestive of anemia.    No pericardial effusion.    Esophagus: Normally decompressed.    LYMPH NODES: No mediastinal or axillarylymphadenopathy. No gross hilar   adenopathy.    LUNGS/AIRWAYS/PLEURA: Patent central airways. Small bilateral pleural   effusions with subjacent compressive atelectasis. There are patchy   bilateral central groundglass opacities. Mild biapical fibrotic changes.    UPPER ABDOMEN: Evaluation of the partially-imaged upper abdomen   demonstrates no acute abnormality.    BONES: No acute abnormality.    IMPRESSION:    CT findings are suggestive of pulmonary edema. Small bilateral pleural   effusions.    BEATRIZ LORA DO; Attending Radiologist            EXAM:  XR CHEST PORTABLE URGENT 1V   ORDERED BY: POPPY ANNA     PROCEDURE DATE:  02/13/2024          INTERPRETATION:  Chest one view    HISTORY: Sepsis    COMPARISON STUDY: 2/3/2024    Frontal expiratory view of the chest shows the heart to be similarly   enlarged in size. The lungs show right lower lobe infiltrate and there is   no evidence of pneumothorax nor pleural effusion. 6.3 mm nodule projected   over the right lung base appears to be calcified.    IMPRESSION:  Right lower lobe infiltrate. Right nodule may be calcified.    The above findings were conveyed to the emergency department staff via   the hospital's image transfer system at the time of report dictation.        Thank you for the courtesy of this referral.      OSCAR VILLANUEVA MD; Attending Interventional Radiologist            MEDICATIONS  (STANDING):  albuterol    0.083% 2.5 milliGRAM(s) Nebulizer every 6 hours  apixaban 5 milliGRAM(s) Oral every 12 hours  cefTRIAXone Injectable. 1000 milliGRAM(s) IV Push every 24 hours  cholecalciferol 1000 Unit(s) Oral daily  dorzolamide 2%/timolol 0.5% Ophthalmic Solution 1 Drop(s) Right EYE every 12 hours  latanoprost 0.005% Ophthalmic Solution 1 Drop(s) Both EYES at bedtime    MEDICATIONS  (PRN):  acetaminophen     Tablet .. 650 milliGRAM(s) Oral every 6 hours PRN Temp greater or equal to 38C (100.4F), Mild Pain (1 - 3)  aluminum hydroxide/magnesium hydroxide/simethicone Suspension 30 milliLiter(s) Oral every 4 hours PRN Dyspepsia  melatonin 3 milliGRAM(s) Oral at bedtime PRN Insomnia  ondansetron Injectable 4 milliGRAM(s) IV Push every 8 hours PRN Nausea and/or Vomiting  polyethylene glycol 3350 17 Gram(s) Oral daily PRN for constipation        A/P: Assessment/Plan: 1. Decreased O2 sat on RA; O2 sat 97% on 3 LPM nasal cannula O2.     2. Pneumonia - concern re: recurrent aspiration.     3. ? Sepsis - presented with hypothermia, leukopenia, bradycardia on 2/13; blood cultures negative x 2; urine culture negative. Normal TSH.    4. Small bilateral pleural effusions; vascular congestion; compressive atelectasis on chest CT done 2/16. BNP 3012; Albumin 2.6.     5. Hx of DVT on Eliquis.     6. Chronic anemia.     7. Hypothermia.       PLAN: Low O2 sat likely secondary to multiple etiologies including basilar atelectasis contributed to by small pleural effusions, pneumonia, mild pulmonary edema in patient with low albumen/sepsis syndrome, and probable recurrent aspiration. Would continue antibiotics per ID. Will give one dose of IV Lasix 20 mg this AM in view of mild vascular congestion/small pleural effusions on Chest CT, elevated BNP, low albumin; patient has mildly elevated systolic BP. Patient was given IV fluid on admission. Pleural effusions are too small to safely perform thoracentesis at this time. Continue nutritional support. Increase activity (OOB to chair); encourage incentive spirometry. Continue aspiration precautions. Continue O2 supplement as needed to maintain O2 sat. Continue Eliquis. Evaluation of hypothermia per primary care team.     Reviewed plan with Dr. Jackson.

## 2024-02-18 NOTE — PROGRESS NOTE ADULT - SUBJECTIVE AND OBJECTIVE BOX
HPI:  88-year-old female with history of dementia, DVT on Eliquis presenting with generalized weakness and burning with urination over the past week.   at the bedside reports that patient was recently hospitalized and discharged on February 6 for UTI with completion of antibiotics prior to discharge. Denies fevers, chills, headache, chest pain, palpitations, shortness of breath, cough, nausea, vomiting, focal neurologic symptoms. (13 Feb 2024 20:08)  no change in her status    Vital Signs Last 24 Hrs  T(C): 36.9 (15 Feb 2024 07:50), Max: 36.9 (15 Feb 2024 07:50)  T(F): 98.4 (15 Feb 2024 07:50), Max: 98.4 (15 Feb 2024 07:50)  HR: 75 (15 Feb 2024 07:50) (69 - 93)  BP: 142/64 (15 Feb 2024 07:50) (139/59 - 152/75)  BP(mean): 75 (14 Feb 2024 15:49) (75 - 75)  RR: 19 (15 Feb 2024 07:50) (18 - 19)  SpO2: 98% (15 Feb 2024 07:50) (97% - 98%)    Parameters below as of 15 Feb 2024 07:50  Patient On (Oxygen Delivery Method): nasal cannula          MEDICATIONS  (STANDING):  apixaban 5 milliGRAM(s) Oral every 12 hours  cholecalciferol 1000 Unit(s) Oral daily  dorzolamide 2%/timolol 0.5% Ophthalmic Solution 1 Drop(s) Right EYE every 12 hours  lactated ringers. 1000 milliLiter(s) (100 mL/Hr) IV Continuous <Continuous>  latanoprost 0.005% Ophthalmic Solution 1 Drop(s) Both EYES at bedtime  piperacillin/tazobactam IVPB.. 3.375 Gram(s) IV Intermittent every 8 hours  vancomycin  IVPB 750 milliGRAM(s) IV Intermittent every 24 hours    MEDICATIONS  (PRN):  acetaminophen     Tablet .. 650 milliGRAM(s) Oral every 6 hours PRN Temp greater or equal to 38C (100.4F), Mild Pain (1 - 3)  aluminum hydroxide/magnesium hydroxide/simethicone Suspension 30 milliLiter(s) Oral every 4 hours PRN Dyspepsia  melatonin 3 milliGRAM(s) Oral at bedtime PRN Insomnia  ondansetron Injectable 4 milliGRAM(s) IV Push every 8 hours PRN Nausea and/or Vomiting  polyethylene glycol 3350 17 Gram(s) Oral daily PRN for constipation        PHYSICAL EXAM:      NERVOUS SYSTEM:    CHEST/LUNG: Clear to auscultation bilaterally; No rales, rhonchi, wheezing, or rubs  HEART: Regular rate and rhythm; No murmurs, rubs, or gallops  ABDOMEN: Soft, Nontender, Nondistended; Bowel sounds present  EXTREMITIES:  2+ Peripheral Pulses, No clubbing, cyanosis, or edema    LABS:                        9.6    4.48  )-----------( 164      ( 14 Feb 2024 07:42 )             29.8     02-14    141  |  111<H>  |  28<H>  ----------------------------<  95  4.8   |  28  |  0.82    Ca    9.2      14 Feb 2024 07:42  Phos  2.9     02-14  Mg     1.7     02-14    TPro  6.0  /  Alb  2.9<L>  /  TBili  0.4  /  DBili  x   /  AST  27  /  ALT  43  /  AlkPhos  201<H>  02-14      Urinalysis Basic - ( 14 Feb 2024 07:42 )    Color: x / Appearance: x / SG: x / pH: x  Gluc: 95 mg/dL / Ketone: x  / Bili: x / Urobili: x   Blood: x / Protein: x / Nitrite: x   Leuk Esterase: x / RBC: x / WBC x   Sq Epi: x / Non Sq Epi: x / Bacteria: x            RADIOLOGY & ADDITIONAL TESTS: personally reviewed    Sepsis of unknown origin  -Hypothermic, bradycardic, leukopenic on presentation.  cxr poss PNA- Ceftriaxone, Zithro    Acute hypoxic resp failure  most likely atelectasis, STEFANI did not  large effusion  chest PT and nebs  add Lasix    Toxic metab encephalopathy  CT brain r/o bleed- neg, mental ststus improved    DVT  -On eliquis  -Monitor Hgb    Chronic Anemia  -No active signs of bleeding.   -Hgb stable from prior admission ~9 on 2/4/23  -Trend Hgb    Dementia  -Fall and aspiration precautions.                 time spent:          HPI:  88-year-old female with history of dementia, DVT on Eliquis presenting with generalized weakness and burning with urination over the past week.   at the bedside reports that patient was recently hospitalized and discharged on February 6 for UTI with completion of antibiotics prior to discharge. Denies fevers, chills, headache, chest pain, palpitations, shortness of breath, cough, nausea, vomiting, focal neurologic symptoms. (13 Feb 2024 20:08)  no change in her status    Vital Signs Last 24 Hrs  T(C): 35.7 (18 Feb 2024 08:07), Max: 36.3 (17 Feb 2024 15:45)  T(F): 96.3 (18 Feb 2024 08:07), Max: 97.3 (17 Feb 2024 15:45)  HR: 69 (18 Feb 2024 09:00) (55 - 69)  BP: 133/55 (18 Feb 2024 08:16) (133/55 - 155/56)  BP(mean): --  RR: 18 (18 Feb 2024 08:16) (17 - 18)  SpO2: 91% (18 Feb 2024 09:00) (91% - 98%)    Parameters below as of 18 Feb 2024 09:00  Patient On (Oxygen Delivery Method): room air    c/w intermittent hypothermia          MEDICATIONS  (STANDING):  apixaban 5 milliGRAM(s) Oral every 12 hours  cholecalciferol 1000 Unit(s) Oral daily  dorzolamide 2%/timolol 0.5% Ophthalmic Solution 1 Drop(s) Right EYE every 12 hours  lactated ringers. 1000 milliLiter(s) (100 mL/Hr) IV Continuous <Continuous>  latanoprost 0.005% Ophthalmic Solution 1 Drop(s) Both EYES at bedtime  piperacillin/tazobactam IVPB.. 3.375 Gram(s) IV Intermittent every 8 hours  vancomycin  IVPB 750 milliGRAM(s) IV Intermittent every 24 hours    MEDICATIONS  (PRN):  acetaminophen     Tablet .. 650 milliGRAM(s) Oral every 6 hours PRN Temp greater or equal to 38C (100.4F), Mild Pain (1 - 3)  aluminum hydroxide/magnesium hydroxide/simethicone Suspension 30 milliLiter(s) Oral every 4 hours PRN Dyspepsia  melatonin 3 milliGRAM(s) Oral at bedtime PRN Insomnia  ondansetron Injectable 4 milliGRAM(s) IV Push every 8 hours PRN Nausea and/or Vomiting  polyethylene glycol 3350 17 Gram(s) Oral daily PRN for constipation        PHYSICAL EXAM:      NERVOUS SYSTEM:    CHEST/LUNG: Clear to auscultation bilaterally; No rales, rhonchi, wheezing, or rubs  HEART: Regular rate and rhythm; No murmurs, rubs, or gallops  ABDOMEN: Soft, Nontender, Nondistended; Bowel sounds present  EXTREMITIES:  2+ Peripheral Pulses, No clubbing, cyanosis, or edema    LABS:                        9.6    4.48  )-----------( 164      ( 14 Feb 2024 07:42 )             29.8     02-14    141  |  111<H>  |  28<H>  ----------------------------<  95  4.8   |  28  |  0.82    Ca    9.2      14 Feb 2024 07:42  Phos  2.9     02-14  Mg     1.7     02-14    TPro  6.0  /  Alb  2.9<L>  /  TBili  0.4  /  DBili  x   /  AST  27  /  ALT  43  /  AlkPhos  201<H>  02-14      Urinalysis Basic - ( 14 Feb 2024 07:42 )    Color: x / Appearance: x / SG: x / pH: x  Gluc: 95 mg/dL / Ketone: x  / Bili: x / Urobili: x   Blood: x / Protein: x / Nitrite: x   Leuk Esterase: x / RBC: x / WBC x   Sq Epi: x / Non Sq Epi: x / Bacteria: x            RADIOLOGY & ADDITIONAL TESTS: personally reviewed    Sepsis of unknown origin  -Hypothermic, bradycardic, leukopenic on presentation.  cxr poss PNA- Ceftriaxone, Zithro- change to ceftin    Hypothermia  diff diagnosis: elderly patient ( age), hypopituitarism, hypoadrenalism, hypothyroidism, malnutrition, inactivity, parkinsonism  send endocrine blood work    Acute hypoxic resp failure  most likely atelectasis, STEFANI did not  large effusion  chest PT and nebs  add Lasix      DVT  -On eliquis  -Monitor Hgb    Chronic Anemia  -No active signs of bleeding.   -Hgb stable from prior admission ~9 on 2/4/23  -Trend Hgb    Dementia  -Fall and aspiration precautions.                 time spent:

## 2024-02-19 LAB
ANION GAP SERPL CALC-SCNC: 3 MMOL/L — LOW (ref 5–17)
BUN SERPL-MCNC: 24 MG/DL — HIGH (ref 7–23)
CALCIUM SERPL-MCNC: 9 MG/DL — SIGNIFICANT CHANGE UP (ref 8.5–10.1)
CHLORIDE SERPL-SCNC: 110 MMOL/L — HIGH (ref 96–108)
CO2 SERPL-SCNC: 30 MMOL/L — SIGNIFICANT CHANGE UP (ref 22–31)
CORTIS AM PEAK SERPL-MCNC: 30.5 UG/DL — HIGH (ref 6–18.4)
CREAT SERPL-MCNC: 0.78 MG/DL — SIGNIFICANT CHANGE UP (ref 0.5–1.3)
CULTURE RESULTS: SIGNIFICANT CHANGE UP
CULTURE RESULTS: SIGNIFICANT CHANGE UP
EGFR: 73 ML/MIN/1.73M2 — SIGNIFICANT CHANGE UP
GLUCOSE SERPL-MCNC: 90 MG/DL — SIGNIFICANT CHANGE UP (ref 70–99)
POTASSIUM SERPL-MCNC: 3.4 MMOL/L — LOW (ref 3.5–5.3)
POTASSIUM SERPL-MCNC: 3.5 MMOL/L — SIGNIFICANT CHANGE UP (ref 3.5–5.3)
POTASSIUM SERPL-SCNC: 3.4 MMOL/L — LOW (ref 3.5–5.3)
POTASSIUM SERPL-SCNC: 3.5 MMOL/L — SIGNIFICANT CHANGE UP (ref 3.5–5.3)
SODIUM SERPL-SCNC: 143 MMOL/L — SIGNIFICANT CHANGE UP (ref 135–145)
SPECIMEN SOURCE: SIGNIFICANT CHANGE UP
SPECIMEN SOURCE: SIGNIFICANT CHANGE UP
TSH SERPL-MCNC: 2.17 UU/ML — SIGNIFICANT CHANGE UP (ref 0.34–4.82)

## 2024-02-19 PROCEDURE — 99232 SBSQ HOSP IP/OBS MODERATE 35: CPT

## 2024-02-19 RX ORDER — POTASSIUM CHLORIDE 20 MEQ
20 PACKET (EA) ORAL ONCE
Refills: 0 | Status: COMPLETED | OUTPATIENT
Start: 2024-02-19 | End: 2024-02-19

## 2024-02-19 RX ADMIN — ALBUTEROL 2.5 MILLIGRAM(S): 90 AEROSOL, METERED ORAL at 08:25

## 2024-02-19 RX ADMIN — Medication 20 MILLIGRAM(S): at 09:33

## 2024-02-19 RX ADMIN — Medication 3 MILLIGRAM(S): at 21:01

## 2024-02-19 RX ADMIN — APIXABAN 5 MILLIGRAM(S): 2.5 TABLET, FILM COATED ORAL at 09:32

## 2024-02-19 RX ADMIN — Medication 1000 UNIT(S): at 09:32

## 2024-02-19 RX ADMIN — DORZOLAMIDE HYDROCHLORIDE TIMOLOL MALEATE 1 DROP(S): 20; 5 SOLUTION/ DROPS OPHTHALMIC at 21:02

## 2024-02-19 RX ADMIN — ALBUTEROL 2.5 MILLIGRAM(S): 90 AEROSOL, METERED ORAL at 22:18

## 2024-02-19 RX ADMIN — APIXABAN 5 MILLIGRAM(S): 2.5 TABLET, FILM COATED ORAL at 21:01

## 2024-02-19 RX ADMIN — Medication 20 MILLIEQUIVALENT(S): at 18:25

## 2024-02-19 RX ADMIN — DORZOLAMIDE HYDROCHLORIDE TIMOLOL MALEATE 1 DROP(S): 20; 5 SOLUTION/ DROPS OPHTHALMIC at 09:34

## 2024-02-19 RX ADMIN — LATANOPROST 1 DROP(S): 0.05 SOLUTION/ DROPS OPHTHALMIC; TOPICAL at 21:02

## 2024-02-19 RX ADMIN — ALBUTEROL 2.5 MILLIGRAM(S): 90 AEROSOL, METERED ORAL at 14:38

## 2024-02-19 NOTE — PROGRESS NOTE ADULT - NUTRITIONAL ASSESSMENT
This patient has been assessed with a concern for Malnutrition and has been determined to have a diagnosis/diagnoses of Severe protein-calorie malnutrition.    This patient is being managed with:   Diet Regular-  Entered: Feb 14 2024  9:21AM  

## 2024-02-19 NOTE — PROGRESS NOTE ADULT - SUBJECTIVE AND OBJECTIVE BOX
HPI:  88-year-old female with history of dementia, DVT on Eliquis presenting with generalized weakness and burning with urination over the past week.   at the bedside reports that patient was recently hospitalized and discharged on February 6 for UTI with completion of antibiotics prior to discharge. Denies fevers, chills, headache, chest pain, palpitations, shortness of breath, cough, nausea, vomiting, focal neurologic symptoms. (13 Feb 2024 20:08)  no change in her status    Vital Signs Last 24 Hrs  T(C): 37 (18 Feb 2024 19:50), Max: 37 (18 Feb 2024 19:50)  T(F): 98.6 (18 Feb 2024 19:50), Max: 98.6 (18 Feb 2024 19:50)  HR: 73 (18 Feb 2024 19:50) (69 - 85)  BP: 144/58 (18 Feb 2024 19:50) (144/58 - 147/61)  BP(mean): --  RR: 18 (18 Feb 2024 19:50) (18 - 18)  SpO2: 96% (18 Feb 2024 19:50) (91% - 96%)    Parameters below as of 18 Feb 2024 21:21  Patient On (Oxygen Delivery Method): nasal cannula        c/w intermittent hypothermia        MEDICATIONS  (STANDING):  albuterol    0.083% 2.5 milliGRAM(s) Nebulizer every 6 hours  apixaban 5 milliGRAM(s) Oral every 12 hours  cholecalciferol 1000 Unit(s) Oral daily  dorzolamide 2%/timolol 0.5% Ophthalmic Solution 1 Drop(s) Right EYE every 12 hours  furosemide   Injectable 20 milliGRAM(s) IV Push daily  latanoprost 0.005% Ophthalmic Solution 1 Drop(s) Both EYES at bedtime          PHYSICAL EXAM:      NERVOUS SYSTEM:  gen weakness  CHEST/LUNG: Clear to auscultation bilaterally; No rales, rhonchi, wheezing, or rubs  HEART: Regular rate and rhythm; No murmurs, rubs, or gallops  ABDOMEN: Soft, Nontender, Nondistended; Bowel sounds present  EXTREMITIES:  2+ Peripheral Pulses, No clubbing, cyanosis, or edema    LABS:                                   8.5    4.50  )-----------( 144      ( 18 Feb 2024 07:35 )             26.4   02-17    142  |  107  |  17  ----------------------------<  80  3.7   |  31  |  0.66    Ca    9.4      17 Feb 2024 17:08    TPro  5.9<L>  /  Alb  2.6<L>  /  TBili  0.6  /  DBili  x   /  AST  15  /  ALT  43  /  AlkPhos  203<H>  02-17      RADIOLOGY & ADDITIONAL TESTS: personally reviewed    Sepsis of unknown origin  -Hypothermic, bradycardic, leukopenic on presentation.  cxr poss PNA- Ceftriaxone, Zithro- change to ceftin    Hypothermia  diff diagnosis: elderly patient ( age), hypopituitarism, hypoadrenalism, hypothyroidism, malnutrition, inactivity, parkinsonism  send endocrine blood work    Normocytic anemia  prev Hb was in the 9 range    Acute hypoxic resp failure  most likely atelectasis, STEFANI did not  large effusion  chest PT and nebs  Lasix was added  add Lasix      DVT  -On eliquis        Dementia  -Fall and aspiration precautions.                    HPI:  88-year-old female with history of dementia, DVT on Eliquis presenting with generalized weakness and burning with urination over the past week.   at the bedside reports that patient was recently hospitalized and discharged on February 6 for UTI with completion of antibiotics prior to discharge. Denies fevers, chills, headache, chest pain, palpitations, shortness of breath, cough, nausea, vomiting, focal neurologic symptoms. (13 Feb 2024 20:08)  no change in her status  Looks better today    Vital Signs Last 24 Hrs  T(C): 36.3 (19 Feb 2024 07:50), Max: 37 (18 Feb 2024 19:50)  T(F): 97.3 (19 Feb 2024 07:50), Max: 98.6 (18 Feb 2024 19:50)  HR: 69 (19 Feb 2024 07:50) (69 - 85)  BP: 143/54 (19 Feb 2024 07:50) (143/54 - 147/61)  BP(mean): --  RR: 18 (19 Feb 2024 07:50) (18 - 18)  SpO2: 96% (19 Feb 2024 07:50) (94% - 96%)    Parameters below as of 19 Feb 2024 07:50  Patient On (Oxygen Delivery Method): nasal cannula  O2 Flow (L/min): 2        c/w intermittent hypothermia        MEDICATIONS  (STANDING):  albuterol    0.083% 2.5 milliGRAM(s) Nebulizer every 6 hours  apixaban 5 milliGRAM(s) Oral every 12 hours  cholecalciferol 1000 Unit(s) Oral daily  dorzolamide 2%/timolol 0.5% Ophthalmic Solution 1 Drop(s) Right EYE every 12 hours  furosemide   Injectable 20 milliGRAM(s) IV Push daily  latanoprost 0.005% Ophthalmic Solution 1 Drop(s) Both EYES at bedtime          PHYSICAL EXAM:      NERVOUS SYSTEM:  gen weakness  CHEST/LUNG: Clear to auscultation bilaterally; No rales, rhonchi, wheezing, or rubs  HEART: Regular rate and rhythm; No murmurs, rubs, or gallops  ABDOMEN: Soft, Nontender, Nondistended; Bowel sounds present  EXTREMITIES:  2+ Peripheral Pulses, No clubbing, cyanosis, or edema    LABS:                                   8.5    4.50  )-----------( 144      ( 18 Feb 2024 07:35 )             26.4   02-17    142  |  107  |  17  ----------------------------<  80  3.7   |  31  |  0.66    Ca    9.4      17 Feb 2024 17:08    TPro  5.9<L>  /  Alb  2.6<L>  /  TBili  0.6  /  DBili  x   /  AST  15  /  ALT  43  /  AlkPhos  203<H>  02-17      RADIOLOGY & ADDITIONAL TESTS: personally reviewed    Sepsis of unknown origin  -Hypothermic, bradycardic, leukopenic on presentation.  cxr poss PNA- Ceftriaxone, Zithro- change to ceftin    Hypothermia  diff diagnosis: elderly patient ( age), hypopituitarism, hypoadrenalism, hypothyroidism, malnutrition, inactivity, parkinsonism  send endocrine blood work    Normocytic anemia  prev Hb was in the 9 range  repeat in am    Acute hypoxic resp failure  most likely atelectasis, STEFANI did not  large effusion  chest PT and nebs  Lasix was added  onLasix      DVT  -On eliquis        Dementia  -Fall and aspiration precautions.

## 2024-02-19 NOTE — PROGRESS NOTE ADULT - SUBJECTIVE AND OBJECTIVE BOX
OTTO PIÑA  MRN: [id number]    S: HPI: 88-year-old female with history of dementia, DVT on Eliquis presenting with generalized weakness and burning with urination over the past week.   at the bedside reports that patient was recently hospitalized and discharged on February 6 for UTI with completion of antibiotics prior to discharge. Denies fevers, chills, headache, chest pain, palpitations, shortness of breath, cough, nausea, vomiting, focal neurologic symptoms. (13 Feb 2024 20:08)    2/17/2024:    Pulmonary consult called for evaluation of decreased O2 saturation (87% on RA). Chart reviewed.     Patient is being treated for pneumonia - possible recurrent aspiration. Admitted with hypothermia. Blood cultures have been negative. At the time of my evaluation the patient is awake and alert. She is breathing comfortably while laying in bed with the head of the bed elevated to about 30 degrees. She denies feeling short of breath. She has not been couching. Her  is at the bedside.     2/18/2024:    Breathing comfortably laying in bed with the head of the bed elevated to 30 degrees. O2 sat 98% on 3 LPM nasal cannula O2; RA O2 sat 87%. Temperature 96.3 - 97.3. Patient offers no complaints.      February 19, 2024:    Sitting up in bed; eating breakfast. No shortness of breath. Awake / alert.  Wants to get out of bed.       PAST MEDICAL & SURGICAL HISTORY:    History of pneumonia with complicated left parapneumonic effusion March 2023.    Osteoarthritis      Osteopenia      Deep vein thrombosis (DVT)      S/P Hysterectomy  2007      Lumbar Laminectomy/ Spinal Fusion  1999      S/P Arthroscopy of Right Knee  1998      S/P Appendectomy  @ 12 years old      S/P Tonsillectomy  @ 2 years old          O: T(C): 36.3 (02-19-24 @ 07:50), Max: 37 (02-18-24 @ 19:50)  HR: 69 (02-19-24 @ 07:50) (69 - 85)  BP: 143/54 (02-19-24 @ 07:50) (143/54 - 147/61)  RR: 18 (02-19-24 @ 07:50) (18 - 18)  SpO2: 96% (02-19-24 @ 07:50) (91% - 96%)  Wt(kg): --    PHYSICAL EXAM:      GENERAL: comfortable; weak; frail    NEURO: sleeping on my arrival but easily arousable; oriented to person and place - recognizes me by name.     NECK: no JVD    CHEST: diminished at bases; no wheezing    CARDIAC: RR    EXT: no edema.     LABS:                          8.5    4.50  )-----------( 144      ( 18 Feb 2024 07:35 )             26.4       02-17    142  |  107  |  17  ----------------------------<  80  3.7   |  31  |  0.66    Ca    9.4      17 Feb 2024 17:08    TPro  5.9<L>  /  Alb  2.6<L>  /  TBili  0.6  /  DBili  x   /  AST  15  /  ALT  43  /  AlkPhos  203<H>  02-17    Pro-Brain Natriuretic Peptide (02.17.24 @ 17:08)   Pro-Brain Natriuretic Peptide: 3012 pg/mL  Thyroid Stimulating Hormone, Serum (02.13.24 @ 16:27)   Thyroid Stimulating Hormone, Serum: 2.02 uU/mL  Flu With COVID-19 By RICARDO (02.13.24 @ 17:38)   SARS-CoV-2 Result: NotDetec.  Influenza A Result: NotDetec  Influenza B Result: NotDetec  Resp Syn Virus Result: NotDetec  Urinalysis (02.13.24 @ 16:27)   pH Urine: 6.0  Glucose Qualitative, Urine: Negative mg/dL  Blood, Urine: Negative  Color: Yellow  Urine Appearance: Clear  Bilirubin: Negative  Ketone - Urine: Negative mg/dL  Specific Gravity: 1.010  Protein, Urine: Negative mg/dL  Urobilinogen: 0.2 mg/dL  Nitrite: Negative  Leukocyte Esterase Concentration: Negative  Culture - Urine (02.13.24 @ 16:27)   Specimen Source: Clean Catch None  Culture Results:   No growth  Culture - Blood (02.13.24 @ 16:27)   Specimen Source: .Blood None  Culture Results:   No growth at 72 Hours    Culture - Blood (02.13.24 @ 16:27)   Specimen Source: .Blood None  Culture Results:   No growth at 72 Hours      RADIOLOGY:      EXAM:  CT CHEST   ORDERED BY: RAJEEV AGUILAR   PROCEDURE DATE:  02/16/2024      INTERPRETATION:  INDICATION: Hypoxia    TECHNIQUE: Volumetric images of the chest without intravenous contrast.   Maximum intensity projection images were generated.    COMPARISON: CT chest November 10, 2023.    FINDINGS:    CARDIOVASCULAR, MEDIASTINUM:    Vasculature:  Thoracic aorta: Normal in caliber and course with atherosclerotic   calcifications. Mild aortic valve calcifications.    Heart and pericardium: Cardiomegaly. Coronary artery calcifications.  Intracardiac blood is less dense than myocardium, suggestive of anemia.    No pericardial effusion.    Esophagus: Normally decompressed.    LYMPH NODES: No mediastinal or axillarylymphadenopathy. No gross hilar   adenopathy.    LUNGS/AIRWAYS/PLEURA: Patent central airways. Small bilateral pleural   effusions with subjacent compressive atelectasis. There are patchy   bilateral central groundglass opacities. Mild biapical fibrotic changes.    UPPER ABDOMEN: Evaluation of the partially-imaged upper abdomen   demonstrates no acute abnormality.    BONES: No acute abnormality.    IMPRESSION:    CT findings are suggestive of pulmonary edema. Small bilateral pleural   effusions.    BEATRIZ LORA DO; Attending Radiologist            EXAM:  XR CHEST PORTABLE URGENT 1V   ORDERED BY: POPPY ANNA     PROCEDURE DATE:  02/13/2024          INTERPRETATION:  Chest one view    HISTORY: Sepsis    COMPARISON STUDY: 2/3/2024    Frontal expiratory view of the chest shows the heart to be similarly   enlarged in size. The lungs show right lower lobe infiltrate and there is   no evidence of pneumothorax nor pleural effusion. 6.3 mm nodule projected   over the right lung base appears to be calcified.    IMPRESSION:  Right lower lobe infiltrate. Right nodule may be calcified.    The above findings were conveyed to the emergency department staff via   the hospital's image transfer system at the time of report dictation.        Thank you for the courtesy of this referral.      OSCAR VILLANUEVA MD; Attending Interventional Radiologist        MEDICATIONS  (STANDING):  albuterol    0.083% 2.5 milliGRAM(s) Nebulizer every 6 hours  apixaban 5 milliGRAM(s) Oral every 12 hours  cholecalciferol 1000 Unit(s) Oral daily  dorzolamide 2%/timolol 0.5% Ophthalmic Solution 1 Drop(s) Right EYE every 12 hours  furosemide   Injectable 20 milliGRAM(s) IV Push daily  latanoprost 0.005% Ophthalmic Solution 1 Drop(s) Both EYES at bedtime    MEDICATIONS  (PRN):  acetaminophen     Tablet .. 650 milliGRAM(s) Oral every 6 hours PRN Temp greater or equal to 38C (100.4F), Mild Pain (1 - 3)  aluminum hydroxide/magnesium hydroxide/simethicone Suspension 30 milliLiter(s) Oral every 4 hours PRN Dyspepsia  melatonin 3 milliGRAM(s) Oral at bedtime PRN Insomnia  ondansetron Injectable 4 milliGRAM(s) IV Push every 8 hours PRN Nausea and/or Vomiting  polyethylene glycol 3350 17 Gram(s) Oral daily PRN for constipation      Assessment/Plan: 1. Decreased O2 sat on RA; O2 sat 91 - 96% on 2 LPM nasal cannula O2.     2. Pneumonia - concern re: recurrent aspiration.     3. ? Sepsis - presented with hypothermia, leukopenia, bradycardia on 2/13; blood cultures negative x 2; urine culture negative.    4. Small bilateral pleural effusions; vascular congestion; compressive atelectasis on chest CT done 2/16. BNP 3012; Albumin 2.6.     5. Hx of DVT on Eliquis.     6. Chronic anemia.     7. Hypothermia.         PLAN: Low O2 sat likely secondary to multiple etiologies including basilar atelectasis contributed to by small pleural effusions, pneumonia, mild pulmonary edema in patient admitted with low albumen/sepsis syndrome, and probable recurrent aspiration. Off antibiotics. Now on Lasix 20 mg daily in view of mild vascular congestion/small pleural effusions on Chest CT, elevated BNP, low albumin; patient has mildly elevated systolic BP. Patient was given IV fluid on admission. Pleural effusions are too small to safely perform thoracentesis at this time. Follow up BMP; patient is unlikely to require prolonged treatment with diuretics. Continue nutritional support. Increase activity (OOB to chair); encourage incentive spirometry. Continue aspiration precautions. Continue O2 supplement as needed to maintain O2 sat; recheck RA O2 sat. Continue Eliquis. Evaluation of hypothermia per primary care team.

## 2024-02-20 ENCOUNTER — TRANSCRIPTION ENCOUNTER (OUTPATIENT)
Age: 89
End: 2024-02-20

## 2024-02-20 VITALS
HEART RATE: 70 BPM | DIASTOLIC BLOOD PRESSURE: 51 MMHG | SYSTOLIC BLOOD PRESSURE: 128 MMHG | TEMPERATURE: 97 F | OXYGEN SATURATION: 96 % | RESPIRATION RATE: 18 BRPM

## 2024-02-20 LAB
ANION GAP SERPL CALC-SCNC: 0 MMOL/L — LOW (ref 5–17)
BUN SERPL-MCNC: 28 MG/DL — HIGH (ref 7–23)
CALCIUM SERPL-MCNC: 9.2 MG/DL — SIGNIFICANT CHANGE UP (ref 8.5–10.1)
CHLORIDE SERPL-SCNC: 112 MMOL/L — HIGH (ref 96–108)
CO2 SERPL-SCNC: 31 MMOL/L — SIGNIFICANT CHANGE UP (ref 22–31)
CREAT SERPL-MCNC: 0.77 MG/DL — SIGNIFICANT CHANGE UP (ref 0.5–1.3)
EGFR: 74 ML/MIN/1.73M2 — SIGNIFICANT CHANGE UP
GLUCOSE SERPL-MCNC: 90 MG/DL — SIGNIFICANT CHANGE UP (ref 70–99)
HCT VFR BLD CALC: 28.4 % — LOW (ref 34.5–45)
HGB BLD-MCNC: 9 G/DL — LOW (ref 11.5–15.5)
MCHC RBC-ENTMCNC: 29.9 PG — SIGNIFICANT CHANGE UP (ref 27–34)
MCHC RBC-ENTMCNC: 31.7 GM/DL — LOW (ref 32–36)
MCV RBC AUTO: 94.4 FL — SIGNIFICANT CHANGE UP (ref 80–100)
PLATELET # BLD AUTO: 167 K/UL — SIGNIFICANT CHANGE UP (ref 150–400)
POTASSIUM SERPL-MCNC: 3.8 MMOL/L — SIGNIFICANT CHANGE UP (ref 3.5–5.3)
POTASSIUM SERPL-SCNC: 3.8 MMOL/L — SIGNIFICANT CHANGE UP (ref 3.5–5.3)
RBC # BLD: 3.01 M/UL — LOW (ref 3.8–5.2)
RBC # FLD: 14.3 % — SIGNIFICANT CHANGE UP (ref 10.3–14.5)
SODIUM SERPL-SCNC: 143 MMOL/L — SIGNIFICANT CHANGE UP (ref 135–145)
WBC # BLD: 4.28 K/UL — SIGNIFICANT CHANGE UP (ref 3.8–10.5)
WBC # FLD AUTO: 4.28 K/UL — SIGNIFICANT CHANGE UP (ref 3.8–10.5)

## 2024-02-20 PROCEDURE — 99239 HOSP IP/OBS DSCHRG MGMT >30: CPT

## 2024-02-20 RX ORDER — APIXABAN 2.5 MG/1
1 TABLET, FILM COATED ORAL
Qty: 0 | Refills: 0 | DISCHARGE
Start: 2024-02-20

## 2024-02-20 RX ADMIN — APIXABAN 5 MILLIGRAM(S): 2.5 TABLET, FILM COATED ORAL at 09:29

## 2024-02-20 RX ADMIN — ALBUTEROL 2.5 MILLIGRAM(S): 90 AEROSOL, METERED ORAL at 02:19

## 2024-02-20 RX ADMIN — DORZOLAMIDE HYDROCHLORIDE TIMOLOL MALEATE 1 DROP(S): 20; 5 SOLUTION/ DROPS OPHTHALMIC at 09:31

## 2024-02-20 RX ADMIN — ALBUTEROL 2.5 MILLIGRAM(S): 90 AEROSOL, METERED ORAL at 14:36

## 2024-02-20 RX ADMIN — ALBUTEROL 2.5 MILLIGRAM(S): 90 AEROSOL, METERED ORAL at 09:21

## 2024-02-20 RX ADMIN — Medication 1000 UNIT(S): at 09:29

## 2024-02-20 NOTE — DISCHARGE NOTE PROVIDER - NSDCFUSCHEDAPPT_GEN_ALL_CORE_FT
[facility]  [facility] [address] [facility]  Scheduled Appointment: 03/18/2024

## 2024-02-20 NOTE — DISCHARGE NOTE PROVIDER - HOSPITAL COURSE
88-year-old female with history of dementia, DVT on Eliquis presenting with generalized weakness and burning with urination over the past week.   at the bedside reports that patient was recently hospitalized and discharged on February 6 for UTI with completion of antibiotics prior to discharge. Denies fevers, chills, headache, chest pain, palpitations, shortness of breath, cough, nausea, vomiting, focal neurologic symptoms. (13 Feb 2024 20:08)  Received Lasix for diastolic HF, improved, but remains mildly hypoxic. Overall improved                            9.0    4.28  )-----------( 167      ( 20 Feb 2024 07:16 )             28.4       Sepsis of unknown origin  -Hypothermic, bradycardic, leukopenic on presentation.  cxr poss PNA- Ceftriaxone, Zithro- change to ceftin- course completed    Hypothermia  diff diagnosis: elderly patient ( age), hypopituitarism, hypoadrenalism, hypothyroidism, malnutrition, inactivity, parkinsonism  sent endocrine blood work nl randol cortisol to f/up as OP    Hypoxi sec to diastolic HF and atelectasis  home O2 incentive esther    Normocytic anemia  prev Hb was in the 9 range  repeat in am    Acute hypoxic resp failure  most likely atelectasis, STEFANI did not  large effusion  chest PT and nebs  Lasix was added  off Lasix for now seems maximived on the fluid removal                 88-year-old female with history of dementia, DVT on Eliquis presenting with generalized weakness and burning with urination over the past week.   at the bedside reports that patient was recently hospitalized and discharged on February 6 for UTI with completion of antibiotics prior to discharge. Denies fevers, chills, headache, chest pain, palpitations, shortness of breath, cough, nausea, vomiting, focal neurologic symptoms. (13 Feb 2024 20:08)  Received Lasix for diastolic HF, improved, but remains mildly hypoxic. Overall improved    Vital Signs Last 24 Hrs  T(C): 36.3 (20 Feb 2024 09:21), Max: 36.4 (19 Feb 2024 23:32)  T(F): 97.4 (20 Feb 2024 09:21), Max: 97.6 (19 Feb 2024 23:32)  HR: 70 (20 Feb 2024 09:21) (59 - 70)  BP: 128/51 (20 Feb 2024 09:21) (128/51 - 131/48)  BP(mean): --  RR: 18 (20 Feb 2024 09:21) (18 - 18)  SpO2: 96% (20 Feb 2024 09:21) (85% - 97%)    Parameters below as of 20 Feb 2024 09:21  Patient On (Oxygen Delivery Method): nasal cannula  O2 Flow (L/min): 1                          9.0    4.28  )-----------( 167      ( 20 Feb 2024 07:16 )             28.4       Sepsis of unknown origin  -Hypothermic, bradycardic, leukopenic on presentation.  cxr poss PNA- Ceftriaxone, Zithro- change to ceftin- course completed    Hypothermia  diff diagnosis: elderly patient ( age), hypopituitarism, hypoadrenalism, hypothyroidism, malnutrition, inactivity, parkinsonism  sent endocrine blood work nl randol cortisol to f/up as OP    Hypoxic sec to diastolic HF and atelectasis  patient's proBNP at 3200, scan suggestive of pleural effusions, responded well to lasix, will f/up qith DR Watson for further evaluation  home O2 incentive esther    Normocytic anemia  prev Hb was in the 9 range  repeat in am    Acute hypoxic resp failure  most likely atelectasis, STEFANI did not  large effusion  chest PT and nebs  Lasix was added  off Lasix for now seems maximived on the fluid removal

## 2024-02-20 NOTE — DISCHARGE NOTE PROVIDER - NSDCMRMEDTOKEN_GEN_ALL_CORE_FT
apixaban 5 mg oral tablet: 1 tab(s) orally every 12 hours  dorzolamide-timolol 2.23%-0.68% (2%-0.5% base) ophthalmic solution: 1 drop(s) in the right eye 2 times a day  latanoprost 0.005% ophthalmic solution: 1 drop(s) in each eye once a day (at bedtime)  polyethylene glycol 3350 oral powder for reconstitution: 17 gram(s) orally once a day as needed for  constipation  Vitamin D3 10 mcg (400 intl units) oral capsule: 2 cap(s) orally once a day

## 2024-02-20 NOTE — PROGRESS NOTE ADULT - SUBJECTIVE AND OBJECTIVE BOX
OTTO PIÑA  MRN: [id number]    S: HPI: 88-year-old female with history of dementia, DVT on Eliquis presenting with generalized weakness and burning with urination over the past week.   at the bedside reports that patient was recently hospitalized and discharged on February 6 for UTI with completion of antibiotics prior to discharge. Denies fevers, chills, headache, chest pain, palpitations, shortness of breath, cough, nausea, vomiting, focal neurologic symptoms. (13 Feb 2024 20:08)    2/17/2024:    Pulmonary consult called for evaluation of decreased O2 saturation (87% on RA). Chart reviewed.     Patient is being treated for pneumonia - possible recurrent aspiration. Admitted with hypothermia. Blood cultures have been negative. At the time of my evaluation the patient is awake and alert. She is breathing comfortably while laying in bed with the head of the bed elevated to about 30 degrees. She denies feeling short of breath. She has not been couching. Her  is at the bedside.     2/18/2024:    Breathing comfortably laying in bed with the head of the bed elevated to 30 degrees. O2 sat 98% on 3 LPM nasal cannula O2; RA O2 sat 87%. Temperature 96.3 - 97.3. Patient offers no complaints.      February 19, 2024:    Sitting up in bed; eating breakfast. No shortness of breath. Awake / alert.  Wants to get out of bed.    February 20, 2024:    No complaints. Sitting up in bed eating breakfast. Denies shortness of breath. No cough this morning.        PAST MEDICAL & SURGICAL HISTORY:    History of pneumonia with complicated left parapneumonic effusion March 2023.    Osteopenia      Deep vein thrombosis (DVT)      S/P Hysterectomy  2007      Lumbar Laminectomy/ Spinal Fusion  1999      S/P Arthroscopy of Right Knee  1998      S/P Appendectomy  @ 12 years old      S/P Tonsillectomy  @ 2 years old          O: T(C): 36.4 (02-19-24 @ 23:32), Max: 36.4 (02-19-24 @ 23:32)  HR: 62 (02-20-24 @ 02:19) (59 - 66)  BP: 131/48 (02-19-24 @ 23:32) (131/48 - 144/60)  RR: 18 (02-19-24 @ 23:32) (18 - 18)  SpO2: 97% (02-20-24 @ 02:19) (93% - 97%)  Wt(kg): --    PHYSICAL EXAM:      GENERAL: comfortable; weak; frail    NEURO: sleeping on my arrival but easily arousable; oriented to person and place - recognizes me by name.     NECK: no JVD    CHEST: clear; equal bilaterally    CARDIAC: RR    EXT: no edema.     LABS:                          9.0    4.28  )-----------( 167      ( 20 Feb 2024 07:16 )             28.4       02-20    143  |  112<H>  |  28<H>  ----------------------------<  90  3.8   |  31  |  0.77    Ca    9.2      20 Feb 2024 07:16    RADIOLOGY:      EXAM:  CT CHEST   ORDERED BY: RAJEEV AGUILAR   PROCEDURE DATE:  02/16/2024      INTERPRETATION:  INDICATION: Hypoxia    TECHNIQUE: Volumetric images of the chest without intravenous contrast.   Maximum intensity projection images were generated.    COMPARISON: CT chest November 10, 2023.    FINDINGS:    CARDIOVASCULAR, MEDIASTINUM:    Vasculature:  Thoracic aorta: Normal in caliber and course with atherosclerotic   calcifications. Mild aortic valve calcifications.    Heart and pericardium: Cardiomegaly. Coronary artery calcifications.  Intracardiac blood is less dense than myocardium, suggestive of anemia.    No pericardial effusion.    Esophagus: Normally decompressed.    LYMPH NODES: No mediastinal or axillarylymphadenopathy. No gross hilar   adenopathy.    LUNGS/AIRWAYS/PLEURA: Patent central airways. Small bilateral pleural   effusions with subjacent compressive atelectasis. There are patchy   bilateral central groundglass opacities. Mild biapical fibrotic changes.    UPPER ABDOMEN: Evaluation of the partially-imaged upper abdomen   demonstrates no acute abnormality.    BONES: No acute abnormality.    IMPRESSION:    CT findings are suggestive of pulmonary edema. Small bilateral pleural   effusions.    BEATRIZ LORA DO; Attending Radiologist            EXAM:  XR CHEST PORTABLE URGENT 1V   ORDERED BY: POPPY ANNA     PROCEDURE DATE:  02/13/2024          INTERPRETATION:  Chest one view    HISTORY: Sepsis    COMPARISON STUDY: 2/3/2024    Frontal expiratory view of the chest shows the heart to be similarly   enlarged in size. The lungs show right lower lobe infiltrate and there is   no evidence of pneumothorax nor pleural effusion. 6.3 mm nodule projected   over the right lung base appears to be calcified.    IMPRESSION:  Right lower lobe infiltrate. Right nodule may be calcified.    The above findings were conveyed to the emergency department staff via   the hospital's image transfer system at the time of report dictation.        Thank you for the courtesy of this referral.      OSCAR VILLANUEVA MD; Attending Interventional Radiologist      MEDICATIONS  (STANDING):  albuterol    0.083% 2.5 milliGRAM(s) Nebulizer every 6 hours  apixaban 5 milliGRAM(s) Oral every 12 hours  cholecalciferol 1000 Unit(s) Oral daily  dorzolamide 2%/timolol 0.5% Ophthalmic Solution 1 Drop(s) Right EYE every 12 hours  latanoprost 0.005% Ophthalmic Solution 1 Drop(s) Both EYES at bedtime    MEDICATIONS  (PRN):  acetaminophen     Tablet .. 650 milliGRAM(s) Oral every 6 hours PRN Temp greater or equal to 38C (100.4F), Mild Pain (1 - 3)  aluminum hydroxide/magnesium hydroxide/simethicone Suspension 30 milliLiter(s) Oral every 4 hours PRN Dyspepsia  melatonin 3 milliGRAM(s) Oral at bedtime PRN Insomnia  ondansetron Injectable 4 milliGRAM(s) IV Push every 8 hours PRN Nausea and/or Vomiting  polyethylene glycol 3350 17 Gram(s) Oral daily PRN for constipation        Assessment/Plan: 1. O2 sat 93 - 97% on 2 LPM N.C.. Check RA O2 sat. Would encourage incentive spirometry. Increase activity. No evidence of acute pulmonary pathology.     2. Pneumonia - concern re: recurrent aspiration. Currently no evidence of pneumonia.     3. Small bilateral pleural effusions; vascular congestion; compressive atelectasis on chest CT done 2/16. D/C Lasix - no evidence of fluid overload at this time. Follow up CXR.     5. Hx of DVT on Eliquis.     6. Chronic anemia.     7. Hypothermia - resolved.      8. Discharge planning per hospitalist team. Will benefit from physical therapy.

## 2024-02-20 NOTE — DISCHARGE NOTE PROVIDER - CARE PROVIDER_API CALL
Lavon Gleason  Internal Medicine  [address] [place] [id number]  Phone: [phone]  Fax: [phone]  Follow Up Time: 1 week

## 2024-02-20 NOTE — DISCHARGE NOTE PROVIDER - NSDCFUADDAPPT_GEN_ALL_CORE_FT
Dr Bg Villasenor  [facility] - [address], [address]  [phone]  Friday March 1, 2024 @1:45pm

## 2024-02-20 NOTE — CHART NOTE - NSCHARTNOTEFT_GEN_A_CORE
Patient's O2 sat is 86%% on room air at rest. Patient has been diagnosed with diastolic heart failure and will therefore need home O2. Patient is aware that he/she will be going home on oxygen. Patient was tested in a chronic stable state. Patient's O2 sat is 86%% on room air at rest. On 1 L NC she is 94%. Patient has been diagnosed with diastolic heart failure and will therefore need home O2. Patient is aware that he/she will be going home on oxygen. Patient was tested in a chronic stable state.

## 2024-02-20 NOTE — PROGRESS NOTE ADULT - PROVIDER SPECIALTY LIST ADULT
Hospitalist
Hospitalist
Infectious Disease
Pulmonology
Hospitalist
Infectious Disease
Pulmonology
Pulmonology
Hospitalist
Hospitalist

## 2024-02-20 NOTE — DISCHARGE NOTE PROVIDER - NSDCCPCAREPLAN_GEN_ALL_CORE_FT
PRINCIPAL DISCHARGE DIAGNOSIS  Diagnosis: Right lower lobe pneumonia  Assessment and Plan of Treatment: course completed. F/up as outpatient re: low temp     PRINCIPAL DISCHARGE DIAGNOSIS  Diagnosis: Right lower lobe pneumonia  Assessment and Plan of Treatment: course completed. F/up as outpatient re: low temp. Follow up with Dr Watson for the elevated BNP and hypoxia poss cardiac evaluation; it is possible the atelectasis caused the hypoxia and further  work up

## 2024-02-21 ENCOUNTER — TRANSCRIPTION ENCOUNTER (OUTPATIENT)
Age: 89
End: 2024-02-21

## 2024-02-25 ENCOUNTER — TRANSCRIPTION ENCOUNTER (OUTPATIENT)
Age: 89
End: 2024-02-25

## 2024-02-26 ENCOUNTER — APPOINTMENT (OUTPATIENT)
Dept: CARE COORDINATION | Facility: HOME HEALTH | Age: 89
End: 2024-02-26
Payer: MEDICARE

## 2024-02-26 VITALS
OXYGEN SATURATION: 96 % | TEMPERATURE: 96.5 F | SYSTOLIC BLOOD PRESSURE: 120 MMHG | HEART RATE: 58 BPM | RESPIRATION RATE: 16 BRPM | DIASTOLIC BLOOD PRESSURE: 62 MMHG

## 2024-02-26 DIAGNOSIS — Z91.040 LATEX ALLERGY STATUS: ICD-10-CM

## 2024-02-26 DIAGNOSIS — J18.9 PNEUMONIA, UNSPECIFIED ORGANISM: ICD-10-CM

## 2024-02-26 DIAGNOSIS — I82.401 ACUTE EMBOLISM AND THROMBOSIS OF UNSPECIFIED DEEP VEINS OF RIGHT LOWER EXTREMITY: ICD-10-CM

## 2024-02-26 DIAGNOSIS — E23.0 HYPOPITUITARISM: ICD-10-CM

## 2024-02-26 DIAGNOSIS — J98.11 ATELECTASIS: ICD-10-CM

## 2024-02-26 DIAGNOSIS — G92.8 OTHER TOXIC ENCEPHALOPATHY: ICD-10-CM

## 2024-02-26 DIAGNOSIS — J96.01 ACUTE RESPIRATORY FAILURE WITH HYPOXIA: ICD-10-CM

## 2024-02-26 DIAGNOSIS — E88.09 OTHER DISORDERS OF PLASMA-PROTEIN METABOLISM, NOT ELSEWHERE CLASSIFIED: ICD-10-CM

## 2024-02-26 DIAGNOSIS — E27.40 UNSPECIFIED ADRENOCORTICAL INSUFFICIENCY: ICD-10-CM

## 2024-02-26 DIAGNOSIS — J90 PLEURAL EFFUSION, NOT ELSEWHERE CLASSIFIED: ICD-10-CM

## 2024-02-26 DIAGNOSIS — Z79.899 OTHER LONG TERM (CURRENT) DRUG THERAPY: ICD-10-CM

## 2024-02-26 DIAGNOSIS — D64.9 ANEMIA, UNSPECIFIED: ICD-10-CM

## 2024-02-26 DIAGNOSIS — Z86.718 PERSONAL HISTORY OF OTHER VENOUS THROMBOSIS AND EMBOLISM: ICD-10-CM

## 2024-02-26 DIAGNOSIS — Z90.49 ACQUIRED ABSENCE OF OTHER SPECIFIED PARTS OF DIGESTIVE TRACT: ICD-10-CM

## 2024-02-26 DIAGNOSIS — M85.80 OTHER SPECIFIED DISORDERS OF BONE DENSITY AND STRUCTURE, UNSPECIFIED SITE: ICD-10-CM

## 2024-02-26 DIAGNOSIS — E03.9 HYPOTHYROIDISM, UNSPECIFIED: ICD-10-CM

## 2024-02-26 DIAGNOSIS — G30.9 ALZHEIMER'S DISEASE, UNSPECIFIED: ICD-10-CM

## 2024-02-26 DIAGNOSIS — Z79.01 LONG TERM (CURRENT) USE OF ANTICOAGULANTS: ICD-10-CM

## 2024-02-26 DIAGNOSIS — Z98.1 ARTHRODESIS STATUS: ICD-10-CM

## 2024-02-26 DIAGNOSIS — Z91.048 OTHER NONMEDICINAL SUBSTANCE ALLERGY STATUS: ICD-10-CM

## 2024-02-26 DIAGNOSIS — F02.80 DEMENTIA IN OTHER DISEASES CLASSIFIED ELSEWHERE, UNSPECIFIED SEVERITY, WITHOUT BEHAVIORAL DISTURBANCE, PSYCHOTIC DISTURBANCE, MOOD DISTURBANCE, AND ANXIETY: ICD-10-CM

## 2024-02-26 DIAGNOSIS — E43 UNSPECIFIED SEVERE PROTEIN-CALORIE MALNUTRITION: ICD-10-CM

## 2024-02-26 DIAGNOSIS — Z90.710 ACQUIRED ABSENCE OF BOTH CERVIX AND UTERUS: ICD-10-CM

## 2024-02-26 DIAGNOSIS — A41.9 SEPSIS, UNSPECIFIED ORGANISM: ICD-10-CM

## 2024-02-26 DIAGNOSIS — G20.A1 PARKINSON'S DISEASE WITHOUT DYSKINESIA, WITHOUT MENTION OF FLUCTUATIONS: ICD-10-CM

## 2024-02-26 PROCEDURE — 99495 TRANSJ CARE MGMT MOD F2F 14D: CPT

## 2024-02-26 RX ORDER — DONEPEZIL HYDROCHLORIDE 5 MG/1
5 TABLET ORAL
Refills: 0 | Status: DISCONTINUED | COMMUNITY
Start: 2023-03-16 | End: 2024-02-26

## 2024-02-26 RX ORDER — FUROSEMIDE 20 MG/1
20 TABLET ORAL
Refills: 0 | Status: DISCONTINUED | COMMUNITY
Start: 2023-03-16 | End: 2024-02-26

## 2024-02-26 RX ORDER — APIXABAN 5 MG/1
5 TABLET, FILM COATED ORAL
Refills: 0 | Status: ACTIVE | COMMUNITY
Start: 2023-03-16

## 2024-02-26 RX ORDER — ALBUTEROL SULFATE 90 UG/1
108 (90 BASE) INHALANT RESPIRATORY (INHALATION) EVERY 6 HOURS
Qty: 1 | Refills: 0 | Status: DISCONTINUED | COMMUNITY
Start: 2023-03-16 | End: 2024-02-26

## 2024-02-26 RX ORDER — AMOXICILLIN AND CLAVULANATE POTASSIUM 875; 125 MG/1; MG/1
875-125 TABLET, COATED ORAL
Qty: 14 | Refills: 0 | Status: DISCONTINUED | COMMUNITY
Start: 2023-03-16 | End: 2024-02-26

## 2024-02-26 RX ORDER — PANTOPRAZOLE 40 MG/1
40 TABLET, DELAYED RELEASE ORAL DAILY
Qty: 30 | Refills: 0 | Status: DISCONTINUED | COMMUNITY
Start: 2023-03-16 | End: 2024-02-26

## 2024-02-26 RX ORDER — IPRATROPIUM BROMIDE 42 UG/1
0.06 SPRAY NASAL
Refills: 0 | Status: DISCONTINUED | COMMUNITY
Start: 2023-03-16 | End: 2024-02-26

## 2024-02-26 NOTE — PHYSICAL EXAM
[No Acute Distress] : no acute distress [Well Developed] : well developed [Normal Sclera/Conjunctiva] : normal sclera/conjunctiva [Normal Outer Ear/Nose] : the outer ears and nose were normal in appearance [Normal Oropharynx] : the oropharynx was normal [Supple] : supple [No Respiratory Distress] : no respiratory distress  [Clear to Auscultation] : lungs were clear to auscultation bilaterally [No Accessory Muscle Use] : no accessory muscle use [Normal Rate] : normal rate  [Regular Rhythm] : with a regular rhythm [Normal S1, S2] : normal S1 and S2 [Pedal Pulses Present] : the pedal pulses are present [Soft] : abdomen soft [No Extremity Clubbing/Cyanosis] : no extremity clubbing/cyanosis [Non Tender] : non-tender [Non-distended] : non-distended [Normal Bowel Sounds] : normal bowel sounds [Grossly Normal Strength/Tone] : grossly normal strength/tone [Coordination Grossly Intact] : coordination grossly intact [No Rash] : no rash [No Focal Deficits] : no focal deficits [Normal Affect] : the affect was normal [de-identified] : no thrush [de-identified] : 1+ R ankle

## 2024-02-26 NOTE — PLAN
[FreeTextEntry1] : A/P: # PNA: - s/p course IV ABT in hospital - possible aspiration, maintain aspiration precautions - upright while eating & for 30 minutes after - s/p Lasix for effusion - call for worsening of sx - f/u Dr. Gleason tomorrow 2/27  # Acute DVT R soleal vein: - h/o DVT in past - con't eliquis, f/u PCP for further monitoring

## 2024-02-26 NOTE — HISTORY OF PRESENT ILLNESS
[Post-hospitalization from ___ Hospital] : Post-hospitalization from [unfilled] Hospital [Admitted on: ___] : The patient was admitted on [unfilled] [Discharged on ___] : discharged on [unfilled] [Discharge Summary] : discharge summary [Discharge Med List] : discharge medication list [Med Reconciliation] : medication reconciliation has been completed [Patient Contacted By: ____] : and contacted by [unfilled] [FreeTextEntry2] : FROM [facility] NOTE PROVIDER: 88-year-old female with history of dementia, DVT on Eliquis presenting with generalized weakness and burning with urination over the past week.   at the bedside reports that patient was recently hospitalized and discharged on February 6 for UTI with completion of antibiotics prior to discharge. Denies fevers, chills, headache, chest pain, palpitations, shortness of breath, cough, nausea, vomiting, focal neurologic symptoms. (13 Feb 2024 20:08) Received Lasix for diastolic HF, improved, but remains mildly hypoxic. Overall improved ADMITTED WITH: Sepsis of unknown origin -Hypothermic, bradycardic, leukopenic on presentation. cxr poss PNA- Ceftriaxone, Zithro- change to ceftin- course completed  Hypothermia diff diagnosis: elderly patient ( age), hypopituitarism, hypoadrenalism, hypothyroidism, malnutrition, inactivity, parkinsonism sent endocrine blood work nl randol cortisol to f/up as OP  Hypoxic sec to diastolic HF and atelectasis patient's proBNP at 3200, scan suggestive of pleural effusions, responded well to lasix, will f/up qith DR Watson for further evaluation home O2 incentive esther  Normocytic anemia prev Hb was in the 9 range repeat in am  Acute hypoxic resp failure most likely atelectasis, STEFANI did not  large effusion chest PT and nebs Lasix was added off Lasix for now seems maximived on the fluid removal  CC: Pt is seen at home s/p recent admission for PNA. Pt is temporarily unable to leave home as it requires a considerable and taxing effort, exhibits unsteady gait and needs assistive device to leave home. HPI: Pt Is a 89 y/o female enrolled in the [facility] seen at home s/p a recent admission for PNA. Pt was contacted by TCM RN on 2/22 and med rec was done within 48 hours of DC. Discharge 1 week prior on 2/6 for UTI  Upon exam awake and alert NAD.  provided detailed history, he is primary caregiver. Overall she feels better than prior to hospital, continues with intermittent hypothermia to f/u with endocrine. PNA: completed all ABT in hospital, presumed to be aspiration. Acute R soleal vein DVT on eliquis, Dementia: mild supportive care. Dysphagia: aspiration precautions. [facility] [place]

## 2024-03-01 ENCOUNTER — TRANSCRIPTION ENCOUNTER (OUTPATIENT)
Age: 89
End: 2024-03-01

## 2024-03-12 ENCOUNTER — TRANSCRIPTION ENCOUNTER (OUTPATIENT)
Age: 89
End: 2024-03-12

## 2024-03-18 ENCOUNTER — APPOINTMENT (OUTPATIENT)
Dept: ORTHOPEDIC SURGERY | Facility: CLINIC | Age: 89
End: 2024-03-18
Payer: MEDICARE

## 2024-03-18 VITALS — HEIGHT: 64 IN | WEIGHT: 117 LBS | BODY MASS INDEX: 19.97 KG/M2

## 2024-03-18 DIAGNOSIS — S72.341D DISPLACED SPIRAL FRACTURE OF SHAFT OF RIGHT FEMUR, SUBSEQUENT ENCOUNTER FOR CLOSED FRACTURE WITH ROUTINE HEALING: ICD-10-CM

## 2024-03-18 PROCEDURE — 99213 OFFICE O/P EST LOW 20 MIN: CPT

## 2024-03-18 PROCEDURE — 73564 X-RAY EXAM KNEE 4 OR MORE: CPT | Mod: RT

## 2024-03-18 NOTE — HISTORY OF PRESENT ILLNESS
[Rest] : rest [Meds] : meds [] : Post Surgical Visit: yes [0] : 0 [FreeTextEntry5] : 87 y/o F presents for PO #3 eval of her Rt. femur today. s/p ORIF Rt femur on DOS noted above. Pt reports no pain. She is using a walker for balance. Tylenol as needed.  PT 2x a week. [de-identified] : 11/12/23 [de-identified] : ORIF Rt. Femur

## 2024-03-18 NOTE — ASSESSMENT
[FreeTextEntry1] : Patient comes in today for follow-up on her right knee.  She is now 4 and half months out from her open reduction internal fixation of a right distal femoral periprosthetic fracture around the knee replacement.  Overall she is doing well.  She has minimal pain.  She is doing physical therapy intermittently.  She is walking with a walker.  Examination of the right lower extremity reveals normal neurovascular exam.  Her scars are well-healed.  Examination of right knee reveals range of motion 0 to 120 degrees.  Her right foot externally rotates at 90 degrees about 10 degrees.  There is no instability or apprehension.  X-rays done in the office today of the right knee 4 views weightbearing show a right total knee prosthesis in normal alignment in good position with no signs of loosening or wear.  Her distal femoral fracture is healed with good callus formation and appropriate alignment and position.  The hardware is in good position.  There are no obvious tumors, masses or calcifications seen.  Plan at this time is physical therapy twice a week.  Will see her back in the office as needed.

## 2024-03-19 ENCOUNTER — TRANSCRIPTION ENCOUNTER (OUTPATIENT)
Age: 89
End: 2024-03-19

## 2024-05-17 ENCOUNTER — NON-APPOINTMENT (OUTPATIENT)
Age: 89
End: 2024-05-17

## 2024-05-17 ENCOUNTER — APPOINTMENT (OUTPATIENT)
Dept: OPHTHALMOLOGY | Facility: CLINIC | Age: 89
End: 2024-05-17
Payer: MEDICARE

## 2024-05-17 PROCEDURE — 92133 CPTRZD OPH DX IMG PST SGM ON: CPT

## 2024-05-17 PROCEDURE — 92014 COMPRE OPH EXAM EST PT 1/>: CPT

## 2024-07-21 NOTE — DIETITIAN NUTRITION RISK NOTIFICATION - PHYSICAL ASSESSMENT ORBITAL
7/8. Planning to repeat GL on 7/25 with Endo labs  Continue KCL 2meq/kg/day (last weaned on 5/28)  Continue NaCl at 8 meq/kg/day  Weight adjust next at 8 kg, then every 1 kg; current MCW is 7.5 kg   severe

## 2024-08-20 ENCOUNTER — NON-APPOINTMENT (OUTPATIENT)
Age: 89
End: 2024-08-20

## 2024-08-20 ENCOUNTER — APPOINTMENT (OUTPATIENT)
Dept: OPHTHALMOLOGY | Facility: CLINIC | Age: 89
End: 2024-08-20
Payer: MEDICARE

## 2024-08-20 PROCEDURE — 92012 INTRM OPH EXAM EST PATIENT: CPT

## 2024-08-20 PROCEDURE — 92083 EXTENDED VISUAL FIELD XM: CPT

## 2024-10-02 NOTE — OCCUPATIONAL THERAPY INITIAL EVALUATION ADULT - PATIENT PROFILE REVIEW, REHAB EVAL
Uncertain control, continue current dose pravastatin, and recheck fasting lipid profile before next follow-up visit.   yes

## 2024-10-18 NOTE — H&P ADULT - NSHPPOAPULMEMBOLUS_GEN_A_CORE
Patient has induction scheduled for 10/22/24 - felt wetness this afternoon - advised patient to keep an eye on it and if it progresses and/or anything changes to head to Labor and Delivery.   no

## 2024-11-26 ENCOUNTER — APPOINTMENT (OUTPATIENT)
Dept: OPHTHALMOLOGY | Facility: CLINIC | Age: 89
End: 2024-11-26
Payer: MEDICARE

## 2024-11-26 ENCOUNTER — NON-APPOINTMENT (OUTPATIENT)
Age: 89
End: 2024-11-26

## 2024-11-26 PROCEDURE — 92014 COMPRE OPH EXAM EST PT 1/>: CPT

## 2024-11-26 PROCEDURE — 92133 CPTRZD OPH DX IMG PST SGM ON: CPT

## 2025-01-21 NOTE — DIETITIAN INITIAL EVALUATION ADULT - HEIGHT FOR BMI (INCHES)
Writer left a detailed message asking parent call back if patient is still having symptoms that they would like to discuss.    Closing encounter at this time   Reason for Disposition  • Second attempt to contact family AND no contact made. Phone number verified.    Protocols used: No Contact or Duplicate Contact Call-P-OH     2

## 2025-03-31 ENCOUNTER — APPOINTMENT (OUTPATIENT)
Dept: OPHTHALMOLOGY | Facility: CLINIC | Age: 89
End: 2025-03-31
Payer: MEDICARE

## 2025-03-31 ENCOUNTER — NON-APPOINTMENT (OUTPATIENT)
Age: 89
End: 2025-03-31

## 2025-03-31 PROCEDURE — 92012 INTRM OPH EXAM EST PATIENT: CPT

## 2025-06-08 NOTE — PHYSICAL THERAPY INITIAL EVALUATION ADULT - SITTING BALANCE: DYNAMIC
Alert and oriented, no focal deficits, no motor or sensory deficits. neurovascular intact. good minus

## 2025-07-30 ENCOUNTER — NON-APPOINTMENT (OUTPATIENT)
Age: 89
End: 2025-07-30

## 2025-07-30 ENCOUNTER — APPOINTMENT (OUTPATIENT)
Dept: OPHTHALMOLOGY | Facility: CLINIC | Age: 89
End: 2025-07-30
Payer: MEDICARE

## 2025-07-30 PROCEDURE — 92014 COMPRE OPH EXAM EST PT 1/>: CPT

## 2025-07-30 PROCEDURE — 92133 CPTRZD OPH DX IMG PST SGM ON: CPT
